# Patient Record
Sex: FEMALE | Race: BLACK OR AFRICAN AMERICAN | Employment: UNEMPLOYED | URBAN - METROPOLITAN AREA
[De-identification: names, ages, dates, MRNs, and addresses within clinical notes are randomized per-mention and may not be internally consistent; named-entity substitution may affect disease eponyms.]

---

## 2017-07-27 ENCOUNTER — HOSPITAL ENCOUNTER (OUTPATIENT)
Age: 32
Setting detail: SPECIMEN
Discharge: HOME | End: 2017-07-27
Payer: MEDICARE

## 2017-07-27 ENCOUNTER — OFFICE VISIT (OUTPATIENT)
Dept: INTERNAL MEDICINE | Age: 32
End: 2017-07-27
Payer: MEDICARE

## 2017-07-27 VITALS
BODY MASS INDEX: 32.96 KG/M2 | DIASTOLIC BLOOD PRESSURE: 69 MMHG | SYSTOLIC BLOOD PRESSURE: 108 MMHG | WEIGHT: 197.8 LBS | HEIGHT: 65 IN | HEART RATE: 81 BPM

## 2017-07-27 DIAGNOSIS — R10.84 GENERALIZED ABDOMINAL PAIN: ICD-10-CM

## 2017-07-27 DIAGNOSIS — E04.1 THYROID NODULE: ICD-10-CM

## 2017-07-27 DIAGNOSIS — G47.33 OSA (OBSTRUCTIVE SLEEP APNEA): ICD-10-CM

## 2017-07-27 DIAGNOSIS — J30.9 ALLERGIC RHINITIS, UNSPECIFIED ALLERGIC RHINITIS TRIGGER, UNSPECIFIED RHINITIS SEASONALITY: ICD-10-CM

## 2017-07-27 DIAGNOSIS — R35.89 POLYURIA: ICD-10-CM

## 2017-07-27 DIAGNOSIS — K21.9 GASTROESOPHAGEAL REFLUX DISEASE, ESOPHAGITIS PRESENCE NOT SPECIFIED: ICD-10-CM

## 2017-07-27 DIAGNOSIS — F33.1 MODERATE EPISODE OF RECURRENT MAJOR DEPRESSIVE DISORDER (HCC): Primary | ICD-10-CM

## 2017-07-27 DIAGNOSIS — E66.01 MORBID OBESITY DUE TO EXCESS CALORIES (HCC): ICD-10-CM

## 2017-07-27 DIAGNOSIS — K29.70 GASTRITIS WITHOUT BLEEDING, UNSPECIFIED CHRONICITY, UNSPECIFIED GASTRITIS TYPE: ICD-10-CM

## 2017-07-27 DIAGNOSIS — J30.9 ALLERGIC SINUSITIS: ICD-10-CM

## 2017-07-27 LAB
-: ABNORMAL
AMORPHOUS: ABNORMAL
BACTERIA: ABNORMAL
BILIRUBIN URINE: NEGATIVE
CASTS UA: ABNORMAL /LPF (ref 0–8)
COLOR: YELLOW
COMMENT UA: ABNORMAL
CRYSTALS, UA: ABNORMAL /HPF
EPITHELIAL CELLS UA: ABNORMAL /HPF (ref 0–5)
GLUCOSE URINE: NEGATIVE
HBA1C MFR BLD: 5.3 %
KETONES, URINE: NEGATIVE
LEUKOCYTE ESTERASE, URINE: ABNORMAL
LIPASE: 12 U/L (ref 13–60)
MUCUS: ABNORMAL
NITRITE, URINE: NEGATIVE
OTHER OBSERVATIONS UA: ABNORMAL
PH UA: 5.5 (ref 5–8)
PROTEIN UA: NEGATIVE
RBC UA: ABNORMAL /HPF (ref 0–4)
RENAL EPITHELIAL, UA: ABNORMAL /HPF
SPECIFIC GRAVITY UA: 1.03 (ref 1–1.03)
TRICHOMONAS: ABNORMAL
TSH SERPL DL<=0.05 MIU/L-ACNC: 1.69 MIU/L (ref 0.3–5)
TURBIDITY: ABNORMAL
URINE HGB: NEGATIVE
UROBILINOGEN, URINE: NORMAL
WBC UA: ABNORMAL /HPF (ref 0–5)
YEAST: ABNORMAL

## 2017-07-27 PROCEDURE — 82306 VITAMIN D 25 HYDROXY: CPT

## 2017-07-27 PROCEDURE — 83690 ASSAY OF LIPASE: CPT

## 2017-07-27 PROCEDURE — 83036 HEMOGLOBIN GLYCOSYLATED A1C: CPT | Performed by: INTERNAL MEDICINE

## 2017-07-27 PROCEDURE — 36415 COLL VENOUS BLD VENIPUNCTURE: CPT

## 2017-07-27 PROCEDURE — 84443 ASSAY THYROID STIM HORMONE: CPT

## 2017-07-27 PROCEDURE — 99204 OFFICE O/P NEW MOD 45 MIN: CPT | Performed by: INTERNAL MEDICINE

## 2017-07-27 RX ORDER — OXYMETAZOLINE HYDROCHLORIDE 0.05 G/100ML
2 SPRAY NASAL 2 TIMES DAILY
Qty: 1 BOTTLE | Refills: 0 | Status: SHIPPED | OUTPATIENT
Start: 2017-07-27 | End: 2018-01-25 | Stop reason: SDUPTHER

## 2017-07-27 RX ORDER — CITALOPRAM 10 MG/1
10 TABLET ORAL DAILY
Qty: 30 TABLET | Refills: 3 | Status: SHIPPED | OUTPATIENT
Start: 2017-07-27 | End: 2020-12-29 | Stop reason: ALTCHOICE

## 2017-07-27 RX ORDER — NITROFURANTOIN 25; 75 MG/1; MG/1
100 CAPSULE ORAL 2 TIMES DAILY
Qty: 14 CAPSULE | Refills: 0 | Status: SHIPPED | OUTPATIENT
Start: 2017-07-27 | End: 2017-08-03

## 2017-07-27 RX ORDER — LORATADINE 10 MG/1
10 TABLET ORAL DAILY
Qty: 30 TABLET | Refills: 0 | Status: SHIPPED | OUTPATIENT
Start: 2017-07-27 | End: 2018-01-25 | Stop reason: SDUPTHER

## 2017-07-27 RX ORDER — OMEPRAZOLE 20 MG/1
20 CAPSULE, DELAYED RELEASE ORAL DAILY
Qty: 30 CAPSULE | Refills: 3 | Status: SHIPPED | OUTPATIENT
Start: 2017-07-27 | End: 2017-08-24

## 2017-07-27 RX ORDER — ECHINACEA PURPUREA EXTRACT 125 MG
1 TABLET ORAL PRN
Qty: 1 BOTTLE | Refills: 3 | Status: SHIPPED | OUTPATIENT
Start: 2017-07-27 | End: 2020-12-29

## 2017-07-27 ASSESSMENT — ENCOUNTER SYMPTOMS
BLOOD IN STOOL: 0
ABDOMINAL PAIN: 1
SORE THROAT: 1
HEMOPTYSIS: 0
DOUBLE VISION: 0
BLURRED VISION: 0
DIARRHEA: 0
SPUTUM PRODUCTION: 0
STRIDOR: 0
NAUSEA: 0
CONSTIPATION: 1
BACK PAIN: 0
EYE PAIN: 0
ORTHOPNEA: 0
SHORTNESS OF BREATH: 0
WHEEZING: 0
COUGH: 0
HEARTBURN: 1
PHOTOPHOBIA: 0

## 2017-07-27 ASSESSMENT — PATIENT HEALTH QUESTIONNAIRE - PHQ9
4. FEELING TIRED OR HAVING LITTLE ENERGY: 3
8. MOVING OR SPEAKING SO SLOWLY THAT OTHER PEOPLE COULD HAVE NOTICED. OR THE OPPOSITE, BEING SO FIGETY OR RESTLESS THAT YOU HAVE BEEN MOVING AROUND A LOT MORE THAN USUAL: 0
6. FEELING BAD ABOUT YOURSELF - OR THAT YOU ARE A FAILURE OR HAVE LET YOURSELF OR YOUR FAMILY DOWN: 1
2. FEELING DOWN, DEPRESSED OR HOPELESS: 2
5. POOR APPETITE OR OVEREATING: 0
7. TROUBLE CONCENTRATING ON THINGS, SUCH AS READING THE NEWSPAPER OR WATCHING TELEVISION: 3
10. IF YOU CHECKED OFF ANY PROBLEMS, HOW DIFFICULT HAVE THESE PROBLEMS MADE IT FOR YOU TO DO YOUR WORK, TAKE CARE OF THINGS AT HOME, OR GET ALONG WITH OTHER PEOPLE: 3
3. TROUBLE FALLING OR STAYING ASLEEP: 3
SUM OF ALL RESPONSES TO PHQ QUESTIONS 1-9: 15
9. THOUGHTS THAT YOU WOULD BE BETTER OFF DEAD, OR OF HURTING YOURSELF: 0
1. LITTLE INTEREST OR PLEASURE IN DOING THINGS: 3
SUM OF ALL RESPONSES TO PHQ9 QUESTIONS 1 & 2: 5

## 2017-07-28 DIAGNOSIS — E55.9 VITAMIN D DEFICIENCY: Primary | ICD-10-CM

## 2017-07-28 RX ORDER — NITROFURANTOIN 25 MG/5ML
100 SUSPENSION ORAL 2 TIMES DAILY
Qty: 280 ML | Refills: 0 | Status: SHIPPED | OUTPATIENT
Start: 2017-07-28 | End: 2017-08-04

## 2017-07-31 DIAGNOSIS — E55.9 VITAMIN D DEFICIENCY: Primary | ICD-10-CM

## 2017-07-31 LAB — VITAMIN D 25-HYDROXY: 8.7 NG/ML (ref 30–100)

## 2017-07-31 RX ORDER — ERGOCALCIFEROL 1.25 MG/1
50000 CAPSULE ORAL WEEKLY
Qty: 8 CAPSULE | Refills: 0 | Status: SHIPPED | OUTPATIENT
Start: 2017-07-31 | End: 2021-03-09

## 2017-07-31 RX ORDER — MELATONIN
1 DAILY
Qty: 90 TABLET | Refills: 1 | Status: SHIPPED | OUTPATIENT
Start: 2017-07-31

## 2017-07-31 RX ORDER — SULFAMETHOXAZOLE AND TRIMETHOPRIM 200; 40 MG/5ML; MG/5ML
160 SUSPENSION ORAL 2 TIMES DAILY
Qty: 280 ML | Refills: 0 | Status: SHIPPED | OUTPATIENT
Start: 2017-07-31 | End: 2017-08-07

## 2017-07-31 RX ORDER — ANTACID TABLETS 500 MG/1
1 TABLET, CHEWABLE ORAL 2 TIMES DAILY WITH MEALS
Qty: 90 TABLET | Refills: 1 | Status: SHIPPED | OUTPATIENT
Start: 2017-07-31 | End: 2017-08-24

## 2017-08-02 ENCOUNTER — TELEPHONE (OUTPATIENT)
Dept: INTERNAL MEDICINE | Age: 32
End: 2017-08-02

## 2017-08-02 DIAGNOSIS — R10.10 PAIN OF UPPER ABDOMEN: Primary | ICD-10-CM

## 2017-08-03 ENCOUNTER — OFFICE VISIT (OUTPATIENT)
Dept: BEHAVIORAL/MENTAL HEALTH CLINIC | Age: 32
End: 2017-08-03
Payer: MEDICARE

## 2017-08-03 DIAGNOSIS — F33.1 MODERATE EPISODE OF RECURRENT MAJOR DEPRESSIVE DISORDER (HCC): Primary | ICD-10-CM

## 2017-08-03 PROCEDURE — 90791 PSYCH DIAGNOSTIC EVALUATION: CPT | Performed by: PSYCHOLOGIST

## 2017-08-11 ENCOUNTER — TELEPHONE (OUTPATIENT)
Dept: INTERNAL MEDICINE | Age: 32
End: 2017-08-11

## 2017-08-24 ENCOUNTER — OFFICE VISIT (OUTPATIENT)
Dept: BEHAVIORAL/MENTAL HEALTH CLINIC | Age: 32
End: 2017-08-24
Payer: MEDICARE

## 2017-08-24 ENCOUNTER — OFFICE VISIT (OUTPATIENT)
Dept: INTERNAL MEDICINE | Age: 32
End: 2017-08-24
Payer: MEDICARE

## 2017-08-24 ENCOUNTER — HOSPITAL ENCOUNTER (OUTPATIENT)
Age: 32
Setting detail: SPECIMEN
Discharge: HOME | End: 2017-08-24
Payer: MEDICARE

## 2017-08-24 VITALS
HEART RATE: 77 BPM | BODY MASS INDEX: 32.82 KG/M2 | HEIGHT: 65 IN | DIASTOLIC BLOOD PRESSURE: 74 MMHG | WEIGHT: 197 LBS | SYSTOLIC BLOOD PRESSURE: 101 MMHG

## 2017-08-24 DIAGNOSIS — E04.1 THYROID NODULE: ICD-10-CM

## 2017-08-24 DIAGNOSIS — F33.1 MODERATE EPISODE OF RECURRENT MAJOR DEPRESSIVE DISORDER (HCC): ICD-10-CM

## 2017-08-24 DIAGNOSIS — F33.1 MODERATE EPISODE OF RECURRENT MAJOR DEPRESSIVE DISORDER (HCC): Primary | ICD-10-CM

## 2017-08-24 DIAGNOSIS — N39.0 URINARY TRACT INFECTION WITHOUT HEMATURIA, SITE UNSPECIFIED: Primary | ICD-10-CM

## 2017-08-24 LAB
T4 TOTAL: 7.6 UG/DL (ref 4.5–12)
TSH SERPL DL<=0.05 MIU/L-ACNC: 1.47 MIU/L (ref 0.3–5)

## 2017-08-24 PROCEDURE — 99213 OFFICE O/P EST LOW 20 MIN: CPT | Performed by: INTERNAL MEDICINE

## 2017-08-24 PROCEDURE — 87086 URINE CULTURE/COLONY COUNT: CPT

## 2017-08-24 PROCEDURE — 84443 ASSAY THYROID STIM HORMONE: CPT

## 2017-08-24 PROCEDURE — 36415 COLL VENOUS BLD VENIPUNCTURE: CPT

## 2017-08-24 PROCEDURE — 84436 ASSAY OF TOTAL THYROXINE: CPT

## 2017-08-24 PROCEDURE — 90832 PSYTX W PT 30 MINUTES: CPT | Performed by: PSYCHOLOGIST

## 2017-08-24 PROCEDURE — 84482 T3 REVERSE: CPT

## 2017-08-24 RX ORDER — CIPROFLOXACIN 250 MG/1
250 TABLET, FILM COATED ORAL 2 TIMES DAILY
Qty: 10 TABLET | Refills: 0 | Status: SHIPPED | OUTPATIENT
Start: 2017-08-24 | End: 2017-08-29

## 2017-08-24 ASSESSMENT — ENCOUNTER SYMPTOMS
NAUSEA: 0
PHOTOPHOBIA: 0
ABDOMINAL PAIN: 1
COUGH: 0
SHORTNESS OF BREATH: 0
DIARRHEA: 0
STRIDOR: 0
SPUTUM PRODUCTION: 0
BACK PAIN: 0
BLURRED VISION: 0
EYE PAIN: 0
WHEEZING: 0
DOUBLE VISION: 0
HEMOPTYSIS: 0
ORTHOPNEA: 0
HEARTBURN: 1
BLOOD IN STOOL: 0

## 2017-08-25 ENCOUNTER — TELEPHONE (OUTPATIENT)
Dept: INTERNAL MEDICINE | Age: 32
End: 2017-08-25

## 2017-08-25 DIAGNOSIS — D50.9 IRON DEFICIENCY ANEMIA, UNSPECIFIED IRON DEFICIENCY ANEMIA TYPE: Primary | ICD-10-CM

## 2017-08-25 LAB
CULTURE: NORMAL
CULTURE: NORMAL
Lab: NORMAL
SPECIMEN DESCRIPTION: NORMAL
STATUS: NORMAL

## 2017-08-27 LAB — T3 REVERSE: 23.1 NG/DL (ref 9–27)

## 2017-08-30 ENCOUNTER — TELEPHONE (OUTPATIENT)
Dept: BEHAVIORAL/MENTAL HEALTH CLINIC | Age: 32
End: 2017-08-30

## 2017-09-01 ENCOUNTER — TELEPHONE (OUTPATIENT)
Dept: INTERNAL MEDICINE | Age: 32
End: 2017-09-01

## 2017-09-06 DIAGNOSIS — G47.33 OSA (OBSTRUCTIVE SLEEP APNEA): Primary | ICD-10-CM

## 2017-09-15 ENCOUNTER — HOSPITAL ENCOUNTER (OUTPATIENT)
Age: 32
Setting detail: SPECIMEN
Discharge: HOME | End: 2017-09-15
Payer: MEDICARE

## 2017-09-15 ENCOUNTER — OFFICE VISIT (OUTPATIENT)
Dept: OBGYN | Age: 32
End: 2017-09-15
Payer: MEDICARE

## 2017-09-15 VITALS
SYSTOLIC BLOOD PRESSURE: 101 MMHG | HEART RATE: 92 BPM | RESPIRATION RATE: 16 BRPM | HEIGHT: 64 IN | DIASTOLIC BLOOD PRESSURE: 72 MMHG | WEIGHT: 195.6 LBS | BODY MASS INDEX: 33.39 KG/M2 | TEMPERATURE: 97.6 F

## 2017-09-15 DIAGNOSIS — Z01.419 WELL WOMAN EXAM WITH ROUTINE GYNECOLOGICAL EXAM: Primary | ICD-10-CM

## 2017-09-15 DIAGNOSIS — D50.9 IRON DEFICIENCY ANEMIA, UNSPECIFIED IRON DEFICIENCY ANEMIA TYPE: ICD-10-CM

## 2017-09-15 DIAGNOSIS — N92.6 IRREGULAR MENSES: ICD-10-CM

## 2017-09-15 DIAGNOSIS — E55.9 VITAMIN D DEFICIENCY: ICD-10-CM

## 2017-09-15 PROBLEM — E66.9 OBESITY: Status: ACTIVE | Noted: 2017-09-15

## 2017-09-15 PROBLEM — E66.01 MORBID OBESITY (HCC): Status: ACTIVE | Noted: 2017-09-15

## 2017-09-15 PROBLEM — E66.01 MORBID OBESITY (HCC): Status: RESOLVED | Noted: 2017-09-15 | Resolved: 2017-09-15

## 2017-09-15 PROBLEM — H50.9 STRABISMUS: Status: ACTIVE | Noted: 2017-09-15

## 2017-09-15 LAB
FERRITIN: 15 UG/L (ref 13–150)
IRON SATURATION: 7 % (ref 20–55)
IRON: 26 UG/DL (ref 37–145)
TOTAL IRON BINDING CAPACITY: 353 UG/DL (ref 250–450)
UNSATURATED IRON BINDING CAPACITY: 327 UG/DL (ref 112–347)

## 2017-09-15 PROCEDURE — 83550 IRON BINDING TEST: CPT

## 2017-09-15 PROCEDURE — 82728 ASSAY OF FERRITIN: CPT

## 2017-09-15 PROCEDURE — 83540 ASSAY OF IRON: CPT

## 2017-09-15 PROCEDURE — 36415 COLL VENOUS BLD VENIPUNCTURE: CPT

## 2017-09-15 PROCEDURE — 99385 PREV VISIT NEW AGE 18-39: CPT | Performed by: STUDENT IN AN ORGANIZED HEALTH CARE EDUCATION/TRAINING PROGRAM

## 2017-09-15 RX ORDER — ALBUTEROL SULFATE 90 UG/1
2 AEROSOL, METERED RESPIRATORY (INHALATION)
COMMUNITY
Start: 2017-09-04 | End: 2017-10-04

## 2017-09-27 ENCOUNTER — HOSPITAL ENCOUNTER (OUTPATIENT)
Dept: SLEEP CENTER | Age: 32
Discharge: HOME | End: 2017-09-27
Payer: MEDICARE

## 2017-09-27 PROCEDURE — G0399 HOME SLEEP TEST/TYPE 3 PORTA: HCPCS

## 2017-09-28 ENCOUNTER — TELEPHONE (OUTPATIENT)
Dept: INTERNAL MEDICINE | Age: 32
End: 2017-09-28

## 2017-09-28 DIAGNOSIS — D50.9 IRON DEFICIENCY ANEMIA, UNSPECIFIED IRON DEFICIENCY ANEMIA TYPE: Primary | ICD-10-CM

## 2017-10-03 ENCOUNTER — OFFICE VISIT (OUTPATIENT)
Dept: BEHAVIORAL/MENTAL HEALTH CLINIC | Age: 32
End: 2017-10-03
Payer: MEDICARE

## 2017-10-03 DIAGNOSIS — F33.1 MODERATE EPISODE OF RECURRENT MAJOR DEPRESSIVE DISORDER (HCC): Primary | ICD-10-CM

## 2017-10-03 PROCEDURE — 90832 PSYTX W PT 30 MINUTES: CPT | Performed by: PSYCHOLOGIST

## 2017-10-03 RX ORDER — LANOLIN ALCOHOL/MO/W.PET/CERES
325 CREAM (GRAM) TOPICAL 2 TIMES DAILY
Qty: 90 TABLET | Refills: 3 | Status: ON HOLD | OUTPATIENT
Start: 2017-10-03 | End: 2021-06-26 | Stop reason: SDUPTHER

## 2017-10-03 NOTE — PROGRESS NOTES
Behavioral Health Consultation  Maryse HillmanyDago ANTOINE  Licensed Clinical Psychologist #[id number]  10/3/2017  11:26 AM- 12:00 PM    Time spent with Patient: 34 minutes  This is patient's third  [facility] consultation. Reason for Consult:  depression  Referring Provider: Elisa Baker MD    Feedback given to PCP. S:   Previous Recommendation(s):   1. I recommend that you reschedule the sleep study- this is likely a significant variable related to your distress. - yes, completed home study. 2. Please follow up today with your PCP regarding your thyroid functioning.- complete. 3. I recommend that your link with [facility]; I suggest this agency, as they advertise the specific testing that I believe that you need. I recommend you participate with a neuropsychological evaluation to explore symptoms of mood, anxiety, personality, and memory functioning. Additional services that would likely help you are case management (help you to be organize and schedule appointments and help with follow through), counseling/therapy, and possibly (if you are interested) in psychiatric or medication intervention. - complete awaiting results. She was asked to release copy of the report. Pt moving forward with appointments and a vareity of providers. She expressed frustration that providers do not trust in previous evaluations and feels that it is unfair that she has to [place] over. \"    O:  MSE:   Mood was Anxious and Irritable, with Apathetic affect. Suicidal ideation was denied. Homicidal ideation was denied. Hygiene was Good. Dress was Appropriate and Casual.   Behavior was mistrustful with No observation or self-report of difficulties standing/ambulating. Attitude was Help-seeking. Eye-contact was Fair. Speech: rate- WNL, rhythm-  WNL, volume- WNL  Verbalizations were  verbose. Thought processes were intact and goal-oriented without evidence of delusions, hallucinations, obsessions, or davie; with significant cognitive distortions. History:    Medications:   Current Outpatient Prescriptions   Medication Sig Dispense Refill    ferrous sulfate (FE TABS) 325 (65 Fe) MG EC tablet Take 1 tablet by mouth 2 times daily 90 tablet 3    albuterol sulfate  (90 Base) MCG/ACT inhaler Inhale 2 puffs into the lungs      vitamin D (ERGOCALCIFEROL) 91400 units CAPS capsule Take 1 capsule by mouth once a week for 8 doses 8 capsule 0    Cholecalciferol (VITAMIN D3) 1000 units TABS Take 1 tablet by mouth daily 90 tablet 1    citalopram (CELEXA) 10 MG tablet Take 1 tablet by mouth daily 30 tablet 3    sodium chloride (ALTAMIST SPRAY) 0.65 % nasal spray 1 spray by Nasal route as needed for Congestion 1 Bottle 3    loratadine (CLARITIN) 10 MG tablet Take 1 tablet by mouth daily 30 tablet 0     No current facility-administered medications for this visit. Social History:   Social History     Social History    Marital status: Single     Spouse name: N/A    Number of children: N/A    Years of education: N/A     Occupational History    Not on file. Social History Main Topics    Smoking status: Current Every Day Smoker     Types: Cigars    Smokeless tobacco: Never Used    Alcohol use No    Drug use: No    Sexual activity: Yes     Partners: Male     Other Topics Concern    Not on file     Social History Narrative       TOBACCO:   reports that she has been smoking Cigars. She has never used smokeless tobacco.  ETOH:   reports that she does not drink alcohol.     Family History:   Family History   Problem Relation Age of Onset    Depression Father     Thyroid Disease Father     Diabetes Sister

## 2017-10-10 ENCOUNTER — TELEPHONE (OUTPATIENT)
Dept: BEHAVIORAL/MENTAL HEALTH CLINIC | Age: 32
End: 2017-10-10

## 2017-10-13 ENCOUNTER — TELEPHONE (OUTPATIENT)
Dept: BEHAVIORAL/MENTAL HEALTH CLINIC | Age: 32
End: 2017-10-13

## 2017-10-16 ENCOUNTER — TELEPHONE (OUTPATIENT)
Dept: OBGYN | Age: 32
End: 2017-10-16

## 2017-10-16 NOTE — TELEPHONE ENCOUNTER
10/16/17 called pt @ 1:30pm regarding missed appt w/Dr. Christiano Harry left VM for pt to call office to r/s.

## 2017-10-19 ENCOUNTER — OFFICE VISIT (OUTPATIENT)
Dept: INTERNAL MEDICINE | Age: 32
End: 2017-10-19
Payer: MEDICARE

## 2017-10-19 VITALS
BODY MASS INDEX: 33.49 KG/M2 | HEIGHT: 64 IN | WEIGHT: 196.2 LBS | HEART RATE: 97 BPM | DIASTOLIC BLOOD PRESSURE: 86 MMHG | SYSTOLIC BLOOD PRESSURE: 139 MMHG

## 2017-10-19 DIAGNOSIS — R07.9 CHEST PAIN ON EXERTION: Primary | ICD-10-CM

## 2017-10-19 DIAGNOSIS — R13.10 DYSPHAGIA, UNSPECIFIED TYPE: ICD-10-CM

## 2017-10-19 DIAGNOSIS — F33.1 MODERATE EPISODE OF RECURRENT MAJOR DEPRESSIVE DISORDER (HCC): ICD-10-CM

## 2017-10-19 DIAGNOSIS — M25.50 ARTHRALGIA, UNSPECIFIED JOINT: ICD-10-CM

## 2017-10-19 DIAGNOSIS — F45.21 HYPOCHONDRIA: ICD-10-CM

## 2017-10-19 DIAGNOSIS — R07.9 CHEST PAIN ON EXERTION: ICD-10-CM

## 2017-10-19 DIAGNOSIS — R21 RASH: ICD-10-CM

## 2017-10-19 DIAGNOSIS — E04.1 THYROID NODULE: Primary | ICD-10-CM

## 2017-10-19 PROCEDURE — G8417 CALC BMI ABV UP PARAM F/U: HCPCS | Performed by: INTERNAL MEDICINE

## 2017-10-19 PROCEDURE — 99214 OFFICE O/P EST MOD 30 MIN: CPT | Performed by: INTERNAL MEDICINE

## 2017-10-19 PROCEDURE — G8484 FLU IMMUNIZE NO ADMIN: HCPCS | Performed by: INTERNAL MEDICINE

## 2017-10-19 PROCEDURE — G8427 DOCREV CUR MEDS BY ELIG CLIN: HCPCS | Performed by: INTERNAL MEDICINE

## 2017-10-19 PROCEDURE — 4004F PT TOBACCO SCREEN RCVD TLK: CPT | Performed by: INTERNAL MEDICINE

## 2017-10-19 NOTE — PROGRESS NOTES
Name:  Erica Valera                                                                                   YOB: 1985     Patient Care Team:  Brandon Santos MD as PCP - General (Internal Medicine)     REASON FOR VISIT: First Visit, establish care      HISTORY OF PRESENTING ILLNESS:    History was obtained from the patient.  Erica Valera is a 33 y.o. is here FOR FOLLOW UP    \  Has history of thyroid nodule , had thyroid biopsy at [address] [place] [address] , in 3/2017 , begin nodular goitre   Missed zahira at [facility] yesterday   Wants to see physician for thyroid here  , referral was given last pavel lr for ENT but did not follow up      Feeling tired , sleepy all day , history of sleep apnea , not using cpap , states that she is getting that soon , she had sleep study done and is due to get c pap      chest pain and dyspnea on exertion which gets better on rest , family history of MI , smokes 3-4 cig /day     Pt report multiple complains every visit , wants to have thyroid antibodies , thyroid uptake , reports multiple joint pains , rash on face on exposure to sun , dysphagia , tired all day , not able to work because of vic issues , always comes in and request multiple testing to be done , pt was explained that all her issue could be sec to depression but she got upset and states that she is depressed because of her he lath issues    Following up with cate , states that they are not helping either       Ct neck : 5/2/2017  Impression: Large mass in the lower pole of the left lobe of the thyroid gland plunging into the superior mediastinum and displacing the trachea somewhat toward the right.  This has progressed dramatically since 2007.  Differential diagnosis would include both benign and malignant etiologies  PAST MEDICAL AND SURGICAL HISTORY:       Past Medical History             Diagnosis Date    Anxiety      Asthma      GERD (gastroesophageal reflux disease)      Major depression 4/30/2013    Negative for blood in stool, diarrhea, melena and nausea. .   Musculoskeletal: Positive for myalgias. Negative for back pain, joint pain and neck pain. Skin: Negative for itching and rash. Neurological: Positive for headaches. Negative for dizziness, tingling, tremors, sensory change, speech change, focal weakness and weakness. Psychiatric/Behavioral: Positive for depression and memory loss. Negative for hallucinations, substance abuse and suicidal ideas. The patient is nervous/anxious and has insomnia.                PHYSICAL EXAM:       Vitals          Vitals:     08/24/17 1300 08/24/17 1308 08/24/17 1312 08/24/17 1313   BP: 93/64 98/68 98/71 101/74   Site: Left Arm Left Arm Left Arm Left Arm   Position: Sitting Supine Sitting Standing   Cuff Size: Large Adult Large Adult Large Adult Large Adult   Pulse: 67 67 75 77   Weight: 197 lb (89.4 kg)         Height: 5' 5\" (1.651 m)               Physical Exam   Constitutional: She is oriented to person, place, and time. Morbid obese    HENT:   Head: Normocephalic and atraumatic. Eyes: Conjunctivae are normal. Right eye exhibits no discharge. Left eye exhibits no discharge. No scleral icterus. Neck: Normal range of motion. Pulmonary/Chest: Effort normal and breath sounds normal. No respiratory distress. She has no wheezes. She has no rales. Abdominal: Soft. She exhibits no distension. Musculoskeletal: Normal range of motion. She exhibits no edema, tenderness or deformity. Neurological: She is alert and oriented to person, place, and time. Skin: Skin is warm.    Psychiatric:    depressed       LABORATORY FINDINGS:    CBC:         Lab Results   Component Value Date     WBC 9.2 10/06/2016     HGB 12.8 10/06/2016      10/06/2016      05/08/2012      BMP:          Lab Results   Component Value Date      10/06/2016     K 4.0 10/06/2016      10/06/2016     CO2 29 10/06/2016     BUN 6 10/06/2016     CREATININE 0.68 10/06/2016     GLUCOSE 87 10/06/2016      Hemoglobin A1C:         Lab Results   Component Value Date     LABA1C 5.3 07/27/2017      Lipid profile:         Lab Results   Component Value Date     CHOL 136 10/31/2013     TRIG 80 10/31/2013     HDL 29 10/31/2013      Thyroid functions:         Lab Results   Component Value Date     TSH 1.69 07/27/2017      Hepatic functions:         Lab Results   Component Value Date     ALT 6 10/06/2016     AST 13 10/06/2016     PROT 7.2 10/06/2016     BILITOT 0.43 10/06/2016     LABALBU 4.0 10/06/2016      ASSESSMENT AND PLAN:   Oneyda Doe was seen today for results and blood pressure check.     Diagnoses and all orders for this visit:           1. Thyroid nodule    - [facility] Alexa Zee MD    2. Moderate episode of recurrent major depressive disorder ([facility] [address]      3. Arthralgia, unspecified joint    - ELLIE Screen With Reflex; Future  - C-Reactive Protein; Future    4. Rash    - ELLIE Screen With Reflex; Future    5. Dysphagia, unspecified type      6. Chest pain on exertion    - (Gxt) Stress Test Exercise W Out Myoview; Future    7. Hypochondria           INSTRUCTIONS:   · Return in about 3 months (around 11/24/2017).    · Ticarra received counseling on the following healthy behaviors: exercise     · Reviewed prior labs and health maintenance.       · Discussed use, benefit, and side effects of prescribed medications. Barriers to medication compliance addressed. All patient questions answered.   Pt voiced understanding.      · Patient given educational materials - see patient instructions     Massimo Calix MD  PGY-3 Internal Medicine Resident   [facility].  8/24/2017  2:04 PM

## 2017-10-31 DIAGNOSIS — G47.33 OSA (OBSTRUCTIVE SLEEP APNEA): ICD-10-CM

## 2017-11-06 ENCOUNTER — TELEPHONE (OUTPATIENT)
Dept: BEHAVIORAL/MENTAL HEALTH CLINIC | Age: 32
End: 2017-11-06

## 2017-11-06 NOTE — TELEPHONE ENCOUNTER
Attempted outreach after no call, no show. [facility] requesting call back to reschedule, provided [phone].

## 2017-11-14 ENCOUNTER — HOSPITAL ENCOUNTER (OUTPATIENT)
Age: 32
Setting detail: SPECIMEN
Discharge: HOME | End: 2017-11-14
Payer: MEDICARE

## 2017-11-14 DIAGNOSIS — M25.50 ARTHRALGIA, UNSPECIFIED JOINT: ICD-10-CM

## 2017-11-14 DIAGNOSIS — R21 RASH: ICD-10-CM

## 2017-11-14 LAB — C-REACTIVE PROTEIN: 26.4 MG/L (ref 0–5)

## 2017-11-14 PROCEDURE — 86038 ANTINUCLEAR ANTIBODIES: CPT

## 2017-11-14 PROCEDURE — 86140 C-REACTIVE PROTEIN: CPT

## 2017-11-14 PROCEDURE — 36415 COLL VENOUS BLD VENIPUNCTURE: CPT

## 2017-11-15 LAB — ANTI-NUCLEAR ANTIBODY (ANA): NEGATIVE

## 2017-11-28 ENCOUNTER — OFFICE VISIT (OUTPATIENT)
Dept: BEHAVIORAL/MENTAL HEALTH CLINIC | Age: 32
End: 2017-11-28
Payer: MEDICARE

## 2017-11-28 DIAGNOSIS — F33.1 MODERATE EPISODE OF RECURRENT MAJOR DEPRESSIVE DISORDER (HCC): Primary | ICD-10-CM

## 2017-11-28 PROCEDURE — 90832 PSYTX W PT 30 MINUTES: CPT | Performed by: PSYCHOLOGIST

## 2017-11-28 NOTE — Clinical Note
Pt has not been taking celexa for multiple months. What do you all think about switching her to Cymbalta? My thinking is this may help with her pain and mood.

## 2017-11-28 NOTE — PATIENT INSTRUCTIONS
1. Please track all the food and liquids that you put in your body each day. It is up to you if you would like to use the weekly tracking method or the daily method. Either way, track daily. 2. Start to fill in the calender to help you better stay organized. 3. Dr. Matthias Winston will consult with [facility]. 4. Be sure to return ENT's call to reschedule you. If they told you last month there was no opening until December, if you wait a month you may not have an appointment until January. 5. Follow up in 2 weeks.

## 2017-11-28 NOTE — PROGRESS NOTES
Behavioral Health Consultation  Meryl ANTOINE  Licensed Clinical Psychologist #[id number]  11/28/2017  11:33 AM- 12:05 PM    Time spent with Patient: 32 minutes  This is patient's fourth  [facility] consultation. Reason for Consult:  depression  Referring Provider: Brenda Coto MD    Feedback given to PCP. S:   Previous Recommendation(s) 10/3/17:   1. Follow up with [facility] and ask them to fax a copy of your report to your PCP Dr. Eden Fragoso or [phone].- in process  2. Follow up with referrals for ENT and GI- call [phone] should you run into problems.- no.  3. In your next visit with your PCP ask about a prescribed multivitamin that has Vitamin D and Iron in it. You may need to explain to Dr. Eden Fragoso that you cannot consistently afford to purchase these over the counter. -complete    Pt expresses concerned that no one is taking her fatigue seriously. Informed about consultations with [facility]; specifically, why additional testing was needed. Addressed her concern about fatigue; pt not drinking enough water and not eating adequate caloric intake or nutrition, which is likely a significant variable to her lack of energy. Pt has not bee taking Celexa for many months. Eating-yesterday:   2 bowls of chili     Exercise:  15 minutes stair stepper- resulted in winded     O:  MSE:   Mood was Depressed and Irritable, with Apathetic affect. Suicidal ideation was denied. Homicidal ideation was denied. Hygiene was Good. Dress was Appropriate and Casual.   Behavior was demanding with No observation or self-report of difficulties standing/ambulating. Attitude was Engageable and Suspicious. Eye-contact was Fair. Speech: rate- Rapid, rhythm-  WNL, volume- WNL  Verbalizations were  repetitive. Thought processes were intact and goal-oriented without evidence of delusions, hallucinations, obsessions, or davie; with significant cognitive distortions.    Associations were characterized by perseverative cognitive processes. Pt was orientated oriented to person, place, time, and general circumstances;  recent:  good and remote:  good. Insight and judgment were estimated to be fair to poor, AEB, a poor understanding of cyclical maladaptive patterns, and the ability to use insight to inform behavior change. A:   Agreed this writer will reach out to Dr. Gallito Jacobs about stopping Celxa and starting Cymbalta. Will also reach back out to [facility] for consultation regarding evaluation progress. Pt interventions:  Supportive techniques and Collaboratively set goals with pt re: ENT follow up, [facility] evaluation, fatigue      Pt Behavioral Change Plan:   1. Please track all the food and liquids that you put in your body each day. It is up to you if you would like to use the weekly tracking method or the daily method. Either way, track daily. 2. Start to fill in the calender to help you better stay organized. 3. Dr. Bret Patel will consult with [facility]. 4. Be sure to return ENT's call to reschedule you. If they told you last month there was no opening until December, if you wait a month you may not have an appointment until January. 5. Follow up in 2 weeks. Diagnosis:  The encounter diagnosis was Moderate episode of recurrent major depressive disorder ([facility] [address]       Diagnosis Date    Anxiety     Asthma     GERD (gastroesophageal reflux disease)     Major depression 4/30/2013    Morbid obesity 9/15/2017    Tension type headache 4/30/2013       History:    Medications:   Current Outpatient Prescriptions   Medication Sig Dispense Refill    Multiple Vitamin (MVI, CELEBRATE, CHEWABLE TABLET) Take 1 tablet by mouth daily 30 tablet 5    ferrous sulfate (FE TABS) 325 (65 Fe) MG EC tablet Take 1 tablet by mouth 2 times daily 90 tablet 3    vitamin D (ERGOCALCIFEROL) 53380 units CAPS capsule Take 1 capsule by mouth once a week for 8 doses 8 capsule 0    Cholecalciferol (VITAMIN D3) 1000 units TABS Take 1 tablet by mouth

## 2017-11-30 ENCOUNTER — TELEPHONE (OUTPATIENT)
Dept: INTERNAL MEDICINE | Age: 32
End: 2017-11-30

## 2017-11-30 RX ORDER — DULOXETIN HYDROCHLORIDE 30 MG/1
30 CAPSULE, DELAYED RELEASE ORAL DAILY
Qty: 30 CAPSULE | Refills: 3 | Status: SHIPPED | OUTPATIENT
Start: 2017-11-30 | End: 2020-12-29 | Stop reason: ALTCHOICE

## 2017-11-30 NOTE — TELEPHONE ENCOUNTER
Please call the patient and inform her that after the recommendation from Dr. Enriqueta Rosales , I have sent a prescription for Cymbalta to her pharmacy.   It will help her pain as well as her mood

## 2017-12-01 ENCOUNTER — TELEPHONE (OUTPATIENT)
Dept: BEHAVIORAL/MENTAL HEALTH CLINIC | Age: 32
End: 2017-12-01

## 2017-12-01 NOTE — TELEPHONE ENCOUNTER
PC from patient, informed of message to start Cymbalta instead of celexa, let her know it was at her pharmacy, pt expressed understanding.

## 2018-01-08 ENCOUNTER — TELEPHONE (OUTPATIENT)
Dept: INTERNAL MEDICINE | Age: 33
End: 2018-01-08

## 2018-01-08 NOTE — TELEPHONE ENCOUNTER
Received PC from patient wanting to schedule an appointment due to worsening fatigue and N&T/cold hands bilaterally. Patient states the fatigue has been present for a long time but symptoms seem to have worsened since Jenny. Patient notified that she has been dismissed from [facility] IM due to our no show policy and must come in through our walk in clinic on Thursday (01/11/18) to be seen. Patient states understanding.

## 2018-01-25 DIAGNOSIS — J30.9 ALLERGIC RHINITIS: ICD-10-CM

## 2018-01-25 DIAGNOSIS — J30.9 ALLERGIC SINUSITIS: ICD-10-CM

## 2018-01-26 RX ORDER — LORATADINE 10 MG/1
TABLET ORAL
Qty: 30 TABLET | Refills: 0 | Status: SHIPPED | OUTPATIENT
Start: 2018-01-26 | End: 2020-12-29 | Stop reason: ALTCHOICE

## 2018-01-26 RX ORDER — OXYMETAZOLINE HCL 0.05 %
SPRAY, NON-AEROSOL (ML) NASAL
Qty: 30 ML | Refills: 0 | Status: SHIPPED | OUTPATIENT
Start: 2018-01-26 | End: 2020-12-29

## 2018-02-18 DIAGNOSIS — E55.9 VITAMIN D DEFICIENCY: ICD-10-CM

## 2018-02-20 RX ORDER — ERGOCALCIFEROL 1.25 MG/1
CAPSULE ORAL
Qty: 8 CAPSULE | Refills: 0 | OUTPATIENT
Start: 2018-02-20

## 2018-02-24 ENCOUNTER — HOSPITAL ENCOUNTER (OUTPATIENT)
Dept: ULTRASOUND IMAGING | Age: 33
Discharge: HOME | End: 2018-02-26
Payer: MEDICARE

## 2018-02-24 DIAGNOSIS — E04.1 THYROID NODULE: ICD-10-CM

## 2018-02-24 PROCEDURE — 76536 US EXAM OF HEAD AND NECK: CPT

## 2018-03-02 ENCOUNTER — HOSPITAL ENCOUNTER (OUTPATIENT)
Dept: NON INVASIVE DIAGNOSTICS | Age: 33
Discharge: HOME | End: 2018-03-02
Payer: MEDICARE

## 2018-03-02 VITALS — BODY MASS INDEX: 34.33 KG/M2 | WEIGHT: 200 LBS

## 2018-03-02 DIAGNOSIS — R07.9 CHEST PAIN ON EXERTION: ICD-10-CM

## 2018-03-02 PROCEDURE — 93017 CV STRESS TEST TRACING ONLY: CPT | Performed by: NURSE PRACTITIONER

## 2018-03-22 ENCOUNTER — HOSPITAL ENCOUNTER (OUTPATIENT)
Age: 33
Setting detail: SPECIMEN
Discharge: HOME | End: 2018-03-22
Payer: MEDICARE

## 2018-03-22 LAB
ABSOLUTE EOS #: 0.16 K/UL (ref 0–0.44)
ABSOLUTE IMMATURE GRANULOCYTE: <0.03 K/UL (ref 0–0.3)
ABSOLUTE LYMPH #: 3.5 K/UL (ref 1.1–3.7)
ABSOLUTE MONO #: 0.43 K/UL (ref 0.1–1.2)
BASOPHILS # BLD: 1 % (ref 0–2)
BASOPHILS ABSOLUTE: 0.05 K/UL (ref 0–0.2)
BILIRUBIN URINE: NEGATIVE
CHOLESTEROL/HDL RATIO: 4.6
CHOLESTEROL: 142 MG/DL
COLOR: YELLOW
COMMENT UA: NORMAL
DIFFERENTIAL TYPE: ABNORMAL
EOSINOPHILS RELATIVE PERCENT: 2 % (ref 1–4)
ESTIMATED AVERAGE GLUCOSE: 108 MG/DL
FERRITIN: 8 UG/L (ref 13–150)
FOLATE: 9.1 NG/ML
GLUCOSE BLD-MCNC: 85 MG/DL (ref 70–99)
GLUCOSE URINE: NEGATIVE
HBA1C MFR BLD: 5.4 % (ref 4–6)
HCT VFR BLD CALC: 40.9 % (ref 36.3–47.1)
HDLC SERPL-MCNC: 31 MG/DL
HEMOGLOBIN: 12.1 G/DL (ref 11.9–15.1)
HIGH SENSITIVE C-REACTIVE PROTEIN: 11 MG/L
HOMOCYSTEINE: 9.5 UMOL/L
IMMATURE GRANULOCYTES: 0 %
INSULIN COMMENT: NORMAL
INSULIN REFERENCE RANGE:: NORMAL
INSULIN: 30.5 MU/L
IRON SATURATION: 23 % (ref 20–55)
IRON: 93 UG/DL (ref 37–145)
KETONES, URINE: NEGATIVE
LDL CHOLESTEROL: 96 MG/DL (ref 0–130)
LEUKOCYTE ESTERASE, URINE: NEGATIVE
LYMPHOCYTES # BLD: 49 % (ref 24–43)
MCH RBC QN AUTO: 24.7 PG (ref 25.2–33.5)
MCHC RBC AUTO-ENTMCNC: 29.6 G/DL (ref 28.4–34.8)
MCV RBC AUTO: 83.6 FL (ref 82.6–102.9)
MONOCYTES # BLD: 6 % (ref 3–12)
NITRITE, URINE: NEGATIVE
NRBC AUTOMATED: 0.4 PER 100 WBC
PDW BLD-RTO: 17.7 % (ref 11.8–14.4)
PH UA: 5.5 (ref 5–8)
PLATELET # BLD: 307 K/UL (ref 138–453)
PLATELET ESTIMATE: ABNORMAL
PMV BLD AUTO: 9.7 FL (ref 8.1–13.5)
PROTEIN UA: NEGATIVE
RBC # BLD: 4.89 M/UL (ref 3.95–5.11)
RBC # BLD: ABNORMAL 10*6/UL
SEG NEUTROPHILS: 42 % (ref 36–65)
SEGMENTED NEUTROPHILS ABSOLUTE COUNT: 2.96 K/UL (ref 1.5–8.1)
SPECIFIC GRAVITY UA: 1.02 (ref 1–1.03)
T3 FREE: 3.23 PG/ML (ref 2.02–4.43)
THYROXINE, FREE: 1.35 NG/DL (ref 0.93–1.7)
TOTAL IRON BINDING CAPACITY: 399 UG/DL (ref 250–450)
TRIGL SERPL-MCNC: 74 MG/DL
TSH SERPL DL<=0.05 MIU/L-ACNC: 2.11 MIU/L (ref 0.3–5)
TURBIDITY: CLEAR
UNSATURATED IRON BINDING CAPACITY: 306 UG/DL (ref 112–347)
URINE HGB: NEGATIVE
UROBILINOGEN, URINE: NORMAL
VITAMIN B-12: 344 PG/ML (ref 232–1245)
VITAMIN D 25-HYDROXY: 9 NG/ML (ref 30–100)
VLDLC SERPL CALC-MCNC: ABNORMAL MG/DL (ref 1–30)
WBC # BLD: 7.1 K/UL (ref 3.5–11.3)
WBC # BLD: ABNORMAL 10*3/UL

## 2018-03-22 PROCEDURE — 84207 ASSAY OF VITAMIN B-6: CPT

## 2018-03-22 PROCEDURE — 86038 ANTINUCLEAR ANTIBODIES: CPT

## 2018-03-22 PROCEDURE — 86225 DNA ANTIBODY NATIVE: CPT

## 2018-03-22 PROCEDURE — 82746 ASSAY OF FOLIC ACID SERUM: CPT

## 2018-03-22 PROCEDURE — 84482 T3 REVERSE: CPT

## 2018-03-22 PROCEDURE — 82728 ASSAY OF FERRITIN: CPT

## 2018-03-22 PROCEDURE — 81383 HLA II TYPING 1 ALLELE HR: CPT

## 2018-03-22 PROCEDURE — 86376 MICROSOMAL ANTIBODY EACH: CPT

## 2018-03-22 PROCEDURE — 80061 LIPID PANEL: CPT

## 2018-03-22 PROCEDURE — 83036 HEMOGLOBIN GLYCOSYLATED A1C: CPT

## 2018-03-22 PROCEDURE — 84443 ASSAY THYROID STIM HORMONE: CPT

## 2018-03-22 PROCEDURE — 36415 COLL VENOUS BLD VENIPUNCTURE: CPT

## 2018-03-22 PROCEDURE — 83090 ASSAY OF HOMOCYSTEINE: CPT

## 2018-03-22 PROCEDURE — 83525 ASSAY OF INSULIN: CPT

## 2018-03-22 PROCEDURE — 82947 ASSAY GLUCOSE BLOOD QUANT: CPT

## 2018-03-22 PROCEDURE — 86141 C-REACTIVE PROTEIN HS: CPT

## 2018-03-22 PROCEDURE — 83540 ASSAY OF IRON: CPT

## 2018-03-22 PROCEDURE — 84425 ASSAY OF VITAMIN B-1: CPT

## 2018-03-22 PROCEDURE — 85025 COMPLETE CBC W/AUTO DIFF WBC: CPT

## 2018-03-22 PROCEDURE — 81003 URINALYSIS AUTO W/O SCOPE: CPT

## 2018-03-22 PROCEDURE — 84481 FREE ASSAY (FT-3): CPT

## 2018-03-22 PROCEDURE — 86628 CANDIDA ANTIBODY: CPT

## 2018-03-22 PROCEDURE — 82607 VITAMIN B-12: CPT

## 2018-03-22 PROCEDURE — 86800 THYROGLOBULIN ANTIBODY: CPT

## 2018-03-22 PROCEDURE — 84439 ASSAY OF FREE THYROXINE: CPT

## 2018-03-22 PROCEDURE — 83550 IRON BINDING TEST: CPT

## 2018-03-22 PROCEDURE — 82306 VITAMIN D 25 HYDROXY: CPT

## 2018-03-22 PROCEDURE — 83735 ASSAY OF MAGNESIUM: CPT

## 2018-03-22 PROCEDURE — 83018 HEAVY METAL QUAN EACH NES: CPT

## 2018-03-23 LAB
ANTI DNA DOUBLE STRANDED: 42 IU/ML
ANTI-NUCLEAR ANTIBODY (ANA): NEGATIVE
THYROGLOBULIN AB: <20 IU/ML (ref 0–40)
THYROID PEROXIDASE (TPO) AB: <10 IU/ML (ref 0–35)

## 2018-03-26 LAB
HLA-DQ: POSITIVE
MAGNESIUM RBC: 1.8 MMOL/L (ref 1.5–3.1)
NARCOLEPSY, SPECIMEN: NORMAL
VITAMIN B6: 25 NMOL/L (ref 20–125)

## 2018-03-27 LAB
CANDIDA ANTIBODIES, QUAL: NORMAL
VITAMIN B1 WHOLE BLOOD: 75 NMOL/L (ref 70–180)

## 2018-03-28 LAB — T3 REVERSE: 21.5 NG/DL (ref 9–27)

## 2018-03-31 DIAGNOSIS — K21.9 GASTROESOPHAGEAL REFLUX DISEASE, ESOPHAGITIS PRESENCE NOT SPECIFIED: ICD-10-CM

## 2018-04-02 LAB
CREATININE URINE /24 HR: NORMAL MG/D (ref 700–1600)
CREATININE URINE /VOLUME: 217 MG/DL
HOURS COLLECTED: 0
IODINE, UR UG/DAY: NORMAL (ref 93–1125)
IODINE, UR UG/L: 39.3 UG/L (ref 26–705)
IODINE, URINE: 18.1 UG/G CRT
URINE VOLUME: NORMAL

## 2018-04-02 RX ORDER — OMEPRAZOLE 20 MG/1
CAPSULE, DELAYED RELEASE ORAL
Qty: 30 CAPSULE | Refills: 3 | Status: SHIPPED | OUTPATIENT
Start: 2018-04-02 | End: 2021-03-19 | Stop reason: CLARIF

## 2018-07-19 ENCOUNTER — HOSPITAL ENCOUNTER (OUTPATIENT)
Age: 33
Setting detail: SPECIMEN
Discharge: HOME | End: 2018-07-19
Payer: MEDICARE

## 2018-07-19 LAB
ABSOLUTE EOS #: 0.13 K/UL (ref 0–0.44)
ABSOLUTE IMMATURE GRANULOCYTE: <0.03 K/UL (ref 0–0.3)
ABSOLUTE LYMPH #: 2.6 K/UL (ref 1.1–3.7)
ABSOLUTE MONO #: 0.43 K/UL (ref 0.1–1.2)
BASOPHILS # BLD: 1 % (ref 0–2)
BASOPHILS ABSOLUTE: 0.05 K/UL (ref 0–0.2)
DIFFERENTIAL TYPE: ABNORMAL
EOSINOPHILS RELATIVE PERCENT: 2 % (ref 1–4)
HCG QUALITATIVE: NEGATIVE
HCT VFR BLD CALC: 40.9 % (ref 36.3–47.1)
HEMOGLOBIN: 12.3 G/DL (ref 11.9–15.1)
HIGH SENSITIVE C-REACTIVE PROTEIN: 50.9 MG/L
IMMATURE GRANULOCYTES: 0 %
IRON SATURATION: 28 % (ref 20–55)
IRON: 98 UG/DL (ref 37–145)
LYMPHOCYTES # BLD: 38 % (ref 24–43)
MCH RBC QN AUTO: 25.8 PG (ref 25.2–33.5)
MCHC RBC AUTO-ENTMCNC: 30.1 G/DL (ref 28.4–34.8)
MCV RBC AUTO: 85.7 FL (ref 82.6–102.9)
MONOCYTES # BLD: 6 % (ref 3–12)
NRBC AUTOMATED: 0.3 PER 100 WBC
PDW BLD-RTO: 15.5 % (ref 11.8–14.4)
PLATELET # BLD: 322 K/UL (ref 138–453)
PLATELET ESTIMATE: ABNORMAL
PMV BLD AUTO: 9.7 FL (ref 8.1–13.5)
RBC # BLD: 4.77 M/UL (ref 3.95–5.11)
RBC # BLD: ABNORMAL 10*6/UL
SEG NEUTROPHILS: 53 % (ref 36–65)
SEGMENTED NEUTROPHILS ABSOLUTE COUNT: 3.57 K/UL (ref 1.5–8.1)
T3 FREE: 2.97 PG/ML (ref 2.02–4.43)
THYROXINE, FREE: 1.33 NG/DL (ref 0.93–1.7)
TOTAL IRON BINDING CAPACITY: 349 UG/DL (ref 250–450)
TSH SERPL DL<=0.05 MIU/L-ACNC: 1.07 MIU/L (ref 0.3–5)
UNSATURATED IRON BINDING CAPACITY: 251 UG/DL (ref 112–347)
VITAMIN B-12: 461 PG/ML (ref 232–1245)
VITAMIN D 25-HYDROXY: 13.8 NG/ML (ref 30–100)
WBC # BLD: 6.8 K/UL (ref 3.5–11.3)
WBC # BLD: ABNORMAL 10*3/UL

## 2018-07-19 PROCEDURE — 84443 ASSAY THYROID STIM HORMONE: CPT

## 2018-07-19 PROCEDURE — 83516 IMMUNOASSAY NONANTIBODY: CPT

## 2018-07-19 PROCEDURE — 83540 ASSAY OF IRON: CPT

## 2018-07-19 PROCEDURE — 82627 DEHYDROEPIANDROSTERONE: CPT

## 2018-07-19 PROCEDURE — 84703 CHORIONIC GONADOTROPIN ASSAY: CPT

## 2018-07-19 PROCEDURE — 83550 IRON BINDING TEST: CPT

## 2018-07-19 PROCEDURE — 84140 ASSAY OF PREGNENOLONE: CPT

## 2018-07-19 PROCEDURE — 36415 COLL VENOUS BLD VENIPUNCTURE: CPT

## 2018-07-19 PROCEDURE — 82607 VITAMIN B-12: CPT

## 2018-07-19 PROCEDURE — 84439 ASSAY OF FREE THYROXINE: CPT

## 2018-07-19 PROCEDURE — 84482 T3 REVERSE: CPT

## 2018-07-19 PROCEDURE — 84481 FREE ASSAY (FT-3): CPT

## 2018-07-19 PROCEDURE — 85025 COMPLETE CBC W/AUTO DIFF WBC: CPT

## 2018-07-19 PROCEDURE — 82306 VITAMIN D 25 HYDROXY: CPT

## 2018-07-19 PROCEDURE — 84207 ASSAY OF VITAMIN B-6: CPT

## 2018-07-19 PROCEDURE — 86800 THYROGLOBULIN ANTIBODY: CPT

## 2018-07-19 PROCEDURE — 83735 ASSAY OF MAGNESIUM: CPT

## 2018-07-19 PROCEDURE — 86141 C-REACTIVE PROTEIN HS: CPT

## 2018-07-19 PROCEDURE — 86376 MICROSOMAL ANTIBODY EACH: CPT

## 2018-07-19 PROCEDURE — 82784 ASSAY IGA/IGD/IGG/IGM EACH: CPT

## 2018-07-20 LAB
DHEAS (DHEA SULFATE): 315 UG/DL (ref 45–270)
GLIADIN DEAMINIDATED PEPTIDE AB IGA: 0.9 U/ML
GLIADIN DEAMINIDATED PEPTIDE AB IGG: <0.4 U/ML
IGA: 250 MG/DL (ref 70–400)
THYROGLOBULIN AB: <20 IU/ML (ref 0–40)
THYROID PEROXIDASE (TPO) AB: 38.5 IU/ML (ref 0–35)
TISSUE TRANSGLUTAMINASE IGA: 0.5 U/ML

## 2018-07-23 LAB
-: NORMAL
PREGNENOLONE: 17 NG/DL (ref 15–132)
REASON FOR REJECTION: NORMAL
VITAMIN B6: 34.2 NMOL/L (ref 20–125)
ZZ NTE CLEAN UP: ORDERED TEST: NORMAL
ZZ NTE WITH NAME CLEAN UP: SPECIMEN SOURCE: NORMAL

## 2018-07-24 LAB — T3 REVERSE: 22 NG/DL (ref 9–27)

## 2019-02-05 ENCOUNTER — HOSPITAL ENCOUNTER (OUTPATIENT)
Age: 34
Setting detail: SPECIMEN
Discharge: HOME | End: 2019-02-05
Payer: MEDICARE

## 2019-02-05 LAB
ABSOLUTE EOS #: 0.15 K/UL (ref 0–0.44)
ABSOLUTE IMMATURE GRANULOCYTE: <0.03 K/UL (ref 0–0.3)
ABSOLUTE LYMPH #: 3.33 K/UL (ref 1.1–3.7)
ABSOLUTE MONO #: 0.54 K/UL (ref 0.1–1.2)
BASOPHILS # BLD: 1 % (ref 0–2)
BASOPHILS ABSOLUTE: 0.05 K/UL (ref 0–0.2)
CHOLESTEROL/HDL RATIO: 5.1
CHOLESTEROL: 132 MG/DL
DIFFERENTIAL TYPE: ABNORMAL
EOSINOPHILS RELATIVE PERCENT: 2 % (ref 1–4)
ESTIMATED AVERAGE GLUCOSE: 114 MG/DL
FERRITIN: 24 UG/L (ref 13–150)
GLUCOSE BLD-MCNC: 89 MG/DL (ref 70–99)
HBA1C MFR BLD: 5.6 % (ref 4–6)
HCT VFR BLD CALC: 39.7 % (ref 36.3–47.1)
HDLC SERPL-MCNC: 26 MG/DL
HEMOGLOBIN: 12.5 G/DL (ref 11.9–15.1)
HIGH SENSITIVE C-REACTIVE PROTEIN: 63 MG/L
IMMATURE GRANULOCYTES: 0 %
INSULIN COMMENT: 1155
INSULIN REFERENCE RANGE:: NORMAL
INSULIN: 18.9 MU/L
IRON SATURATION: 13 % (ref 20–55)
IRON: 41 UG/DL (ref 37–145)
LDL CHOLESTEROL: 89 MG/DL (ref 0–130)
LYMPHOCYTES # BLD: 40 % (ref 24–43)
MCH RBC QN AUTO: 26 PG (ref 25.2–33.5)
MCHC RBC AUTO-ENTMCNC: 31.5 G/DL (ref 28.4–34.8)
MCV RBC AUTO: 82.7 FL (ref 82.6–102.9)
MONOCYTES # BLD: 6 % (ref 3–12)
NRBC AUTOMATED: 0 PER 100 WBC
PDW BLD-RTO: 14.9 % (ref 11.8–14.4)
PLATELET # BLD: 311 K/UL (ref 138–453)
PLATELET ESTIMATE: ABNORMAL
PMV BLD AUTO: 9.7 FL (ref 8.1–13.5)
RBC # BLD: 4.8 M/UL (ref 3.95–5.11)
RBC # BLD: ABNORMAL 10*6/UL
RHEUMATOID FACTOR: <10 IU/ML
SEDIMENTATION RATE, ERYTHROCYTE: 32 MM (ref 0–20)
SEG NEUTROPHILS: 51 % (ref 36–65)
SEGMENTED NEUTROPHILS ABSOLUTE COUNT: 4.34 K/UL (ref 1.5–8.1)
T3 FREE: 2.85 PG/ML (ref 2.02–4.43)
T3 TOTAL: 111 NG/DL (ref 80–200)
THYROXINE, FREE: 1.36 NG/DL (ref 0.93–1.7)
TOTAL IRON BINDING CAPACITY: 313 UG/DL (ref 250–450)
TRIGL SERPL-MCNC: 86 MG/DL
TSH SERPL DL<=0.05 MIU/L-ACNC: 0.52 MIU/L (ref 0.3–5)
UNSATURATED IRON BINDING CAPACITY: 272 UG/DL (ref 112–347)
URIC ACID: 4.1 MG/DL (ref 2.4–5.7)
VITAMIN D 25-HYDROXY: 19 NG/ML (ref 30–100)
VLDLC SERPL CALC-MCNC: ABNORMAL MG/DL (ref 1–30)
WBC # BLD: 8.4 K/UL (ref 3.5–11.3)
WBC # BLD: ABNORMAL 10*3/UL

## 2019-02-06 LAB
DHEAS (DHEA SULFATE): 257 UG/DL (ref 45–270)
THYROGLOBULIN AB: <20 IU/ML (ref 0–40)
THYROID PEROXIDASE (TPO) AB: <10 IU/ML (ref 0–35)

## 2019-02-08 LAB
EBV DNA, PCR: NOT DETECTED
EBV SOURCE: NORMAL

## 2019-02-09 LAB — PREGNENOLONE: 57 NG/DL (ref 15–132)

## 2019-02-11 LAB — T3 REVERSE: 24.6 NG/DL (ref 9–27)

## 2019-03-27 ENCOUNTER — HOSPITAL ENCOUNTER (OUTPATIENT)
Age: 34
Setting detail: SPECIMEN
Discharge: HOME | End: 2019-03-27
Payer: MEDICARE

## 2019-03-27 LAB
BILIRUBIN URINE: NEGATIVE
COLOR: YELLOW
COMMENT UA: NORMAL
ESTIMATED AVERAGE GLUCOSE: 105 MG/DL
ESTRADIOL LEVEL: 172 PG/ML (ref 27–314)
FOLLICLE STIMULATING HORMONE: 3.4 U/L (ref 1.7–21.5)
GLUCOSE URINE: NEGATIVE
HBA1C MFR BLD: 5.3 % (ref 4–6)
KETONES, URINE: NEGATIVE
LEUKOCYTE ESTERASE, URINE: NEGATIVE
LH: 8.3 U/L (ref 1–95.6)
NITRITE, URINE: NEGATIVE
PH UA: 5.5 (ref 5–8)
PROLACTIN: 15.79 UG/L (ref 4.79–23.3)
PROTEIN UA: NEGATIVE
SPECIFIC GRAVITY UA: 1.02 (ref 1–1.03)
T3 FREE: 2.98 PG/ML (ref 2.02–4.43)
TESTOSTERONE TOTAL: 54 NG/DL (ref 20–70)
THYROXINE, FREE: 1.19 NG/DL (ref 0.93–1.7)
TSH SERPL DL<=0.05 MIU/L-ACNC: 0.73 MIU/L (ref 0.3–5)
TURBIDITY: CLEAR
URINE HGB: NEGATIVE
UROBILINOGEN, URINE: NORMAL

## 2019-03-28 LAB
CULTURE: NORMAL
DHEAS (DHEA SULFATE): 287 UG/DL (ref 45–270)
Lab: NORMAL
SPECIMEN DESCRIPTION: NORMAL
THYROID PEROXIDASE (TPO) AB: <10 IU/ML (ref 0–35)

## 2019-04-05 LAB — THYROID STIMULATING IMMUNOGLOB: 85 %

## 2019-04-11 ENCOUNTER — HOSPITAL ENCOUNTER (OUTPATIENT)
Age: 34
Setting detail: SPECIMEN
Discharge: HOME | End: 2019-04-11
Payer: MEDICARE

## 2019-04-11 LAB
CHOLESTEROL/HDL RATIO: 5
CHOLESTEROL: 149 MG/DL
GLUCOSE BLD-MCNC: 90 MG/DL (ref 70–99)
HDLC SERPL-MCNC: 30 MG/DL
INSULIN COMMENT: NORMAL
INSULIN REFERENCE RANGE:: NORMAL
INSULIN: 21.1 MU/L
LDL CHOLESTEROL: 106 MG/DL (ref 0–130)
TRIGL SERPL-MCNC: 66 MG/DL
VITAMIN D 25-HYDROXY: 22.1 NG/ML (ref 30–100)
VLDLC SERPL CALC-MCNC: ABNORMAL MG/DL (ref 1–30)

## 2019-04-13 LAB — CORTISOL SALIVARY: 0.1 UG/DL

## 2019-04-14 LAB — 17 OH PROGESTERONE: 16.29 NG/DL

## 2019-05-29 ENCOUNTER — HOSPITAL ENCOUNTER (OUTPATIENT)
Age: 34
Setting detail: SPECIMEN
Discharge: HOME | End: 2019-05-29
Payer: MEDICARE

## 2019-05-29 LAB
ABSOLUTE EOS #: 0.2 K/UL (ref 0–0.44)
ABSOLUTE IMMATURE GRANULOCYTE: <0.03 K/UL (ref 0–0.3)
ABSOLUTE LYMPH #: 3.33 K/UL (ref 1.1–3.7)
ABSOLUTE MONO #: 0.45 K/UL (ref 0.1–1.2)
BASOPHILS # BLD: 1 % (ref 0–2)
BASOPHILS ABSOLUTE: 0.06 K/UL (ref 0–0.2)
DIFFERENTIAL TYPE: ABNORMAL
EOSINOPHILS RELATIVE PERCENT: 3 % (ref 1–4)
ESTIMATED AVERAGE GLUCOSE: 103 MG/DL
FERRITIN: 25 UG/L (ref 13–150)
GLUCOSE BLD-MCNC: 86 MG/DL (ref 70–99)
HBA1C MFR BLD: 5.2 % (ref 4–6)
HCT VFR BLD CALC: 41.6 % (ref 36.3–47.1)
HEMOGLOBIN: 12.9 G/DL (ref 11.9–15.1)
HIGH SENSITIVE C-REACTIVE PROTEIN: 33.1 MG/L
IMMATURE GRANULOCYTES: 0 %
INSULIN COMMENT: NORMAL
INSULIN REFERENCE RANGE:: NORMAL
INSULIN: 11.7 MU/L
IRON SATURATION: 14 % (ref 20–55)
IRON: 45 UG/DL (ref 37–145)
LYMPHOCYTES # BLD: 44 % (ref 24–43)
MCH RBC QN AUTO: 26.7 PG (ref 25.2–33.5)
MCHC RBC AUTO-ENTMCNC: 31 G/DL (ref 28.4–34.8)
MCV RBC AUTO: 86 FL (ref 82.6–102.9)
MONOCYTES # BLD: 6 % (ref 3–12)
NRBC AUTOMATED: 0 PER 100 WBC
PDW BLD-RTO: 15.1 % (ref 11.8–14.4)
PLATELET # BLD: 316 K/UL (ref 138–453)
PLATELET ESTIMATE: ABNORMAL
PMV BLD AUTO: 9.6 FL (ref 8.1–13.5)
RBC # BLD: 4.84 M/UL (ref 3.95–5.11)
RBC # BLD: ABNORMAL 10*6/UL
SEG NEUTROPHILS: 46 % (ref 36–65)
SEGMENTED NEUTROPHILS ABSOLUTE COUNT: 3.52 K/UL (ref 1.5–8.1)
T3 FREE: 2.91 PG/ML (ref 2.02–4.43)
T3 TOTAL: 113 NG/DL (ref 80–200)
THYROXINE, FREE: 1.5 NG/DL (ref 0.93–1.7)
TOTAL IRON BINDING CAPACITY: 326 UG/DL (ref 250–450)
TSH SERPL DL<=0.05 MIU/L-ACNC: 0.85 MIU/L (ref 0.3–5)
UNSATURATED IRON BINDING CAPACITY: 281 UG/DL (ref 112–347)
VITAMIN B-12: 596 PG/ML (ref 232–1245)
VITAMIN D 25-HYDROXY: 23.8 NG/ML (ref 30–100)
WBC # BLD: 7.6 K/UL (ref 3.5–11.3)
WBC # BLD: ABNORMAL 10*3/UL

## 2019-05-30 LAB
DHEAS (DHEA SULFATE): 327 UG/DL (ref 45–270)
THYROGLOBULIN AB: <20 IU/ML (ref 0–40)
THYROID PEROXIDASE (TPO) AB: 12.1 IU/ML (ref 0–35)

## 2019-05-31 LAB
-: NORMAL
HCT VFR BLD CALC: 41.6 % (ref 36.3–47.1)
RBC FOLATE: 888.5 NG/ML (ref 280–903)
REASON FOR REJECTION: NORMAL
ZZ NTE CLEAN UP: ORDERED TEST: NORMAL
ZZ NTE WITH NAME CLEAN UP: SPECIMEN SOURCE: NORMAL

## 2019-06-01 LAB
ANDROSTENEDIONE: 1.05 NG/ML (ref 0.26–2.14)
T3 REVERSE: 26.2 NG/DL (ref 9–27)

## 2019-06-02 LAB — PREGNENOLONE: 38 NG/DL (ref 15–132)

## 2019-06-04 ENCOUNTER — HOSPITAL ENCOUNTER (OUTPATIENT)
Age: 34
Setting detail: SPECIMEN
Discharge: HOME | End: 2019-06-04
Payer: MEDICARE

## 2019-06-05 LAB
MISCELLANEOUS LAB TEST RESULT: NORMAL
TEST NAME: NORMAL

## 2019-06-07 LAB — MAGNESIUM RBC: 1.8 MMOL/L (ref 1.5–3.1)

## 2020-10-08 ENCOUNTER — HOSPITAL ENCOUNTER (OUTPATIENT)
Age: 35
Setting detail: SPECIMEN
Discharge: HOME | End: 2020-10-08
Payer: MEDICARE

## 2020-10-08 LAB
-: ABNORMAL
ABSOLUTE EOS #: 0.07 K/UL (ref 0–0.44)
ABSOLUTE IMMATURE GRANULOCYTE: <0.03 K/UL (ref 0–0.3)
ABSOLUTE LYMPH #: 2.86 K/UL (ref 1.1–3.7)
ABSOLUTE MONO #: 0.54 K/UL (ref 0.1–1.2)
ALBUMIN SERPL-MCNC: 3.6 G/DL (ref 3.5–5.2)
ALBUMIN/GLOBULIN RATIO: 1 (ref 1–2.5)
ALP BLD-CCNC: 68 U/L (ref 35–104)
ALT SERPL-CCNC: 6 U/L (ref 5–33)
AMORPHOUS: ABNORMAL
ANION GAP SERPL CALCULATED.3IONS-SCNC: 12 MMOL/L (ref 9–17)
AST SERPL-CCNC: 14 U/L
BACTERIA: ABNORMAL
BASOPHILS # BLD: 1 % (ref 0–2)
BASOPHILS ABSOLUTE: 0.04 K/UL (ref 0–0.2)
BILIRUB SERPL-MCNC: 0.37 MG/DL (ref 0.3–1.2)
BILIRUBIN URINE: ABNORMAL
BUN BLDV-MCNC: 3 MG/DL (ref 6–20)
BUN/CREAT BLD: ABNORMAL (ref 9–20)
CALCIUM SERPL-MCNC: 9.4 MG/DL (ref 8.6–10.4)
CASTS UA: ABNORMAL /LPF (ref 0–8)
CHLORIDE BLD-SCNC: 105 MMOL/L (ref 98–107)
CO2: 21 MMOL/L (ref 20–31)
COLOR: ABNORMAL
COMMENT UA: ABNORMAL
CREAT SERPL-MCNC: 0.55 MG/DL (ref 0.5–0.9)
CRYSTALS, UA: ABNORMAL /HPF
DIFFERENTIAL TYPE: NORMAL
EOSINOPHILS RELATIVE PERCENT: 1 % (ref 1–4)
EPITHELIAL CELLS UA: ABNORMAL /HPF (ref 0–5)
FERRITIN: 26 UG/L (ref 13–150)
GFR AFRICAN AMERICAN: >60 ML/MIN
GFR NON-AFRICAN AMERICAN: >60 ML/MIN
GFR SERPL CREATININE-BSD FRML MDRD: ABNORMAL ML/MIN/{1.73_M2}
GFR SERPL CREATININE-BSD FRML MDRD: ABNORMAL ML/MIN/{1.73_M2}
GLUCOSE BLD-MCNC: 83 MG/DL (ref 70–99)
GLUCOSE URINE: NEGATIVE
HCT VFR BLD CALC: 37.7 % (ref 36.3–47.1)
HEMOGLOBIN: 12.6 G/DL (ref 11.9–15.1)
HIGH SENSITIVE C-REACTIVE PROTEIN: 15.1 MG/L
HOMOCYSTEINE: 7.6 UMOL/L
IMMATURE GRANULOCYTES: 0 %
IRON SATURATION: 23 % (ref 20–55)
IRON: 64 UG/DL (ref 37–145)
KETONES, URINE: ABNORMAL
LEUKOCYTE ESTERASE, URINE: ABNORMAL
LYMPHOCYTES # BLD: 39 % (ref 24–43)
MCH RBC QN AUTO: 27.9 PG (ref 25.2–33.5)
MCHC RBC AUTO-ENTMCNC: 33.4 G/DL (ref 28.4–34.8)
MCV RBC AUTO: 83.4 FL (ref 82.6–102.9)
MONOCYTES # BLD: 7 % (ref 3–12)
MUCUS: ABNORMAL
NITRITE, URINE: NEGATIVE
NRBC AUTOMATED: 0 PER 100 WBC
OTHER OBSERVATIONS UA: ABNORMAL
PDW BLD-RTO: 13.4 % (ref 11.8–14.4)
PH UA: 5.5 (ref 5–8)
PLATELET # BLD: 267 K/UL (ref 138–453)
PLATELET ESTIMATE: NORMAL
PMV BLD AUTO: 10.1 FL (ref 8.1–13.5)
POTASSIUM SERPL-SCNC: 3.4 MMOL/L (ref 3.7–5.3)
PROTEIN UA: NEGATIVE
RBC # BLD: 4.52 M/UL (ref 3.95–5.11)
RBC # BLD: NORMAL 10*6/UL
RBC UA: ABNORMAL /HPF (ref 0–4)
RENAL EPITHELIAL, UA: ABNORMAL /HPF
SARS-COV-2 ANTIBODY, TOTAL: NEGATIVE
SEDIMENTATION RATE, ERYTHROCYTE: 50 MM (ref 0–20)
SEG NEUTROPHILS: 52 % (ref 36–65)
SEGMENTED NEUTROPHILS ABSOLUTE COUNT: 3.87 K/UL (ref 1.5–8.1)
SODIUM BLD-SCNC: 138 MMOL/L (ref 135–144)
SPECIFIC GRAVITY UA: 1.02 (ref 1–1.03)
T3 FREE: 3.44 PG/ML (ref 2.02–4.43)
THYROGLOBULIN AB: <20 IU/ML (ref 0–40)
THYROID PEROXIDASE (TPO) AB: 13.3 IU/ML (ref 0–35)
THYROXINE, FREE: 1.2 NG/DL (ref 0.93–1.7)
TOTAL IRON BINDING CAPACITY: 278 UG/DL (ref 250–450)
TOTAL PROTEIN: 7.1 G/DL (ref 6.4–8.3)
TRICHOMONAS: ABNORMAL
TSH SERPL DL<=0.05 MIU/L-ACNC: 0.25 MIU/L (ref 0.3–5)
TURBIDITY: ABNORMAL
UNSATURATED IRON BINDING CAPACITY: 214 UG/DL (ref 112–347)
URINE HGB: NEGATIVE
UROBILINOGEN, URINE: NORMAL
VITAMIN B-12: 347 PG/ML (ref 232–1245)
VITAMIN D 25-HYDROXY: 12.2 NG/ML (ref 30–100)
WBC # BLD: 7.4 K/UL (ref 3.5–11.3)
WBC # BLD: NORMAL 10*3/UL
WBC UA: ABNORMAL /HPF (ref 0–5)
YEAST: ABNORMAL

## 2020-10-09 LAB
ANTI DNA DOUBLE STRANDED: 9 IU/ML
ANTI-NUCLEAR ANTIBODY (ANA): NEGATIVE
CULTURE: NORMAL
HCT VFR BLD CALC: 37.7 % (ref 36.3–47.1)
LEAD BLOOD: <1 UG/DL (ref 0–4)
Lab: NORMAL
RBC FOLATE: 562.6 NG/ML (ref 280–903)
SPECIMEN DESCRIPTION: NORMAL

## 2020-10-10 LAB
RETINYL PALMITATE: <0.02 MG/L (ref 0–0.1)
VITAMIN A LEVEL: 0.19 MG/L (ref 0.3–1.2)
VITAMIN A, INTERP: ABNORMAL

## 2020-10-11 LAB
CO-ENZYME Q10: 0.6 MG/L (ref 0.4–1.6)
COPPER: 177.7 UG/DL (ref 80–155)
ZINC: 75.8 UG/DL (ref 60–120)

## 2020-10-12 LAB
-: NORMAL
REASON FOR REJECTION: NORMAL
VITAMIN B1 WHOLE BLOOD: 82 NMOL/L (ref 70–180)
VITAMIN B6: 17.1 NMOL/L (ref 20–125)
ZZ NTE CLEAN UP: ORDERED TEST: NORMAL
ZZ NTE WITH NAME CLEAN UP: SPECIMEN SOURCE: NORMAL

## 2020-10-13 LAB
CANDIDA IGA AB: 0.86 EV
CANDIDA IGG AB: 1.01 EV
CANDIDA IGM AB: 0.43 EV
METHYLMALONIC ACID: 0.11 UMOL/L (ref 0–0.4)
T3 REVERSE: 29.6 NG/DL (ref 9–27)

## 2020-10-16 ENCOUNTER — HOSPITAL ENCOUNTER (OUTPATIENT)
Age: 35
Setting detail: SPECIMEN
Discharge: HOME | End: 2020-10-16
Payer: MEDICARE

## 2020-10-16 LAB
AMYLASE: 43 U/L (ref 28–100)
LIPASE: 11 U/L (ref 13–60)

## 2020-10-20 LAB — MAGNESIUM RBC: 2 MMOL/L (ref 1.5–3.1)

## 2020-10-21 LAB — VITAMIN C: 51 UMOL/L (ref 23–114)

## 2020-12-20 ENCOUNTER — APPOINTMENT (OUTPATIENT)
Dept: ULTRASOUND IMAGING | Age: 35
DRG: 560 | End: 2020-12-20
Payer: MEDICARE

## 2020-12-20 ENCOUNTER — HOSPITAL ENCOUNTER (INPATIENT)
Age: 35
LOS: 1 days | Discharge: HOME | DRG: 560 | End: 2020-12-21
Attending: OBSTETRICS & GYNECOLOGY | Admitting: OBSTETRICS & GYNECOLOGY
Payer: MEDICARE

## 2020-12-20 ENCOUNTER — HOSPITAL ENCOUNTER (EMERGENCY)
Age: 35
Discharge: HOME | DRG: 560 | End: 2020-12-20
Attending: EMERGENCY MEDICINE
Payer: MEDICARE

## 2020-12-20 VITALS
HEART RATE: 96 BPM | DIASTOLIC BLOOD PRESSURE: 73 MMHG | SYSTOLIC BLOOD PRESSURE: 119 MMHG | OXYGEN SATURATION: 100 % | TEMPERATURE: 98.7 F | RESPIRATION RATE: 18 BRPM

## 2020-12-20 PROBLEM — Z72.0 TOBACCO USE: Status: ACTIVE | Noted: 2020-12-20

## 2020-12-20 PROBLEM — O09.90 HRP (HIGH RISK PREGNANCY): Status: RESOLVED | Noted: 2020-12-20 | Resolved: 2020-12-20

## 2020-12-20 PROBLEM — F41.9 ANXIETY: Status: ACTIVE | Noted: 2020-12-20

## 2020-12-20 PROBLEM — O09.90 HRP (HIGH RISK PREGNANCY): Status: ACTIVE | Noted: 2020-12-20

## 2020-12-20 PROBLEM — O09.90 HRP (HIGH RISK PREGNANCY), UNSPECIFIED TRIMESTER: Status: ACTIVE | Noted: 2020-12-20

## 2020-12-20 PROBLEM — E27.40 ADRENAL INSUFFICIENCY (HCC): Status: ACTIVE | Noted: 2020-12-20

## 2020-12-20 LAB
-: ABNORMAL
ABO/RH: NORMAL
ABSOLUTE EOS #: 0.03 K/UL (ref 0–0.44)
ABSOLUTE EOS #: <0.03 K/UL (ref 0–0.44)
ABSOLUTE IMMATURE GRANULOCYTE: 0.12 K/UL (ref 0–0.3)
ABSOLUTE IMMATURE GRANULOCYTE: 0.2 K/UL (ref 0–0.3)
ABSOLUTE LYMPH #: 1.68 K/UL (ref 1.1–3.7)
ABSOLUTE LYMPH #: 2.02 K/UL (ref 1.1–3.7)
ABSOLUTE MONO #: 1.13 K/UL (ref 0.1–1.2)
ABSOLUTE MONO #: 1.31 K/UL (ref 0.1–1.2)
AMORPHOUS: ABNORMAL
AMPHETAMINE SCREEN URINE: NEGATIVE
ANION GAP SERPL CALCULATED.3IONS-SCNC: 12 MMOL/L (ref 9–17)
ANTIBODY SCREEN: NEGATIVE
BACTERIA: ABNORMAL
BARBITURATE SCREEN URINE: NEGATIVE
BASOPHILS # BLD: 0 % (ref 0–2)
BASOPHILS # BLD: 0 % (ref 0–2)
BASOPHILS ABSOLUTE: 0.03 K/UL (ref 0–0.2)
BASOPHILS ABSOLUTE: 0.04 K/UL (ref 0–0.2)
BENZODIAZEPINE SCREEN, URINE: NEGATIVE
BILIRUBIN URINE: NEGATIVE
BLOOD BANK SPECIMEN: NORMAL
BUN BLDV-MCNC: 5 MG/DL (ref 6–20)
BUN/CREAT BLD: ABNORMAL (ref 9–20)
BUPRENORPHINE URINE: NORMAL
CALCIUM SERPL-MCNC: 9.5 MG/DL (ref 8.6–10.4)
CANNABINOID SCREEN URINE: NEGATIVE
CASTS UA: ABNORMAL /LPF (ref 0–8)
CHLORIDE BLD-SCNC: 101 MMOL/L (ref 98–107)
CO2: 21 MMOL/L (ref 20–31)
COCAINE METABOLITE, URINE: NEGATIVE
COLOR: YELLOW
COMMENT UA: ABNORMAL
CREAT SERPL-MCNC: 0.46 MG/DL (ref 0.5–0.9)
CRYSTALS, UA: ABNORMAL /HPF
DIFFERENTIAL TYPE: ABNORMAL
DIFFERENTIAL TYPE: ABNORMAL
DIRECT EXAM: ABNORMAL
EOSINOPHILS RELATIVE PERCENT: 0 % (ref 1–4)
EOSINOPHILS RELATIVE PERCENT: 0 % (ref 1–4)
EPITHELIAL CELLS UA: ABNORMAL /HPF (ref 0–5)
GFR AFRICAN AMERICAN: >60 ML/MIN
GFR NON-AFRICAN AMERICAN: >60 ML/MIN
GFR SERPL CREATININE-BSD FRML MDRD: ABNORMAL ML/MIN/{1.73_M2}
GFR SERPL CREATININE-BSD FRML MDRD: ABNORMAL ML/MIN/{1.73_M2}
GLUCOSE BLD-MCNC: 94 MG/DL (ref 70–99)
GLUCOSE URINE: NEGATIVE
HCG QUALITATIVE: POSITIVE
HCG QUANTITATIVE: ABNORMAL IU/L
HCT VFR BLD CALC: 32.9 % (ref 36.3–47.1)
HCT VFR BLD CALC: 35.7 % (ref 36.3–47.1)
HEMOGLOBIN: 10.6 G/DL (ref 11.9–15.1)
HEMOGLOBIN: 11.2 G/DL (ref 11.9–15.1)
HEPATITIS B SURFACE ANTIGEN: NONREACTIVE
HEPATITIS C ANTIBODY: NONREACTIVE
HIV AG/AB: NONREACTIVE
IMMATURE GRANULOCYTES: 1 %
IMMATURE GRANULOCYTES: 1 %
KETONES, URINE: ABNORMAL
LEUKOCYTE ESTERASE, URINE: ABNORMAL
LYMPHOCYTES # BLD: 11 % (ref 24–43)
LYMPHOCYTES # BLD: 8 % (ref 24–43)
Lab: ABNORMAL
MCH RBC QN AUTO: 28.1 PG (ref 25.2–33.5)
MCH RBC QN AUTO: 28.3 PG (ref 25.2–33.5)
MCHC RBC AUTO-ENTMCNC: 31.4 G/DL (ref 28.4–34.8)
MCHC RBC AUTO-ENTMCNC: 32.2 G/DL (ref 28.4–34.8)
MCV RBC AUTO: 88 FL (ref 82.6–102.9)
MCV RBC AUTO: 89.5 FL (ref 82.6–102.9)
MDMA URINE: NORMAL
METHADONE SCREEN, URINE: NEGATIVE
METHAMPHETAMINE, URINE: NORMAL
MONOCYTES # BLD: 6 % (ref 3–12)
MONOCYTES # BLD: 7 % (ref 3–12)
MUCUS: ABNORMAL
NITRITE, URINE: NEGATIVE
NRBC AUTOMATED: 0 PER 100 WBC
NRBC AUTOMATED: 0 PER 100 WBC
OPIATES, URINE: NEGATIVE
OTHER OBSERVATIONS UA: ABNORMAL
OXYCODONE SCREEN URINE: NEGATIVE
PDW BLD-RTO: 13.9 % (ref 11.8–14.4)
PDW BLD-RTO: 14 % (ref 11.8–14.4)
PH UA: 6 (ref 5–8)
PHENCYCLIDINE, URINE: NEGATIVE
PLATELET # BLD: 278 K/UL (ref 138–453)
PLATELET # BLD: 289 K/UL (ref 138–453)
PLATELET ESTIMATE: ABNORMAL
PLATELET ESTIMATE: ABNORMAL
PMV BLD AUTO: 9.4 FL (ref 8.1–13.5)
PMV BLD AUTO: 9.7 FL (ref 8.1–13.5)
POTASSIUM SERPL-SCNC: 3.5 MMOL/L (ref 3.7–5.3)
PROPOXYPHENE, URINE: NORMAL
PROTEIN UA: ABNORMAL
RBC # BLD: 3.74 M/UL (ref 3.95–5.11)
RBC # BLD: 3.99 M/UL (ref 3.95–5.11)
RBC # BLD: ABNORMAL 10*6/UL
RBC # BLD: ABNORMAL 10*6/UL
RBC UA: ABNORMAL /HPF (ref 0–4)
RENAL EPITHELIAL, UA: ABNORMAL /HPF
RUBV IGG SER QL: 373.3 IU/ML
SEG NEUTROPHILS: 81 % (ref 36–65)
SEG NEUTROPHILS: 85 % (ref 36–65)
SEGMENTED NEUTROPHILS ABSOLUTE COUNT: 14.69 K/UL (ref 1.5–8.1)
SEGMENTED NEUTROPHILS ABSOLUTE COUNT: 17.56 K/UL (ref 1.5–8.1)
SODIUM BLD-SCNC: 134 MMOL/L (ref 135–144)
SPECIFIC GRAVITY UA: 1.03 (ref 1–1.03)
SPECIMEN DESCRIPTION: ABNORMAL
T. PALLIDUM, IGG: NONREACTIVE
TEST INFORMATION: NORMAL
TRICHOMONAS: ABNORMAL
TRICYCLIC ANTIDEPRESSANTS, UR: NORMAL
TURBIDITY: ABNORMAL
URINE HGB: NEGATIVE
UROBILINOGEN, URINE: NORMAL
WBC # BLD: 18.2 K/UL (ref 3.5–11.3)
WBC # BLD: 20.6 K/UL (ref 3.5–11.3)
WBC # BLD: ABNORMAL 10*3/UL
WBC # BLD: ABNORMAL 10*3/UL
WBC UA: ABNORMAL /HPF (ref 0–5)
YEAST: ABNORMAL

## 2020-12-20 PROCEDURE — 6360000002 HC RX W HCPCS: Performed by: STUDENT IN AN ORGANIZED HEALTH CARE EDUCATION/TRAINING PROGRAM

## 2020-12-20 PROCEDURE — 6370000000 HC RX 637 (ALT 250 FOR IP): Performed by: STUDENT IN AN ORGANIZED HEALTH CARE EDUCATION/TRAINING PROGRAM

## 2020-12-20 PROCEDURE — 86900 BLOOD TYPING SEROLOGIC ABO: CPT

## 2020-12-20 PROCEDURE — 87591 N.GONORRHOEAE DNA AMP PROB: CPT

## 2020-12-20 PROCEDURE — 2500000003 HC RX 250 WO HCPCS: Performed by: STUDENT IN AN ORGANIZED HEALTH CARE EDUCATION/TRAINING PROGRAM

## 2020-12-20 PROCEDURE — 86920 COMPATIBILITY TEST SPIN: CPT

## 2020-12-20 PROCEDURE — 6360000002 HC RX W HCPCS

## 2020-12-20 PROCEDURE — 2580000003 HC RX 258: Performed by: STUDENT IN AN ORGANIZED HEALTH CARE EDUCATION/TRAINING PROGRAM

## 2020-12-20 PROCEDURE — 96374 THER/PROPH/DIAG INJ IV PUSH: CPT

## 2020-12-20 PROCEDURE — 88305 TISSUE EXAM BY PATHOLOGIST: CPT

## 2020-12-20 PROCEDURE — 86762 RUBELLA ANTIBODY: CPT

## 2020-12-20 PROCEDURE — 87480 CANDIDA DNA DIR PROBE: CPT

## 2020-12-20 PROCEDURE — 87086 URINE CULTURE/COLONY COUNT: CPT

## 2020-12-20 PROCEDURE — 59409 OBSTETRICAL CARE: CPT | Performed by: OBSTETRICS & GYNECOLOGY

## 2020-12-20 PROCEDURE — 87340 HEPATITIS B SURFACE AG IA: CPT

## 2020-12-20 PROCEDURE — 80307 DRUG TEST PRSMV CHEM ANLYZR: CPT

## 2020-12-20 PROCEDURE — 7200000001 HC VAGINAL DELIVERY

## 2020-12-20 PROCEDURE — 87491 CHLMYD TRACH DNA AMP PROBE: CPT

## 2020-12-20 PROCEDURE — 81001 URINALYSIS AUTO W/SCOPE: CPT

## 2020-12-20 PROCEDURE — 36415 COLL VENOUS BLD VENIPUNCTURE: CPT

## 2020-12-20 PROCEDURE — 96372 THER/PROPH/DIAG INJ SC/IM: CPT

## 2020-12-20 PROCEDURE — 84703 CHORIONIC GONADOTROPIN ASSAY: CPT

## 2020-12-20 PROCEDURE — 87660 TRICHOMONAS VAGIN DIR PROBE: CPT

## 2020-12-20 PROCEDURE — 99285 EMERGENCY DEPT VISIT HI MDM: CPT

## 2020-12-20 PROCEDURE — 84702 CHORIONIC GONADOTROPIN TEST: CPT

## 2020-12-20 PROCEDURE — 86901 BLOOD TYPING SEROLOGIC RH(D): CPT

## 2020-12-20 PROCEDURE — 87389 HIV-1 AG W/HIV-1&-2 AB AG IA: CPT

## 2020-12-20 PROCEDURE — 76805 OB US >/= 14 WKS SNGL FETUS: CPT

## 2020-12-20 PROCEDURE — 86850 RBC ANTIBODY SCREEN: CPT

## 2020-12-20 PROCEDURE — 1220000000 HC SEMI PRIVATE OB R&B

## 2020-12-20 PROCEDURE — 87510 GARDNER VAG DNA DIR PROBE: CPT

## 2020-12-20 PROCEDURE — 86780 TREPONEMA PALLIDUM: CPT

## 2020-12-20 PROCEDURE — 80048 BASIC METABOLIC PNL TOTAL CA: CPT

## 2020-12-20 PROCEDURE — 86803 HEPATITIS C AB TEST: CPT

## 2020-12-20 PROCEDURE — 85025 COMPLETE CBC W/AUTO DIFF WBC: CPT

## 2020-12-20 RX ORDER — MORPHINE SULFATE 4 MG/ML
INJECTION, SOLUTION INTRAMUSCULAR; INTRAVENOUS
Status: COMPLETED
Start: 2020-12-20 | End: 2020-12-20

## 2020-12-20 RX ORDER — IBUPROFEN 600 MG/1
600 TABLET ORAL EVERY 6 HOURS PRN
Status: DISCONTINUED | OUTPATIENT
Start: 2020-12-20 | End: 2020-12-21 | Stop reason: HOSPADM

## 2020-12-20 RX ORDER — METRONIDAZOLE 500 MG/1
500 TABLET ORAL EVERY 12 HOURS SCHEDULED
Status: DISCONTINUED | OUTPATIENT
Start: 2020-12-20 | End: 2020-12-20

## 2020-12-20 RX ORDER — KETOROLAC TROMETHAMINE 30 MG/ML
30 INJECTION, SOLUTION INTRAMUSCULAR; INTRAVENOUS ONCE
Status: DISCONTINUED | OUTPATIENT
Start: 2020-12-20 | End: 2020-12-20

## 2020-12-20 RX ORDER — CLINDAMYCIN PHOSPHATE 900 MG/50ML
900 INJECTION INTRAVENOUS EVERY 8 HOURS
Status: DISCONTINUED | OUTPATIENT
Start: 2020-12-20 | End: 2020-12-20

## 2020-12-20 RX ORDER — IBUPROFEN 600 MG/1
600 TABLET ORAL EVERY 6 HOURS PRN
Qty: 30 TABLET | Refills: 0 | Status: SHIPPED | OUTPATIENT
Start: 2020-12-20 | End: 2021-02-22 | Stop reason: HOSPADM

## 2020-12-20 RX ORDER — ACETAMINOPHEN 500 MG
1000 TABLET ORAL EVERY 6 HOURS PRN
Status: DISCONTINUED | OUTPATIENT
Start: 2020-12-20 | End: 2020-12-21 | Stop reason: HOSPADM

## 2020-12-20 RX ORDER — ONDANSETRON 2 MG/ML
4 INJECTION INTRAMUSCULAR; INTRAVENOUS EVERY 6 HOURS PRN
Status: DISCONTINUED | OUTPATIENT
Start: 2020-12-20 | End: 2020-12-21 | Stop reason: HOSPADM

## 2020-12-20 RX ORDER — METOCLOPRAMIDE HYDROCHLORIDE 5 MG/ML
10 INJECTION INTRAMUSCULAR; INTRAVENOUS ONCE
Status: DISCONTINUED | OUTPATIENT
Start: 2020-12-20 | End: 2020-12-20 | Stop reason: HOSPADM

## 2020-12-20 RX ORDER — METRONIDAZOLE 500 MG/1
500 TABLET ORAL EVERY 12 HOURS SCHEDULED
Qty: 14 TABLET | Refills: 0 | Status: SHIPPED | OUTPATIENT
Start: 2020-12-21 | End: 2020-12-28

## 2020-12-20 RX ORDER — ONDANSETRON 2 MG/ML
4 INJECTION INTRAMUSCULAR; INTRAVENOUS EVERY 6 HOURS PRN
Status: DISCONTINUED | OUTPATIENT
Start: 2020-12-20 | End: 2020-12-20

## 2020-12-20 RX ORDER — DOCUSATE SODIUM 100 MG/1
100 CAPSULE, LIQUID FILLED ORAL 2 TIMES DAILY
Status: DISCONTINUED | OUTPATIENT
Start: 2020-12-20 | End: 2020-12-21 | Stop reason: HOSPADM

## 2020-12-20 RX ORDER — METRONIDAZOLE 500 MG/1
500 TABLET ORAL EVERY 12 HOURS SCHEDULED
Status: DISCONTINUED | OUTPATIENT
Start: 2020-12-20 | End: 2020-12-21 | Stop reason: HOSPADM

## 2020-12-20 RX ORDER — MISOPROSTOL 100 UG/1
1000 TABLET ORAL ONCE
Status: COMPLETED | OUTPATIENT
Start: 2020-12-20 | End: 2020-12-20

## 2020-12-20 RX ORDER — SODIUM CHLORIDE 0.9 % (FLUSH) 0.9 %
10 SYRINGE (ML) INJECTION PRN
Status: DISCONTINUED | OUTPATIENT
Start: 2020-12-20 | End: 2020-12-21 | Stop reason: HOSPADM

## 2020-12-20 RX ORDER — LANOLIN 72 %
OINTMENT (GRAM) TOPICAL PRN
Status: DISCONTINUED | OUTPATIENT
Start: 2020-12-20 | End: 2020-12-21 | Stop reason: HOSPADM

## 2020-12-20 RX ORDER — AZITHROMYCIN 250 MG/1
1000 TABLET, FILM COATED ORAL ONCE
Status: COMPLETED | OUTPATIENT
Start: 2020-12-20 | End: 2020-12-20

## 2020-12-20 RX ORDER — FENTANYL CITRATE 50 UG/ML
50 INJECTION, SOLUTION INTRAMUSCULAR; INTRAVENOUS ONCE
Status: DISCONTINUED | OUTPATIENT
Start: 2020-12-20 | End: 2020-12-20

## 2020-12-20 RX ORDER — NITROFURANTOIN 25; 75 MG/1; MG/1
100 CAPSULE ORAL 2 TIMES DAILY
Qty: 14 CAPSULE | Refills: 0 | Status: SHIPPED | OUTPATIENT
Start: 2020-12-20 | End: 2020-12-27

## 2020-12-20 RX ORDER — 0.9 % SODIUM CHLORIDE 0.9 %
1000 INTRAVENOUS SOLUTION INTRAVENOUS ONCE
Status: COMPLETED | OUTPATIENT
Start: 2020-12-20 | End: 2020-12-20

## 2020-12-20 RX ORDER — PSEUDOEPHEDRINE HCL 30 MG
100 TABLET ORAL 2 TIMES DAILY
Qty: 60 CAPSULE | Refills: 0 | Status: SHIPPED | OUTPATIENT
Start: 2020-12-21 | End: 2020-12-29

## 2020-12-20 RX ORDER — ACETAMINOPHEN 500 MG
1000 TABLET ORAL EVERY 6 HOURS PRN
Status: DISCONTINUED | OUTPATIENT
Start: 2020-12-20 | End: 2020-12-20

## 2020-12-20 RX ORDER — MORPHINE SULFATE 4 MG/ML
4 INJECTION, SOLUTION INTRAMUSCULAR; INTRAVENOUS ONCE
Status: COMPLETED | OUTPATIENT
Start: 2020-12-20 | End: 2020-12-20

## 2020-12-20 RX ORDER — DIPHENHYDRAMINE HCL 25 MG
25 TABLET ORAL EVERY 4 HOURS PRN
Status: DISCONTINUED | OUTPATIENT
Start: 2020-12-20 | End: 2020-12-21 | Stop reason: HOSPADM

## 2020-12-20 RX ORDER — ACETAMINOPHEN 500 MG
1000 TABLET ORAL ONCE
Status: DISCONTINUED | OUTPATIENT
Start: 2020-12-20 | End: 2020-12-20 | Stop reason: HOSPADM

## 2020-12-20 RX ORDER — KETOROLAC TROMETHAMINE 30 MG/ML
30 INJECTION, SOLUTION INTRAMUSCULAR; INTRAVENOUS EVERY 6 HOURS PRN
Status: DISCONTINUED | OUTPATIENT
Start: 2020-12-20 | End: 2020-12-21 | Stop reason: HOSPADM

## 2020-12-20 RX ADMIN — AZITHROMYCIN MONOHYDRATE 1000 MG: 250 TABLET ORAL at 10:14

## 2020-12-20 RX ADMIN — MISOPROSTOL 800 MCG: 100 TABLET ORAL at 19:25

## 2020-12-20 RX ADMIN — METRONIDAZOLE 500 MG: 500 TABLET, FILM COATED ORAL at 10:52

## 2020-12-20 RX ADMIN — MORPHINE SULFATE 4 MG: 4 INJECTION INTRAVENOUS at 03:37

## 2020-12-20 RX ADMIN — SODIUM CHLORIDE 1000 ML: 9 INJECTION, SOLUTION INTRAVENOUS at 02:30

## 2020-12-20 RX ADMIN — GENTAMICIN SULFATE 334 MG: 40 INJECTION, SOLUTION INTRAMUSCULAR; INTRAVENOUS at 10:13

## 2020-12-20 RX ADMIN — MORPHINE SULFATE 4 MG: 4 INJECTION, SOLUTION INTRAMUSCULAR; INTRAVENOUS at 16:45

## 2020-12-20 RX ADMIN — CLINDAMYCIN PHOSPHATE 900 MG: 900 INJECTION, SOLUTION INTRAVENOUS at 11:20

## 2020-12-20 RX ADMIN — MORPHINE SULFATE 4 MG: 4 INJECTION INTRAVENOUS at 16:45

## 2020-12-20 ASSESSMENT — PAIN SCALES - GENERAL
PAINLEVEL_OUTOF10: 10
PAINLEVEL_OUTOF10: 9
PAINLEVEL_OUTOF10: 10
PAINLEVEL_OUTOF10: 10

## 2020-12-20 ASSESSMENT — ENCOUNTER SYMPTOMS
COUGH: 0
DIARRHEA: 0
SORE THROAT: 0
SHORTNESS OF BREATH: 0
SINUS PAIN: 0
BACK PAIN: 0
VOMITING: 0
NAUSEA: 1
ABDOMINAL PAIN: 1
EYE PAIN: 0

## 2020-12-20 ASSESSMENT — PAIN DESCRIPTION - PAIN TYPE: TYPE: ACUTE PAIN

## 2020-12-20 ASSESSMENT — PAIN DESCRIPTION - LOCATION: LOCATION: ABDOMEN

## 2020-12-20 ASSESSMENT — PAIN DESCRIPTION - DESCRIPTORS: DESCRIPTORS: SHARP

## 2020-12-20 ASSESSMENT — PAIN DESCRIPTION - ORIENTATION: ORIENTATION: LOWER

## 2020-12-20 ASSESSMENT — PAIN DESCRIPTION - FREQUENCY: FREQUENCY: CONTINUOUS

## 2020-12-20 NOTE — ED NOTES
OB resident at bedside, Patient being discharged and going up to labor and delivery      Lavera Blizzard, RN  12/20/20 2416

## 2020-12-20 NOTE — FLOWSHEET NOTE
Patient arrives on stretcher from ED with c/o vaginal pressure and abdominal pain. Patient was unaware of current pregnancy. Patient states she has vaginal discharge.

## 2020-12-20 NOTE — ED NOTES
Bed: 17  Expected date: 12/20/20  Expected time: 1:41 AM  Means of arrival:   Comments:  Sandie Alvarez X [phone] Karl Arthur RN  12/20/20 7869

## 2020-12-20 NOTE — ED PROVIDER NOTES
[facility]     Emergency Department     Faculty Attestation    I performed a history and physical examination of the patient and discussed management with the resident. I have reviewed and agree with the residents findings including all diagnostic interpretations, and treatment plans as written. Any areas of disagreement are noted on the chart. I was personally present for the key portions of any procedures. I have documented in the chart those procedures where I was not present during the key portions. I have reviewed the emergency nurses triage note. I agree with the chief complaint, past medical history, past surgical history, allergies, medications, social and family history as documented unless otherwise noted below. Documentation of the HPI, Physical Exam and Medical Decision Making performed by scribsocorro is based on my personal performance of the HPI, PE and MDM. For Physician Assistant/ Nurse Practitioner cases/documentation I have personally evaluated this patient and have completed at least one if not all key elements of the E/M (history, physical exam, and MDM). Additional findings are as noted. 36 yo F c/o lower abdominal pain, nausea & vomiting, increasing since yesterday, no fever, no injury, + dysuria,   pe vss [place] RN escort for exam, bilateral lower quadrant tenderness with guarding, no rebound, no rigidity, no distension, no cva tenderness,     TVUS >> 19w 6d gestation, ob at bedside, admitting pt to ob, vss,     EKG Interpretation    Interpreted by me      CRITICAL CARE: There was a high probability of clinically significant/life threatening deterioration in this patient's condition which required my urgent intervention. Total critical care time was 0 minutes. This excludes any time for separately reportable procedures.        [facility], DO  12/20/20 1247 [address],   12/20/20 3297

## 2020-12-20 NOTE — ED NOTES
Ultra sound at bedside      Lavera Blizzard, [facility]  12/20/20 7835

## 2020-12-20 NOTE — ED TRIAGE NOTES
Patient arrived to ED alert and oriented x4  Patient has complaints of suprapubic pain that radiates up towards naval. Patient states that pain started last night around 1800. Patient states she has not had a period in 3 months and there is a change of pregnancy. Patient also has complaints of increased urinary frequency and vaginal discharge. Patient states that she is concerned that she may had an STD. Patient is not currently taking birth control or using protection with her partner. Patient states she has a hx of gallstones.    Dr. Augustina Quach at bedside

## 2020-12-20 NOTE — PROGRESS NOTES
Ob Attending     At the patient's bedside since approximately 1600 discussing plan of care with her and her grandmother. She reports feeling more pressure but declines exam at this time. Bedside ultrasound preformed with head in the vagina. Discussed expectations, pain management and treatment options for periviable pprom. After US patinet feeling more pressure and requesting exam SSE with no visible remaining cervix bag visible with leaking fluid + clots and minimal vaginal bleeding. Digital exam C/C/-2 bulging bag in the vagina. Patient would like something for pain, morphine 4mg IV given. Her partner Dalila is on his way. Once he arrives will start pushing for her comfort. Discussed risk of hemorrhage and possible need for OR. Plan for vaginal delivery at this time.      Eduardo Rutledge MD

## 2020-12-20 NOTE — FLOWSHEET NOTE
SPIRITUAL CARE DEPARTMENT - [facility]  PROGRESS NOTE    Shift date: 12/20/2020  Shift day: Sunday   Shift # 1    Room # 0705/0705-01   Name: Josefina Huang            Age: 37 y.o. Gender: female          Yazidism: [address] of Latter-day:     Referral: [facility] from doctor for Emotional Distress    Admit Date & Time: 12/20/2020  5:55 AM    PATIENT/EVENT DESCRIPTION:  Josefina Huang is a 37 y.o. female   Patient came to hospital with abdominal pain and discomfort. Staff informed patient she was 19 weeks pregnant with significant pregnancy complications. Patient reported history of infertility. Patient was expressing her emotions helpfully and exhibiting signifcant emotional distress. SPIRITUAL ASSESSMENT/INTERVENTION:   looked at chart notes some before visit. Spoke with patient nurse some before entering room. Found patient ready to interact and wanting to talk and engage questions around her raiza   and connection to [employer] in this surprising crisis situation.  conversed with patient around issues she named, listening reflectively and affirming her helpful focus.  offered prayer which was accepted.  offered a New Testament, which was accepted. Patient exhibited a focus which brings her expressed spirituality into a helpful focus for her challenge. Patient appeared significantly encouraged by prayer and reported a commitment to seeing pregnancy toward term. SPIRITUAL CARE FOLLOW-UP PLAN:  Chaplains will remain available to offer spiritual and emotional support as needed.       Electronically signed by Moni Rubio on 12/20/2020 at 10:21 AM.  [facility]  [phone]                   12/20/20 7164   Encounter Summary   Services provided to: Patient;Significant other   Referral/Consult From: Nurse  (Spiritual care consult fr [place])   Guillermo Sloan Significant other   Place of Judaism   Ernestina Denny)   Continue Visiting   (12/20/2020)   Complexity of Encounter High   Length of Encounter 45 minutes   Spiritual Assessment Completed Yes   Crisis   Type Emotional distress   Assessment Tearful;Grieving; Anxious; Fearful; Hopeful;Spiritual struggle   Intervention Active listening;Explored feelings, thoughts, concerns;Explored coping resources;Nurtured hope;Prayer;[place];Scripture;Sustaining presence/ Ministry of presence   Outcome Acceptance; Connection/belonging;Comfort;Expressed gratitude;Engaged in conversation;Expressed feelings/needs/concerns;Coping;Tearful;Less anxious, less agitated   Spiritual/[employer]   Type Spiritual support

## 2020-12-20 NOTE — ED PROVIDER NOTES
[facility]  ED  Emergency Department Encounter  EmergencyMedicineResident     This patient was seen during the COVID-19 crisis. There were limited resources and those resources we did have had to be conserved for the sickest of patients. Pt Name: Baldomero Kapoor  MRN: [id number]  [place] 1985  Date of evaluation: 12/20/20  PCP: Robbie Phan MD    [address] [place] [address]       Chief Complaint   Patient presents with    Abdominal Pain     suprapubic tenderness that radiates up towards naval    Vaginal Discharge     started this morning- clear with slight yellow color    Urinary Frequency       HISTORY OF PRESENT ILLNESS  (Location/Symptom, Timing/Onset, Context/Setting, Quality, Duration, Modifying Factors, Severity.)      Baldomero Kapoor is a 37 y.o. female who presents for evaluation of suprapubic abdominal pain x3 days. Patient reports that she has noticed yellow-green discharge during this timeframe and frequent urination. Patient states that she has now missed 3 consecutive months of menses. Her last known menstrual period was 3 months ago in September. Patient reports her pain is 10/10 and is unable to stay still due to the pain. She is not taking anything for pain to this point. Patient reports that she has never had any abdominal surgeries and has never had a pregnancy, she has had fertility issues in the past.     PAST MEDICAL / SURGICAL /SOCIAL / FAMILY HISTORY      has a past medical history of Anxiety, Asthma, GERD (gastroesophageal reflux disease), Major depression, Morbid obesity, and Tension type headache.       has no past surgical history on file.       Social History     Socioeconomic History    Marital status: Single     Spouse name: Not on file    Number of children: Not on file    Years of education: Not on file    Highest education level: Not on file   Occupational History    Not on file   Social Needs    Financial resource strain: Not on file   Haritha-Mela insecurity     Worry: Not on file     Inability: Not on file    Transportation needs     Medical: Not on file     Non-medical: Not on file   Tobacco Use    Smoking status: Current Every Day Smoker     Types: Cigars    Smokeless tobacco: Never Used   Substance and Sexual Activity    Alcohol use: No    Drug use: No    Sexual activity: Yes     Partners: Male   Lifestyle    Physical activity     Days per week: Not on file     Minutes per session: Not on file    Stress: Not on file   Relationships    Social connections     Talks on phone: Not on file     Gets together: Not on file     Attends [employer] service: Not on file     Active member of club or organization: Not on file     Attends meetings of clubs or organizations: Not on file     Relationship status: Not on file    Intimate partner violence     Fear of current or ex partner: Not on file     Emotionally abused: Not on file     Physically abused: Not on file     Forced sexual activity: Not on file   Other Topics Concern    Not on file   Social History Narrative    Not on file       Family History   Problem Relation Age of Onset    Depression Father     Thyroid Disease Father     Diabetes Sister        Allergies:  Iodine, Penicillin g, Prednisolone, and Dye  [iodides]    Home Medications:  Prior to Admission medications    Medication Sig Start Date End Date Taking?  Authorizing Provider   omeprazole (PRILOSEC) 20 MG delayed release capsule take 1 capsule by mouth once daily 4/2/18  Yes Jessika Up MD   ferrous sulfate (FE TABS) 325 (65 Fe) MG EC tablet Take 1 tablet by mouth 2 times daily 10/3/17  Yes Jessika Up MD   flintstones complete (FLINTSTONES) 60 MG chewable tablet chew and swallow 1 tablet by mouth once daily 7/10/18   Stiven Lopez MD   loratadine (CLARITIN) 10 MG tablet take 1 tablet by mouth once daily 1/26/18   Jessika Up MD   RA 12 HOUR NASAL SPRAY 0.05 % nasal spray instill 2 sprays NASALLY twice a day for 3 days as directed appearance. She is ill-appearing. HENT:      Head: Normocephalic and atraumatic. Nose: Nose normal.      Mouth/Throat:      Mouth: Mucous membranes are moist.   Eyes:      Extraocular Movements: Extraocular movements intact. Pupils: Pupils are equal, round, and reactive to light. Neck:      Musculoskeletal: Normal range of motion. Cardiovascular:      Rate and Rhythm: Normal rate and regular rhythm. Pulses: Normal pulses. Heart sounds: Normal heart sounds. Pulmonary:      Effort: Pulmonary effort is normal.      Breath sounds: Normal breath sounds. Abdominal:      Palpations: Abdomen is soft. Tenderness: There is abdominal tenderness in the suprapubic area. There is guarding. Genitourinary:     Vagina: Vaginal discharge present. Cervix: Dilated. Discharge present. Uterus: Enlarged and tender. Adnexa:         Right: Tenderness present. Left: Tenderness present. Comments: Greenish mucus present over the labia majora and throughout the vaginal vault. On bimanual examination there is exquisite tenderness to palpation over the uterus. Musculoskeletal: Normal range of motion. Skin:     General: Skin is warm and dry. Capillary Refill: Capillary refill takes less than 2 seconds. Neurological:      General: No focal deficit present. Mental Status: She is alert and oriented to person, place, and time. Psychiatric:         Mood and Affect: Mood is anxious.            DIFFERENTIAL  DIAGNOSIS     PLAN (LABS / IMAGING / EKG):  Orders Placed This Encounter   Procedures    Culture, Urine    C.trachomatis N.gonorrhoeae DNA    VAGINITIS DNA PROBE    US OB 14 PLUS WEEKS SINGLE OR FIRST GESTATION    Urinalysis, reflex to microscopic    HCG Qualitative, Serum    Microscopic Urinalysis    HCG, Quantitative, Pregnancy    Basic Metabolic Panel    CBC Auto Differential    Inpatient consult to Obstetrics / Gynecology       MEDICATIONS ORDERED:  ED Medication Orders (From admission, onward)    Start Ordered     Status Ordering Provider    12/20/20 0345 12/20/20 0334  morphine injection 4 mg  ONCE      Last MAR action: Given - by Shawna Londono on 12/20/20 at 0337 Birgit COOLEY    12/20/20 0215 12/20/20 0209  0.9 % sodium chloride bolus  ONCE      Last MAR action: Stopped - by Nichole Whaley on 12/20/20 at 0550 Birgit COOLEY          DDX: Uterine pregnancy, ectopic pregnancy, cystitis, urinary tract infection, nephrolithiasis    DIAGNOSTIC RESULTS / EMERGENCY DEPARTMENT COURSE / MDM     IMPRESSION & INITIAL PLAN:  This is a 68-year-old female in acute distress presented emergency department today for evaluation of 10 out of 10 suprapubic abdominal pain. Patient states that she has missed her last 3 menses. Patient has had symptoms for last 3 days with greenish-white discharge present. hCG qualitative came back positive for pregnancy. U UA suggesting the patient has UTI with large amount of ketones. Will order a full lab work-up patient. On speculum exam there was a gross amount of greenish-white discharge present throughout the vaginal vault. Bimanual exam there was enlargement of the uterus with tenderness to palpation. We will order transvaginal ultrasound, hCG quantitative. Bedside ultrasound showed a formed fetus in a low-lying position with fetal heart rate of 174. Endovaginal ultrasound showed a viable intrauterine pregnancy estimated gestational age of 24 weeks 6 days. OB the bedside performed pelvic exam identifying an open cervical os. They are requesting for the patient to be discharged from the ER so that they may admit to the [facility] floor. LABS:  Results for orders placed or performed during the hospital encounter of 12/20/20   VAGINITIS DNA PROBE    Specimen: Vaginal   Result Value Ref Range    Specimen Description . VAGINA     Special Requests NOT REPORTED     Direct Exam POSITIVE for Gardnerella vaginalis. (A)     Direct Exam NEGATIVE for Trichomonas vaginalis     Direct Exam NEGATIVE for Candida sp. Direct Exam       Method of testing is a DNA probe intended for detection and identification of Candida species, Gardnerella vaginalis, and Trichomonas vaginalis nucleic acid in vaginal fluid specimens from patients with symptoms of vaginitis/vaginosis. Urinalysis, reflex to microscopic   Result Value Ref Range    Color, UA YELLOW YELLOW    Turbidity UA CLOUDY (A) CLEAR    Glucose, Ur NEGATIVE NEGATIVE    Bilirubin Urine NEGATIVE NEGATIVE    Ketones, Urine LARGE (A) NEGATIVE    Specific Gravity, UA 1.026 1.005 - 1.030    Urine Hgb NEGATIVE NEGATIVE    pH, UA 6.0 5.0 - 8.0    Protein, UA TRACE (A) NEGATIVE    Urobilinogen, Urine Normal Normal    Nitrite, Urine NEGATIVE NEGATIVE    Leukocyte Esterase, Urine LARGE (A) NEGATIVE    Urinalysis Comments NOT REPORTED    HCG Qualitative, Serum   Result Value Ref Range    hCG Qual POSITIVE (A) NEGATIVE   Microscopic Urinalysis   Result Value Ref Range    -          WBC, UA TOO NUMEROUS TO COUNT 0 - 5 /HPF    RBC, UA 5 TO 10 0 - 4 /HPF    Casts UA  0 - 8 /LPF     0 TO 2 HYALINE Reference range defined for non-centrifuged specimen. Crystals, UA NOT REPORTED None /HPF    Epithelial Cells UA 2 TO 5 0 - 5 /HPF    Renal Epithelial, UA NOT REPORTED 0 /HPF    Bacteria, UA FEW (A) None    Mucus, UA NOT REPORTED None    Trichomonas, UA NOT REPORTED None    Amorphous, UA NOT REPORTED None    Other Observations UA NOT REPORTED NOT REQ.     Yeast, UA NOT REPORTED None   HCG, Quantitative, Pregnancy   Result Value Ref Range    hCG Quant 16,700 (H) <5 IU/L   Basic Metabolic Panel   Result Value Ref Range    Glucose 94 70 - 99 mg/dL    BUN 5 (L) 6 - 20 mg/dL    CREATININE 0.46 (L) 0.50 - 0.90 mg/dL    Bun/Cre Ratio NOT REPORTED 9 - 20    Calcium 9.5 8.6 - 10.4 mg/dL    Sodium 134 (L) 135 - 144 mmol/L    Potassium 3.5 (L) 3.7 - 5.3 mmol/L    Chloride 101 98 - 107 mmol/L    CO2 21 20 - 31 mmol/L    Anion Gap 12 9 - 17 mmol/L    GFR Non-African American >60 >60 mL/min    GFR African American >60 >60 mL/min    GFR Comment          GFR Staging NOT REPORTED    CBC Auto Differential   Result Value Ref Range    WBC 18.2 (H) 3.5 - 11.3 k/uL    RBC 3.99 3.95 - 5.11 m/uL    Hemoglobin 11.2 (L) 11.9 - 15.1 g/dL    Hematocrit 35.7 (L) 36.3 - 47.1 %    MCV 89.5 82.6 - 102.9 fL    MCH 28.1 25.2 - 33.5 pg    MCHC 31.4 28.4 - 34.8 g/dL    RDW 13.9 11.8 - 14.4 %    Platelets 024 786 - 779 k/uL    MPV 9.7 8.1 - 13.5 fL    NRBC Automated 0.0 0.0 per 100 WBC    Differential Type NOT REPORTED     Seg Neutrophils 81 (H) 36 - 65 %    Lymphocytes 11 (L) 24 - 43 %    Monocytes 7 3 - 12 %    Eosinophils % 0 (L) 1 - 4 %    Basophils 0 0 - 2 %    Immature Granulocytes 1 (H) 0 %    Segs Absolute 14.69 (H) 1.50 - 8.10 k/uL    Absolute Lymph # 2.02 1.10 - 3.70 k/uL    Absolute Mono # 1.31 (H) 0.10 - 1.20 k/uL    Absolute Eos # 0.03 0.00 - 0.44 k/uL    Basophils Absolute 0.03 0.00 - 0.20 k/uL    Absolute Immature Granulocyte 0.12 0.00 - 0.30 k/uL    WBC Morphology NOT REPORTED     RBC Morphology NOT REPORTED     Platelet Estimate NOT REPORTED        RADIOLOGY:  US OB 14 PLUS WEEKS SINGLE OR FIRST GESTATION   Final Result   A single live intrauterine pregnancy with estimated gestational age of 19   weeks 6 days by ultrasound. COURTNEY is May 10, 2021. The estimated fetal weight   is 325.9 g.  Consider follow-up fetal anatomical survey when clinically   appropriate. BEDSIDE ULTRASOUND:  OB bedside ultrasound showed fetus present in the uterus and a low-lying position. Fetal heart rate 176. CONSULTS:  IP CONSULT TO OB GYN    CRITICAL CARE:  See attending physician note    FINAL IMPRESSION      1. Complication of pregnancy, antepartum          DISPOSITION / PLAN     DISPOSITION Decision To Discharge 12/20/2020 05:33:17 AM      PATIENT REFERRED TO:  No follow-up provider specified.     DISCHARGE MEDICATIONS:  Discharge Medication List as of 12/20/2020  5:34 AM        Discharge Medication List as of 12/20/2020  5:34 AM           Misa Quintanilla MD  Emergency Medicine Resident    (Please note that portions of this note were completed with a voice recognition program.  Efforts were made to edit the dictations but occasionally words are mis-transcribed.)       Misa Quintanilla MD  Resident  12/20/20 8801

## 2020-12-20 NOTE — H&P
OBSTETRICAL HISTORY Willem Tellez    Date: 2020       Time: 5:56 AM   Patient Name: Sandra Walters     Patient : 1985  Room/Bed: [facility]/0705-01    Admission Date/Time: 2020  5:55 AM      CC: Abdominal pain     HPI: Sandra Walters is a 37 y.o. [place] Hedge  @19w6d who presented to the ED for abdominal pain. She states the pain started a few days ago, but became severe yesterday. She also noted some increase in thin discharge. While in the ED a Beta Quant was elevated to 16,700. A TVUS was performed and the patient was noted to be 19w6d. A CBC showed a WBC of  18.2, UA was suspicious for UTI, otherwise labs were normal. The patient was unaware that she was pregnant. She has never been pregnant before. She states she has a history of irregular periods therefore was not alarmed when she did not have a period for 3 months. She is unsure when her last LMP was. She denies F/C, N/V/D, vaginal bleeding, dysuria, hematuria, chest pain or SOB. She states yesterday at noon she experienced leaking of fluid. She has continued to leak since then. The patient reports fetal movement is absent, complains of contractions, complains of loss of fluid, denies vaginal bleeding. Pregnancy is complicated by Anxiety, Depression, Hypothyroidism, Adrenal insufficiency, Tobacco use, and BMI 29.9.     DATING:  LMP: Unknown  Estimated Date of Delivery: 5/10/21  Based on: today US, at 23 6/7 weeks GA    PREGNANCY RISK FACTORS:  Patient Active Problem List   Diagnosis    Thyroid nodule    Major depression    Tension type headache    Vitamin D deficiency    Obesity    Strabismus        Steroids Given In This Pregnancy:  no     REVIEW OF SYSTEMS:   Constitutional: negative fever, negative chills, negative weight changes   HEENT: negative visual disturbances, negative headaches, negative dizziness, negative hearing loss  Breast: Negative breast abnormalities, negative breast once daily 1/26/18   Екатерина Yee MD   RA 12 HOUR NASAL SPRAY 0.05 % nasal spray instill 2 sprays NASALLY twice a day for 3 days as directed 1/26/18   Екатерина Yee MD   DULoxetine (CYMBALTA) 30 MG extended release capsule Take 1 capsule by mouth daily 11/30/17   Toya Arredondo MD   ferrous sulfate (FE TABS) 325 (65 Fe) MG EC tablet Take 1 tablet by mouth 2 times daily 10/3/17   Екатерина Yee MD   vitamin D (ERGOCALCIFEROL) 12971 units CAPS capsule Take 1 capsule by mouth once a week for 8 doses 7/31/17 9/19/17  Misha Helm MD   Cholecalciferol (VITAMIN D3) 1000 units TABS Take 1 tablet by mouth daily 7/31/17   Misha Helm MD   citalopram (CELEXA) 10 MG tablet Take 1 tablet by mouth daily 7/27/17   Misha Helm MD   sodium chloride (ALTAMIST SPRAY) 0.65 % nasal spray 1 spray by Nasal route as needed for Congestion 7/27/17   Misha Helm MD       FAMILY HISTORY:  family history includes Depression in her father; Diabetes in her sister; Thyroid Disease in her father. SOCIAL HISTORY:   reports that she has been smoking cigars. She has never used smokeless tobacco. She reports that she does not drink alcohol or use drugs.     VITALS:  Vitals:    12/20/20 0559 12/20/20 0805   BP: 118/77 110/72   Pulse: 92 111   Resp: 16 18   Temp: 98.5 °F (36.9 °C) 98 °F (36.7 °C)   TempSrc: Oral Oral   Weight:  180 lb (81.6 kg)   Height:  5' 5\" (1.651 m)       PHYSICAL EXAM:    General appearance:  no apparent distress, alert and cooperative  HEENT: head atraumatic, normocephalic, moist mucous membranes, trachea midline  Neurologic:  alert, oriented, normal speech, no focal findings or movement disorder noted  Lungs:  No increased work of breathing, good air exchange, clear to auscultation bilaterally, no crackles or wheezing  Heart:  regular rate and rhythm and no murmur, rubs, gallops  Abdomen:  soft, gravid, significant fundal tenderness, no rebound, guarding, or rigidity, no RUQ or epigastric tenderness, no signs or symptoms of abruption, no signs or symptoms of chorioamnionitis  Extremities:  no calf tenderness, non edematous, no varicosities, full range of motion in all four extremities  Musculoskeletal: Gross strength equal and intact throughout, no gross abnormalities, range of motion normal in hips, knees, shoulders and spine  Psychiatric: Mood appropriate, normal affect   Rectal Exam: not indicated  Pelvic Exam:   Chaperone for Intimate Exam: Chaperone was present for entire exam, Chaperone Name: Tootie Leonardo RN   Sterile Speculum Exam:   Urethral meatus: normal appearing   Vulva: Normal hair distribution, normal appearing vulva, no masses, tenderness or lesions, normal clitoris   Vagina: pooling of clear/green fluid in the vaul, no lesions or lacerations    Cervix: visually dilated 2-3 cm, no lacerations or abnormal lesions visualized    DATA:  Membranes Ruptured: Yes  Fern: positive  Nitrazine: Positive  Valsalva/Pooling: present  Vaginal Bleeding: absent    PRENATAL LAB RESULTS: Collected today      ASSESSMENT & PLAN:  Baldomero Kapoor is a 37 y.o. female  at 23w7d    - GBS unknown / Rh unk / R unknown   - No indication for GBS prophylaxis   - VSS, Febrile   - FHT: 154 bpm by TVUS   - WBC 18.2   - SSE: Visually 2-3 cm dilated   - Ferning, Nitrazine, pooling positive    - Will start the patient on latency antibiotics. Due to a severe allergy to penicillins will give:  Azithro 1g PO x1, IV Gent 5mg/kg/Img, Clinda 900mg x48hrs (-) then PO Clinda 300 mg q8hrs (-)    - Gc/C pending   - IVF   - Vaginitis probe positive for BV   - Prenatal labs ordered    - UA suspicious for UTI; Ucx pending    - TVUS : A single live IUP@ 19 weeks 6 days, .1 g, Cephalic   - Will consult SW and spiritual care    - Will admit and monitor the patient for possible delivery to previable fetus    BV   - Flagyl ordered    Anxiety/Depression    - She states she is on no meds.  She was prescribed Celexa by her Psychiatrist but has not started it yet    - She follows with a psychiatrist    - Denies SI/HI      Hypothyroidism    - She follows with Dr. Kory Knott. Per patient reports she last saw him a few days ago. - She states she is on Naturthroid    - TSH 1.07 3/2/20      Adrenal insufficency    - She reports she is supposed to be on Hydrocortisone but has not taken it in months    - She follows with Dr. Kory Knott. Per patient reports she last saw him a few days ago.      BMI 34     Patient Active Problem List    Diagnosis Date Noted    Obesity 09/15/2017    Strabismus 09/15/2017    Vitamin D deficiency 07/31/2017     8.7 on 7/27/17      Major depression 04/30/2013    Tension type headache 04/30/2013    Thyroid nodule 03/29/2012     CT neck : 5/2/2017  Impression: Large mass in the lower pole of the left lobe of the thyroid gland plunging into the superior mediastinum and displacing the trachea somewhat toward the right.  This has progressed dramatically since 2007.    FNA: Benign. Nodular goiter. 12/20/2016    T4 and reverse T3 wnl 8/24/17             Plan discussed with Dr. Donavon Mcintosh, who is agreeable. Steroids given this admission: No    Risks, benefits, alternatives and possible complications have been discussed in detail with the patient. Admission, and post admission procedures and expectations were discussed in detail. All questions were answered.     Attending's Name: Dr. Chandrakant Winkler DO  Ob/Gyn Resident  12/20/2020, 5:56 AM

## 2020-12-20 NOTE — FLOWSHEET NOTE
Dr. Marcella Vincent, Dr. Teresa Cantu, Dr. Lance Valentin and Soren Contreras RN at bedside. Patient pushing at 1750. Male baby born at 200. Baby delivered en caul at 200 with placenta. 1- minute apgar of 5  5- minute apgar of 1    800 mg of Cytotec given rectally by Dr. Teresa Cantu.

## 2020-12-20 NOTE — ED NOTES
OB resident at bedside      Manny Alvarez, [facility]  12/20/20 0900

## 2020-12-20 NOTE — DISCHARGE SUMMARY
patient's :  Jermaine Sanz [[id number]]   male   Birth Weight: 9 oz (0.255 kg)       Apgars: 5 at 1 minute and 1 at 5 minutes. Postpartum course: normal.      Course of patient: complicated by Previable Prelabor Rupture of Memranes     Discharge to: Home    Readmission planned: no     Recommendations on Discharge:     Medications:      Medication List      START taking these medications    docusate 100 MG Caps  Commonly known as: COLACE, DULCOLAX  Take 100 mg by mouth 2 times daily  Start taking on: 2020     ibuprofen 600 MG tablet  Commonly known as: ADVIL;MOTRIN  Take 1 tablet by mouth every 6 hours as needed for Pain     metroNIDAZOLE 500 MG tablet  Commonly known as: FLAGYL  Take 1 tablet by mouth every 12 hours for 7 days  Start taking on: 2020     nitrofurantoin (macrocrystal-monohydrate) 100 MG capsule  Commonly known as: Macrobid  Take 1 capsule by mouth 2 times daily for 7 days        CONTINUE taking these medications    citalopram 10 MG tablet  Commonly known as: CeleXA  Take 1 tablet by mouth daily     DULoxetine 30 MG extended release capsule  Commonly known as: CYMBALTA  Take 1 capsule by mouth daily     ferrous sulfate 325 (65 Fe) MG EC tablet  Commonly known as: Fe Tabs  Take 1 tablet by mouth 2 times daily     flintstones complete 60 MG chewable tablet  chew and swallow 1 tablet by mouth once daily     loratadine 10 MG tablet  Commonly known as: CLARITIN  take 1 tablet by mouth once daily     omeprazole 20 MG delayed release capsule  Commonly known as: PRILOSEC  take 1 capsule by mouth once daily     RA 12 Hour Nasal Spray 0.05 % nasal spray  Generic drug: oxymetazoline  instill 2 sprays NASALLY twice a day for 3 days as directed     sodium chloride 0.65 % nasal spray  Commonly known as:  Altamist Spray  1 spray by Nasal route as needed for Congestion     vitamin D 1.25 MG (72086 UT) Caps capsule  Commonly known as: ERGOCALCIFEROL  Take 1 capsule by mouth once a week for 8 doses     vitamin D3 25 MCG (1000 UT) Tabs tablet  Commonly known as: CHOLECALCIFEROL  Take 1 tablet by mouth daily           Where to Get Your Medications      You can get these medications from any pharmacy    Bring a paper prescription for each of these medications  · docusate 100 MG Caps  · ibuprofen 600 MG tablet  · metroNIDAZOLE 500 MG tablet  · nitrofurantoin (macrocrystal-monohydrate) 100 MG capsule         Activity: pelvic rest x 6 weeks, nno lifting greater than 15 lbs  Diet: regular diet  Follow up: 2 weeks     Condition on discharge: stable    Discharge date: 12/21/2020    Rubens Adame DO  Ob/Gyn Resident    Comments:  Home care and follow-up care were reviewed. Pelvic rest, and birth control were reviewed. Signs and symptoms of mastitis and post partum depression were reviewed. The patient is to notify her physician if any of these occur. The patient was counseled on secondary smoke risks and the increased risk of sudden infant death syndrome and respiratory problems to her baby with exposure. She was counseled on various alternate recommendations to decrease the exposure to secondary smoke to her children.

## 2020-12-20 NOTE — L&D DELIVERY NOTE
Mother's Information    Labor Events     labor?: Yes  Rupture type: Spontaneous=SROM     Mother Delivery Information    Episiotomy: None  Lacerations: None  Repair Suture: None  # of Repair Packets: 0  Surgical or Additional Est. Blood Loss (mL): 0 (View Only): Edit in Flowsheets   Combined Est. Blood Loss (mL): 0        Kaushik Webb [[id number]]    Labor Events     labor?: Yes   steroids?: None  Cervical ripening date/time:     Rupture Identifier: Sac 1   Rupture date/time: 20 12:00:00   Rupture type: Spontaneous=SROM  Fluid color: Clear  Fluid odor: None  Induction: None  Augmentation: None  Labor complications: None          Labor Event Times    Labor onset date/time:     Dilation complete date/time:  20 1633 EST   Start pushing:    Decision time (emergent ):        Anesthesia    Method: None     Assisted Delivery Details    Forceps attempted?: No  Vacuum extractor attempted?: No     Document Additional Attempt       Document Additional Attempt             Shoulder Dystocia    Shoulder dystocia present?: No  Add Second Maneuver  Add Third Maneuver  Add Fourth Maneuver  Add Fifth Maneuver  Add Sixth Maneuver  Add Seventh Maneuver  Add Eighth Maneuver  Add Ninth Maneuver      Presentation    Presentation: Vertex      Information    Head delivery date/time: 2020 18:20:00   Changing the 's delivery date/time could affect patient care.:    Delivery date/time:  20   Delivery type: Vaginal, Spontaneous    Details:        Delivery Providers    Delivering clinician: Vladimir Beard MD   Provider Role    [place] [address], DO Serjio Poon, RN     Abbey Vegas RN       Cord    Complications: None  Delayed cord clamping?: No  Cord clamped date/time: 2020  Gases sent?: No     Placenta    Date/time: 2020 18:20:00  Removal: Spontaneous  Appearance: Intact  Disposition: Lab Apgars    Living status: Living  Apgars   1 Minute:  5 Minute:  10 Minute 15 Minute 20 Minute   Skin Color: 0  0  0      Heart Rate: 2  1  1      Reflex Irritability: 1  0  0      Muscle Tone: 1  0  0      Respiratory Effort: 1  0  0      Total: 5  1  1              Apgars Assigned By: DAVID RN     [place] Measurements    Weight: 255 g Length: 22.9 cm      Delivery Information    Episiotomy: None  Perineal lacerations: None    Vaginal laceration: No    Cervical laceration: No    Surgical or additional est. blood loss (mL): 0 (View Only): Edit in Flowsheets   Combined est. blood loss (mL): 0  Repair suture: None     Vaginal Delivery Counts    Initial count personnel: JAYMIE  Initial count verified by: DAVID   4x4:  Needles:  Instruments:  Lap Pads:  Sponges:    Initial counts:         Final counts:         Final count personnel: Dede Ozuna  Final count verified by: Cooper Charles     Other Procedures    Procedures: None            Vaginal Delivery Note  Department of Obstetrics and Gynecology  [facility]       Patient: Magan Durant   : 1985  MRN: [id number]   Date of delivery: 20     Pre-operative Diagnosis: Magan Valdovinosole  at 19w6d      PPPROM   Leukocytosis   Hypothyroidism  Adrenal insufficiency   Anxiety/Depression  BMI 34      Post-operative Diagnosis:  Living  previable infant, 80g, Male    Delivering Obstetrician & Assistant(s): Dr Javi Glass; Zoey Norwood Duard [place] PGY1    Infant Information:   Information for the patient's :  Jordana Oleary [[id number]]        Information for the patient's :  Aroldo Kelly [[id number]]          Apgar scores:  at 5 minute and 1 at 5 minutes. Anesthesia:  None. Application and Delivery:    Patient was admitted to the ED with abdominal pain x3 days on . She was noted to be pregnant at 19w6d on admission with previable premature rupture of membranes. today.    GC Modifier. This service has been performed in part by a resident under the direction of a teaching physician. Attending's Name:  Joselyn Harrington MD        I was present for the entire uncomplicated previable delivery of a live born male infant at 19w6d gestation weight 255g.

## 2020-12-20 NOTE — FLOWSHEET NOTE
Crissy Ron from spiritual care at patient bedside at [facility] for Mandaen/blessing of baby.

## 2020-12-20 NOTE — PROGRESS NOTES
Patient came to unit this morning after 6am. Patient states she noticed watery leakage yesterday around noon on Saturday-12/19. She states she also noticed a yellowish discharge with the clear fluid. Pt states she also feels suprapubic tenderness and pain. Patient states she maybe feels occasional contractions. Patient denies any recent vaginal bleeding. Patient denies fetal movement.      Dopplers were obtained and fetal heart tones 145bpm.

## 2020-12-21 VITALS
BODY MASS INDEX: 29.99 KG/M2 | WEIGHT: 180 LBS | HEIGHT: 65 IN | TEMPERATURE: 98.5 F | SYSTOLIC BLOOD PRESSURE: 102 MMHG | DIASTOLIC BLOOD PRESSURE: 66 MMHG | HEART RATE: 92 BPM | RESPIRATION RATE: 16 BRPM

## 2020-12-21 LAB
ABO/RH: NORMAL
ANTIBODY SCREEN: NEGATIVE
ARM BAND NUMBER: NORMAL
BLD PROD TYP BPU: NORMAL
BLD PROD TYP BPU: NORMAL
CROSSMATCH RESULT: NORMAL
CROSSMATCH RESULT: NORMAL
CULTURE: NORMAL
DISPENSE STATUS BLOOD BANK: NORMAL
DISPENSE STATUS BLOOD BANK: NORMAL
EXPIRATION DATE: NORMAL
Lab: NORMAL
SPECIMEN DESCRIPTION: NORMAL
TRANSFUSION STATUS: NORMAL
TRANSFUSION STATUS: NORMAL
UNIT DIVISION: 0
UNIT DIVISION: 0
UNIT NUMBER: NORMAL
UNIT NUMBER: NORMAL
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
## 2020-12-21 NOTE — FLOWSHEET NOTE
SPIRITUAL CARE DEPARTMENT - [facility]  PROGRESS NOTE    Shift date: 12/20/20  Shift day: Sunday   Shift # 2    Room # 0705/0705-01   Name: Magan Durant            Age: 37 y.o. Gender: female            Admit Date & Time: 12/20/2020  5:55 AM    PATIENT/EVENT DESCRIPTION:  Magan Durant is a 37 y.o. female who found out she was pregnant yesterday. The baby was born around 18:20 at [place] [address]. SPIRITUAL ASSESSMENT/INTERVENTION:  The patient was in shock, denial and grieving. The  was able to do an emergency Rastafarian with the family. The  was able to sit with the mother while she vented emotions. The mother and father were showing all the appropriate grief. The  was able to pray and be a ministerial presence. SPIRITUAL CARE FOLLOW-UP PLAN:  Chaplains will remain available to offer spiritual and emotional support as needed.  gave a referral to night  to follow up. Electronically signed by Tao Angel, on 12/20/2020 at 8:28 PM.  [address]  [phone]       12/20/20 2014   Encounter Summary   Services provided to: Patient and family together   Referral/Consult From: Nurse   Support System Significant other;Family members   Place [address]   (12/20/20)   Complexity of Encounter High   Length of Encounter 2 hours   Spiritual Assessment Completed Yes   Crisis   Type Emotional distress   Assessment Shock; Denial;Grieving   Intervention Sustaining presence/ Ministry of presence; Active listening;Prayer;Ritual;[place]   Outcome Tearful;Grieving

## 2020-12-21 NOTE — FLOWSHEET NOTE
information.         12/21/20 0855   Encounter Summary   Services provided to: Patient   Referral/Consult From: Nurse   Support System Significant other   Continue Visiting   (12/20/20)   Complexity of Encounter High   Length of Encounter 45 minutes   Spiritual Assessment Completed Yes   Crisis   Type Emotional distress   Assessment Shock   Intervention Active listening   Outcome Tearful   Spiritual/Sabianism   Type Spiritual support       Electronically signed by Jean Paul Arevalo on 12/21/2020 at 8:57 AM.  [facility]  [phone]

## 2020-12-21 NOTE — PROGRESS NOTES
Ob Attending     RN called out family upset and yelling that we are not trying to help her baby that we are not giving him a fighting chance. Again reviewed the cut of for evaluation and attempt at resescutation of 22 weeks and that we are several weeks away from those even early attempts at resuscitation. Patient states \"You all are murdering my baby. \" Family member on the phone also escalated and upset asking for second opinion. Discussed limitations of ability to resuscitate neonates. Family continues to be upset. Support offered, condolences and grief shared. NICU NP was willing to speak with family and again reviewed the limitations of resuscitation and that at 19 weeks 6 days this  was too early to have attempted resuscitation. No heart rate on auscultation at this time. [address] remains stable with appropriate bleeding. Will continue to monitor her bleeding. Continue physical and emotional support as she and her family process their grief.        Chevy Mcfarland MD

## 2020-12-23 ENCOUNTER — TELEPHONE (OUTPATIENT)
Dept: OBGYN | Age: 35
End: 2020-12-23

## 2020-12-23 LAB
C TRACH DNA GENITAL QL NAA+PROBE: ABNORMAL
N. GONORRHOEAE DNA: ABNORMAL
SPECIMEN DESCRIPTION: ABNORMAL
SURGICAL PATHOLOGY REPORT: NORMAL

## 2020-12-23 NOTE — TELEPHONE ENCOUNTER
Spoke with patient about her recovery. She reports minimal bleeding, occasional clotting. She reports trouble sleeping and continued grief but she has a call into her therapist and will continue counseling. She has support with her family and is appropriately grieving. Support offered.      She will see me on 12/29/2020 at 10 Baron Marcy MD

## 2020-12-23 NOTE — TELEPHONE ENCOUNTER
Left message for patient to check on her and asked her to return my call. She has an appointment with on 12/29 at 10 am at the [facility]. Reminded her of this appointment in my voicemail.      Tasha Stanley MD

## 2020-12-24 ENCOUNTER — TELEPHONE (OUTPATIENT)
Dept: PHARMACY | Age: 35
End: 2020-12-24

## 2020-12-24 NOTE — TELEPHONE ENCOUNTER
CLINICAL PHARMACY NOTE:  Follow-up for STD Test    At the time of Awilda Tam's visit to [facility] Emergency Department on 12/20/2020 STD testing was performed. DNA testing was resulted as indeterminate for Chlamydia and Gonorrhea. No documented infection. No STD treatment needed at this time.

## 2020-12-29 ENCOUNTER — HOSPITAL ENCOUNTER (OUTPATIENT)
Age: 35
Setting detail: SPECIMEN
Discharge: HOME | End: 2020-12-29
Payer: MEDICARE

## 2020-12-29 ENCOUNTER — POSTPARTUM VISIT (OUTPATIENT)
Dept: OBGYN | Age: 35
End: 2020-12-29
Payer: MEDICARE

## 2020-12-29 VITALS
WEIGHT: 175.2 LBS | HEART RATE: 80 BPM | BODY MASS INDEX: 29.15 KG/M2 | DIASTOLIC BLOOD PRESSURE: 72 MMHG | TEMPERATURE: 96.4 F | SYSTOLIC BLOOD PRESSURE: 105 MMHG

## 2020-12-29 PROCEDURE — 99024 POSTOP FOLLOW-UP VISIT: CPT | Performed by: OBSTETRICS & GYNECOLOGY

## 2020-12-29 PROCEDURE — 99211 OFF/OP EST MAY X REQ PHY/QHP: CPT | Performed by: OBSTETRICS & GYNECOLOGY

## 2020-12-29 NOTE — PROGRESS NOTES
Postpartum Visit    Fe Araujo is a 38 y.o. female , 9 days Post Partum s/p spontaneous vaginal delivery on 2020 at 19 weeks of a 255 g male  demise. The patient was seen. She reports continued grief over the loss of her son. Her pregnancy was complicated by no prenatal care, spontaneous  delivery at 19 weeks 6 days and chorioamnionitis confirmed on placenta pathology. She reports some breast leakage. Denies pain or redness. The patient completed the E.P.D.S. Post Partum Depression Evaluation form and EPDS Score of 24. She does have signs or symptoms of post partum depression. She denies any suicidal thoughts with a plan, intent to harm others and delusional ideas. She does admit to having good home support. She is seeing her therapist and has an appointment today for continued care. She struggled with depression and anxiety before delivery and has continued with therapy and feels she is doing better. She continues to have grief and anxiety as well as depression and reports sleeping difficulty. She will be started on medication today with her therapist and plans to start medication for sleep. She reports having good support and denies needing additional resources today for her depression. Today her lochia is light she denies any dizziness or shortness of breath. Her bowels are regular and she denies any urinary tract symptomology. She is using abstinence until 6 weeks and would like to conceive as soon as possible. She reports occasional sharp pain she describes as cervical but is otherwise doing well physically. Her biggest challenge is the emotional toll of the loss of her son Hailey Black.     OB History    Para Term  AB Living   1 0 0 0 1 0   SAB TAB Ectopic Molar Multiple Live Births   1 0 0 0 0 0     Patient Active Problem List   Diagnosis    Thyroid nodule    Major depression    Tension type headache    Vitamin D deficiency    Obesity    Strabismus · Abstinence, family planning counseling and STD counseling: done  · Continue with pelvic rest restrictions until 6 weeks post partum  · No heavy lifting or [place] until 6 weeks post partum    Emile Patterson MD

## 2020-12-30 LAB
C TRACH DNA GENITAL QL NAA+PROBE: NEGATIVE
DIRECT EXAM: NORMAL
Lab: NORMAL
N. GONORRHOEAE DNA: NEGATIVE
SPECIMEN DESCRIPTION: NORMAL
SPECIMEN DESCRIPTION: NORMAL

## 2020-12-31 LAB
HPV SAMPLE: ABNORMAL
HPV, GENOTYPE 16: NOT DETECTED
HPV, GENOTYPE 18: NOT DETECTED
HPV, HIGH RISK OTHER: DETECTED
HPV, INTERPRETATION: ABNORMAL
SPECIMEN DESCRIPTION: ABNORMAL

## 2021-01-01 NOTE — PROGRESS NOTES
Visit Information    Have you changed or started any medications since your last visit including any over-the-counter medicines, vitamins, or herbal medicines? no   Have you stopped taking any of your medications? Is so, why? -  no  Are you having any side effects from any of your medications? - no    Have you seen any other physician or provider since your last visit? yes    Have you had any other diagnostic tests since your last visit? yes    Have you been seen in the emergency room and/or had an admission in a hospital since we last saw you?  no   Have you had your routine dental cleaning in the past 6 months?  no     Do you have an active MyChart account? If no, what is the barrier?   Yes    Patient Care Team:  Emir Snider MD as PCP - General (Internal Medicine)    Medical History Review  Past Medical, Family, and Social History reviewed and does contribute to the patient presenting condition    Health Maintenance   Topic Date Due    Cervical cancer screen  12/29/2004    Flu vaccine (1) 09/01/2017    Pneumococcal med risk (1 of 1 - PPSV23) 10/24/2017 (Originally 12/29/2002)    DTaP/Tdap/Td vaccine (1 - Tdap) 10/27/2017 (Originally 12/29/2002)    HIV screen  07/27/2018 (Originally 12/29/1998) Resident

## 2021-01-06 ENCOUNTER — TELEPHONE (OUTPATIENT)
Dept: OBGYN | Age: 36
End: 2021-01-06

## 2021-01-06 RX ORDER — SULFAMETHOXAZOLE AND TRIMETHOPRIM 800; 160 MG/1; MG/1
1 TABLET ORAL 2 TIMES DAILY
Qty: 6 TABLET | Refills: 0 | Status: SHIPPED | OUTPATIENT
Start: 2021-01-06 | End: 2021-01-09

## 2021-01-06 NOTE — TELEPHONE ENCOUNTER
Patient was seen for post partum appt on 12/29/20 by Dr. Viridiana Martinez. She called today c/o abdominal cramping and cervical pain for 3 days. She also states that when she urinates it feels like her urine gets \"stuck\". She denies back pain, no pain with urination. She was offered an appointment today but doesn't have transportation. She also needs a death certificate signed for her son's cremation. Spoke to staff at [employer] [phone] and they will bring Death Certificate to [facility] and will forward to Dr. Viridiana Martinez for signature.

## 2021-01-06 NOTE — TELEPHONE ENCOUNTER
Rx for bactrim sent for likely UTI, if this does not help symptoms, she will need to make an appt to come in and provide a urine specimen. And yes, we will be able to get the death certificate to Dr. Avtar Fischer to sign.

## 2021-01-08 LAB — CYTOLOGY REPORT: NORMAL

## 2021-01-08 NOTE — TELEPHONE ENCOUNTER
Left message on recorder for patient to call the office for a message from md.  She is to check at her pharmacy for medication since it is Friday and the office will not be open again until Monday.

## 2021-01-11 ENCOUNTER — TELEPHONE (OUTPATIENT)
Dept: OBGYN | Age: 36
End: 2021-01-11

## 2021-01-11 DIAGNOSIS — R87.610 ASCUS WITH POSITIVE HIGH RISK HPV CERVICAL: ICD-10-CM

## 2021-01-11 DIAGNOSIS — R87.810 ASCUS WITH POSITIVE HIGH RISK HPV CERVICAL: ICD-10-CM

## 2021-01-11 NOTE — TELEPHONE ENCOUNTER
Ob Attending     Patient called and confirmed identity. Death certificate for her  loss (her son Codie Lindsey born at 24 weeks) has been signed and  home has been contacted to let them know that it has been completed. She was also notified of her abnormal PAP test ASCUS HPV other high risk type positive and need for colposcopy she has an appointment on  and would like to have this appointment be her colposcopy. We discussed at length the follow up for abnormal pap test including need for close follow up and yearly pap tests until she is told to space them out as she is at increased risk for cervical cancer. We discussed the screening for HPV and that it can cause cervical, vaginal, vulvar, anal and oropharyngeal cancer. Support also offered for her loss she has been seeing a therapist and is in a support group. She continues to have some hard days and is experiencing grief on top of already having several life stressors and trauma history she has endured. She states she is sometimes blaming her self for the loss of her son and had many questions about the placental pathology (chorioamnionitis and funisitis) we reviewed that this is an infection in the uterus and that the membranes, amniotic fluid and placenta showed signs of infection. These infections are almost always polymicrobial. She had been struggling with  C. Diff prior to pregnancy and was concerned that the c.diff may have contributed to her loss. We discussed that we will never know the exact cause of infection but that most intra-amniotic infections are ascending. She also reports some UTI history and BV these are all known possible sources of infection. I continued to encourage her to work through her grief and continue to seek services and support. Reviewed that this is not her fault that  loss is difficult and that loss due to infection is not something she could have modified the outcome. Support offered.      She will be seen on 2/2 for post-partum and colposcopy follow up. She can have repeat cultures obtained at that time if she has any symptoms. She reported occasional yellow discharge. Recent cultures on 12/29/2020 were negative.      Cindy Guaman MD

## 2021-01-15 ENCOUNTER — TELEPHONE (OUTPATIENT)
Dept: OBGYN | Age: 36
End: 2021-01-15

## 2021-01-15 DIAGNOSIS — R10.2 PELVIC CRAMPING: Primary | ICD-10-CM

## 2021-01-15 RX ORDER — IBUPROFEN 600 MG/1
600 TABLET ORAL EVERY 6 HOURS PRN
Qty: 60 TABLET | Refills: 0 | OUTPATIENT
Start: 2021-01-15 | End: 2021-02-02

## 2021-01-16 NOTE — TELEPHONE ENCOUNTER
Brianna Brownlee called regarding episodes of cramping that have been persistent today. Patient's Hx is significant for Previable PPROM @ 19w6d on 12/19. Patient states she has not had a period since. She denies any bleeding, fevers, chills, nausea, vomiting, diarrhea or any other symptoms. Informed patient that this is likely her first menstrual cycle since her recent delivery. Patient agrees that this cramping is similar to that of a period. Rx sent to the pharmacy for ibuprofen. Instructed patient to take this for the cramping pain and to please come to the ED for any worsening symptoms or acute changes in status. Also discussed patient's mental health following the loss of her baby. Patient denies SI/HI, but admits to feeling intense grief at times. Encouraged continued close follow-up with therapy and in the office. Advised that she does not hesitate to reach out or come to ED if her thoughts or emotions become overwhelming. Patient agrees to plan. She is scheduled for an appointment at [facility] 2/2/15.     Discussed with Dr. Jr Guevara, [place]  OB/GYN Resident, PGY1  [facility]  1/15/2021 11:01 PM

## 2021-01-19 ENCOUNTER — TELEPHONE (OUTPATIENT)
Dept: OBGYN | Age: 36
End: 2021-01-19

## 2021-01-19 NOTE — TELEPHONE ENCOUNTER
Geoffrey Henao called stating that she is still having abdominal pain and she has started spotting. She is currently on an antibiotic and 600 mg of motrin. Advised to continue on meds and if no change by Monday to call back and we will get her in for evaluation.

## 2021-01-20 ENCOUNTER — APPOINTMENT (OUTPATIENT)
Dept: CT IMAGING | Age: 36
End: 2021-01-20
Payer: MEDICARE

## 2021-01-20 ENCOUNTER — HOSPITAL ENCOUNTER (EMERGENCY)
Age: 36
Discharge: HOME | End: 2021-01-20
Attending: EMERGENCY MEDICINE
Payer: MEDICARE

## 2021-01-20 VITALS
HEART RATE: 85 BPM | SYSTOLIC BLOOD PRESSURE: 109 MMHG | OXYGEN SATURATION: 97 % | DIASTOLIC BLOOD PRESSURE: 73 MMHG | RESPIRATION RATE: 16 BRPM | TEMPERATURE: 97 F | HEIGHT: 65 IN | WEIGHT: 170 LBS | BODY MASS INDEX: 28.32 KG/M2

## 2021-01-20 DIAGNOSIS — R10.11 RIGHT UPPER QUADRANT ABDOMINAL PAIN: Primary | ICD-10-CM

## 2021-01-20 LAB
-: NORMAL
ABSOLUTE EOS #: 0.16 K/UL (ref 0–0.44)
ABSOLUTE IMMATURE GRANULOCYTE: 0.03 K/UL (ref 0–0.3)
ABSOLUTE LYMPH #: 3.3 K/UL (ref 1.1–3.7)
ABSOLUTE MONO #: 0.61 K/UL (ref 0.1–1.2)
ALBUMIN SERPL-MCNC: 3.3 G/DL (ref 3.5–5.2)
ALBUMIN/GLOBULIN RATIO: 1 (ref 1–2.5)
ALP BLD-CCNC: 69 U/L (ref 35–104)
ALT SERPL-CCNC: <5 U/L (ref 5–33)
AMORPHOUS: NORMAL
ANION GAP SERPL CALCULATED.3IONS-SCNC: 6 MMOL/L (ref 9–17)
AST SERPL-CCNC: 10 U/L
BACTERIA: NORMAL
BASOPHILS # BLD: 1 % (ref 0–2)
BASOPHILS ABSOLUTE: 0.04 K/UL (ref 0–0.2)
BILIRUB SERPL-MCNC: <0.1 MG/DL (ref 0.3–1.2)
BILIRUBIN URINE: NEGATIVE
BUN BLDV-MCNC: 4 MG/DL (ref 6–20)
BUN/CREAT BLD: ABNORMAL (ref 9–20)
C TRACH DNA GENITAL QL NAA+PROBE: NEGATIVE
CALCIUM SERPL-MCNC: 8.5 MG/DL (ref 8.6–10.4)
CASTS UA: NORMAL /LPF (ref 0–8)
CHLORIDE BLD-SCNC: 105 MMOL/L (ref 98–107)
CO2: 24 MMOL/L (ref 20–31)
COLOR: YELLOW
COMMENT UA: ABNORMAL
CREAT SERPL-MCNC: 0.68 MG/DL (ref 0.5–0.9)
CRYSTALS, UA: NORMAL /HPF
DIFFERENTIAL TYPE: ABNORMAL
DIRECT EXAM: ABNORMAL
EOSINOPHILS RELATIVE PERCENT: 2 % (ref 1–4)
EPITHELIAL CELLS UA: NORMAL /HPF (ref 0–5)
GFR AFRICAN AMERICAN: >60 ML/MIN
GFR NON-AFRICAN AMERICAN: >60 ML/MIN
GFR SERPL CREATININE-BSD FRML MDRD: ABNORMAL ML/MIN/{1.73_M2}
GFR SERPL CREATININE-BSD FRML MDRD: ABNORMAL ML/MIN/{1.73_M2}
GLUCOSE BLD-MCNC: 93 MG/DL (ref 70–99)
GLUCOSE URINE: NEGATIVE
HCG QUANTITATIVE: <1 IU/L
HCT VFR BLD CALC: 36.9 % (ref 36.3–47.1)
HEMOGLOBIN: 11.5 G/DL (ref 11.9–15.1)
IMMATURE GRANULOCYTES: 0 %
KETONES, URINE: NEGATIVE
LEUKOCYTE ESTERASE, URINE: ABNORMAL
LIPASE: 11 U/L (ref 13–60)
LYMPHOCYTES # BLD: 40 % (ref 24–43)
Lab: ABNORMAL
MCH RBC QN AUTO: 26.9 PG (ref 25.2–33.5)
MCHC RBC AUTO-ENTMCNC: 31.2 G/DL (ref 28.4–34.8)
MCV RBC AUTO: 86.4 FL (ref 82.6–102.9)
MONOCYTES # BLD: 7 % (ref 3–12)
MUCUS: NORMAL
N. GONORRHOEAE DNA: NEGATIVE
NITRITE, URINE: NEGATIVE
NRBC AUTOMATED: 0 PER 100 WBC
OTHER OBSERVATIONS UA: NORMAL
PDW BLD-RTO: 13.1 % (ref 11.8–14.4)
PH UA: 6.5 (ref 5–8)
PLATELET # BLD: 326 K/UL (ref 138–453)
PLATELET ESTIMATE: ABNORMAL
PMV BLD AUTO: 9.3 FL (ref 8.1–13.5)
POTASSIUM SERPL-SCNC: 3.7 MMOL/L (ref 3.7–5.3)
PROTEIN UA: NEGATIVE
RBC # BLD: 4.27 M/UL (ref 3.95–5.11)
RBC # BLD: ABNORMAL 10*6/UL
RBC UA: NORMAL /HPF (ref 0–4)
RENAL EPITHELIAL, UA: NORMAL /HPF
SEG NEUTROPHILS: 50 % (ref 36–65)
SEGMENTED NEUTROPHILS ABSOLUTE COUNT: 4.16 K/UL (ref 1.5–8.1)
SODIUM BLD-SCNC: 135 MMOL/L (ref 135–144)
SPECIFIC GRAVITY UA: 1.01 (ref 1–1.03)
SPECIMEN DESCRIPTION: ABNORMAL
SPECIMEN DESCRIPTION: NORMAL
TOTAL PROTEIN: 6.6 G/DL (ref 6.4–8.3)
TRICHOMONAS: NORMAL
TURBIDITY: CLEAR
URINE HGB: NEGATIVE
UROBILINOGEN, URINE: NORMAL
WBC # BLD: 8.3 K/UL (ref 3.5–11.3)
WBC # BLD: ABNORMAL 10*3/UL
WBC UA: NORMAL /HPF (ref 0–5)
YEAST: NORMAL

## 2021-01-20 PROCEDURE — 6370000000 HC RX 637 (ALT 250 FOR IP): Performed by: EMERGENCY MEDICINE

## 2021-01-20 PROCEDURE — 74176 CT ABD & PELVIS W/O CONTRAST: CPT

## 2021-01-20 PROCEDURE — 87480 CANDIDA DNA DIR PROBE: CPT

## 2021-01-20 PROCEDURE — 85025 COMPLETE CBC W/AUTO DIFF WBC: CPT

## 2021-01-20 PROCEDURE — 87491 CHLMYD TRACH DNA AMP PROBE: CPT

## 2021-01-20 PROCEDURE — 87660 TRICHOMONAS VAGIN DIR PROBE: CPT

## 2021-01-20 PROCEDURE — 80053 COMPREHEN METABOLIC PANEL: CPT

## 2021-01-20 PROCEDURE — 84702 CHORIONIC GONADOTROPIN TEST: CPT

## 2021-01-20 PROCEDURE — 87591 N.GONORRHOEAE DNA AMP PROB: CPT

## 2021-01-20 PROCEDURE — 83690 ASSAY OF LIPASE: CPT

## 2021-01-20 PROCEDURE — 81001 URINALYSIS AUTO W/SCOPE: CPT

## 2021-01-20 PROCEDURE — 99285 EMERGENCY DEPT VISIT HI MDM: CPT

## 2021-01-20 PROCEDURE — 87510 GARDNER VAG DNA DIR PROBE: CPT

## 2021-01-20 RX ORDER — KETOROLAC TROMETHAMINE 30 MG/ML
30 INJECTION, SOLUTION INTRAMUSCULAR; INTRAVENOUS ONCE
Status: DISCONTINUED | OUTPATIENT
Start: 2021-01-20 | End: 2021-01-20 | Stop reason: HOSPADM

## 2021-01-20 RX ORDER — METRONIDAZOLE 500 MG/1
500 TABLET ORAL 2 TIMES DAILY
Qty: 14 TABLET | Refills: 0 | Status: SHIPPED | OUTPATIENT
Start: 2021-01-20 | End: 2021-01-27

## 2021-01-20 RX ORDER — ACETAMINOPHEN 500 MG
500 TABLET ORAL EVERY 6 HOURS PRN
Qty: 40 TABLET | Refills: 0 | Status: SHIPPED | OUTPATIENT
Start: 2021-01-20 | End: 2022-07-09

## 2021-01-20 RX ORDER — ACETAMINOPHEN 325 MG/1
650 TABLET ORAL ONCE
Status: COMPLETED | OUTPATIENT
Start: 2021-01-20 | End: 2021-01-20

## 2021-01-20 RX ORDER — ONDANSETRON 2 MG/ML
4 INJECTION INTRAMUSCULAR; INTRAVENOUS ONCE
Status: DISCONTINUED | OUTPATIENT
Start: 2021-01-20 | End: 2021-01-20 | Stop reason: HOSPADM

## 2021-01-20 RX ADMIN — ACETAMINOPHEN 650 MG: 325 TABLET ORAL at 01:57

## 2021-01-20 ASSESSMENT — ENCOUNTER SYMPTOMS
CHEST TIGHTNESS: 0
VOMITING: 0
ABDOMINAL DISTENTION: 0
DIARRHEA: 0
NAUSEA: 1
APNEA: 0
BACK PAIN: 0
CONSTIPATION: 0
SHORTNESS OF BREATH: 0
COLOR CHANGE: 0
ABDOMINAL PAIN: 1

## 2021-01-20 ASSESSMENT — PAIN DESCRIPTION - PROGRESSION: CLINICAL_PROGRESSION: GRADUALLY WORSENING

## 2021-01-20 ASSESSMENT — PAIN DESCRIPTION - FREQUENCY: FREQUENCY: CONTINUOUS

## 2021-01-20 ASSESSMENT — PAIN DESCRIPTION - PAIN TYPE: TYPE: ACUTE PAIN

## 2021-01-20 ASSESSMENT — PAIN DESCRIPTION - ORIENTATION: ORIENTATION: RIGHT

## 2021-01-20 ASSESSMENT — PAIN SCALES - GENERAL: PAINLEVEL_OUTOF10: 9

## 2021-01-20 ASSESSMENT — PAIN DESCRIPTION - LOCATION: LOCATION: ABDOMEN

## 2021-01-20 NOTE — ED PROVIDER NOTES
[facility] ED  Emergency Department Encounter  EmergencyMedicine Resident     Pt Sadiq Llamas  MRN: [id number]  Ivanna 1985  Date of evaluation: 1/20/21  PCP:  Monty Leon MD    CHIEF COMPLAINT       Chief Complaint   Patient presents with    Abdominal Pain     x3-4 days, cramping       HISTORY OF PRESENT ILLNESS  (Location/Symptom, Timing/Onset, Context/Setting, Quality, Duration, Modifying Factors, Severity.)      Saima Drummond is a 38 y.o. female who presents with 3 to 4 days of abdominal cramping which is progressively getting worse. Patient states that she delivered a nonviable child at 24 weeks in December, had no vaginal bleeding for about 2 weeks up until recently where she had vaginal spotting. Has had one episode of coitus, without dyspareunia. Patient describes the abdominal pain as bandlike across her umbilicus, radiates to the back and recently radiating to the right upper quadrant where its most severe, patient had associated nausea but no vomiting. Denies any chest pain shortness of breath. Patient recently diagnosed with UTI and started on Bactrim. No dysuria, hematuria, flank pain. PAST MEDICAL / SURGICAL / SOCIAL / FAMILY HISTORY      has a past medical history of Anxiety, Asthma, GERD (gastroesophageal reflux disease), Major depression, Morbid obesity, and Tension type headache. No added pertinent     has no past surgical history on file.   No added pertinent    Social History     Socioeconomic History    Marital status: Single     Spouse name: Not on file    Number of children: Not on file    Years of education: Not on file    Highest education level: Not on file   Occupational History    Not on file   Social Needs    Financial resource strain: Not on file    Food insecurity     Worry: Not on file     Inability: Not on file    Transportation needs     Medical: Not on file     Non-medical: Not on file   Tobacco Use    Smoking status: Current Every Day Smoker     Packs/day: 1.00     Types: Cigars    Smokeless tobacco: Never Used    Tobacco comment: Black and Mild   Substance and Sexual Activity    Alcohol use: No    Drug use: No    Sexual activity: Yes     Partners: Male   Lifestyle    Physical activity     Days per week: Not on file     Minutes per session: Not on file    Stress: Not on file   Relationships    Social connections     Talks on phone: Not on file     Gets together: Not on file     Attends [employer] service: Not on file     Active member of club or organization: Not on file     Attends meetings of clubs or organizations: Not on file     Relationship status: Not on file    Intimate partner violence     Fear of current or ex partner: Not on file     Emotionally abused: Not on file     Physically abused: Not on file     Forced sexual activity: Not on file   Other Topics Concern    Not on file   Social History Narrative    Not on file       Family History   Problem Relation Age of Onset    Depression Father     Thyroid Disease Father     Diabetes Sister        Allergies:  Iodine, Penicillin g, Prednisolone, and Dye  [iodides]    Home Medications:  Prior to Admission medications    Medication Sig Start Date End Date Taking?  Authorizing Provider   metroNIDAZOLE (FLAGYL) 500 MG tablet Take 1 tablet by mouth 2 times daily for 7 days 1/20/21 1/27/21 Yes Gurmeet Maradiaga, DO   acetaminophen (TYLENOL) 500 MG tablet Take 1 tablet by mouth every 6 hours as needed for Pain 1/20/21  Yes Gurmeet Maradiaga, DO   ibuprofen (ADVIL;MOTRIN) 600 MG tablet Take 1 tablet by mouth every 6 hours as needed for Pain 1/15/21   Veronica Bolus, DO   ibuprofen (ADVIL;MOTRIN) 600 MG tablet Take 1 tablet by mouth every 6 hours as needed for Pain 12/20/20   Jany Martinez, DO   flintstones complete (FLINTSTONES) 60 MG chewable tablet chew and swallow 1 tablet by mouth once daily 7/10/18   Flor Batres MD   omeprazole (PRILOSEC) 20 MG delayed release capsule take 1 capsule by mouth once daily 4/2/18   Blanka Burch MD   ferrous sulfate (FE TABS) 325 (65 Fe) MG EC tablet Take 1 tablet by mouth 2 times daily 10/3/17   Blanka Burch MD   vitamin D (ERGOCALCIFEROL) 62481 units CAPS capsule Take 1 capsule by mouth once a week for 8 doses 7/31/17 9/19/17  Lauren Penn MD   Cholecalciferol (VITAMIN D3) 1000 units TABS Take 1 tablet by mouth daily 7/31/17   Lauren Penn MD       REVIEW OF SYSTEMS    (2-9 systems for level 4, 10 or more for level 5)      Review of Systems   Constitutional: Negative for chills, fatigue and fever. HENT: Negative for congestion. Respiratory: Negative for apnea, chest tightness and shortness of breath. Cardiovascular: Negative for chest pain and leg swelling. Gastrointestinal: Positive for abdominal pain and nausea. Negative for abdominal distention, constipation, diarrhea and vomiting. Genitourinary: Positive for pelvic pain and vaginal bleeding. Negative for difficulty urinating, dysuria and vaginal discharge. Musculoskeletal: Negative for back pain. Skin: Negative for color change. Neurological: Negative for dizziness, facial asymmetry, weakness, light-headedness, numbness and headaches. Psychiatric/Behavioral: Negative for confusion. PHYSICAL EXAM   (up to 7 for level 4, 8 or more for level 5)      INITIAL VITALS:   /73   Pulse 85   Temp 97 °F (36.1 °C) (Skin)   Resp 16   Ht 5' 5\" (1.651 m)   Wt 170 lb (77.1 kg)   LMP 09/11/2020 (Approximate)   SpO2 97%   BMI 28.29 kg/m²     Physical Exam  Vitals signs and nursing note reviewed. Exam conducted with a chaperone present. Constitutional:       Appearance: Normal appearance. HENT:      Head: Normocephalic and atraumatic. Mouth/Throat:      Mouth: Mucous membranes are moist.      Pharynx: Oropharynx is clear. Eyes:      Extraocular Movements: Extraocular movements intact.       Conjunctiva/sclera: Conjunctivae normal.      Pupils: Pupils are equal, round, and reactive to light. Neck:      Musculoskeletal: Normal range of motion and neck supple. Cardiovascular:      Rate and Rhythm: Normal rate and regular rhythm. Pulses: Normal pulses. Heart sounds: Normal heart sounds. No murmur. Pulmonary:      Effort: Pulmonary effort is normal.      Breath sounds: Normal breath sounds. No stridor. No wheezing, rhonchi or rales. Abdominal:      General: Abdomen is flat. There is no distension. Palpations: Abdomen is soft. Tenderness: There is abdominal tenderness in the right upper quadrant and right lower quadrant. There is no guarding. Positive signs include Mitchell's sign. Negative signs include Rovsing's sign, McBurney's sign and psoas sign. Genitourinary:     General: Normal vulva. Exam position: Supine. Pubic Area: No rash. Labia:         Right: No tenderness. Left: No tenderness. Vagina: Vaginal discharge present. No tenderness. Cervix: Normal.      Uterus: Tender. Comments: Patient noted to have tenderness with cervical motion. Patient also white discharge in the vaginal canal, very apparent of bacterial vaginosis. Adnexa was unable to be palpated no tenderness present on bimanual exam.  Prone was provided by patient's nurse. Musculoskeletal: Normal range of motion. General: No swelling or tenderness. Skin:     General: Skin is warm and dry. Coloration: Skin is not pale. Findings: No erythema or rash. Neurological:      General: No focal deficit present. Mental Status: She is alert and oriented to person, place, and time. Mental status is at baseline.    Psychiatric:         Mood and Affect: Mood normal.         Behavior: Behavior normal.         DIFFERENTIAL  DIAGNOSIS     PLAN (LABS / IMAGING / EKG):  Orders Placed This Encounter   Procedures    C.trachomatis N.gonorrhoeae DNA    VAGINITIS DNA PROBE    CT ABDOMEN PELVIS WO CONTRAST    CBC Auto Differential    Comprehensive Metabolic Panel w/ Reflex to MG    Lipase    Urinalysis Reflex to Culture    HCG, Quantitative, Pregnancy    Microscopic Urinalysis    Vaginal exam       MEDICATIONS ORDERED:  Orders Placed This Encounter   Medications    acetaminophen (TYLENOL) tablet 650 mg    DISCONTD: ondansetron (ZOFRAN) injection 4 mg    DISCONTD: ketorolac (TORADOL) injection 30 mg    metroNIDAZOLE (FLAGYL) 500 MG tablet     Sig: Take 1 tablet by mouth 2 times daily for 7 days     Dispense:  14 tablet     Refill:  0    acetaminophen (TYLENOL) 500 MG tablet     Sig: Take 1 tablet by mouth every 6 hours as needed for Pain     Dispense:  40 tablet     Refill:  0       DDX: Patient's cramping most likely could be likely to new onset menses after delivery. Patient does note to have slight spotting, bacterial vaginosis, cramping is most likely not due to bacterial vaginosis but we will still treat. She does have positive Mitchell sign, will obtain imaging of gallbladder for further evaluation. Infection not likely as patient does not have any fever or leukocytosis, will clinically evaluate patient. DIAGNOSTIC RESULTS / EMERGENCY DEPARTMENT COURSE / MDM   LAB RESULTS:  Results for orders placed or performed during the hospital encounter of 01/20/21   C.trachomatis N.gonorrhoeae DNA    Specimen: Cervix   Result Value Ref Range    Specimen Description . CERVIX     C. trachomatis DNA NEGATIVE NEGATIVE    N. gonorrhoeae DNA NEGATIVE NEGATIVE   VAGINITIS DNA PROBE    Specimen: Vaginal   Result Value Ref Range    Specimen Description . VAGINA     Special Requests NOT REPORTED     Direct Exam POSITIVE for Gardnerella vaginalis. (A)     Direct Exam NEGATIVE for Candida sp.      Direct Exam NEGATIVE for Trichomonas vaginalis     Direct Exam       Method of testing is a DNA probe intended for detection and identification of Candida species, Gardnerella vaginalis, and Trichomonas vaginalis nucleic family physicians and OB/GYN. PROCEDURES:  None    CONSULTS:  None    MEDICAL DECISION MAKING:  Patient seen very concerning for gallbladder pathology, CT was negative, ultrasound was not on at night. Patient's pain had resolved with administration of Tylenol and nausea had relaxed with time, patient did deny Zofran and Toradol later in her stay saying that she did not need any other management. Concerns with patient's miscarriage and story for worsening pathology, thorough evaluation and multiple reassessments with patient. Patient's vaginal exam noted closed os, no blood in the vaginal vault, white vaginal discharge indicated of of bacterial vaginosis which corresponded to laboratory testing. Heavy emphasis on further evaluation discussed with patient for follow-up with [address] [place] [address] family physicians and OB/GYN. Finding could be new onset of menses, which can tend to be worse with cramping initially after pregnancy. Pancreas was noted to be normal on CT with a normal lipase. Post partum help syndrome was thought of with patient's right upper quadrant pain following pregnancy, patient has normal platelet count and normal liver enzymes and this is not likely. Patient was normotensive and did not demonstrate any hypertension and preeclampsia not likely. CRITICAL CARE:  Please see attending note    FINAL IMPRESSION      1.  Right upper quadrant abdominal pain          DISPOSITION / PLAN     DISPOSITION Decision To Discharge 01/20/2021 05:20:36 AM      PATIENT REFERRED TO:  [facility] [address] [address] [phone]  Schedule an appointment as soon as possible for a visit today  Set up for primary care    [facility]  [address] [place] [address] [id number] [phone]  Schedule an appointment as soon as possible for a visit         DISCHARGE MEDICATIONS:  Discharge Medication List as of 1/20/2021  5:24 AM      START taking these medications    Details   metroNIDAZOLE (FLAGYL) 500 MG tablet Take 1 tablet by mouth 2 times daily for 7 days, Disp-14 tablet, R-0Print      acetaminophen (TYLENOL) 500 MG tablet Take 1 tablet by mouth every 6 hours as needed for Pain, Disp-40 tablet, R-0Print             Hiren De Leon,   Emergency Medicine Resident    (Please note that portions of thisnote were completed with a voice recognition program.  Efforts were made to edit the dictations but occasionally words are mis-transcribed.)        Estelle Rubinstein, DO  Resident  01/20/21 9164

## 2021-01-20 NOTE — ED NOTES
Pt presents to ED w/ c/o right sided abd pain x3-4 days, radiates across upper abd, pt also c/o spotting. Pt reports hx of miscarriage on 12/20/20. Pt reports pain to \"cervix\" as well. Pt is ambulatory, in NAD, VSS. IV line and labs. Will continue to monitor.      Marisela Fuentes RN  01/20/21 9881

## 2021-01-20 NOTE — ED NOTES
[facility] via [address] [place] [address]  01/20/21 2933

## 2021-01-20 NOTE — ED PROVIDER NOTES
[facility]     Emergency Department     Faculty Attestation    I performed a history and physical examination of the patient and discussed management with the resident. I have reviewed and agree with the residents findings including all diagnostic interpretations, and treatment plans as written. Any areas of disagreement are noted on the chart. I was personally present for the key portions of any procedures. I have documented in the chart those procedures where I was not present during the key portions. I have reviewed the emergency nurses triage note. I agree with the chief complaint, past medical history, past surgical history, allergies, medications, social and family history as documented unless otherwise noted below. Documentation of the HPI, Physical Exam and Medical Decision Making performed by scribsocorro is based on my personal performance of the HPI, PE and MDM. For Physician Assistant/ Nurse Practitioner cases/documentation I have personally evaluated this patient and have completed at least one if not all key elements of the E/M (history, physical exam, and MDM). Additional findings are as noted. 39 yo F ruq pain, diffuse cramping x 3 days, no fever + nausea, miscarriage 1 month prior, no injury, no back pain,   pe silas Forte RN escort for exam,   Mild tenderness ruq, no rigidity, no rebound, no guarding, no distension,     S/s improved, lipase 11, wbc stable, ct stable, we stressed pcp & gyn follow up,     EKG Interpretation    Interpreted by me      CRITICAL CARE: There was a high probability of clinically significant/life threatening deterioration in this patient's condition which required my urgent intervention. Total critical care time was 0 minutes. This excludes any time for separately reportable procedures.        Daniel 24, DO  01/20/21 [address] [facility], [address], DO  01/20/21 9285

## 2021-02-02 ENCOUNTER — HOSPITAL ENCOUNTER (OUTPATIENT)
Age: 36
Setting detail: SPECIMEN
Discharge: HOME | End: 2021-02-02
Payer: MEDICARE

## 2021-02-02 ENCOUNTER — PROCEDURE VISIT (OUTPATIENT)
Dept: OBGYN | Age: 36
End: 2021-02-02
Payer: MEDICARE

## 2021-02-02 VITALS
HEART RATE: 77 BPM | HEIGHT: 65 IN | WEIGHT: 177.13 LBS | DIASTOLIC BLOOD PRESSURE: 73 MMHG | BODY MASS INDEX: 29.51 KG/M2 | SYSTOLIC BLOOD PRESSURE: 115 MMHG

## 2021-02-02 DIAGNOSIS — N89.8 VAGINAL DISCHARGE: ICD-10-CM

## 2021-02-02 DIAGNOSIS — R87.611 ATYPICAL SQUAMOUS CELLS CANNOT EXCLUDE HIGH GRADE SQUAMOUS INTRAEPITHELIAL LESION ON CYTOLOGIC SMEAR OF CERVIX (ASC-H): Primary | ICD-10-CM

## 2021-02-02 LAB
CONTROL: PRESENT
PREGNANCY TEST URINE, POC: NEGATIVE

## 2021-02-02 PROCEDURE — 57452 EXAM OF CERVIX W/SCOPE: CPT | Performed by: STUDENT IN AN ORGANIZED HEALTH CARE EDUCATION/TRAINING PROGRAM

## 2021-02-02 PROCEDURE — 99211 OFF/OP EST MAY X REQ PHY/QHP: CPT | Performed by: STUDENT IN AN ORGANIZED HEALTH CARE EDUCATION/TRAINING PROGRAM

## 2021-02-02 PROCEDURE — 81025 URINE PREGNANCY TEST: CPT | Performed by: STUDENT IN AN ORGANIZED HEALTH CARE EDUCATION/TRAINING PROGRAM

## 2021-02-02 RX ORDER — PANTOPRAZOLE SODIUM 40 MG/1
40 TABLET, DELAYED RELEASE ORAL
Status: ON HOLD | COMMUNITY
End: 2021-06-26 | Stop reason: HOSPADM

## 2021-02-02 RX ORDER — IBUPROFEN 200 MG
200 TABLET ORAL
COMMUNITY
End: 2021-02-22 | Stop reason: HOSPADM

## 2021-02-02 RX ORDER — ESCITALOPRAM OXALATE 10 MG/1
10 TABLET ORAL
COMMUNITY
Start: 2020-11-17 | End: 2021-04-22

## 2021-02-02 RX ORDER — POTASSIUM CHLORIDE 750 MG/1
10 CAPSULE, EXTENDED RELEASE ORAL
Status: ON HOLD | COMMUNITY
Start: 2021-01-14 | End: 2021-06-26 | Stop reason: HOSPADM

## 2021-02-02 RX ORDER — LACTOBACILLUS COMBINATION NO.9 4B CELL
1 CAPSULE ORAL DAILY
Qty: 30 TABLET | Refills: 1 | Status: SHIPPED | OUTPATIENT
Start: 2021-02-02 | End: 2021-04-22 | Stop reason: ALTCHOICE

## 2021-02-02 RX ORDER — FLUCONAZOLE 150 MG/1
150 TABLET ORAL ONCE
Qty: 1 TABLET | Refills: 0 | Status: SHIPPED | OUTPATIENT
Start: 2021-02-02 | End: 2021-02-02

## 2021-02-02 RX ORDER — LEVOTHYROXINE, LIOTHYRONINE 19; 4.5 UG/1; UG/1
30 TABLET ORAL
Status: ON HOLD | COMMUNITY
Start: 2020-12-23 | End: 2021-06-26 | Stop reason: HOSPADM

## 2021-02-02 RX ORDER — TOPIRAMATE 25 MG/1
25 TABLET ORAL
COMMUNITY
Start: 2020-02-23 | End: 2021-02-22 | Stop reason: HOSPADM

## 2021-02-02 NOTE — PROGRESS NOTES
[facility] Obstetrics & Gynecology    COLPOSCOPY PROCEDURE FORM    2021  Dimple Garrett  Patient's last menstrual period was 2021 (approximate). 38 y.o.      Chief Complaint:   Chief Complaint   Patient presents with    Procedure     colp        Patient is here for a colposcopy. Pap smear on 20 showed ASCH with high risk HPV. Today she is also complaining of vaginal discharge. States the discharge is thick and white with no odor. Denies any abdominal pain, vaginal bleeding or discomfort. She reports that she was recently tested for this discharge on 21 and she was positive for BV. She took the flagyl that was prescribed but now she thinks she has a yeast infection and desires treatment for that. Patient was very emotional and anxious about this procedure. Several relaxation techniques and lots of encouragement were given by attending and myself to perform this procedure. Procedure was offered to be performed under general anesthesia at a later date but patient desired to continue with the colpo as scheduled today. Past Medical History:   Diagnosis Date    Anxiety     Asthma     GERD (gastroesophageal reflux disease)     Major depression 2013    Morbid obesity 9/15/2017    Tension type headache 2013     History reviewed. No pertinent surgical history.     Family History   Problem Relation Age of Onset    Depression Father     Thyroid Disease Father     Diabetes Sister      Social History     Socioeconomic History    Marital status: Single     Spouse name: Not on file    Number of children: Not on file    Years of education: Not on file    Highest education level: Not on file   Occupational History    Not on file   Social Needs    Financial resource strain: Not on file    Food insecurity     Worry: Not on file     Inability: Not on file    Transportation needs     Medical: Not on file     Non-medical: Not on file   Tobacco Use    Smoking status: Current Every Day Smoker     Packs/day: 1.00     Types: Cigars    Smokeless tobacco: Never Used    Tobacco comment: Black and Mild   Substance and Sexual Activity    Alcohol use: No    Drug use: No    Sexual activity: Yes     Partners: Male   Lifestyle    Physical activity     Days per week: Not on file     Minutes per session: Not on file    Stress: Not on file   Relationships    Social connections     Talks on phone: Not on file     Gets together: Not on file     Attends Synagogue service: Not on file     Active member of club or organization: Not on file     Attends meetings of clubs or organizations: Not on file     Relationship status: Not on file    Intimate partner violence     Fear of current or ex partner: Not on file     Emotionally abused: Not on file     Physically abused: Not on file     Forced sexual activity: Not on file   Other Topics Concern    Not on file   Social History Narrative    Not on file       Current Outpatient Medications on File Prior to Visit   Medication Sig Dispense Refill    ibuprofen (ADVIL) 200 MG tablet 200 mg      escitalopram (LEXAPRO) 10 MG tablet 10 mg      topiramate (TOPAMAX) 25 MG tablet 25 mg      NP THYROID 30 MG tablet 30 mg      potassium chloride (MICRO-K) 10 MEQ extended release capsule 10 mEq      pantoprazole (PROTONIX) 40 MG tablet 40 mg      acetaminophen (TYLENOL) 500 MG tablet Take 1 tablet by mouth every 6 hours as needed for Pain 40 tablet 0    ibuprofen (ADVIL;MOTRIN) 600 MG tablet Take 1 tablet by mouth every 6 hours as needed for Pain 30 tablet 0    ferrous sulfate (FE TABS) 325 (65 Fe) MG EC tablet Take 1 tablet by mouth 2 times daily 90 tablet 3    flintstones complete (FLINTSTONES) 60 MG chewable tablet chew and swallow 1 tablet by mouth once daily (Patient not taking: Reported on 2/2/2021) 30 tablet 5    omeprazole (PRILOSEC) 20 MG delayed release capsule take 1 capsule by mouth once daily (Patient not taking: Reported on 2/2/2021) 30 capsule 3    vitamin D (ERGOCALCIFEROL) 22521 units CAPS capsule Take 1 capsule by mouth once a week for 8 doses 8 capsule 0    Cholecalciferol (VITAMIN D3) 1000 units TABS Take 1 tablet by mouth daily (Patient not taking: Reported on 2/2/2021) 90 tablet 1     No current facility-administered medications on file prior to visit. Allergies as of 02/02/2021 - Review Complete 02/02/2021   Allergen Reaction Noted    Iodine Shortness Of Breath 03/16/2017    Penicillin g Hives and Shortness Of Breath 03/16/2017    Prednisolone Hives and Shortness Of Breath 03/16/2017    Dye  [iodides]  01/21/2017        INDICATIONS:   1. Atypical squamous cells cannot exclude high grade squamous intraepithelial lesion on cytologic smear of cervix (ASC-H)    2. Vaginal discharge                UHCG: negative         HPV:   positive      Birth Control Method: she is not using any birth control   Abnormal Cytology and Colposcopy History: denies previous history     COLPOSCOPIC EXAMINATION:                Blood pressure 115/73, pulse 77, height 5' 5\" (1.651 m), weight 177 lb 2 oz (80.3 kg), last menstrual period 01/17/2021, not currently breastfeeding. Chaperone for Intimate Exam   Chaperone was offered and accepted as part of the rooming process.  Chaperone: Angelica Alvarado MA and Dr Emmett Rodriguez observations: white discharge that was easily able to be cleaned off cervix, cervix appeared to be very thick, TZ present   Satisfactory:  Yes             LANDMARKS and ATYPICAL FINDINGS:  TZ = transformation zone   SC = new squamocolumnar junction  NC = Nabothian cyst    ME = immature squamous metaplasia  PO = polyp   AV = atypical vessels  C = condyloma  L = leukoplakia  AW = acetowhite epithelium   P = punctation  MO = mosaicism   LS = decreased Lugols uptake  X = biopsy sites  CA = invasive carcinoma      FINDINGS:   When vinegar was applied, aceto white changes were noted at 12 oclock, there was also some punctation noted at 12 oclock. Lugols iodine was applied and decreased uptake was noted at [address] performed:  No    Lugols Iodine Applied:   Yes     Biopsy sites: 9 oclock and 12 oclock   Silver nitrate sticks were used for hemostasis and Monsol's solution was applied. Patient tolerated the procedure well despite having severe anxiety about the procedure       Assessment:   Diagnosis Orders   1. Atypical squamous cells cannot exclude high grade squamous intraepithelial lesion on cytologic smear of cervix (ASC-H)  Colposcopy   2. Vaginal discharge  fluconazole (DIFLUCAN) 150 MG tablet    Prenatal Vit-Min-FA-Fish Oil ([facility] PRENATAL GUMMY) 0.4-113.5 MG CHEW       Patient Active Problem List    Diagnosis Date Noted    ASCUS with positive high risk HPV cervical 2021    19 weeks gestation of pregnancy      premature rupture of membranes (PPROM) delivered, current hospitalization      labor in second trimester with  delivery     Previable PPROM @19wk6d on  @1200 2020     Overview Note:     The patient did not know she was pregnant prior to rupture.  Anxiety 2020    Adrenal insufficiency ([address] 2020    Tobacco use 2020    Obesity 09/15/2017    Strabismus 09/15/2017    Vitamin D deficiency 2017     Overview Note:     8.7 on 17      Major depression 2013    Tension type headache 2013    Thyroid nodule 2012     Overview Note:     CT neck : 2017  Impression: Large mass in the lower pole of the left lobe of the thyroid gland plunging into the superior mediastinum and displacing the trachea somewhat toward the right.  This has progressed dramatically since .    FNA: Benign. Nodular goiter. 2016    T4 and reverse T3 wnl 17             PLAN:   1. The patient was given formal restriction guidelines. Given a larry pad and instructed to report any increase in vaginal bleeding, discharge, or any temperature more than 100.4 F. Okay to take ibuprofen or tylenol as needed for pain control   2. Rx diflucan given due to recent course of antibiotics, declined cultures at this visit   3. Rx prenatal gummy given to patient   4. Follow up in 2 weeks for phone appointment to discuss results  5. HPV Barrier Recommendations and STD counseling completed      Yesi Leary DO   OB/GYN Resident  Pager [phone]  1 Krystyna Alexander  2/2/2021, 5:48 PM           Attending Physician Statement  I have discussed the care of Nikita Ace, including pertinent history and exam findings,  with the resident. I have seen and examined the patient and the key elements of all parts of the encounter have been performed by me. I agree with the assessment, plan and orders as documented by the resident.   [facility])    Ernesto Solano DO

## 2021-02-04 LAB — SURGICAL PATHOLOGY REPORT: NORMAL

## 2021-02-17 ENCOUNTER — TELEPHONE (OUTPATIENT)
Dept: OBGYN | Age: 36
End: 2021-02-17

## 2021-02-17 NOTE — TELEPHONE ENCOUNTER
Maria Esther Canseco called stating that she can not take the Flagyl and would like Metro Gel sent to her pharmacy.

## 2021-02-18 RX ORDER — METRONIDAZOLE 7.5 MG/G
GEL VAGINAL
Qty: 1 TUBE | Refills: 0 | Status: SHIPPED | OUTPATIENT
Start: 2021-02-18 | End: 2021-02-25

## 2021-02-19 ENCOUNTER — HOSPITAL ENCOUNTER (OUTPATIENT)
Age: 36
Setting detail: SPECIMEN
Discharge: HOME | End: 2021-02-19
Payer: MEDICARE

## 2021-02-19 LAB
ESTIMATED AVERAGE GLUCOSE: 97 MG/DL
ESTRADIOL LEVEL: 241 PG/ML (ref 27–314)
FOLLICLE STIMULATING HORMONE: 2.5 U/L (ref 1.7–21.5)
HBA1C MFR BLD: 5 % (ref 4–6)
LH: 4 U/L (ref 1–95.6)
OSMOLALITY URINE: 789 MOSM/KG (ref 80–1300)
POTASSIUM SERPL-SCNC: 3.7 MMOL/L (ref 3.7–5.3)
PROGESTERONE LEVEL: 9.89 NG/ML
PROLACTIN: 27.97 UG/L (ref 4.79–23.3)
SODIUM BLD-SCNC: 141 MMOL/L (ref 135–144)
T3 FREE: 2.72 PG/ML (ref 2.02–4.43)
THYROXINE, FREE: 1.17 NG/DL (ref 0.93–1.7)
TSH SERPL DL<=0.05 MIU/L-ACNC: 1.13 MIU/L (ref 0.3–5)
VITAMIN D 25-HYDROXY: 21.4 NG/ML (ref 30–100)

## 2021-02-21 ENCOUNTER — HOSPITAL ENCOUNTER (EMERGENCY)
Age: 36
Discharge: HOME | End: 2021-02-22
Attending: EMERGENCY MEDICINE
Payer: MEDICARE

## 2021-02-21 ENCOUNTER — APPOINTMENT (OUTPATIENT)
Dept: ULTRASOUND IMAGING | Age: 36
End: 2021-02-21
Payer: MEDICARE

## 2021-02-21 DIAGNOSIS — O20.0 THREATENED MISCARRIAGE: Primary | ICD-10-CM

## 2021-02-21 LAB
-: ABNORMAL
ABSOLUTE EOS #: 0.12 K/UL (ref 0–0.44)
ABSOLUTE IMMATURE GRANULOCYTE: <0.03 K/UL (ref 0–0.3)
ABSOLUTE LYMPH #: 3.5 K/UL (ref 1.1–3.7)
ABSOLUTE MONO #: 0.69 K/UL (ref 0.1–1.2)
ALBUMIN SERPL-MCNC: 3.6 G/DL (ref 3.5–5.2)
ALBUMIN/GLOBULIN RATIO: 0.9 (ref 1–2.5)
ALP BLD-CCNC: 76 U/L (ref 35–104)
ALT SERPL-CCNC: 11 U/L (ref 5–33)
AMORPHOUS: ABNORMAL
ANION GAP SERPL CALCULATED.3IONS-SCNC: 9 MMOL/L (ref 9–17)
AST SERPL-CCNC: 15 U/L
BACTERIA: ABNORMAL
BASOPHILS # BLD: 1 % (ref 0–2)
BASOPHILS ABSOLUTE: 0.04 K/UL (ref 0–0.2)
BILIRUB SERPL-MCNC: 0.19 MG/DL (ref 0.3–1.2)
BILIRUBIN URINE: NEGATIVE
BUN BLDV-MCNC: 8 MG/DL (ref 6–20)
BUN/CREAT BLD: ABNORMAL (ref 9–20)
CALCIUM SERPL-MCNC: 8.8 MG/DL (ref 8.6–10.4)
CASTS UA: ABNORMAL /LPF (ref 0–8)
CHLORIDE BLD-SCNC: 103 MMOL/L (ref 98–107)
CO2: 22 MMOL/L (ref 20–31)
COLOR: YELLOW
CREAT SERPL-MCNC: 0.56 MG/DL (ref 0.5–0.9)
CRYSTALS, UA: ABNORMAL /HPF
DIFFERENTIAL TYPE: ABNORMAL
EOSINOPHILS RELATIVE PERCENT: 2 % (ref 1–4)
EPITHELIAL CELLS UA: ABNORMAL /HPF (ref 0–5)
GFR AFRICAN AMERICAN: >60 ML/MIN
GFR NON-AFRICAN AMERICAN: >60 ML/MIN
GFR SERPL CREATININE-BSD FRML MDRD: ABNORMAL ML/MIN/{1.73_M2}
GFR SERPL CREATININE-BSD FRML MDRD: ABNORMAL ML/MIN/{1.73_M2}
GLUCOSE BLD-MCNC: 101 MG/DL (ref 70–99)
GLUCOSE URINE: NEGATIVE
HCG QUANTITATIVE: 68 IU/L
HCT VFR BLD CALC: 37.6 % (ref 36.3–47.1)
HEMOGLOBIN: 11.6 G/DL (ref 11.9–15.1)
IMMATURE GRANULOCYTES: 0 %
KETONES, URINE: NEGATIVE
LEUKOCYTE ESTERASE, URINE: ABNORMAL
LIPASE: 15 U/L (ref 13–60)
LYMPHOCYTES # BLD: 43 % (ref 24–43)
MCH RBC QN AUTO: 26.4 PG (ref 25.2–33.5)
MCHC RBC AUTO-ENTMCNC: 30.9 G/DL (ref 28.4–34.8)
MCV RBC AUTO: 85.6 FL (ref 82.6–102.9)
MONOCYTES # BLD: 8 % (ref 3–12)
MUCUS: ABNORMAL
NITRITE, URINE: NEGATIVE
NRBC AUTOMATED: 0 PER 100 WBC
OTHER OBSERVATIONS UA: ABNORMAL
PDW BLD-RTO: 13.6 % (ref 11.8–14.4)
PH UA: 5.5 (ref 5–8)
PLATELET # BLD: 311 K/UL (ref 138–453)
PLATELET ESTIMATE: ABNORMAL
PMV BLD AUTO: 9.4 FL (ref 8.1–13.5)
POTASSIUM SERPL-SCNC: 3.8 MMOL/L (ref 3.7–5.3)
PROTEIN UA: NEGATIVE
RBC # BLD: 4.39 M/UL (ref 3.95–5.11)
RBC # BLD: ABNORMAL 10*6/UL
RBC UA: ABNORMAL /HPF (ref 0–4)
RENAL EPITHELIAL, UA: ABNORMAL /HPF
SEG NEUTROPHILS: 46 % (ref 36–65)
SEGMENTED NEUTROPHILS ABSOLUTE COUNT: 3.84 K/UL (ref 1.5–8.1)
SODIUM BLD-SCNC: 134 MMOL/L (ref 135–144)
SPECIFIC GRAVITY UA: 1.03 (ref 1–1.03)
TOTAL PROTEIN: 7.5 G/DL (ref 6.4–8.3)
TRICHOMONAS: ABNORMAL
TURBIDITY: CLEAR
URINE HGB: NEGATIVE
UROBILINOGEN, URINE: NORMAL
WBC # BLD: 8.2 K/UL (ref 3.5–11.3)
WBC # BLD: ABNORMAL 10*3/UL
WBC UA: ABNORMAL /HPF (ref 0–5)
YEAST: ABNORMAL

## 2021-02-21 PROCEDURE — 85025 COMPLETE CBC W/AUTO DIFF WBC: CPT

## 2021-02-21 PROCEDURE — 81001 URINALYSIS AUTO W/SCOPE: CPT

## 2021-02-21 PROCEDURE — 87491 CHLMYD TRACH DNA AMP PROBE: CPT

## 2021-02-21 PROCEDURE — 76817 TRANSVAGINAL US OBSTETRIC: CPT

## 2021-02-21 PROCEDURE — 87510 GARDNER VAG DNA DIR PROBE: CPT

## 2021-02-21 PROCEDURE — 87086 URINE CULTURE/COLONY COUNT: CPT

## 2021-02-21 PROCEDURE — 87591 N.GONORRHOEAE DNA AMP PROB: CPT

## 2021-02-21 PROCEDURE — 84702 CHORIONIC GONADOTROPIN TEST: CPT

## 2021-02-21 PROCEDURE — 87480 CANDIDA DNA DIR PROBE: CPT

## 2021-02-21 PROCEDURE — 80053 COMPREHEN METABOLIC PANEL: CPT

## 2021-02-21 PROCEDURE — 99284 EMERGENCY DEPT VISIT MOD MDM: CPT

## 2021-02-21 PROCEDURE — 83690 ASSAY OF LIPASE: CPT

## 2021-02-21 PROCEDURE — 87660 TRICHOMONAS VAGIN DIR PROBE: CPT

## 2021-02-21 ASSESSMENT — ENCOUNTER SYMPTOMS
SHORTNESS OF BREATH: 0
ABDOMINAL PAIN: 1
RHINORRHEA: 0
PHOTOPHOBIA: 0
BACK PAIN: 1
VOMITING: 0
WHEEZING: 0
NAUSEA: 0

## 2021-02-22 ENCOUNTER — TELEPHONE (OUTPATIENT)
Dept: OBGYN | Age: 36
End: 2021-02-22

## 2021-02-22 VITALS
DIASTOLIC BLOOD PRESSURE: 87 MMHG | TEMPERATURE: 98 F | RESPIRATION RATE: 16 BRPM | SYSTOLIC BLOOD PRESSURE: 106 MMHG | HEART RATE: 92 BPM | OXYGEN SATURATION: 97 %

## 2021-02-22 DIAGNOSIS — Z3A.01 LESS THAN 8 WEEKS GESTATION OF PREGNANCY: Primary | ICD-10-CM

## 2021-02-22 LAB
CULTURE: NORMAL
DIRECT EXAM: ABNORMAL
Lab: ABNORMAL
Lab: NORMAL
SPECIMEN DESCRIPTION: ABNORMAL
SPECIMEN DESCRIPTION: NORMAL

## 2021-02-22 PROCEDURE — 6370000000 HC RX 637 (ALT 250 FOR IP): Performed by: STUDENT IN AN ORGANIZED HEALTH CARE EDUCATION/TRAINING PROGRAM

## 2021-02-22 RX ORDER — METRONIDAZOLE 500 MG/1
500 TABLET ORAL 2 TIMES DAILY
Qty: 13 TABLET | Refills: 0 | Status: SHIPPED | OUTPATIENT
Start: 2021-02-22 | End: 2021-03-01 | Stop reason: ALTCHOICE

## 2021-02-22 RX ORDER — METRONIDAZOLE 500 MG/1
500 TABLET ORAL ONCE
Status: COMPLETED | OUTPATIENT
Start: 2021-02-22 | End: 2021-02-22

## 2021-02-22 RX ADMIN — METRONIDAZOLE 500 MG: 500 TABLET ORAL at 01:06

## 2021-02-22 NOTE — ED TRIAGE NOTES
Pt to ED with c/o abdominal pain and back pain. Pt stated she took a pregnancy test at home today and it was positive. Pt also states she is having vaginal discharge. Denies any bleeding. Pt stated she had a  labor in 2020. Pt resting, NAD noted. RR even and non labored. VSS. Call light in reach. Resident at bedside assessing pt.

## 2021-02-22 NOTE — TELEPHONE ENCOUNTER
Spoke with Suzanne Coburn and informed her that Dr Rodrigue Joseph was looking into finding a prenatal that would be easier for her to take. I tole her that I would call her back when I have an answer from Dr Rodrigue Joseph.

## 2021-02-22 NOTE — TELEPHONE ENCOUNTER
Ofelia Bright is needing a prenatal vitamin that is small due to her not being able to swelling her current prenatal, she has a thyroid goiter. She is asking if it can come in a liquid form or tiny pill.

## 2021-02-22 NOTE — CONSULTS
Ob/Gyn Resident Consult Note:    Contacted by Resident in the ED regarding Viridiana Barfield. Patient came in c/o RUQ pain. TVUS not concerning for ectopic pregnancy. bHCG quant 68. Patient denies any vaginal bleeding. Vital signs stable. Abdomen non-peritoneal per ED Resident. Stable per Dr. Jason Daley clinical judgment. Patient is OK for DC from Ob/Gyn standpoint with f/u bHCG in 48hrs and 1 week. Patient given instructions to f/u in the office in 1 week. Will plan to trend quants to assess for appropriate growth vs. SAB. Standard precautions advised. Discussed with senior resident.    Will update Dr. Power Ortega DO  OB/GYN Resident, PGY1  [facility]  2/22/2021 1:24 AM

## 2021-02-22 NOTE — ED PROVIDER NOTES
[facility]     Emergency Department     Faculty Attestation    I performed a history and physical examination of the patient and discussed management with the resident. I have reviewed and agree with the residents findings including all diagnostic interpretations, and treatment plans as written at the time of my review. Any areas of disagreement are noted on the chart. I was personally present for the key portions of any procedures. I have documented in the chart those procedures where I was not present during the key portions. For Physician Assistant/ Nurse Practitioner cases/documentation I have personally evaluated this patient and have completed at least one if not all key elements of the E/M (history, physical exam, and MDM). Additional findings are as noted. This patient was evaluated in the Emergency Department for symptoms described in the history of present illness. The patient was evaluated in the context of the global COVID-19 pandemic, which necessitated consideration that the patient might be at risk for infection with the SARS-CoV-2 virus that causes COVID-19. Institutional protocols and algorithms that pertain to the evaluation of patients at risk for COVID-19 are in a state of rapid change based on information released by regulatory bodies including the CDC and federal and state organizations. These policies and algorithms were followed during the patient's care in the ED. Primary Care Physician: Radha Ace MD    History: This is a 38 y.o. female who presents to the Emergency Department with complaint of abdominal pain. The patient presents emerged complaint of upper abdominal pain with some nausea. Patient has no constipation diarrhea. She denies any dysuria, frequency urgency. Physical:   temperature is 98 °F (36.7 °C). Her blood pressure is 125/77 and her pulse is 92. Her respiration is 16 and oxygen saturation is 100%. Abdomen is soft she has some tenderness in epigastric and right upper quadrant is no guarding or rebound no tenderness in the lower abdominal area. Impression: Abdominal pain    Plan: CBC, CMP, lipase, a pregnancy test, urinalysis      (Please note that portions of this note were completed with a voice recognition program.  Efforts were made to edit the dictations but occasionally words are mis-transcribed.)    Christina Grimm.  Machelle Oliver MD, [facility]  Attending Emergency Medicine Physician        Kristel Castro MD  02/21/21 4345

## 2021-02-22 NOTE — ED PROVIDER NOTES
[facility]  ED  Emergency Department Encounter  Emergency Medicine Resident     Pt Name: Harris Jones  MRN: [id number]  [place] 1985  Date of evaluation: 21  PCP:  Willy Senior MD    CHIEF COMPLAINT     Back pain, abdominal pain    HISTORY OFPRESENT ILLNESS  (Location/Symptom, Timing/Onset, Context/Setting, Quality, Duration, Modifying Factors,Severity.)      Harris Jones is a 38 y. o.yo female who presents with recent positive pregnancy test at home today. Since yesterday, she is also been complaining of lower back pain as well as abdominal pain. She states that this pain is what prompted her to take a pregnancy test.  In 2020 she had  labor at this hospital and since then, she has been unable to identify exactly when her last period was. She states that she has had some discharge and she is recently being treated for bacterial vaginosis. She states that her abdominal pain is in the right upper quadrant and she says she has been told she has problems with her gallbladder, and that she has gallstones. She states it is worse when she eats. She denies any changes to the urine including dysuria or increased urinary frequency. He is states she has back pain and given her recent pregnancy and labor in the setting of back pain, she is very concerned and would like medical evaluation. She denies any fevers, chills, chest pain, shortness of breath, nausea, vomiting. She states that she did take Motrin x1 which did not alleviate the pain. No known previous history of ectopic pregnancy. No known history of sexually transmitted infections. PAST MEDICAL / SURGICAL / SOCIAL / FAMILY HISTORY      has a past medical history of Anxiety, Asthma, GERD (gastroesophageal reflux disease), Major depression, Morbid obesity, and Tension type headache.     has no past surgical history on file.      Social History     Socioeconomic History    Marital status: Single Spouse name: Not on file    Number of children: Not on file    Years of education: Not on file    Highest education level: Not on file   Occupational History    Not on file   Social Needs    Financial resource strain: Not on file    Food insecurity     Worry: Not on file     Inability: Not on file    Transportation needs     Medical: Not on file     Non-medical: Not on file   Tobacco Use    Smoking status: Current Every Day Smoker     Packs/day: 1.00     Types: Cigars    Smokeless tobacco: Never Used    Tobacco comment: Black and Mild   Substance and Sexual Activity    Alcohol use: No    Drug use: No    Sexual activity: Yes     Partners: Male   Lifestyle    Physical activity     Days per week: Not on file     Minutes per session: Not on file    Stress: Not on file   Relationships    Social connections     Talks on phone: Not on file     Gets together: Not on file     Attends Zoroastrian service: Not on file     Active member of club or organization: Not on file     Attends meetings of clubs or organizations: Not on file     Relationship status: Not on file    Intimate partner violence     Fear of current or ex partner: Not on file     Emotionally abused: Not on file     Physically abused: Not on file     Forced sexual activity: Not on file   Other Topics Concern    Not on file   Social History Narrative    Not on file       Family History   Problem Relation Age of Onset    Depression Father     Thyroid Disease Father     Diabetes Sister         Allergies:  Iodine, Penicillin g, Prednisolone, and Dye  [iodides]    Home Medications:  Prior to Admission medications    Medication Sig Start Date End Date Taking? Authorizing Provider   metroNIDAZOLE (FLAGYL) 500 MG tablet Take 1 tablet by mouth 2 times daily Take with Food. Do NOT drink alcohol.  Begin taking in morning of 2/22/21 2/22/21  Yes Megan Stephens, DO   Multiple Vitamin (MVI, CELEBRATE, CHEWABLE TABLET) Take 1 tablet by mouth daily 2/22/21 Khanh Ramírez, DO   metroNIDAZOLE (METROGEL VAGINAL) 0.75 % vaginal gel One applicator intravaginally every night for 5 days 2/18/21 2/25/21  Carey Arshad DO   escitalopram (LEXAPRO) 10 MG tablet 10 mg 11/17/20   Historical Provider, MD   NP THYROID 30 MG tablet 30 mg 12/23/20   Historical Provider, MD   potassium chloride (MICRO-K) 10 MEQ extended release capsule 10 mEq 1/14/21   Historical Provider, MD   pantoprazole (PROTONIX) 40 MG tablet 40 mg    Historical Provider, MD   Prenatal Vit-Min-FA-Fish Oil ([employer] PRENATAL GUMMY) 0.4-113.5 MG CHEW Take 1 tablet by mouth daily 2/2/21   Erlinda Bryan, DO   acetaminophen (TYLENOL) 500 MG tablet Take 1 tablet by mouth every 6 hours as needed for Pain 1/20/21   Roberto Maradiaga, DO   flintstones complete (FLINTSTONES) 60 MG chewable tablet chew and swallow 1 tablet by mouth once daily  Patient not taking: Reported on 2/2/2021 7/10/18   Nadeem Bro MD   omeprazole (PRILOSEC) 20 MG delayed release capsule take 1 capsule by mouth once daily  Patient not taking: Reported on 2/2/2021 4/2/18   Davion Delvalle MD   ferrous sulfate (FE TABS) 325 (65 Fe) MG EC tablet Take 1 tablet by mouth 2 times daily 10/3/17   Davion Delvalle MD   vitamin D (ERGOCALCIFEROL) 55002 units CAPS capsule Take 1 capsule by mouth once a week for 8 doses 7/31/17 9/19/17  Gisselle Lucia MD   Cholecalciferol (VITAMIN D3) 1000 units TABS Take 1 tablet by mouth daily  Patient not taking: Reported on 2/2/2021 7/31/17   Gisselle Lucia MD       REVIEW OFSYSTEMS    (2-9 systems for level 4, 10 or more for level 5)      Review of Systems   Constitutional: Negative for chills and fever. HENT: Negative for congestion and rhinorrhea. Eyes: Negative for photophobia and visual disturbance. Respiratory: Negative for shortness of breath and wheezing. Cardiovascular: Negative for chest pain and palpitations. Gastrointestinal: Positive for abdominal pain.  Negative for nausea and vomiting. Genitourinary: Positive for vaginal discharge. Negative for dysuria and vaginal bleeding. Musculoskeletal: Positive for back pain. Skin: Negative for pallor. Neurological: Negative for dizziness and headaches. PHYSICAL EXAM   (up to 7 for level 4, 8 or more forlevel 5)      INITIAL VITALS:   Vitals:    02/21/21 2136 02/21/21 2202 02/21/21 2216 02/21/21 2231   BP: 125/77 119/68 123/73 106/87   Pulse: 92      Resp: 16      Temp: 98 °F (36.7 °C)      SpO2: 100% 100% 100% 97%       Physical Exam  Constitutional:       General: She is not in acute distress. Appearance: She is well-developed. She is not toxic-appearing. HENT:      Head: Normocephalic and atraumatic. Eyes:      Extraocular Movements: Extraocular movements intact. Conjunctiva/sclera: Conjunctivae normal.   Neck:      Musculoskeletal: Normal range of motion and neck supple. Cardiovascular:      Rate and Rhythm: Normal rate. Heart sounds: Normal heart sounds. Pulmonary:      Effort: Pulmonary effort is normal.      Breath sounds: Normal breath sounds. Abdominal:      Comments: Abdomen is soft and nondistended. There is no tenderness to palpation in the lower quadrants. There is tenderness in the right upper quadrant with a positive Mitchell sign. No rebound or guarding. No tenderness with palpation over McBurney's point. Negative CVA tenderness bilaterally. Musculoskeletal: Normal range of motion. General: No deformity. Neurological:      Mental Status: She is alert and oriented to person, place, and time. Comments: Sensation and muscle strength intact for bilateral lower extremities. No midline tenderness to thoracic or lumbar spine.          DIFFERENTIAL  DIAGNOSIS     PLAN (LABS / IMAGING / EKG):  Orders Placed This Encounter   Procedures    C.trachomatis N.gonorrhoeae DNA    VAGINITIS DNA PROBE    Culture, Urine    US OB TRANSVAGINAL    CBC Auto Differential    HCG, normal limits   URINALYSIS WITH MICROSCOPIC - Abnormal; Notable for the following components:    Leukocyte Esterase, Urine SMALL (*)     All other components within normal limits   COMPREHENSIVE METABOLIC PANEL - Abnormal; Notable for the following components:    Glucose 101 (*)     Sodium 134 (*)     Total Bilirubin 0.19 (*)     Albumin/Globulin Ratio 0.9 (*)     All other components within normal limits   CULTURE, URINE   C.TRACHOMATIS N.GONORRHOEAE DNA   LIPASE         RADIOLOGY:  Impression   1. No evidence of intrauterine pregnancy. 2. Unremarkable appearing endometrial stripe without sonographic suggestion   of retained products of conception. 3. No evidence of adnexal mass lesion to suggest ectopic pregnancy. 4. Note: Right adnexa not visualized likely secondary to obscuration by   overlying bowel gas. EKG  none    All EKG's are interpreted by the Emergency Department Physicianwho either signs or Co-signs this chart in the absence of a cardiologist.    EMERGENCY DEPARTMENT COURSE:  ED Course as of Feb 23 1000   Sun Feb 21, 2021   2302 hCG Quant(!): 68 [KD]   2302 Lipase: 15 [KD]   2302 No leukocytosis. No UTI. Quant 68 and and a formal transvaginal ultrasound was ordered to evaluate for ectopic pregnancy although I do have a low suspicion for these clinically. Bedside ultrasound was performed. Gallbladder difficult to identify however there was a negative sonographic Mitchell sign. Negative fast.  No IUP identified which is expected with a quant of 68. Follow-up transvaginal ultrasound. WBC: 8.2 [KD]   2326 Thick exam performed. Swabs collected. There was some white discharge in the vaginal vault likely BV given that patient is having symptoms that she believes to BV and she has had BV infections in the past.  There is no cervical motion tenderness or pain or palpable mass in the adnexa. Follow-up transvaginal ultrasound anticipate discharge.     [KD]      ED Course User Index  [KD] Rudy Kuhn DO    TVUS showing now IUP but likely due to early pregnancy. Spoke with OB, patient needs repeat quant in 48 hours. PROCEDURES:  None    CONSULTS:  IP CONSULT TO OB GYN    CRITICAL CARE:  none    FINAL IMPRESSION      1. Threatened miscarriage          DISPOSITION / PLAN     DISPOSITION Discharge       PATIENT REFERRED TO:  Dareen Phalen, MD  [facility]  [address] [facility] [address]  [phone]    Schedule an appointment as soon as possible for a visit       [address] [address]  [phone]  Schedule an appointment as soon as possible for a visit       [facility] ED  [address] [facility]  [phone]    As needed    [address]  [phone]  In 1 week  [facility] follow up for threatened miscarriage       DISCHARGE MEDICATIONS:  Discharge Medication List as of 2/22/2021 12:53 AM      START taking these medications    Details   metroNIDAZOLE (FLAGYL) 500 MG tablet Take 1 tablet by mouth 2 times daily Take with Food. Do NOT drink alcohol.  Begin taking in morning of 2/22/21, Disp-13 tablet, R-0Print             Rudy Kuhn DO  Emergency Medicine Resident    (Please note that portions of this note were completed with a voice recognition program.Efforts were made to edit the dictations but occasionally words are mis-transcribed.)       Rudy Kuhn DO  Resident  02/23/21 1001

## 2021-02-22 NOTE — ED PROVIDER NOTES
Faculty Sign-Out Attestation  Handoff taken on the following patient from prior Attending Physician: Christina Cash    I was available and discussed any additional care issues that arose and coordinated the management plans with the resident(s) caring for the patient during my duty period. Any areas of disagreement with residents documentation of care or procedures are noted on the chart. I was personally present for the key portions of any/all procedures during my duty period. I have documented in the chart those procedures where I was not present during the key portions.     Abdominal pain, quant 65,   US pending,   Needing OB consult  /  \\  >> dc    Sung Buchanan DO  Attending Physician     Sung Buchanan,   02/21/21 0004  No iup, ob consulted cleared for dc,   Treating trich, pt will follow up,      Sung Buchanan,   02/22/21 9996

## 2021-02-23 ENCOUNTER — VIRTUAL VISIT (OUTPATIENT)
Dept: OBGYN | Age: 36
End: 2021-02-23
Payer: MEDICARE

## 2021-02-23 DIAGNOSIS — N76.0 BV (BACTERIAL VAGINOSIS): ICD-10-CM

## 2021-02-23 DIAGNOSIS — Z32.01 POSITIVE BLOOD PREGNANCY TEST: ICD-10-CM

## 2021-02-23 DIAGNOSIS — N87.0 CIN I (CERVICAL INTRAEPITHELIAL NEOPLASIA I): Primary | ICD-10-CM

## 2021-02-23 DIAGNOSIS — N89.8 VAGINAL IRRITATION: ICD-10-CM

## 2021-02-23 DIAGNOSIS — A59.01 TRICHOMONAS VAGINALIS (TV) INFECTION: ICD-10-CM

## 2021-02-23 DIAGNOSIS — Z34.91 CURRENTLY PREGNANT IN FIRST TRIMESTER WITH UNKNOWN GESTATIONAL AGE: ICD-10-CM

## 2021-02-23 DIAGNOSIS — B96.89 BV (BACTERIAL VAGINOSIS): ICD-10-CM

## 2021-02-23 LAB
C TRACH DNA GENITAL QL NAA+PROBE: NEGATIVE
N. GONORRHOEAE DNA: NEGATIVE
SPECIMEN DESCRIPTION: NORMAL

## 2021-02-23 PROCEDURE — G8427 DOCREV CUR MEDS BY ELIG CLIN: HCPCS | Performed by: OBSTETRICS & GYNECOLOGY

## 2021-02-23 PROCEDURE — G8419 CALC BMI OUT NRM PARAM NOF/U: HCPCS | Performed by: OBSTETRICS & GYNECOLOGY

## 2021-02-23 PROCEDURE — 99213 OFFICE O/P EST LOW 20 MIN: CPT | Performed by: OBSTETRICS & GYNECOLOGY

## 2021-02-23 PROCEDURE — G8484 FLU IMMUNIZE NO ADMIN: HCPCS | Performed by: OBSTETRICS & GYNECOLOGY

## 2021-02-23 PROCEDURE — 4004F PT TOBACCO SCREEN RCVD TLK: CPT | Performed by: OBSTETRICS & GYNECOLOGY

## 2021-02-23 NOTE — PROGRESS NOTES
Dimple Garrett is a 38 y.o. female evaluated via telephone on 2/23/2021. Consent:  She and/or health care decision maker is aware that that she may receive a bill for this telephone service, depending on her insurance coverage, and has provided verbal consent to proceed: Yes      Documentation:  I communicated with the patient and/or health care decision maker about follow up for results of colposcopy and cervical biopsy. She also had several questions about recent pos BHCG = 68 from ED evaluation on 2/21/21 as well as + BV and + trichomonas vaginalis infection. She reports vaginal soreness since exam on 2/21. This is likely due to TV. She has started flagyl BID x 7 . Details of this discussion including any medical advice provided:   1. POLO I (cervical intraepithelial neoplasia I)  - repeat co-testing in 1 year    2. Positive blood pregnancy test  - follow up BHCG 2/24 as planned    3. Currently pregnant in first trimester with unknown gestational age  -  Timing of repeat BHCG based on results on 2/24 results    4. BV (bacterial vaginosis)  - complete flagyl    5. Trichomonas vaginalis (TV) infection  - completed flagyl . Recommend no intercourse until she and partner complete treatment     6. Vaginal irritation, likely due to TV  - treat TV as noted above          I affirm this is a Patient Initiated Episode with a Patient who has not had a related appointment within my department in the past 7 days or scheduled within the next 24 hours.     Patient identification was verified at the start of the visit: Yes    Total Time: minutes: 11-20 minutes    Note: not billable if this call serves to triage the patient into an appointment for the relevant concern      Michelle Proctor

## 2021-02-24 ENCOUNTER — HOSPITAL ENCOUNTER (OUTPATIENT)
Age: 36
Setting detail: SPECIMEN
Discharge: HOME | End: 2021-02-24
Payer: MEDICARE

## 2021-02-24 DIAGNOSIS — O20.0 THREATENED MISCARRIAGE: ICD-10-CM

## 2021-02-24 LAB — HCG QUANTITATIVE: 371 IU/L

## 2021-02-25 ENCOUNTER — HOSPITAL ENCOUNTER (EMERGENCY)
Age: 36
Discharge: HOME | End: 2021-02-26
Attending: EMERGENCY MEDICINE
Payer: MEDICARE

## 2021-02-25 ENCOUNTER — APPOINTMENT (OUTPATIENT)
Dept: ULTRASOUND IMAGING | Age: 36
End: 2021-02-25
Payer: MEDICARE

## 2021-02-25 VITALS
OXYGEN SATURATION: 100 % | DIASTOLIC BLOOD PRESSURE: 69 MMHG | RESPIRATION RATE: 22 BRPM | SYSTOLIC BLOOD PRESSURE: 103 MMHG | TEMPERATURE: 97.8 F | HEART RATE: 84 BPM

## 2021-02-25 DIAGNOSIS — R10.2 PELVIC PAIN AFFECTING PREGNANCY IN FIRST TRIMESTER, ANTEPARTUM: ICD-10-CM

## 2021-02-25 DIAGNOSIS — O26.891 PELVIC PAIN AFFECTING PREGNANCY IN FIRST TRIMESTER, ANTEPARTUM: ICD-10-CM

## 2021-02-25 DIAGNOSIS — R11.2 NON-INTRACTABLE VOMITING WITH NAUSEA, UNSPECIFIED VOMITING TYPE: ICD-10-CM

## 2021-02-25 DIAGNOSIS — R10.13 ABDOMINAL PAIN, EPIGASTRIC: Primary | ICD-10-CM

## 2021-02-25 LAB
-: ABNORMAL
ABSOLUTE EOS #: 0.09 K/UL (ref 0–0.44)
ABSOLUTE IMMATURE GRANULOCYTE: <0.03 K/UL (ref 0–0.3)
ABSOLUTE LYMPH #: 3.36 K/UL (ref 1.1–3.7)
ABSOLUTE MONO #: 0.75 K/UL (ref 0.1–1.2)
ALBUMIN SERPL-MCNC: 3.6 G/DL (ref 3.5–5.2)
ALBUMIN/GLOBULIN RATIO: 1.1 (ref 1–2.5)
ALP BLD-CCNC: 64 U/L (ref 35–104)
ALT SERPL-CCNC: 5 U/L (ref 5–33)
AMORPHOUS: ABNORMAL
ANION GAP SERPL CALCULATED.3IONS-SCNC: 8 MMOL/L (ref 9–17)
AST SERPL-CCNC: 11 U/L
BACTERIA: ABNORMAL
BASOPHILS # BLD: 1 % (ref 0–2)
BASOPHILS ABSOLUTE: 0.04 K/UL (ref 0–0.2)
BILIRUB SERPL-MCNC: 0.18 MG/DL (ref 0.3–1.2)
BILIRUBIN URINE: NEGATIVE
BUN BLDV-MCNC: 9 MG/DL (ref 6–20)
BUN/CREAT BLD: ABNORMAL (ref 9–20)
CALCIUM SERPL-MCNC: 8.8 MG/DL (ref 8.6–10.4)
CASTS UA: ABNORMAL /LPF (ref 0–8)
CHLORIDE BLD-SCNC: 105 MMOL/L (ref 98–107)
CO2: 24 MMOL/L (ref 20–31)
COLOR: YELLOW
COMMENT UA: ABNORMAL
CREAT SERPL-MCNC: 0.67 MG/DL (ref 0.5–0.9)
CRYSTALS, UA: ABNORMAL /HPF
DIFFERENTIAL TYPE: ABNORMAL
DIRECT EXAM: ABNORMAL
EOSINOPHILS RELATIVE PERCENT: 1 % (ref 1–4)
EPITHELIAL CELLS UA: ABNORMAL /HPF (ref 0–5)
GFR AFRICAN AMERICAN: >60 ML/MIN
GFR NON-AFRICAN AMERICAN: >60 ML/MIN
GFR SERPL CREATININE-BSD FRML MDRD: ABNORMAL ML/MIN/{1.73_M2}
GFR SERPL CREATININE-BSD FRML MDRD: ABNORMAL ML/MIN/{1.73_M2}
GLUCOSE BLD-MCNC: 88 MG/DL (ref 70–99)
GLUCOSE URINE: NEGATIVE
HCG QUANTITATIVE: 798 IU/L
HCT VFR BLD CALC: 35.2 % (ref 36.3–47.1)
HEMOGLOBIN: 10.8 G/DL (ref 11.9–15.1)
IMMATURE GRANULOCYTES: 0 %
KETONES, URINE: NEGATIVE
LEUKOCYTE ESTERASE, URINE: ABNORMAL
LIPASE: 17 U/L (ref 13–60)
LYMPHOCYTES # BLD: 46 % (ref 24–43)
Lab: ABNORMAL
MCH RBC QN AUTO: 26.7 PG (ref 25.2–33.5)
MCHC RBC AUTO-ENTMCNC: 30.7 G/DL (ref 28.4–34.8)
MCV RBC AUTO: 87.1 FL (ref 82.6–102.9)
MONOCYTES # BLD: 10 % (ref 3–12)
MUCUS: ABNORMAL
NITRITE, URINE: NEGATIVE
NRBC AUTOMATED: 0 PER 100 WBC
OTHER OBSERVATIONS UA: ABNORMAL
PDW BLD-RTO: 14.1 % (ref 11.8–14.4)
PH UA: 6 (ref 5–8)
PLATELET # BLD: 292 K/UL (ref 138–453)
PLATELET ESTIMATE: ABNORMAL
PMV BLD AUTO: 9.7 FL (ref 8.1–13.5)
POTASSIUM SERPL-SCNC: 3.9 MMOL/L (ref 3.7–5.3)
PROTEIN UA: NEGATIVE
RBC # BLD: 4.04 M/UL (ref 3.95–5.11)
RBC # BLD: ABNORMAL 10*6/UL
RBC UA: ABNORMAL /HPF (ref 0–4)
RENAL EPITHELIAL, UA: ABNORMAL /HPF
SEG NEUTROPHILS: 42 % (ref 36–65)
SEGMENTED NEUTROPHILS ABSOLUTE COUNT: 3.08 K/UL (ref 1.5–8.1)
SODIUM BLD-SCNC: 137 MMOL/L (ref 135–144)
SPECIFIC GRAVITY UA: 1.03 (ref 1–1.03)
SPECIMEN DESCRIPTION: ABNORMAL
TOTAL PROTEIN: 7 G/DL (ref 6.4–8.3)
TRICHOMONAS: ABNORMAL
TURBIDITY: CLEAR
URINE HGB: NEGATIVE
UROBILINOGEN, URINE: NORMAL
WBC # BLD: 7.3 K/UL (ref 3.5–11.3)
WBC # BLD: ABNORMAL 10*3/UL
WBC UA: ABNORMAL /HPF (ref 0–5)
YEAST: ABNORMAL

## 2021-02-25 PROCEDURE — 87660 TRICHOMONAS VAGIN DIR PROBE: CPT

## 2021-02-25 PROCEDURE — 99284 EMERGENCY DEPT VISIT MOD MDM: CPT

## 2021-02-25 PROCEDURE — 85025 COMPLETE CBC W/AUTO DIFF WBC: CPT

## 2021-02-25 PROCEDURE — 80053 COMPREHEN METABOLIC PANEL: CPT

## 2021-02-25 PROCEDURE — 87591 N.GONORRHOEAE DNA AMP PROB: CPT

## 2021-02-25 PROCEDURE — 87491 CHLMYD TRACH DNA AMP PROBE: CPT

## 2021-02-25 PROCEDURE — 84702 CHORIONIC GONADOTROPIN TEST: CPT

## 2021-02-25 PROCEDURE — 81001 URINALYSIS AUTO W/SCOPE: CPT

## 2021-02-25 PROCEDURE — 83690 ASSAY OF LIPASE: CPT

## 2021-02-25 PROCEDURE — 87480 CANDIDA DNA DIR PROBE: CPT

## 2021-02-25 PROCEDURE — 87510 GARDNER VAG DNA DIR PROBE: CPT

## 2021-02-25 PROCEDURE — 76817 TRANSVAGINAL US OBSTETRIC: CPT

## 2021-02-25 PROCEDURE — 93976 VASCULAR STUDY: CPT

## 2021-02-25 RX ORDER — ACETAMINOPHEN 500 MG
1000 TABLET ORAL ONCE
Status: DISCONTINUED | OUTPATIENT
Start: 2021-02-25 | End: 2021-02-26 | Stop reason: HOSPADM

## 2021-02-25 RX ORDER — ONDANSETRON 2 MG/ML
4 INJECTION INTRAMUSCULAR; INTRAVENOUS ONCE
Status: DISCONTINUED | OUTPATIENT
Start: 2021-02-25 | End: 2021-02-26 | Stop reason: HOSPADM

## 2021-02-25 ASSESSMENT — ENCOUNTER SYMPTOMS
PHOTOPHOBIA: 0
ABDOMINAL PAIN: 1
SHORTNESS OF BREATH: 0

## 2021-02-25 ASSESSMENT — PAIN DESCRIPTION - LOCATION: LOCATION: ABDOMEN

## 2021-02-26 ENCOUNTER — TELEPHONE (OUTPATIENT)
Dept: OBGYN | Age: 36
End: 2021-02-26

## 2021-02-26 RX ORDER — NITROFURANTOIN 25; 75 MG/1; MG/1
100 CAPSULE ORAL 2 TIMES DAILY
Qty: 20 CAPSULE | Refills: 0 | Status: SHIPPED | OUTPATIENT
Start: 2021-02-26 | End: 2021-03-08

## 2021-02-26 NOTE — ED NOTES
No change in pt status, pt remains resting on cot, NAD noted, call light within reach. Awaiting ob consult.       Yesenia Casey RN  02/25/21 1911

## 2021-02-26 NOTE — ED NOTES
Pt to ED with  C/o nausea with pregnancy, states she had positive pregnancy test two days ago and was seen at [facility]. V's. Pt states she doesn't have any nausea at this time but has abdominal pain.  PT in NAD rr even and unlabored, alert and oriented x 4.     Jorge Alberto Car RN  02/25/21 6481

## 2021-02-26 NOTE — ED PROVIDER NOTES
[facility]     Emergency Department     Faculty Attestation    I performed a history and physical examination of the patient and discussed management with the resident. I reviewed the resident´s note and agree with the documented findings and plan of care. Any areas of disagreement are noted on the chart. I was personally present for the key portions of any procedures. I have documented in the chart those procedures where I was not present during the key portions. I have reviewed the emergency nurses triage note. I agree with the chief complaint, past medical history, past surgical history, allergies, medications, social and family history as documented unless otherwise noted below. For Physician Assistant/ Nurse Practitioner cases/documentation I have personally evaluated this patient and have completed at least one if not all key elements of the E/M (history, physical exam, and MDM). Additional findings are as noted. Patient pregnancy, low hCGs, pelvic ultrasound done 3 days ago did not show an IUP, patient is not tender in the adnexa but complains of pain in the epigastrium and does have a positive Mitchell sign.      Latasha Mejia MD  02/25/21 2051

## 2021-02-26 NOTE — ED NOTES
Dr Glenna Henderson RN and Dr Natalya Carroll at bedside.      Rosemarie Fraga, ANTOINETTE  02/25/21 8214

## 2021-02-26 NOTE — CONSULTS
[address] [facility]    Patient Name: Kayden Ludwig     Patient : 1985  Room/Bed:   Admission Date/Time: 2021  8:08 PM  Primary Care Physician: Destinee Zhao MD    Consulting Provider: Dr. Carol Marte  Reason for Consult: Pregnancy of unknown location    CC:   Chief Complaint   Patient presents with    Abdominal Pain     \"Tightness\"    Emesis During Pregnancy     Positive test on 21                HPI: Kayden Ludwig is a 38 y.o. female  5w5d GA based on LMP who presents with bilateral lower pelvic pain that has been persistent for days. She has not had this before and is concerned. She denies any abdominal cramping or bleeding. This is a desired and wanted pregnancy. Patient has hx of previable PPROM that resulted in spontaneous delivery at 19w6d and resulted in  demise. Patient last had a bowel movement earlier today without melena/hematochezia. She reports no hx of constipation. She denies any abdominal pain, dysuria, hematuria. bHCG on 21 was 68. Pelvic ultrasound on 21 showing no IUP with no visualization of R ovary but left ovary normal at 3 x 2 x 2cm. bHCG on 21 was 371 > 21 was 798. Pelvic ultrasound on 21 showing no IUP with complex cystic lesions on both ovaries without internal vascularity and indeterminate that could represent corpus luterum or ectopic with normal doppler flows within both ovaries. R ovary with 2 x 1 x1cm complex cystic lesion and L ovary with 2 x 2 x 1cm complex cystic lesion.      REVIEW OF SYSTEMS:   Constitutional: negative fever, negative chills, negative weight changes   HEENT: negative visual disturbances, negative headaches, negative dizziness  Breast: negative breast abnormalities, negative breast lumps, negative nipple discharge  Respiratory: negative dyspnea, negative cough, negative SOB  Cardiovascular: negative chest pain,  negative palpitations, negative arrhythmia, negative syncope   Gastrointestinal: negative abdominal pain, negative RUQ pain, negative N/V, negative diarrhea, negative constipation, negative bowel changes, positive abdominal pressure  Genitourinary: negative dysuria, negative hematuria, negative urinary incontinence, negative vaginal discharge, negative vaginal bleeding  Dermatological: negative rash, negative pruritis, negative mole or other skin changes  Hematologic: negative bruising, negative personal/family history of DVT/PE  Immunologic/Lymphatic: negative recent illness, negative recent sick contact  Musculoskeletal: negative back pain, negative myalgias, negative arthralgias  Neurological:  negative dizziness, negative migraines, negative seizures, negative weakness  Behavior/Psych: negative depression, negative anxiety, negative SI, negative HI    _______________________________________________________________________      OBSTETRICAL HISTORY:   OB History    Para Term  AB Living   2 0 0 0 1 0   SAB TAB Ectopic Molar Multiple Live Births   1 0 0 0 0 0      # Outcome Date GA Lbr Adi/2nd Weight Sex Delivery Anes PTL Lv   2 Current            1 SAB /:47 9 oz (0.255 kg) M Vag-Spont None Y ND      Complications: Chorioamnionitis      Name: Yvan Kelly: 5  Apgar5: 1       PAST MEDICAL HISTORY:   has a past medical history of Anxiety, Asthma, GERD (gastroesophageal reflux disease), Major depression, Morbid obesity, and Tension type headache. PAST SURGICAL HISTORY:   has no past surgical history on file.     ALLERGIES:  Allergies as of 2021 - Review Complete 2021   Allergen Reaction Noted    Iodine Shortness Of Breath 2017    Penicillin g Hives and Shortness Of Breath 2017    Prednisolone Hives and Shortness Of Breath 2017    Dye  [iodides]  2017       MEDICATIONS:  Current Facility-Administered Medications   Medication Dose Route Frequency Provider Last Rate Last Admin    ondansetron [facility]) injection 4 mg  4 mg Intravenous Once Fred Bonilla DO   Stopped at 02/25/21 2055    acetaminophen (TYLENOL) tablet 1,000 mg  1,000 mg Oral Once Fred Bonilla DO         Current Outpatient Medications   Medication Sig Dispense Refill    metroNIDAZOLE (FLAGYL) 500 MG tablet Take 1 tablet by mouth 2 times daily Take with Food. Do NOT drink alcohol. Begin taking in morning of 2/22/21 13 tablet 0    Multiple Vitamin (MVI, CELEBRATE, CHEWABLE TABLET) Take 1 tablet by mouth daily 30 tablet 5    escitalopram (LEXAPRO) 10 MG tablet 10 mg      NP THYROID 30 MG tablet 30 mg      potassium chloride (MICRO-K) 10 MEQ extended release capsule 10 mEq      pantoprazole (PROTONIX) 40 MG tablet 40 mg      Prenatal Vit-Min-FA-Fish Oil ([employer] PRENATAL GUMMY) 0.4-113.5 MG CHEW Take 1 tablet by mouth daily 30 tablet 1    acetaminophen (TYLENOL) 500 MG tablet Take 1 tablet by mouth every 6 hours as needed for Pain 40 tablet 0    flintstones complete (FLINTSTONES) 60 MG chewable tablet chew and swallow 1 tablet by mouth once daily (Patient not taking: Reported on 2/2/2021) 30 tablet 5    omeprazole (PRILOSEC) 20 MG delayed release capsule take 1 capsule by mouth once daily (Patient not taking: Reported on 2/2/2021) 30 capsule 3    ferrous sulfate (FE TABS) 325 (65 Fe) MG EC tablet Take 1 tablet by mouth 2 times daily 90 tablet 3    vitamin D (ERGOCALCIFEROL) 54663 units CAPS capsule Take 1 capsule by mouth once a week for 8 doses 8 capsule 0    Cholecalciferol (VITAMIN D3) 1000 units TABS Take 1 tablet by mouth daily 90 tablet 1       FAMILY HISTORY:  Family History of Breast, Ovarian, Colon or Uterine Cancer: No   family history includes Depression in her father; Diabetes in her sister; Thyroid Disease in her father. SOCIAL HISTORY:   reports that she has quit smoking. Her smoking use included cigars. She smoked 1.00 pack per day.  She has never used smokeless tobacco. She reports that she does not drink alcohol or use drugs. ________________________________________________________________________                                    Soren Coburneste:  Vitals:    02/25/21 1959   BP: 103/69   Pulse: 84   Resp: 22   Temp: 97.8 °F (36.6 °C)   TempSrc: Temporal   SpO2: 100%                                                                                                                                                                             PHYSICAL EXAM:   General Appearance: Appears healthy. Alert; in no acute distress. Pleasant. Skin: Skin color, texture, turgor normal. No rashes or lesions. HEENT: Normocephalic and atraumatic, moist mucous membranes, trachea midline  Respiratory: Normal expansion. Clear to auscultation. No rales, rhonchi, or wheezing. Cardiovascular: Normal rate, regular rhythm, normal S1 and S2, no murmurs, rubs or gallops. Abdomen: Soft, non-tender, no rebound, guarding, rigidity, non-distended  Pelvic Exam: Completed by ED resident who reported closed cervix without bleeding  Rectal Exam: Exam declined by patient. Musculoskeletal: No joint swelling, deformity, or tenderness. , no gross abnormalities. Extremities: Non-tender BLE and non-edematous. Psych: Oriented to time, place and person, mood and affect are within normal limits. OMM EXAM:  The patient did not complain of a Chief complaint requiring OMM. Chief Complaint:none  Structural Exam: No Interest    LAB RESULTS:  A POSITIVE     Results for orders placed or performed during the hospital encounter of 02/25/21   VAGINITIS DNA PROBE    Specimen: Vaginal   Result Value Ref Range    Specimen Description . VAGINA     Special Requests NOT REPORTED     Direct Exam POSITIVE for Gardnerella vaginalis. (A)     Direct Exam NEGATIVE for Candida sp.      Direct Exam NEGATIVE for Trichomonas vaginalis     Direct Exam       Method of testing is a DNA probe intended for detection and identification of Candida species, Gardnerella vaginalis, and Trichomonas vaginalis nucleic acid in vaginal fluid specimens from patients with symptoms of vaginitis/vaginosis.    HCG, QUANTITATIVE, PREGNANCY   Result Value Ref Range    hCG Quant 798 (H) <5 IU/L   CBC WITH AUTO DIFFERENTIAL   Result Value Ref Range    WBC 7.3 3.5 - 11.3 k/uL    RBC 4.04 3.95 - 5.11 m/uL    Hemoglobin 10.8 (L) 11.9 - 15.1 g/dL    Hematocrit 35.2 (L) 36.3 - 47.1 %    MCV 87.1 82.6 - 102.9 fL    MCH 26.7 25.2 - 33.5 pg    MCHC 30.7 28.4 - 34.8 g/dL    RDW 14.1 11.8 - 14.4 %    Platelets 033 862 - 982 k/uL    MPV 9.7 8.1 - 13.5 fL    NRBC Automated 0.0 0.0 per 100 WBC    Differential Type NOT REPORTED     Seg Neutrophils 42 36 - 65 %    Lymphocytes 46 (H) 24 - 43 %    Monocytes 10 3 - 12 %    Eosinophils % 1 1 - 4 %    Basophils 1 0 - 2 %    Immature Granulocytes 0 0 %    Segs Absolute 3.08 1.50 - 8.10 k/uL    Absolute Lymph # 3.36 1.10 - 3.70 k/uL    Absolute Mono # 0.75 0.10 - 1.20 k/uL    Absolute Eos # 0.09 0.00 - 0.44 k/uL    Basophils Absolute 0.04 0.00 - 0.20 k/uL    Absolute Immature Granulocyte <0.03 0.00 - 0.30 k/uL    WBC Morphology NOT REPORTED     RBC Morphology NOT REPORTED     Platelet Estimate NOT REPORTED    COMPREHENSIVE METABOLIC PANEL   Result Value Ref Range    Glucose 88 70 - 99 mg/dL    BUN 9 6 - 20 mg/dL    CREATININE 0.67 0.50 - 0.90 mg/dL    Bun/Cre Ratio NOT REPORTED 9 - 20    Calcium 8.8 8.6 - 10.4 mg/dL    Sodium 137 135 - 144 mmol/L    Potassium 3.9 3.7 - 5.3 mmol/L    Chloride 105 98 - 107 mmol/L    CO2 24 20 - 31 mmol/L    Anion Gap 8 (L) 9 - 17 mmol/L    Alkaline Phosphatase 64 35 - 104 U/L    ALT 5 5 - 33 U/L    AST 11 <32 U/L    Total Bilirubin 0.18 (L) 0.3 - 1.2 mg/dL    Total Protein 7.0 6.4 - 8.3 g/dL    Albumin 3.6 3.5 - 5.2 g/dL    Albumin/Globulin Ratio 1.1 1.0 - 2.5    GFR Non-African American >60 >60 mL/min    GFR African American >60 >60 mL/min    GFR Comment          GFR Staging NOT REPORTED    Urinalysis Reflex to Culture    Specimen: Urine, clean catch   Result Value Ref Range    Color, UA YELLOW YELLOW    Turbidity UA CLEAR CLEAR    Glucose, Ur NEGATIVE NEGATIVE    Bilirubin Urine NEGATIVE NEGATIVE    Ketones, Urine NEGATIVE NEGATIVE    Specific Gravity, UA 1.027 1.005 - 1.030    Urine Hgb NEGATIVE NEGATIVE    pH, UA 6.0 5.0 - 8.0    Protein, UA NEGATIVE NEGATIVE    Urobilinogen, Urine Normal Normal    Nitrite, Urine NEGATIVE NEGATIVE    Leukocyte Esterase, Urine SMALL (A) NEGATIVE    Urinalysis Comments NOT REPORTED    Microscopic Urinalysis   Result Value Ref Range    -          WBC, UA 5 TO 10 0 - 5 /HPF    RBC, UA 0 TO 2 0 - 4 /HPF    Casts UA  0 - 8 /LPF     2 TO 5 HYALINE Reference range defined for non-centrifuged specimen. Crystals, UA NOT REPORTED None /HPF    Epithelial Cells UA 2 TO 5 0 - 5 /HPF    Renal Epithelial, UA NOT REPORTED 0 /HPF    Bacteria, UA FEW (A) None    Mucus, UA NOT REPORTED None    Trichomonas, UA NOT REPORTED None    Amorphous, UA NOT REPORTED None    Other Observations UA NOT REPORTED NOT REQ. Yeast, UA NOT REPORTED None   Lipase   Result Value Ref Range    Lipase 17 13 - 60 U/L     DIAGNOSTICS:  Us Ob Transvaginal  Result Date: 2/25/2021  EXAMINATION: FIRST TRIMESTER OBSTETRIC ULTRASOUND 2/25/2021 TECHNIQUE: Transvaginal first trimester obstetric pelvic ultrasound was performed with color Doppler flow evaluation. COMPARISON: 02/21/2021 HISTORY: ORDERING SYSTEM PROVIDED HISTORY: eval for ectopic TECHNOLOGIST PROVIDED HISTORY: eval for ectopic FINDINGS: Uterus: 9.9 x 7.2 x 4.7 cm. The endometrium measures 1.7 cm Gestational Sac(s):  None visualized Yolk Sac:  Not visualized Fetal Pole:  Not visualized Crown Rump Length:  NA Fetal Heart Rate:  Not visualized Right ovary: 3.6 x 2.6 x 2.0 cm. There is a 2.2 x 1.7 x 1.4 cm complex cystic lesion in the liver right ovary. There is normal Doppler flow. Left ovary: 3.8 x 2.5 x 1.7 cm. There is a 2.3 x 2.0 x 1.2 cm complex cystic lesion in the left ovary.   Normal Pelvic ultrasound on 21 showing no IUP with complex cystic lesions on both ovaries without internal vascularity and indeterminate that could represent corpus luterum or ectopic with normal doppler flows within both ovaries. R ovary with 2 x 1 x1cm complex cystic lesion and L ovary with 2 x 2 x 1cm complex cystic lesion.    - Pregnancy of unknown location at this time, suspect early IUP vs ectopic pregnancy   - Discussed return precautions of worsening abdominal pain/bleeding   - Pt declined tylenol or zofran and requesting to go home today   - Patient already had a virtual visit on 21 with Dr. Emanuel Cushing and has f/u appt 3/1/21 at [facility] already- encouraged compliance and at that time, will consider trending bHCG and another pelvic ultrasound to confirm IUP    Hx of PPROM @ 19w6d resulting in spontaneous delivery and then  loss     Hx headaches   - Stable on no medications    GERD   - Stable on no medications    Depression/Anxiety   - Stable on lexapro 10mg qd    Asthma   - Stable on no medications    Patient Active Problem List    Diagnosis Date Noted    ASCUS with positive high risk HPV cervical 2021    19 weeks gestation of pregnancy      premature rupture of membranes (PPROM) delivered, current hospitalization      labor in second trimester with  delivery     Previable PPROM @19wk6d on  @1200 2020     The patient did not know she was pregnant prior to rupture.        Anxiety 2020    Adrenal insufficiency ([address] 2020    Tobacco use 2020    Obesity 09/15/2017    Strabismus 09/15/2017    Vitamin D deficiency 2017     8.7 on 17      Major depression 2013    Tension type headache 2013    Thyroid nodule 2012     CT neck : 2017  Impression: Large mass in the lower pole of the left lobe of the thyroid gland plunging into the superior mediastinum and displacing the trachea somewhat toward the right.  This has progressed dramatically since .    FNA: Benign. Nodular goiter. 2016    T4 and reverse T3 wnl 17             Plan discussed with Dr. Telma Gale, who is agreeable. Attending's Name: Dr. Clinton Griffin DO  Ob/Gyn Resident  2021, 12:58 AM    Date: 3/4/2021  Time: 3:17 PM      Patient Name: Ludwin Alas  Patient : 1985  Room/Bed:   Admission Date/Time: 2021  8:08 PM        Attending Physician Statement  I have discussed the care of Ludwin Alas, including pertinent history and exam findings with the resident. I have reviewed and edited their note in the electronic medical record. The key elements of all parts of the encounter have been performed/reviewed by me . I agree with the assessment, plan and orders as documented by the resident. The level of care submitted represents to the best of my ability the care documented in the medical record today. GC Modifier. This service has been performed in part by a resident under the direction of a teaching physician.         Attending's Name:  Fe Omer DO

## 2021-02-26 NOTE — ED PROVIDER NOTES
[facility] ED  Emergency Department Encounter  EmergencyMedicine Resident     Pt Renato Mccall  MRN: [id number]  [place] 1985  Date of evaluation: 2/25/21  PCP:  Shelia Olvera MD    CHIEF COMPLAINT       Chief Complaint   Patient presents with    Abdominal Pain     \"Tightness\"    Emesis During Pregnancy     Positive test on Tuesday 2/23/21       HISTORY OF PRESENT ILLNESS  (Location/Symptom, Timing/Onset, Context/Setting, Quality, Duration, Modifying Factors, Severity.)      Patricia Barrera is a 38 y.o. female who presents with plaints of pelvic tightness as well as nausea vomiting and epigastric abdominal pain she states started yesterday. Patient was last menstrual period is 1/17/2021 she was found to be pregnant within the last week. Patient was seen and evaluated several days ago in emergency department and diagnosed with bacterial vaginosis as well as Trichomonas and was started on Flagyl. Patient states that she has not been sexual active since then and has been compliant with her Flagyl. Denies any trauma any vaginal discharge or bleeding any urinary symptoms. PAST MEDICAL / SURGICAL / SOCIAL / FAMILY HISTORY      has a past medical history of Anxiety, Asthma, GERD (gastroesophageal reflux disease), Major depression, Morbid obesity, and Tension type headache.       has no past surgical history on file.       Social History     Socioeconomic History    Marital status: Single     Spouse name: Not on file    Number of children: Not on file    Years of education: Not on file    Highest education level: Not on file   Occupational History    Not on file   Social Needs    Financial resource strain: Not on file    Food insecurity     Worry: Not on file     Inability: Not on file    Transportation needs     Medical: Not on file     Non-medical: Not on file   Tobacco Use    Smoking status: Former Smoker     Packs/day: 1.00     Types: Cigars    Smokeless tobacco: Never Used    Tobacco comment: Black and Mild   Substance and Sexual Activity    Alcohol use: No    Drug use: No    Sexual activity: Yes     Partners: Male   Lifestyle    Physical activity     Days per week: Not on file     Minutes per session: Not on file    Stress: Not on file   Relationships    Social connections     Talks on phone: Not on file     Gets together: Not on file     Attends Jew service: Not on file     Active member of club or organization: Not on file     Attends meetings of clubs or organizations: Not on file     Relationship status: Not on file    Intimate partner violence     Fear of current or ex partner: Not on file     Emotionally abused: Not on file     Physically abused: Not on file     Forced sexual activity: Not on file   Other Topics Concern    Not on file   Social History Narrative    Not on file       Family History   Problem Relation Age of Onset    Depression Father     Thyroid Disease Father     Diabetes Sister        Allergies:  Iodine, Penicillin g, Prednisolone, and Dye  [iodides]    Home Medications:  Prior to Admission medications    Medication Sig Start Date End Date Taking? Authorizing Provider   metroNIDAZOLE (FLAGYL) 500 MG tablet Take 1 tablet by mouth 2 times daily Take with Food. Do NOT drink alcohol.  Begin taking in morning of 2/22/21 2/22/21   Valdene Atlantic, DO   Multiple Vitamin (MVI, CELEBRATE, CHEWABLE TABLET) Take 1 tablet by mouth daily 2/22/21   Barbette Popper, DO   metroNIDAZOLE (METROGEL VAGINAL) 0.75 % vaginal gel One applicator intravaginally every night for 5 days  Patient not taking: Reported on 2/23/2021 2/18/21 2/25/21  Daryl Villegas,    escitalopram (LEXAPRO) 10 MG tablet 10 mg 11/17/20   Historical Provider, MD   NP THYROID 30 MG tablet 30 mg 12/23/20   Historical Provider, MD   potassium chloride (MICRO-K) 10 MEQ extended release capsule 10 mEq 1/14/21   Historical Provider, MD   pantoprazole (PROTONIX) 40 MG tablet Physical Exam  Vitals signs and nursing note reviewed. Constitutional:       Appearance: She is obese. She is not diaphoretic. Comments: Uncomfortable nontoxic   HENT:      Head: Normocephalic and atraumatic. Right Ear: External ear normal.      Left Ear: External ear normal.      Mouth/Throat:      Mouth: Mucous membranes are moist.   Eyes:      General: No scleral icterus. Right eye: No discharge. Left eye: No discharge. Conjunctiva/sclera: Conjunctivae normal.   Neck:      Musculoskeletal: Normal range of motion. Cardiovascular:      Rate and Rhythm: Normal rate and regular rhythm. Pulses: Normal pulses. Pulmonary:      Effort: Pulmonary effort is normal. No respiratory distress. Breath sounds: Normal breath sounds. No wheezing. Abdominal:      Palpations: Abdomen is soft. Tenderness: There is no right CVA tenderness, left CVA tenderness or guarding. Genitourinary:     General: Normal vulva. Vagina: No vaginal discharge. Musculoskeletal:      Right lower leg: No edema. Left lower leg: No edema. Skin:     General: Skin is warm. Neurological:      Mental Status: She is alert.    Psychiatric:         Mood and Affect: Mood normal.         DIFFERENTIAL  DIAGNOSIS     PLAN (LABS / IMAGING / EKG):  Orders Placed This Encounter   Procedures    VAGINITIS DNA PROBE    C.trachomatis N.gonorrhoeae DNA    HCG, QUANTITATIVE, PREGNANCY    CBC WITH AUTO DIFFERENTIAL    COMPREHENSIVE METABOLIC PANEL    Urinalysis Reflex to Culture    LIPASE    Vaginal exam       MEDICATIONS ORDERED:  Orders Placed This Encounter   Medications    ondansetron (ZOFRAN) injection 4 mg       DDX: ectopic, sti, uti, pancreatitis    DIAGNOSTIC RESULTS / EMERGENCY DEPARTMENT COURSE / MDM   LAB RESULTS:  Results for orders placed or performed during the hospital encounter of 02/25/21   CBC WITH AUTO DIFFERENTIAL   Result Value Ref Range    WBC 7.3 3.5 - 11.3 k/uL    RBC 4. 04 3.95 - 5.11 m/uL    Hemoglobin 10.8 (L) 11.9 - 15.1 g/dL    Hematocrit 35.2 (L) 36.3 - 47.1 %    MCV 87.1 82.6 - 102.9 fL    MCH 26.7 25.2 - 33.5 pg    MCHC 30.7 28.4 - 34.8 g/dL    RDW 14.1 11.8 - 14.4 %    Platelets 788 248 - 323 k/uL    MPV 9.7 8.1 - 13.5 fL    NRBC Automated 0.0 0.0 per 100 WBC    Differential Type NOT REPORTED     Seg Neutrophils 42 36 - 65 %    Lymphocytes 46 (H) 24 - 43 %    Monocytes 10 3 - 12 %    Eosinophils % 1 1 - 4 %    Basophils 1 0 - 2 %    Immature Granulocytes 0 0 %    Segs Absolute 3.08 1.50 - 8.10 k/uL    Absolute Lymph # 3.36 1.10 - 3.70 k/uL    Absolute Mono # 0.75 0.10 - 1.20 k/uL    Absolute Eos # 0.09 0.00 - 0.44 k/uL    Basophils Absolute 0.04 0.00 - 0.20 k/uL    Absolute Immature Granulocyte <0.03 0.00 - 0.30 k/uL    WBC Morphology NOT REPORTED     RBC Morphology NOT REPORTED     Platelet Estimate NOT REPORTED        IMPRESSION: Uncomfortable nontoxic 18-year-old female with pelvic cramping/squeezing sensation 24 hours duration recent pregnancy unknown location concerning for ectopic plan will be broad work-up labs imaging transvaginal ultrasound    RADIOLOGY:  Us Ob Transvaginal    Result Date: 2/25/2021  EXAMINATION: FIRST TRIMESTER OBSTETRIC ULTRASOUND 2/25/2021 TECHNIQUE: Transvaginal first trimester obstetric pelvic ultrasound was performed with color Doppler flow evaluation. COMPARISON: 02/21/2021 HISTORY: ORDERING SYSTEM PROVIDED HISTORY: eval for ectopic TECHNOLOGIST PROVIDED HISTORY: eval for ectopic FINDINGS: Uterus: 9.9 x 7.2 x 4.7 cm. The endometrium measures 1.7 cm Gestational Sac(s):  None visualized Yolk Sac:  Not visualized Fetal Pole:  Not visualized Crown Rump Length:  NA Fetal Heart Rate:  Not visualized Right ovary: 3.6 x 2.6 x 2.0 cm. There is a 2.2 x 1.7 x 1.4 cm complex cystic lesion in the liver right ovary. There is normal Doppler flow. Left ovary: 3.8 x 2.5 x 1.7 cm. There is a 2.3 x 2.0 x 1.2 cm complex cystic lesion in the left ovary. Normal Doppler flow. 1. No intrauterine gestation identified. 2. Complex cystic lesions in both ovaries without internal vascularity, indeterminate. This could represent corpus luteum or ectopic. Close clinical and sonographic follow-up is recommended. . 3. Normal Doppler flow within both ovaries. 4. No free fluid. EKG  none    All EKG's are interpreted by the Emergency Department Physician who either signs or Co-signs this chart in the absence of a cardiologist.    EMERGENCY DEPARTMENT COURSE:  ED Course as of Feb 26 0135   Thu Feb 25, 2021 2055 Female rn chaperoned pelvic showed closed os no cmt or adnexal tenderness swabs obtained. There is mucoid discharge and erythema of the cervix    [BG]   2156 Dispo pending tvus    [BG]   2333 IMPRESSION:  1. No intrauterine gestation identified. 2. Complex cystic lesions in both ovaries without internal vascularity,  indeterminate. This could represent corpus luteum or ectopic. Close  clinical and sonographic follow-up is recommended. .  3. Normal Doppler flow within both ovaries. 4. No free fluid. [BG]   0 Spoke with ob they will come see pt    [BG]   Fri Feb 26, 2021   0025 Evaluated and updated on results I found patient was dressed and back to leave I explained to her the need for her to states that OB can speak with her and she is in agreement with staying at this time she is very emotional given the events of the last week. [BG]   0105 Ob paged again they are coming down    [BG]   0114 Pt wanting to go. Encouraged again to wait for ob    [BG]   0133 Given bacteruria and anaphylactic reaction to keflex/penicillin documented will prescribe macrobid as quinolones are contraindicated and other options have high risk of resistance.     [BG]      ED Course User Index  [BG] Kay Roldan DO         PROCEDURES:  Pelvic exam as documented above    CONSULTS:  IP CONSULT TO OB GYN    CRITICAL CARE:  Please see attending note    FINAL IMPRESSION      1. Abdominal pain, epigastric    2. Non-intractable vomiting with nausea, unspecified vomiting type    3. Pelvic pain affecting pregnancy in first trimester, antepartum          DISPOSITION / PLAN     DISPOSITION  dc with strict return precautions and ob followup. Pt with ob/gyn followup appointment scheduled for Monday.       PATIENT REFERRED TO:  Db Quevedo MD  [address] [address]  [phone]    Call today  for pcp followup    [facility]  [address] [place] [address]  [phone]  Go to   in 2 days for recheck of hcg (saturday) am, As needed, If symptoms worsen    [address]  Schedule an appointment as soon as possible for a visit         DISCHARGE MEDICATIONS:  Discharge Medication List as of 2/26/2021  1:23 AM          Hernandez Fox DO  Emergency Medicine Resident    (Please note that portions of thisnote were completed with a voice recognition program.  Efforts were made to edit the dictations but occasionally words are mis-transcribed.)        Hernandez Fox DO  Resident  02/26/21 8917

## 2021-02-26 NOTE — ED NOTES
Pt states she doesn't want vaginal exam or Othel Loveless, Dr Jeannette Miller informed.       Nestor Bautista, RN  02/25/21 2051

## 2021-02-26 NOTE — TELEPHONE ENCOUNTER
Pt seen and DC from ED last night. BHCG increasing appropriately, 798. No gest sac on US as expected, bilateral ovarian cysts, no free fluid. Ectopic can not be ruled out. I had a long discussion with pt regarding her symptoms and findings. S/S of ectopic reviewed. Pt to go to ED if she experiences any of those. Clinic follow up 3/1/21 as scheduled.

## 2021-02-26 NOTE — TELEPHONE ENCOUNTER
Chika Moss called stating that she went back to er last night and is concerned for ectopic pregnancy. She states that she is having swelling in her abdomen and it has a heart beat feeling. She is very uncomfortable.

## 2021-02-26 NOTE — ED NOTES
The following labs labeled with pt sticker and tubed:     [x] Lavender   [] on ice   [x] Blue   [x] Green/yellow  [] Green/black [] on ice  [] Pink  [] Red  [x] Yellow     [] COVID-19 swab    [] Rapid     [x] Urine Sample  [] Pelvic Cultures    [] Blood Cultures          Vicky Carrasco RN  02/25/21 2038

## 2021-02-27 ENCOUNTER — HOSPITAL ENCOUNTER (OUTPATIENT)
Age: 36
Discharge: HOME | End: 2021-02-27
Payer: MEDICARE

## 2021-02-27 DIAGNOSIS — O20.0 THREATENED MISCARRIAGE: ICD-10-CM

## 2021-02-27 LAB — HCG QUANTITATIVE: 1664 IU/L

## 2021-02-27 PROCEDURE — 36415 COLL VENOUS BLD VENIPUNCTURE: CPT

## 2021-02-27 PROCEDURE — 84702 CHORIONIC GONADOTROPIN TEST: CPT

## 2021-03-01 ENCOUNTER — HOSPITAL ENCOUNTER (OUTPATIENT)
Age: 36
Setting detail: SPECIMEN
Discharge: HOME | End: 2021-03-01
Payer: MEDICARE

## 2021-03-01 ENCOUNTER — OFFICE VISIT (OUTPATIENT)
Dept: OBGYN | Age: 36
End: 2021-03-01
Payer: MEDICARE

## 2021-03-01 VITALS
TEMPERATURE: 97.1 F | WEIGHT: 175 LBS | HEART RATE: 65 BPM | SYSTOLIC BLOOD PRESSURE: 90 MMHG | BODY MASS INDEX: 29.12 KG/M2 | DIASTOLIC BLOOD PRESSURE: 65 MMHG

## 2021-03-01 DIAGNOSIS — O36.80X0 PREGNANCY OF UNKNOWN ANATOMIC LOCATION: ICD-10-CM

## 2021-03-01 DIAGNOSIS — K80.20 CALCULUS OF GALLBLADDER WITHOUT CHOLECYSTITIS WITHOUT OBSTRUCTION: ICD-10-CM

## 2021-03-01 DIAGNOSIS — O36.80X0 PREGNANCY OF UNKNOWN ANATOMIC LOCATION: Primary | ICD-10-CM

## 2021-03-01 PROBLEM — N87.0 DYSPLASIA OF CERVIX, LOW GRADE (CIN 1): Status: ACTIVE | Noted: 2021-03-01

## 2021-03-01 PROBLEM — A59.9 TRICHOMONAS VAGINALIS INFECTION: Status: ACTIVE | Noted: 2021-03-01

## 2021-03-01 LAB — HCG QUANTITATIVE: 3495 IU/L

## 2021-03-01 PROCEDURE — G8427 DOCREV CUR MEDS BY ELIG CLIN: HCPCS | Performed by: STUDENT IN AN ORGANIZED HEALTH CARE EDUCATION/TRAINING PROGRAM

## 2021-03-01 PROCEDURE — 1036F TOBACCO NON-USER: CPT | Performed by: STUDENT IN AN ORGANIZED HEALTH CARE EDUCATION/TRAINING PROGRAM

## 2021-03-01 PROCEDURE — G8484 FLU IMMUNIZE NO ADMIN: HCPCS | Performed by: STUDENT IN AN ORGANIZED HEALTH CARE EDUCATION/TRAINING PROGRAM

## 2021-03-01 PROCEDURE — 99213 OFFICE O/P EST LOW 20 MIN: CPT | Performed by: STUDENT IN AN ORGANIZED HEALTH CARE EDUCATION/TRAINING PROGRAM

## 2021-03-01 PROCEDURE — G8419 CALC BMI OUT NRM PARAM NOF/U: HCPCS | Performed by: STUDENT IN AN ORGANIZED HEALTH CARE EDUCATION/TRAINING PROGRAM

## 2021-03-01 RX ORDER — PRENATAL WITH FERROUS FUM AND FOLIC ACID 3080; 920; 120; 400; 22; 1.84; 3; 20; 10; 1; 12; 200; 27; 25; 2 [IU]/1; [IU]/1; MG/1; [IU]/1; MG/1; MG/1; MG/1; MG/1; MG/1; MG/1; UG/1; MG/1; MG/1; MG/1; MG/1
TABLET ORAL
Status: ON HOLD | COMMUNITY
Start: 2021-02-24 | End: 2021-06-26 | Stop reason: HOSPADM

## 2021-03-01 NOTE — PROGRESS NOTES
Attending Physician Statement  I have discussed the care of Kayden Ludwig, including pertinent history and exam findings,  with the resident. I have seen and examined the patient and the key elements of all parts of the encounter have been performed by me. I agree with the assessment, plan and orders as documented by the resident.   (GC Modifier)

## 2021-03-01 NOTE — PROGRESS NOTES
palpitations  Gastrointestinal: positive abdominal pain, positive nausea, negative RUQ pain, negative vomiting, negative diarrhea, negative constipation  Genitourinary: negative dysuria, negative vaginal discharge, positive vaginal pain   Dermatological: negative rash  Hematologic: negative bruising  Immunologic/Lymphatic: negative recent illness, negative recent sick contact  Musculoskeletal: negative back pain, negative myalgias, negative arthralgias  Neurological:  negative dizziness, negative weakness  Behavior/Psych: negative depression, negative SI/HI, positive anxiety  ________________________________________________________________________      OBSTETRICAL HISTORY:  OB History    Para Term  AB Living   2 0 0 0 1 0   SAB TAB Ectopic Molar Multiple Live Births   1 0 0 0 0 0      # Outcome Date GA Lbr Adi/2nd Weight Sex Delivery Anes PTL Lv   2 Current            1 SAB 20:47 9 oz (0.255 kg) M Vag-Spont None Y ND      Complications: Chorioamnionitis       PAST MEDICAL HISTORY:      Diagnosis Date    Anxiety     Asthma     GERD (gastroesophageal reflux disease)     Major depression 2013    Morbid obesity 9/15/2017    Tension type headache 2013       PAST SURGICAL HISTORY:                                                                History reviewed. No pertinent surgical history.     MEDICATIONS:  Current Outpatient Medications   Medication Sig Dispense Refill    Prenatal Vit-Fe Fumarate-FA (PRENATAL VITAMIN) 27-1 MG TABS tablet       acetaminophen (TYLENOL) 500 MG tablet Take 1 tablet by mouth every 6 hours as needed for Pain 40 tablet 0    Cholecalciferol (VITAMIN D3) 1000 units TABS Take 1 tablet by mouth daily 90 tablet 1    nitrofurantoin, macrocrystal-monohydrate, (MACROBID) 100 MG capsule Take 1 capsule by mouth 2 times daily for 10 days (Patient not taking: Reported on 3/1/2021) 20 capsule 0    Multiple Vitamin (MVI, CELEBRATE, CHEWABLE TABLET) Take 1 tablet by mouth daily (Patient not taking: Reported on 3/1/2021) 30 tablet 5    escitalopram (LEXAPRO) 10 MG tablet 10 mg      NP THYROID 30 MG tablet 30 mg      potassium chloride (MICRO-K) 10 MEQ extended release capsule 10 mEq      pantoprazole (PROTONIX) 40 MG tablet 40 mg      Prenatal Vit-Min-FA-Fish Oil ([employer] PRENATAL GUMMY) 0.4-113.5 MG CHEW Take 1 tablet by mouth daily (Patient not taking: Reported on 3/1/2021) 30 tablet 1    flintstones complete (FLINTSTONES) 60 MG chewable tablet chew and swallow 1 tablet by mouth once daily (Patient not taking: Reported on 2/2/2021) 30 tablet 5    omeprazole (PRILOSEC) 20 MG delayed release capsule take 1 capsule by mouth once daily (Patient not taking: Reported on 2/2/2021) 30 capsule 3    ferrous sulfate (FE TABS) 325 (65 Fe) MG EC tablet Take 1 tablet by mouth 2 times daily (Patient not taking: Reported on 3/1/2021) 90 tablet 3    vitamin D (ERGOCALCIFEROL) 16547 units CAPS capsule Take 1 capsule by mouth once a week for 8 doses 8 capsule 0     No current facility-administered medications for this visit. ALLERGIES:  Allergies as of 03/01/2021 - Review Complete 03/01/2021   Allergen Reaction Noted    Iodine Shortness Of Breath 03/16/2017    Penicillin g Hives and Shortness Of Breath 03/16/2017    Prednisolone Hives and Shortness Of Breath 03/16/2017    Dye  [iodides]  01/21/2017                                   VITALS:  Vitals:    03/01/21 1321   BP: 90/65   Pulse: 65   Temp: 97.1 °F (36.2 °C)                                                                                                                                                                         PHYSICAL EXAM:    General Appearance: Appears healthy. Alert. Pleasant. Skin: Normal  Lymphatic: No cervical, superclavicular, axillary, or inguinal adenopathy.   HEENT: normocephalic and atraumatic, Thyroid normal to inspection  Respiratory: no respiratory distress, good air

## 2021-03-02 ENCOUNTER — HOSPITAL ENCOUNTER (OUTPATIENT)
Dept: ULTRASOUND IMAGING | Age: 36
Discharge: HOME | End: 2021-03-04
Payer: MEDICARE

## 2021-03-02 ENCOUNTER — TELEPHONE (OUTPATIENT)
Dept: OBGYN | Age: 36
End: 2021-03-02

## 2021-03-02 DIAGNOSIS — O36.80X0 PREGNANCY OF UNKNOWN ANATOMIC LOCATION: ICD-10-CM

## 2021-03-02 DIAGNOSIS — K80.20 CALCULUS OF GALLBLADDER WITHOUT CHOLECYSTITIS WITHOUT OBSTRUCTION: ICD-10-CM

## 2021-03-02 PROCEDURE — 76705 ECHO EXAM OF ABDOMEN: CPT

## 2021-03-02 PROCEDURE — 76817 TRANSVAGINAL US OBSTETRIC: CPT

## 2021-03-02 NOTE — TELEPHONE ENCOUNTER
I called to inform the patient that BHCG level on 3/1/21 increased normally and pelvic ultrasound showed and early pregnancy sac measure 5 weeks size. Still too early to see a fetus. Ultrasound of the Gallbladder showed many stones. Recommend repeat pelvic ultrasound in 2 weeks, by that time we would expect to see a fetus and heart beat. Order entered for repeat ultrasound that can be completed in our office. We will consider referral to general surgery after fetal viability is confirmed for surveillance of gallstones during pregnancy.

## 2021-03-09 ENCOUNTER — OFFICE VISIT (OUTPATIENT)
Dept: OBGYN | Age: 36
End: 2021-03-09
Payer: MEDICARE

## 2021-03-09 ENCOUNTER — HOSPITAL ENCOUNTER (OUTPATIENT)
Age: 36
Setting detail: SPECIMEN
Discharge: HOME | End: 2021-03-09
Payer: MEDICARE

## 2021-03-09 VITALS
HEART RATE: 71 BPM | SYSTOLIC BLOOD PRESSURE: 102 MMHG | DIASTOLIC BLOOD PRESSURE: 63 MMHG | BODY MASS INDEX: 29.75 KG/M2 | WEIGHT: 178.8 LBS

## 2021-03-09 DIAGNOSIS — O26.891 DYSURIA DURING PREGNANCY IN FIRST TRIMESTER: ICD-10-CM

## 2021-03-09 DIAGNOSIS — R30.0 DYSURIA DURING PREGNANCY IN FIRST TRIMESTER: ICD-10-CM

## 2021-03-09 DIAGNOSIS — R20.9 COLD EXTREMITIES: ICD-10-CM

## 2021-03-09 DIAGNOSIS — O36.80X0 PREGNANCY OF UNKNOWN ANATOMIC LOCATION: Primary | ICD-10-CM

## 2021-03-09 PROCEDURE — G8427 DOCREV CUR MEDS BY ELIG CLIN: HCPCS | Performed by: STUDENT IN AN ORGANIZED HEALTH CARE EDUCATION/TRAINING PROGRAM

## 2021-03-09 PROCEDURE — 1036F TOBACCO NON-USER: CPT | Performed by: STUDENT IN AN ORGANIZED HEALTH CARE EDUCATION/TRAINING PROGRAM

## 2021-03-09 PROCEDURE — 99213 OFFICE O/P EST LOW 20 MIN: CPT | Performed by: STUDENT IN AN ORGANIZED HEALTH CARE EDUCATION/TRAINING PROGRAM

## 2021-03-09 PROCEDURE — G8484 FLU IMMUNIZE NO ADMIN: HCPCS | Performed by: STUDENT IN AN ORGANIZED HEALTH CARE EDUCATION/TRAINING PROGRAM

## 2021-03-09 PROCEDURE — G8419 CALC BMI OUT NRM PARAM NOF/U: HCPCS | Performed by: STUDENT IN AN ORGANIZED HEALTH CARE EDUCATION/TRAINING PROGRAM

## 2021-03-09 NOTE — PROGRESS NOTES
Yoseph Kirby  3/9/2021    YOB: 1985    HPI:  Yoseph Kirby is a 38 y.o. female    The patient was seen and examined today. She had several complaints this appointment. Her biggest complaint was intermittent pelvic pain that radiates to her lower back. She noticed in her chart that she was given a Rx for Macrobid but never filled that Rx. She wants to be re-tested for a urinary tract infection. Denies increase in urinary frequency but did noticed some light pink discharge when wiping. She states she wipes deep inside her vagina to \"clean it out\" and that is where she noticed the light pink spotting. Denies any bright red bleeding. Discussed that the vagina is self cleaning and does not need to be clean internally. Warned her about increased risk of infections and possibly rupture of membranes when she is further along in pregnancy. Patient agreeable to stop doing this habit. She also reports that she is extremely cold all the time. States this started when she found out she was pregnant. States she wears 4 blankets and still cannot warm up. She checks her temp daily and never spikes a fever, also says her temp is not too low. Denies any chills, N/V or exposure to sick contacts. Showed patient her thyroid studies that were completed on 21 which were WNL. Explained to her that repeating thyroid studies early in pregnancy can be misleading but patient was persistent to have her thyroid rechecked. Her bHCG has been appropriately increasing (351>[id number]>2944). Most recent [address] on 3/2/21 showed a presumed gestational sac measuring 5w0d but no fetal pole or yolk sac were identified. She has a repeat ultrasound scheduled for viability on 3/23/21.     OB History    Para Term  AB Living   2 0 0 0 1 0   SAB TAB Ectopic Molar Multiple Live Births   1 0 0 0 0 0      # Outcome Date GA Lbr Adi/2nd Weight Sex Delivery Anes PTL Lv   2 Current            1 SAB 20  / MEDICATIONS:  Current Outpatient Medications   Medication Sig Dispense Refill    Prenatal Vit-Fe Fumarate-FA (PRENATAL VITAMIN) 27-1 MG TABS tablet       escitalopram (LEXAPRO) 10 MG tablet 10 mg      acetaminophen (TYLENOL) 500 MG tablet Take 1 tablet by mouth every 6 hours as needed for Pain 40 tablet 0    Cholecalciferol (VITAMIN D3) 1000 units TABS Take 1 tablet by mouth daily 90 tablet 1    Multiple Vitamin (MVI, CELEBRATE, CHEWABLE TABLET) Take 1 tablet by mouth daily (Patient not taking: Reported on 3/1/2021) 30 tablet 5    NP THYROID 30 MG tablet 30 mg      potassium chloride (MICRO-K) 10 MEQ extended release capsule 10 mEq      pantoprazole (PROTONIX) 40 MG tablet 40 mg      Prenatal Vit-Min-FA-Fish Oil ([facility] PRENATAL GUMMY) 0.4-113.5 MG CHEW Take 1 tablet by mouth daily (Patient not taking: Reported on 3/1/2021) 30 tablet 1    flintstones complete (FLINTSTONES) 60 MG chewable tablet chew and swallow 1 tablet by mouth once daily (Patient not taking: Reported on 2/2/2021) 30 tablet 5    omeprazole (PRILOSEC) 20 MG delayed release capsule take 1 capsule by mouth once daily (Patient not taking: Reported on 2/2/2021) 30 capsule 3    ferrous sulfate (FE TABS) 325 (65 Fe) MG EC tablet Take 1 tablet by mouth 2 times daily (Patient not taking: Reported on 3/1/2021) 90 tablet 3     No current facility-administered medications for this visit.         ALLERGIES:  Allergies as of 03/09/2021 - Review Complete 03/09/2021   Allergen Reaction Noted    Iodine Shortness Of Breath 03/16/2017    Penicillin g Hives and Shortness Of Breath 03/16/2017    Prednisolone Hives and Shortness Of Breath 03/16/2017    Dye  [iodides]  01/21/2017       REVIEW OF SYSTEMS:   Constitutional: negative fever, negative chills, negative weight changes, positive cold sensitivity   HEENT: negative headaches, negative dizziness   Respiratory:   negative cough, negative SOB  Cardiovascular: negative chest pain,  negative the prior study, there appears to be interval development of gestational sac with surrounding decidual reaction measuring 5 weeks 0 days by MSD. Gestational sac measures 5 mm. No fetal pole or yolk sac is noted. Small amount of fluid is seen within the endometrial canal.  Small amount simple free fluid is seen within the pelvis. Both ovaries are identified appear unremarkable. No adnexal mass. Since the prior study, there appears to be interval development of a presumed gestational sac  measuring 5 weeks 0 days by MSD. No fetal pole or yolk sac is identified. Early IUP is suspected. Correlation with beta HCG trend and repeat ultrasound is recommended to ensure fetal viability. Assessment/Plan:  Pregnancy of Unknown Location    - bHCG appropriately increasing [id number]   - Pelvic US 3/2/21 showed a presumed gestational sac measuring 5w0d but no fetal pole or yolk sac were identified   - Repeat ultrasound scheduled for dating & viability on 3/23/21   - Repeat bHCG ordered for patient to complete this week     Lower Back Pain    - Desires urinalysis for possible UTI, lab ordered    - Was previously prescribed Macrobid but did not fill this Rx. She desires treatment with Keflex over Macrobid if she needs an antibiotic    - UA on 2/25/21 did not reflex to culture but did have small leukocyte esterase, 5-10 WBC and few bacteria    - Declined pelvic exam    - Patient recently had trich (positive on 2/21/21) and was treated with Flagyl. She had a negative vaginitis on 2/25/21 but will still need a test of re-infection in another 2-3 weeks     Cold Extremities    - Repeat TSH with reflex T4 ordered    - Phone appointment scheduled in 2 weeks to check on symptoms and go over labs      Diagnosis Orders   1. Pregnancy of unknown anatomic location  hCG, Quantitative, Pregnancy   2. Dysuria during pregnancy in first trimester  Urinalysis Reflex to Culture   3.  Cold extremities  TSH With Reflex Ft4     Patient Active Problem List    Diagnosis Date Noted    LGSIL (POLO-1) on [place] 2/2/21 03/01/2021     9 o'clock and 12 o'clock biopsies  Needs repeat Pap smear 2/2/22      Trichomonas vaginalis infection 2/21/21 03/01/2021    ASCUS with positive high risk HPV cervical 01/11/2021    sPTD @19w6d after PPROM     Hx Previable PPROM @19wk6d on 12/19/20 12/20/2020     The patient did not know she was pregnant prior to rupture.  Anxiety 12/20/2020    Adrenal insufficiency ([facility] [address] 12/20/2020    Tobacco use 12/20/2020    Obesity 09/15/2017    Strabismus 09/15/2017    Vitamin D deficiency 07/31/2017     8.7 on 7/27/17      Major depression 04/30/2013    Tension type headache 04/30/2013    Thyroid nodule 03/29/2012     CT neck : 5/2/2017  Impression: Large mass in the lower pole of the left lobe of the thyroid gland plunging into the superior mediastinum and displacing the trachea somewhat toward the right.  This has progressed dramatically since 2007.    FNA: Benign. Nodular goiter. 12/20/2016    T4 and reverse T3 wnl 8/24/17           Orders Placed This Encounter   Procedures    Urinalysis Reflex to Culture     Standing Status:   Future     Standing Expiration Date:   6/9/2021     Order Specific Question:   SPECIFY(EX-CATH,MIDSTREAM,CYSTO,ETC)? Answer:   midstream    hCG, Quantitative, Pregnancy     Standing Status:   Future     Standing Expiration Date:   7/9/2021    TSH With Reflex Ft4     Standing Status:   Future     Standing Expiration Date:   6/9/2021       Patient was seen with total face to face time of 20 minutes. More than 50% of this visit was counseling and education regarding The primary encounter diagnosis was Pregnancy of unknown anatomic location. Diagnoses of Dysuria during pregnancy in first trimester and Cold extremities were also pertinent to this visit. and Follow-up (pressure in pelvis area )   as well as counseling on preventative health maintenance follow-up.       Marian Aquino DO OB/GYN Resident  Pager [phone] [facility]  3/9/2021, 5:19 PM        Attending Physician Statement  I have discussed the care of Vy Nassar, including pertinent history and exam findings,  with the resident. I have reviewed the key elements of all parts of the encounter with the resident. I agree with the assessment, plan and orders as documented by the resident.   (GE Modifier)      Kelvin Davison DO

## 2021-03-10 DIAGNOSIS — O26.891 DYSURIA DURING PREGNANCY IN FIRST TRIMESTER: ICD-10-CM

## 2021-03-10 DIAGNOSIS — R30.0 DYSURIA DURING PREGNANCY IN FIRST TRIMESTER: ICD-10-CM

## 2021-03-10 LAB
-: ABNORMAL
AMORPHOUS: ABNORMAL
BACTERIA: ABNORMAL
BILIRUBIN URINE: NEGATIVE
CASTS UA: ABNORMAL /LPF (ref 0–8)
COLOR: YELLOW
COMMENT UA: ABNORMAL
CRYSTALS, UA: ABNORMAL /HPF
EPITHELIAL CELLS UA: ABNORMAL /HPF (ref 0–5)
GLUCOSE URINE: NEGATIVE
KETONES, URINE: NEGATIVE
LEUKOCYTE ESTERASE, URINE: ABNORMAL
MUCUS: ABNORMAL
NITRITE, URINE: NEGATIVE
OTHER OBSERVATIONS UA: ABNORMAL
PH UA: 6 (ref 5–8)
PROTEIN UA: NEGATIVE
RBC UA: ABNORMAL /HPF (ref 0–4)
RENAL EPITHELIAL, UA: ABNORMAL /HPF
SPECIFIC GRAVITY UA: 1.01 (ref 1–1.03)
TRICHOMONAS: ABNORMAL
TURBIDITY: CLEAR
URINE HGB: NEGATIVE
UROBILINOGEN, URINE: NORMAL
WBC UA: ABNORMAL /HPF (ref 0–5)
YEAST: ABNORMAL

## 2021-03-11 LAB
CULTURE: ABNORMAL
Lab: ABNORMAL
SPECIMEN DESCRIPTION: ABNORMAL

## 2021-03-12 ENCOUNTER — TELEPHONE (OUTPATIENT)
Dept: OBGYN | Age: 36
End: 2021-03-12

## 2021-03-12 DIAGNOSIS — O23.11 ACUTE CYSTITIS DURING PREGNANCY IN FIRST TRIMESTER: Primary | ICD-10-CM

## 2021-03-12 DIAGNOSIS — B95.2 ENTEROCOCCUS UTI: ICD-10-CM

## 2021-03-12 DIAGNOSIS — N39.0 ENTEROCOCCUS UTI: ICD-10-CM

## 2021-03-12 PROBLEM — B96.20 E. COLI UTI: Status: ACTIVE | Noted: 2021-03-12

## 2021-03-12 RX ORDER — GRANULES FOR ORAL 3 G/1
3 POWDER ORAL ONCE
Qty: 1 EACH | Refills: 0 | Status: SHIPPED | OUTPATIENT
Start: 2021-03-12 | End: 2021-03-12

## 2021-03-12 NOTE — TELEPHONE ENCOUNTER
Gigimica Almas called stating that she has recently given a urine sample and thinks she has a UTI and would like medication called in for.

## 2021-03-13 ENCOUNTER — APPOINTMENT (OUTPATIENT)
Dept: ULTRASOUND IMAGING | Age: 36
End: 2021-03-13
Payer: MEDICARE

## 2021-03-13 ENCOUNTER — HOSPITAL ENCOUNTER (EMERGENCY)
Age: 36
Discharge: HOME | End: 2021-03-14
Attending: EMERGENCY MEDICINE
Payer: MEDICARE

## 2021-03-13 ENCOUNTER — TELEPHONE (OUTPATIENT)
Dept: OBGYN | Age: 36
End: 2021-03-13

## 2021-03-13 VITALS
HEART RATE: 81 BPM | HEIGHT: 65 IN | OXYGEN SATURATION: 100 % | DIASTOLIC BLOOD PRESSURE: 66 MMHG | WEIGHT: 175 LBS | RESPIRATION RATE: 16 BRPM | SYSTOLIC BLOOD PRESSURE: 100 MMHG | BODY MASS INDEX: 29.16 KG/M2 | TEMPERATURE: 97.5 F

## 2021-03-13 DIAGNOSIS — N30.00 ACUTE CYSTITIS WITHOUT HEMATURIA: Primary | ICD-10-CM

## 2021-03-13 DIAGNOSIS — R11.2 NAUSEA AND VOMITING, INTRACTABILITY OF VOMITING NOT SPECIFIED, UNSPECIFIED VOMITING TYPE: Primary | ICD-10-CM

## 2021-03-13 LAB
-: NORMAL
ABSOLUTE EOS #: 0.04 K/UL (ref 0–0.44)
ABSOLUTE IMMATURE GRANULOCYTE: <0.03 K/UL (ref 0–0.3)
ABSOLUTE LYMPH #: 3.46 K/UL (ref 1.1–3.7)
ABSOLUTE MONO #: 0.56 K/UL (ref 0.1–1.2)
AMORPHOUS: NORMAL
BACTERIA: NORMAL
BASOPHILS # BLD: 1 % (ref 0–2)
BASOPHILS ABSOLUTE: 0.05 K/UL (ref 0–0.2)
BILIRUBIN URINE: NEGATIVE
CASTS UA: NORMAL /LPF (ref 0–8)
COLOR: YELLOW
COMMENT UA: ABNORMAL
CRYSTALS, UA: NORMAL /HPF
DIFFERENTIAL TYPE: ABNORMAL
EOSINOPHILS RELATIVE PERCENT: 1 % (ref 1–4)
EPITHELIAL CELLS UA: NORMAL /HPF (ref 0–5)
GLUCOSE URINE: NEGATIVE
HCG QUANTITATIVE: ABNORMAL IU/L
HCT VFR BLD CALC: 34.7 % (ref 36.3–47.1)
HEMOGLOBIN: 11.3 G/DL (ref 11.9–15.1)
IMMATURE GRANULOCYTES: 0 %
KETONES, URINE: NEGATIVE
LEUKOCYTE ESTERASE, URINE: ABNORMAL
LYMPHOCYTES # BLD: 45 % (ref 24–43)
MCH RBC QN AUTO: 26.6 PG (ref 25.2–33.5)
MCHC RBC AUTO-ENTMCNC: 32.6 G/DL (ref 28.4–34.8)
MCV RBC AUTO: 81.6 FL (ref 82.6–102.9)
MONOCYTES # BLD: 7 % (ref 3–12)
MUCUS: NORMAL
NITRITE, URINE: NEGATIVE
NRBC AUTOMATED: 0 PER 100 WBC
OTHER OBSERVATIONS UA: NORMAL
PDW BLD-RTO: 14.9 % (ref 11.8–14.4)
PH UA: 6 (ref 5–8)
PLATELET # BLD: 263 K/UL (ref 138–453)
PLATELET ESTIMATE: ABNORMAL
PMV BLD AUTO: 10 FL (ref 8.1–13.5)
PROTEIN UA: NEGATIVE
RBC # BLD: 4.25 M/UL (ref 3.95–5.11)
RBC # BLD: ABNORMAL 10*6/UL
RBC UA: NORMAL /HPF (ref 0–4)
RENAL EPITHELIAL, UA: NORMAL /HPF
SEG NEUTROPHILS: 46 % (ref 36–65)
SEGMENTED NEUTROPHILS ABSOLUTE COUNT: 3.52 K/UL (ref 1.5–8.1)
SPECIFIC GRAVITY UA: 1.02 (ref 1–1.03)
TRICHOMONAS: NORMAL
TURBIDITY: CLEAR
URINE HGB: NEGATIVE
UROBILINOGEN, URINE: NORMAL
WBC # BLD: 7.6 K/UL (ref 3.5–11.3)
WBC # BLD: ABNORMAL 10*3/UL
WBC UA: NORMAL /HPF (ref 0–5)
YEAST: NORMAL

## 2021-03-13 PROCEDURE — 84702 CHORIONIC GONADOTROPIN TEST: CPT

## 2021-03-13 PROCEDURE — 86850 RBC ANTIBODY SCREEN: CPT

## 2021-03-13 PROCEDURE — 81001 URINALYSIS AUTO W/SCOPE: CPT

## 2021-03-13 PROCEDURE — 87086 URINE CULTURE/COLONY COUNT: CPT

## 2021-03-13 PROCEDURE — 86900 BLOOD TYPING SEROLOGIC ABO: CPT

## 2021-03-13 PROCEDURE — 76817 TRANSVAGINAL US OBSTETRIC: CPT

## 2021-03-13 PROCEDURE — 99283 EMERGENCY DEPT VISIT LOW MDM: CPT

## 2021-03-13 PROCEDURE — 85025 COMPLETE CBC W/AUTO DIFF WBC: CPT

## 2021-03-13 PROCEDURE — 6370000000 HC RX 637 (ALT 250 FOR IP): Performed by: STUDENT IN AN ORGANIZED HEALTH CARE EDUCATION/TRAINING PROGRAM

## 2021-03-13 PROCEDURE — 86901 BLOOD TYPING SEROLOGIC RH(D): CPT

## 2021-03-13 RX ORDER — CEPHALEXIN 250 MG/1
250 CAPSULE ORAL 2 TIMES DAILY
Qty: 7 CAPSULE | Refills: 0 | Status: SHIPPED | OUTPATIENT
Start: 2021-03-13 | End: 2021-04-22 | Stop reason: ALTCHOICE

## 2021-03-13 RX ORDER — DOXYLAMINE SUCCINATE AND PYRIDOXINE HYDROCHLORIDE, DELAYED RELEASE TABLETS 10 MG/10 MG 10; 10 MG/1; MG/1
10 TABLET, DELAYED RELEASE ORAL 2 TIMES DAILY
Qty: 60 TABLET | Refills: 0 | Status: SHIPPED | OUTPATIENT
Start: 2021-03-13 | End: 2021-05-03

## 2021-03-13 RX ORDER — LANOLIN ALCOHOL/MO/W.PET/CERES
50 CREAM (GRAM) TOPICAL ONCE
Status: COMPLETED | OUTPATIENT
Start: 2021-03-13 | End: 2021-03-13

## 2021-03-13 RX ADMIN — DOXYLAMINE SUCCINATE 25 MG: 25 TABLET ORAL at 19:33

## 2021-03-13 RX ADMIN — Medication 50 MG: at 19:33

## 2021-03-13 ASSESSMENT — PAIN DESCRIPTION - LOCATION: LOCATION: ABDOMEN

## 2021-03-13 NOTE — TELEPHONE ENCOUNTER
Patient returned the call from [address] [place] [address] ED. Patient is to be seen and evaluated there. Will coordinate care. Patient has not been able to  her Rx d/t issues with prior authorization.     +UCx on 3/9/21 for Enterococcus faecalis     Gisel Valdivia DO  OB/GYN Resident, PGY1  [facility]  3/13/2021 6:56 PM

## 2021-03-13 NOTE — TELEPHONE ENCOUNTER
Attempted to call patient regarding concerns about UTI and medication. Patient did not answer. Left message with callback.      Oneal Mcgee DO  OB/GYN Resident, PGY1  [facility]  3/13/2021 6:28 PM

## 2021-03-13 NOTE — ED PROVIDER NOTES
[facility] ED  Emergency Department Encounter  Emergency Medicine Resident     Pt Name: Ciaran Tim  MRN: [id number]  [place] 1985  Date of evaluation: 3/13/21  PCP:  Anabel Calderon MD    [address] [place] [address]       Chief Complaint   Patient presents with    Abdominal Pain    Nausea       HISTORY OFPRESENT ILLNESS  (Location/Symptom, Timing/Onset, Context/Setting, Quality, Duration, Modifying Factors,Severity.)      Ciaran Tim is a 38 y.o. female who presents with concerns responding, back pain patient will be seen in 7 weeks. Patient had a positive pregnancy test on 2/23/2021 with a last menstrual period on 1/17/2021, patient at that time was diagnosed with acute vaginosis as well as Trichomonas was started on Flagyl at that time. She was notably a positive, with no need for RhoGam at this time. Patient has recent urine culture which shows E. coli, patient is currently not on any antibiotics, is notably sensitive to Macrobid. Patient notably has severe, right lower quadrant tenderness as well as nausea and vomiting. He states his symptoms have been worsening over the past couple of days, denies any change in bowel function, denies any vaginal discharge, denies any passage of clots, but does state that she has had some scant spotting. PAST MEDICAL / SURGICAL / SOCIAL / FAMILY HISTORY      has a past medical history of Anxiety, Asthma, GERD (gastroesophageal reflux disease), Major depression, Morbid obesity, and Tension type headache.     has no past surgical history on file.      Social History     Socioeconomic History    Marital status: Single     Spouse name: Not on file    Number of children: Not on file    Years of education: Not on file    Highest education level: Not on file   Occupational History    Not on file   Social Needs    Financial resource strain: Not on file    Food insecurity     Worry: Not on file     Inability: Not on file   [facility] needs Provider, MD   potassium chloride (MICRO-K) 10 MEQ extended release capsule 10 mEq 1/14/21   Historical Provider, MD   pantoprazole (PROTONIX) 40 MG tablet 40 mg    Historical Provider, MD   Prenatal Vit-Min-FA-Fish Oil ([facility] PRENATAL GUMMY) 0.4-113.5 MG CHEW Take 1 tablet by mouth daily  Patient not taking: Reported on 3/1/2021 2/2/21   Jeanann Nageotte, DO   acetaminophen (TYLENOL) 500 MG tablet Take 1 tablet by mouth every 6 hours as needed for Pain 1/20/21   Tramaine Maradiaga,    flintstones complete (FLINTSTONES) 60 MG chewable tablet chew and swallow 1 tablet by mouth once daily  Patient not taking: Reported on 2/2/2021 7/10/18   Flor Batres MD   omeprazole (PRILOSEC) 20 MG delayed release capsule take 1 capsule by mouth once daily  Patient not taking: Reported on 2/2/2021 4/2/18   Alize Stacy MD   ferrous sulfate (FE TABS) 325 (65 Fe) MG EC tablet Take 1 tablet by mouth 2 times daily  Patient not taking: Reported on 3/1/2021 10/3/17   Alize Stacy MD   Cholecalciferol (VITAMIN D3) 1000 units TABS Take 1 tablet by mouth daily 7/31/17   Jacqueline Rico MD       REVIEW OFSYSTEMS    (2-9 systems for level 4, 10 or more for level 5)      Review of Systems   Constitutional: Negative for chills, fatigue and fever. HENT: Negative for hearing loss, rhinorrhea, sneezing, sore throat, tinnitus and trouble swallowing. Eyes: Negative for photophobia and visual disturbance. Respiratory: Negative for chest tightness, shortness of breath and wheezing. Cardiovascular: Negative for chest pain and palpitations. Gastrointestinal: Positive for abdominal pain, nausea and vomiting. Negative for abdominal distention, constipation and diarrhea. Endocrine: Negative for polyuria. Genitourinary: Positive for vaginal bleeding. Negative for dysuria, flank pain, frequency and vaginal discharge. Musculoskeletal: Negative for arthralgias, back pain, gait problem and neck pain.    Neurological: Positive for headaches. Negative for dizziness, tremors, seizures, syncope, facial asymmetry and numbness. Psychiatric/Behavioral: Negative for self-injury and suicidal ideas. PHYSICAL EXAM   (up to 7 for level 4, 8 or more forlevel 5)      INITIAL VITALS:   ED Triage Vitals [03/13/21 1840]   BP Temp Temp Source Pulse Resp SpO2 Height Weight   -- 96.8 °F (36 °C) Tympanic -- -- -- -- --       Physical Exam  Constitutional:       General: She is not in acute distress. Appearance: She is well-developed. She is not diaphoretic. HENT:      Head: Normocephalic and atraumatic. Right Ear: External ear normal.      Left Ear: External ear normal.   Eyes:      Conjunctiva/sclera: Conjunctivae normal.   Neck:      Musculoskeletal: Normal range of motion and neck supple. Trachea: No tracheal deviation. Cardiovascular:      Rate and Rhythm: Normal rate and regular rhythm. Heart sounds: Normal heart sounds. Pulmonary:      Effort: Pulmonary effort is normal.      Breath sounds: Normal breath sounds. Abdominal:      General: Bowel sounds are normal. There is no distension. Palpations: Abdomen is soft. Tenderness: There is no abdominal tenderness. There is no guarding. Musculoskeletal: Normal range of motion. General: No deformity. Skin:     General: Skin is warm. Neurological:      Mental Status: She is alert and oriented to person, place, and time. Cranial Nerves: No cranial nerve deficit.          DIFFERENTIAL  DIAGNOSIS     PLAN (LABS / IMAGING / EKG):  Orders Placed This Encounter   Procedures    Culture, Urine    US OB TRANSVAGINAL    URINALYSIS    HCG, QUANTITATIVE, PREGNANCY    Microscopic Urinalysis    CBC WITH AUTO DIFFERENTIAL    Inpatient consult to Obstetrics / Gynecology    Inpatient consult to Obstetrics / Gynecology    TYPE AND SCREEN       MEDICATIONS ORDERED:  Orders Placed This Encounter   Medications    vitamin B-6 (PYRIDOXINE) tablet 50 mg    doxyLAMINE succinate (GNP SLEEP AID) tablet 25 mg    doxylamine-pyridoxine 10-10 MG TBEC     Sig: Take 10 mg by mouth 2 times daily     Dispense:  60 tablet     Refill:  0    cephALEXin (KEFLEX) 250 MG capsule     Sig: Take 1 capsule by mouth 2 times daily     Dispense:  7 capsule     Refill:  0       DDX: Worsening concerns of worsening, intrauterine pregnancy, spotting, subchorionic hemorrhage, ectopic pregnancy, urinary tract infection    Initial MDM/Plan/ED COURSE:    38 y.o. female who presents with known urinary tract infection as well as concerns for vaginal spotting, nausea and vomiting, plan to get a quantitative beta-hCG as transabdominal ultrasound does not show any signs of fetal heartbeat inside the uterus. Stated to patient known urinary tract infection with antibiotics, plan to get a transvaginal ultrasound. ED Course as of Mar 14 0144   Sat Mar 13, 2021   1926 Endovaginal ultrasound does not show intrauterine pregnancy, shows an empty uterus. Transvaginal ultrasound ordered, quantitative beta-hCG ordered. [GP]   2048 hCG Quant(!): 07,179 [GP]      ED Course User Index  [GP] Reji Garcia MD   Transvaginal ultrasound shows the results as below, spoke with outpatient gynecology recommend follow-up in 7 days for repeat transvaginal ultrasound, also recommend patient be discharged. Keflex for antibiotic.   Patient agreeable discharge at this time, has no questions at time of discharge.:     DIAGNOSTIC RESULTS / Dilan  / MDM     LABS:  Labs Reviewed   URINALYSIS - Abnormal; Notable for the following components:       Result Value    Leukocyte Esterase, Urine SMALL (*)     All other components within normal limits   HCG, QUANTITATIVE, PREGNANCY - Abnormal; Notable for the following components:    hCG CJNTZ 97,442 (*)     All other components within normal limits   CBC WITH AUTO DIFFERENTIAL - Abnormal; Notable for the following components:    Hemoglobin 11.3 (*) Hematocrit 34.7 (*)     MCV 81.6 (*)     RDW 14.9 (*)     Lymphocytes 45 (*)     All other components within normal limits   CULTURE, URINE   MICROSCOPIC URINALYSIS   TYPE AND SCREEN           Us Ob Transvaginal    Result Date: 3/13/2021  EXAMINATION: FIRST TRIMESTER OBSTETRIC ULTRASOUND 3/13/2021 TECHNIQUE: Transvaginal first trimester obstetric pelvic ultrasound was performed with color Doppler flow evaluation. COMPARISON: Ob ultrasound 03/02/2021. HISTORY: ORDERING SYSTEM PROVIDED HISTORY: concern for ectopic TECHNOLOGIST PROVIDED HISTORY: concern for ectopic FINDINGS: Uterus: 9.9 x 5.4 by 6.1 cm. Nabothian cysts. Gestational Sac(s): Intrauterine gestation. Irregularly shaped gestational sac. Questionable subchorionic hemorrhage measuring up to 1 cm. Yolk Sac: Visualized. Fetal Pole: Not clearly visualized. Right ovary: 3.2 x 2.5 by 1.8 cm. Complex cyst that could represent corpus luteal cyst noted. Gray scale appearance of the right ovary is unremarkable. Flow is identified to the region of the right ovary on color Doppler. Left ovary: 3.2 x 2.5 by 2.3 cm Gray scale appearance of the left ovary is unremarkable. Flow is identified to the region of the left ovary on color Doppler. Free fluid: None. Measurements: Estimated gestational age by current ultrasound: 6 weeks 3 days Estimated gestational by LMP/prior ultrasound: 6 weeks 3 days Estimated Due Date: 11/03/2021     Irregularly-shaped intrauterine gestational sac. Embryonic pole not clearly identified at this time. Questionable subchorionic hemorrhage measuring up to 1 cm. Follow-up ultrasound recommended. PROCEDURES:  None    CONSULTS:  IP CONSULT TO OB GYN  IP CONSULT TO OB GYN    CRITICAL CARE:  Please see attending note    FINAL IMPRESSION      1.  Nausea and vomiting, intractability of vomiting not specified, unspecified vomiting type          DISPOSITION / PLAN     DISPOSITION Decision To Discharge 03/13/2021 11:48:40 PM      PATIENT REFERRED TO:  [facility]  [address] [facility] [address]  [phone]    As needed    Mitchell Julien MD  [address] [facility] [address]  [phone]      As needed    [address] [facility] [address]  [phone]    For [address] appointment      DISCHARGE MEDICATIONS:  Discharge Medication List as of 3/14/2021 12:00 AM      START taking these medications    Details   doxylamine-pyridoxine 10-10 MG TBEC Take 10 mg by mouth 2 times daily, Disp-60 tablet, R-0Print      cephALEXin (KEFLEX) 250 MG capsule Take 1 capsule by mouth 2 times daily, Disp-7 capsule, R-0Print             Jame Sofia MD  Emergency Medicine Resident    (Please note that portions of this note were completed with a voice recognition program.Efforts were made to edit the dictations but occasionally words are mis-transcribed.)        Jame Sofia MD  Resident  03/14/21 2623

## 2021-03-14 ENCOUNTER — TELEPHONE (OUTPATIENT)
Dept: OBGYN | Age: 36
End: 2021-03-14

## 2021-03-14 DIAGNOSIS — N39.0 E. COLI UTI: Primary | ICD-10-CM

## 2021-03-14 DIAGNOSIS — B96.20 E. COLI UTI: Primary | ICD-10-CM

## 2021-03-14 DIAGNOSIS — O23.41 URINARY TRACT INFECTION IN MOTHER DURING FIRST TRIMESTER OF PREGNANCY: ICD-10-CM

## 2021-03-14 LAB
CULTURE: NORMAL
Lab: NORMAL
SPECIMEN DESCRIPTION: NORMAL

## 2021-03-14 PROCEDURE — 99242 OFF/OP CONSLTJ NEW/EST SF 20: CPT | Performed by: STUDENT IN AN ORGANIZED HEALTH CARE EDUCATION/TRAINING PROGRAM

## 2021-03-14 RX ORDER — CEPHALEXIN 250 MG/5ML
500 POWDER, FOR SUSPENSION ORAL 4 TIMES DAILY
Qty: 280 ML | Refills: 0 | Status: SHIPPED | OUTPATIENT
Start: 2021-03-14 | End: 2021-03-21

## 2021-03-14 ASSESSMENT — ENCOUNTER SYMPTOMS
CHEST TIGHTNESS: 0
TROUBLE SWALLOWING: 0
VOMITING: 1
PHOTOPHOBIA: 0
RHINORRHEA: 0
ABDOMINAL DISTENTION: 0
SORE THROAT: 0
DIARRHEA: 0
CONSTIPATION: 0
SHORTNESS OF BREATH: 0
BACK PAIN: 0
WHEEZING: 0
NAUSEA: 1
ABDOMINAL PAIN: 1

## 2021-03-14 NOTE — TELEPHONE ENCOUNTER
Obstetric/Gynecology Resident Note    Health link received regarding being 6wks pregnant with cramping and back pain. No answer, left message encouraging her to take 1000mg Tylenol, try a warm bath or heating pad for discomfort and encouraged her to call back. Per chart review patient was seen in ER yesterday for similar complaints. At that time she had an indeterminate ultrasound with an irregularly shaped intrauterine gestational sac and UA suspicions for UTI. Encouraged her to continue her Keflex and to please call back to discuss further plan of care.     Demetri Gallegos DO  OB/GYN Resident, PGY2  [facility]  3/14/2021, 5:16 PM

## 2021-03-14 NOTE — ED PROVIDER NOTES
[facility]  Emergency Department  Faculty Attestation     I performed a history and physical examination of the patient and discussed management with the resident. I reviewed the residents note and agree with the documented findings and plan of care. Any areas of disagreement are noted on the chart. I was personally present for the key portions of any procedures. I have documented in the chart those procedures where I was not present during the key portions. I have reviewed the emergency nurses triage note. I agree with the chief complaint, past medical history, past surgical history, allergies, medications, social and family history as documented unless otherwise noted below. For Physician Assistant/ Nurse Practitioner cases/documentation I have personally evaluated this patient and have completed at least one if not all key elements of the E/M (history, physical exam, and MDM). Additional findings are as noted. Primary Care Physician:  Radha Ace MD    Screenings:  [unfilled]    CHIEF COMPLAINT       Chief Complaint   Patient presents with    Abdominal Pain    Nausea       RECENT VITALS:   Temp: 97.5 °F (36.4 °C),  Pulse: 81, Resp: 16, BP: 100/66    LABS:  Labs Reviewed   CULTURE, URINE   VAGINITIS DNA PROBE   C.TRACHOMATIS N.GONORRHOEAE DNA   URINALYSIS   HCG, QUANTITATIVE, PREGNANCY       Radiology  US OB TRANSVAGINAL    (Results Pending)         Attending Physician Additional  Notes  Patient is 6 flexed weeks pregnant, no confirmatory IUP on ultrasound as yet, did have yolk sac but no fetal pole, was diagnosed as UTI and antibiotics were to be prescribed which she has not yet filled. She has diffuse abdominal pain somewhat into the left flank and some chills but no fevers nausea or vomiting. No true pelvic pain. On exam she is uncomfortable but nontoxic afebrile vital signs normal.  Abdomen is soft and nontender. No CVA tenderness.   Plan is bedside ultrasound, repeat urinalysis, hCG. Anticipate formal pelvic ultrasound. Tj Meyers.  Maria Elena Wright MD, [address] [facility]  Attending Emergency  Physician               Jasper Rodriguez MD  03/13/21 2014

## 2021-03-14 NOTE — TELEPHONE ENCOUNTER
Obstetric/Gynecology Resident Note    Patient called back to L&D to discuss her continued cramping and abdominal pain. She was seen in the ER yesterday for similar symptoms. At that time ultrasound was completed which demonstrated gestational sac and yolk sac without fetal pole. She has repeat ultrasound ordered. She was also diagnosed with a UTI and had previously known about this but has not started her antibiotics. She was given an Rx for Keflex yesterday. She has not tried anything for pain yet today and is concerned about what her ultrasound says as well as her continued pain. Discussed results of ultrasound in detail and encouraged her to follow up closely. Encouraged her to start Keflex and to take 1000mg Tylenol and try a heating pad or warm bath. Patient agreeable to starting both of these this evening. Encouraged patient to see if this helps her symptoms and if not to call the clinic to schedule same day appointment to be seen. Patient agreeable to above plan. She was given strict precautions to come to ER if pain worsens or she develops heavy vaginal bleeding.     Ray Morris DO  OB/GYN Resident, PGY2  [facility]  3/14/2021, 5:28 PM

## 2021-03-14 NOTE — ED TRIAGE NOTES
Patient states she is 6-7 weeks having abdominal cramping and some intermittent spotting. Patient was seen her last week for the same thing. States the cramping has continued.

## 2021-03-14 NOTE — TELEPHONE ENCOUNTER
Obstetric/Gynecology Resident Note    [facility] received regarding inability to swallow Keflex pills. Rx for liquid keflex sent to pharmacy. All questions answered. Patient to start medication tonight.     Casandra DO Kuldip  OB/GYN Resident, PGY2  [facility]  3/14/2021, 7:54 PM

## 2021-03-14 NOTE — CONSULTS
[facility]    Patient Name: Patricia Barrera     Patient : 1985  Room/Bed:   Admission Date/Time: 3/13/2021  6:51 PM  Primary Care Physician: Shelia Olvera MD    Consulting Provider: Dr. Jessica Suarez  Reason for Consult: Pregnancy with abdominal cramping/UTI    CC:   Chief Complaint   Patient presents with    Abdominal Pain    Nausea                HPI: Patricia Barrera is a 38 y.o. female  presents to the ED c/o diffuse abdominal discomfort and concerns about at UTI. Patient was diagnosed with at UTI on 3/9 at the [facility] clinic. Culture came back positive for Enterobacter. Patient states she was never able to get the antibiotics 2/2 issues with a prior authorization. Patient admits to feeling very stressed and afraid about this pregnancy especially considering her recent loss in 2020. She states she feels like she is aware of every \"twinge\"  Or pain in her body. She reports some very light spotting over the past few weeks but denies current spotting or bleeding. Patient denies any headache, visual changes, difficulty breathing, RUQ pain, N/V, F/C, and pain/swelling in lower extremities   Patient's last menstrual period was 2021 (approximate). Patient [facility] 69,[phone] increased from [facility] on 3/1/21 of 3,495. REVIEW OF SYSTEMS:   A minimum of an eleven point review of systems was completed.     Constitutional: negative fever, negative chills   HEENT: negative visual disturbances, negative headaches  Respiratory: negative dyspnea, negative cough  Cardiovascular: negative chest pain,  negative palpitations  Gastrointestinal: positive abdominal discomfort, negative RUQ pain, negative N/V, negative diarrhea, negative constipation  Genitourinary: negative dysuria, negative vaginal discharge  Dermatological: negative rash  Hematologic: negative bruising  Immunologic/Lymphatic: negative recent illness, negative recent sick contact  Musculoskeletal: negative back pain, negative myalgias, negative arthralgias  Neurological:  negative dizziness, negative weakness  Behavior/Psych: negative depression, negative anxiety    _______________________________________________________________________    GYNECOLOGICAL HISTORY:    Sexually Active: has sex with males   STD History: recent Hx Trichomonas for which the patient was treated     Pap History: ASC-H +other HRHPV   Colposcopy History: 19 + LSIL @ 12 and 9 o clock     Permanent Sterilization: denies  Reversible Birth Control: denies    OBSTETRICAL HISTORY:   OB History    Para Term  AB Living   2 0 0 0 1 0   SAB TAB Ectopic Molar Multiple Live Births   1 0 0 0 0 0      # Outcome Date GA Lbr Adi/2nd Weight Sex Delivery Anes PTL Lv   2 Current            1 SAB 20:47 9 oz (0.255 kg) M Vag-Spont None Y ND      Complications: Chorioamnionitis      Name: Laura [place]: 5  Apgar5: 1       PAST MEDICAL HISTORY:   has a past medical history of Anxiety, Asthma, GERD (gastroesophageal reflux disease), Major depression, Morbid obesity, and Tension type headache. PAST SURGICAL HISTORY:   has no past surgical history on file. ALLERGIES:  Allergies as of 2021 - Review Complete 2021   Allergen Reaction Noted    Iodine Shortness Of Breath 2017    Penicillin g Hives and Shortness Of Breath 2017    Prednisolone Hives and Shortness Of Breath 2017    Dye  [iodides]  2017       MEDICATIONS:  No current facility-administered medications for this encounter.       Current Outpatient Medications   Medication Sig Dispense Refill    doxylamine-pyridoxine 10-10 MG TBEC Take 10 mg by mouth 2 times daily 60 tablet 0    cephALEXin (KEFLEX) 250 MG capsule Take 1 capsule by mouth 2 times daily 7 capsule 0    Prenatal Vit-Fe Fumarate-FA (PRENATAL VITAMIN) 27-1 MG TABS tablet       Multiple Vitamin (MVI, CELEBRATE, CHEWABLE TABLET) Take 1 tablet by mouth daily (Patient not taking: Reported on 3/1/2021) 30 tablet 5    escitalopram (LEXAPRO) 10 MG tablet 10 mg      NP THYROID 30 MG tablet 30 mg      potassium chloride (MICRO-K) 10 MEQ extended release capsule 10 mEq      pantoprazole (PROTONIX) 40 MG tablet 40 mg      Prenatal Vit-Min-FA-Fish Oil ([employer] PRENATAL GUMMY) 0.4-113.5 MG CHEW Take 1 tablet by mouth daily (Patient not taking: Reported on 3/1/2021) 30 tablet 1    acetaminophen (TYLENOL) 500 MG tablet Take 1 tablet by mouth every 6 hours as needed for Pain 40 tablet 0    flintstones complete (FLINTSTONES) 60 MG chewable tablet chew and swallow 1 tablet by mouth once daily (Patient not taking: Reported on 2/2/2021) 30 tablet 5    omeprazole (PRILOSEC) 20 MG delayed release capsule take 1 capsule by mouth once daily (Patient not taking: Reported on 2/2/2021) 30 capsule 3    ferrous sulfate (FE TABS) 325 (65 Fe) MG EC tablet Take 1 tablet by mouth 2 times daily (Patient not taking: Reported on 3/1/2021) 90 tablet 3    Cholecalciferol (VITAMIN D3) 1000 units TABS Take 1 tablet by mouth daily 90 tablet 1       FAMILY HISTORY:  Family History of Breast, Ovarian, Colon or Uterine Cancer: No   family history includes Depression in her father; Diabetes in her sister; Thyroid Disease in her father. SOCIAL HISTORY:   reports that she has quit smoking. Her smoking use included cigars. She smoked 1.00 pack per day. She has never used smokeless tobacco. She reports that she does not drink alcohol or use drugs. ________________________________________________________________________                                    Michael Sullivanul:  Vitals:    03/13/21 1840 03/13/21 1913   BP:  100/66   Pulse:  81   Resp:  16   Temp: 96.8 °F (36 °C) 97.5 °F (36.4 °C)   TempSrc: Tympanic Oral   SpO2:  100%   Weight:  175 lb (79.4 kg)   Height:  5' 5\" (1.651 m)                                                    INPUT/OUTPUT:  No intake/output data recorded.   No intake/output data recorded. PHYSICAL EXAM:     General Appearance: Appears healthy. Alert; in no acute distress. Pleasant. Skin: Skin color, texture, turgor normal. No rashes or lesions. Lymphatic: No abnormally enlarged lymph nodes. Neck and EENT: normal atraumatic, no neck masses, normal thyroid, no jvd  Respiratory: Normal expansion. Clear to auscultation. No rales, rhonchi, or wheezing. Cardiovascular: normal, regular rate and rhythm  Abdomen: soft, non-tender, non-distended, no right upper quadrant tenderness and no CVA tenderness, no rebound, guarding, or rigidity, no abdominal scars  Pelvic Exam: performed by ED resident. No active bleeding. Cervix closed. Musculoskeletal: no gross abnormalities  Extremities: non-tender BLE and non-edematous  Psych:  oriented to time, place and person     OMM EXAM:  The patient did not complain of a Chief complaint requiring OMM. Chief Complaint:none    Structural Exam: No Interest    LAB RESULTS:  Results for orders placed or performed during the hospital encounter of 03/13/21   URINALYSIS   Result Value Ref Range    Color, UA YELLOW YELLOW    Turbidity UA CLEAR CLEAR    Glucose, Ur NEGATIVE NEGATIVE    Bilirubin Urine NEGATIVE NEGATIVE    Ketones, Urine NEGATIVE NEGATIVE    Specific Gravity, UA 1.021 1.005 - 1.030    Urine Hgb NEGATIVE NEGATIVE    pH, UA 6.0 5.0 - 8.0    Protein, UA NEGATIVE NEGATIVE    Urobilinogen, Urine Normal Normal    Nitrite, Urine NEGATIVE NEGATIVE    Leukocyte Esterase, Urine SMALL (A) NEGATIVE    Urinalysis Comments NOT REPORTED    HCG, QUANTITATIVE, PREGNANCY   Result Value Ref Range    hCG Quant 69,458 (H) <5 IU/L   Microscopic Urinalysis   Result Value Ref Range    -          WBC, UA 5 TO 10 0 - 5 /HPF    RBC, UA None 0 - 4 /HPF    Casts UA  0 - 8 /LPF     0 TO 2 HYALINE Reference range defined for non-centrifuged specimen. Crystals, UA NOT REPORTED None /HPF    Epithelial Cells UA 0 TO 2 0 - 5 /HPF    Renal Epithelial, UA NOT REPORTED 0 /HPF    Bacteria, UA NOT REPORTED None    Mucus, UA NOT REPORTED None    Trichomonas, UA NOT REPORTED None    Amorphous, UA NOT REPORTED None    Other Observations UA NOT REPORTED NOT REQ. Yeast, UA NOT REPORTED None     DIAGNOSTICS:  Us Ob Transvaginal    Result Date: 3/13/2021  EXAMINATION: FIRST TRIMESTER OBSTETRIC ULTRASOUND 3/13/2021 TECHNIQUE: Transvaginal first trimester obstetric pelvic ultrasound was performed with color Doppler flow evaluation. COMPARISON: Ob ultrasound 03/02/2021. HISTORY: ORDERING SYSTEM PROVIDED HISTORY: concern for ectopic TECHNOLOGIST PROVIDED HISTORY: concern for ectopic FINDINGS: Uterus: 9.9 x 5.4 by 6.1 cm. Nabothian cysts. Gestational Sac(s): Intrauterine gestation. Irregularly shaped gestational sac. Questionable subchorionic hemorrhage measuring up to 1 cm. Yolk Sac: Visualized. Fetal Pole: Not clearly visualized. Right ovary: 3.2 x 2.5 by 1.8 cm. Complex cyst that could represent corpus luteal cyst noted. Gray scale appearance of the right ovary is unremarkable. Flow is identified to the region of the right ovary on color Doppler. Left ovary: 3.2 x 2.5 by 2.3 cm Gray scale appearance of the left ovary is unremarkable. Flow is identified to the region of the left ovary on color Doppler. Free fluid: None. Measurements: Estimated gestational age by current ultrasound: 6 weeks 3 days Estimated gestational by LMP/prior ultrasound: 6 weeks 3 days Estimated Due Date: 11/03/2021     Irregularly-shaped intrauterine gestational sac. Embryonic pole not clearly identified at this time. Questionable subchorionic hemorrhage measuring up to 1 cm. Follow-up ultrasound recommended.      Us Ob Transvaginal    Result Date: 3/2/2021  EXAMINATION: FIRST TRIMESTER OBSTETRIC ULTRASOUND 3/2/2021 TECHNIQUE: Transvaginal first trimester obstetric pelvic ultrasound was 1. No intrauterine gestation identified. 2. Complex cystic lesions in both ovaries without internal vascularity, indeterminate. This could represent corpus luteum or ectopic. Close clinical and sonographic follow-up is recommended. . 3. Normal Doppler flow within both ovaries. 4. No free fluid. Us Ob Transvaginal    Result Date: 2/22/2021  EXAMINATION: FIRST TRIMESTER OBSTETRIC ULTRASOUND 2/22/2021 TECHNIQUE: Transvaginal first trimester obstetric pelvic ultrasound was performed with color Doppler flow evaluation. COMPARISON: Ultrasound OB 14+ weeks December 20, 2020. HISTORY: ORDERING SYSTEM PROVIDED HISTORY: r/o ectopic TECHNOLOGIST PROVIDED HISTORY: r/o ectopic FINDINGS: Uterus: 9.6 cm in length and is anteverted in position. Gestational Sac(s):  Not visualized. Yolk Sac:  Not visualized. Fetal Pole:  Not visualized. [place] Rump Length:  Not measured Fetal Heart Rate:  Not sonographically detected. Right ovary: Not visualized secondary to obscuration by overlying bowel gas. Left ovary: 3.0 x 2.4 x 2.7 cm Free fluid: No significant free fluid is noted within the cul-de-sac. 1. No evidence of intrauterine pregnancy. 2. Unremarkable appearing endometrial stripe without sonographic suggestion of retained products of conception. 3. No evidence of adnexal mass lesion to suggest ectopic pregnancy. 4. Note: Right adnexa not visualized likely secondary to obscuration by overlying bowel gas. Us Gallbladder Ruq    Result Date: 3/2/2021  EXAMINATION: RIGHT UPPER QUADRANT ULTRASOUND 3/2/2021 8:50 am COMPARISON: CT abdomen and pelvis January 20, 2021. HISTORY: ORDERING SYSTEM PROVIDED HISTORY: Calculus of gallbladder without cholecystitis without obstruction TECHNOLOGIST PROVIDED HISTORY: This procedure can be scheduled via [employer].   Access your [employer] account by visiting [place]. abdominal pain, nausea, vomiting gallstones seen on CT abdomen/pelvis Acuity: Acute Type of Exam: Initial FINDINGS: LIVER: The liver demonstrates normal echogenicity without evidence of intrahepatic biliary ductal dilatation. Hepatopetal flow seen within the portal vein. BILIARY SYSTEM:  Wall echo shadow sign consistent with gallbladder filled with stones. No definitive gallbladder wall thickening. No pericholecystic fluid. Negative Mitchell's sign. Common bile duct is within normal limits measuring 6 mm. RIGHT KIDNEY: The right kidney is grossly unremarkable without evidence of hydronephrosis. PANCREAS:  Visualized portions of the pancreas are unremarkable. OTHER: No evidence of right upper quadrant ascites. 1. Gallbladder filled with stones. No ultrasound evidence of acute cholecystitis. Us Dup Abd Pel Retro Scrot Limited    Result Date: 2021  EXAMINATION: FIRST TRIMESTER OBSTETRIC ULTRASOUND 2021 TECHNIQUE: Transvaginal first trimester obstetric pelvic ultrasound was performed with color Doppler flow evaluation. COMPARISON: 2021 HISTORY: ORDERING SYSTEM PROVIDED HISTORY: eval for ectopic TECHNOLOGIST PROVIDED HISTORY: eval for ectopic FINDINGS: Uterus: 9.9 x 7.2 x 4.7 cm. The endometrium measures 1.7 cm Gestational Sac(s):  None visualized Yolk Sac:  Not visualized Fetal Pole:  Not visualized [place] Rump Length:  NA Fetal Heart Rate:  Not visualized Right ovary: 3.6 x 2.6 x 2.0 cm. There is a 2.2 x 1.7 x 1.4 cm complex cystic lesion in the liver right ovary. There is normal Doppler flow. Left ovary: 3.8 x 2.5 x 1.7 cm. There is a 2.3 x 2.0 x 1.2 cm complex cystic lesion in the left ovary. Normal Doppler flow. 1. No intrauterine gestation identified. 2. Complex cystic lesions in both ovaries without internal vascularity, indeterminate. This could represent corpus luteum or ectopic. Close clinical and sonographic follow-up is recommended. . 3. Normal Doppler flow within both ovaries. 4. No free fluid.        ASSESSMENT & PLAN:    Aaron Lopez is a 38 y.o. female  w/ abdominal discomfort   - Patient reports abdominal discomfort that has been diffuse over the last several weeks since she found out she was pregnant    - SSE: Performed by ED Resident. No bleeding, cervix closed. Declining additional exam.   - TVUS: Irregularly shaped IU gestational sac. Embryonic pole not clearly identified at this time. Questionable subchorionic hemorrhage measuring up to 1cm. F/U us recommended    - Discussed indeterminate nature of the US with the patient in accordance with [facility] of 69,458. Explained that because the patient is currently stable, and not having vaginal bleeding or any acutely concerning clinical signs, the best management is expectant. Patient has an appointment for a repeat [address] on 3/23   - Vaginitis: Negative    - Gonorrhea/Chlamydia: pending    - UA: indeterminate, UCx + enterococcus on 3/9/21-- Will plan to treat    - CBC stable with WBC wnl and Hgb 11.3    - Patient given strict return precautions for bleeding, worsening pain, or acute changes. She is to follow up in the office. Patient is in agreement with plan. Urine culture + Enteroccous   - Patient never picked up Rx d/t issues with prior authorization    - Patient is still symptomatic with dysuria    - Rx Keflex given today. Patient acknowledges her listed allergy to penicillin. Patient counseled on possible cross reactivity between penicillins and cephalosporins. Patient declining GetFresh because she has read of its risks in pregnancy. Patient states she has recently taken amoxicillin for a dental procedure and believes her allergy was to Penicillin specifically.  She is comfortable taking Keflex and states she has an Epi pen at home should there be an emergency    Nausea    - Patient discharged with Unisom and B6    sPTD @ 19w6d     Depression   - Patient taking Lexapro    - Patient admits to still feeling very sad about her  delivery in  but states she is coping with her medication and feels she has good support - Denies SI/HI      Patient Active Problem List    Diagnosis Date Noted    E. coli UTI during pregnancy  03/12/2021    LGSIL (POLO-1) on [place]po 2/2/21 03/01/2021     9 o'clock and 12 o'clock biopsies  Needs repeat Pap smear 2/2/22      Trichomonas vaginalis infection 2/21/21 03/01/2021    ASCUS with positive high risk HPV cervical 01/11/2021    sPTD @19w6d after PPROM     Hx Previable PPROM @19wk6d on 12/19/20 12/20/2020     The patient did not know she was pregnant prior to rupture.  Anxiety 12/20/2020    Adrenal insufficiency ([facility] [address] 12/20/2020    Tobacco use 12/20/2020    Obesity 09/15/2017    Strabismus 09/15/2017    Vitamin D deficiency 07/31/2017     8.7 on 7/27/17      Major depression 04/30/2013    Tension type headache 04/30/2013    Thyroid nodule 03/29/2012     CT neck : 5/2/2017  Impression: Large mass in the lower pole of the left lobe of the thyroid gland plunging into the superior mediastinum and displacing the trachea somewhat toward the right.  This has progressed dramatically since 2007.    FNA: Benign. Nodular goiter. 12/20/2016    T4 and reverse T3 wnl 8/24/17             Plan discussed with Dr. Diana Lujan, who is agreeable. Attending's Name: Dr. Bigg Griffin DO  Ob/Gyn Resident  Pager: [phone]  3/13/2021, 11:52 PM    Attending Physician Statement  I have discussed the care of Kristel Pedersen, including pertinent history and exam findings with the resident. I have reviewed and edited their note in the electronic medical record. The key elements of all parts of the encounter have been performed/reviewed by me . I agree with the assessment, plan and orders as documented by the resident. The level of care submitted represents to the best of my ability the care documented in the medical record today. GC Modifier.   This service has been performed in part by a resident under the direction of a teaching physician. Patient presented with vaginal spotting/UTI symptoms. Hx Enterobacter UTI not treated. Keflex on D/C. US showed \" Irregularly shaped IU gestational sac. Embryonic pole not clearly identified at this time. Questionable subchorionic hemorrhage measuring up to 1cm. F/U us recommended. \" Recommend F/U US in 1-2 weeks to assess viability. Patient stable for discharge.       Attending's Name:  Bertin Ontiveros, DO

## 2021-03-16 ENCOUNTER — TELEPHONE (OUTPATIENT)
Dept: OBGYN | Age: 36
End: 2021-03-16

## 2021-03-16 DIAGNOSIS — Z34.91 CURRENTLY PREGNANT IN FIRST TRIMESTER WITH UNKNOWN GESTATIONAL AGE: Primary | ICD-10-CM

## 2021-03-16 NOTE — TELEPHONE ENCOUNTER
Recommend follow up [facility] tomorrow or Thursday. Order entered. Add pt to ultrasound schedule for Friday 3/19/201 anytime before 11:00 AM. Visit with  resident after ultrasound. Add pt to the schedule of any resident 3/19/21.

## 2021-03-19 ENCOUNTER — ANCILLARY PROCEDURE (OUTPATIENT)
Dept: OBGYN | Age: 36
End: 2021-03-19
Payer: MEDICARE

## 2021-03-19 ENCOUNTER — ROUTINE PRENATAL (OUTPATIENT)
Dept: OBGYN | Age: 36
End: 2021-03-19
Payer: MEDICARE

## 2021-03-19 VITALS
DIASTOLIC BLOOD PRESSURE: 71 MMHG | HEART RATE: 100 BPM | BODY MASS INDEX: 29.62 KG/M2 | SYSTOLIC BLOOD PRESSURE: 101 MMHG | WEIGHT: 178 LBS

## 2021-03-19 DIAGNOSIS — Z36.89 ESTABLISH GESTATIONAL AGE, ULTRASOUND: ICD-10-CM

## 2021-03-19 DIAGNOSIS — K80.20 GALLSTONES: ICD-10-CM

## 2021-03-19 DIAGNOSIS — O09.91 HIGH-RISK PREGNANCY IN FIRST TRIMESTER: Primary | ICD-10-CM

## 2021-03-19 PROCEDURE — G8484 FLU IMMUNIZE NO ADMIN: HCPCS | Performed by: STUDENT IN AN ORGANIZED HEALTH CARE EDUCATION/TRAINING PROGRAM

## 2021-03-19 PROCEDURE — 1036F TOBACCO NON-USER: CPT | Performed by: STUDENT IN AN ORGANIZED HEALTH CARE EDUCATION/TRAINING PROGRAM

## 2021-03-19 PROCEDURE — 99212 OFFICE O/P EST SF 10 MIN: CPT | Performed by: STUDENT IN AN ORGANIZED HEALTH CARE EDUCATION/TRAINING PROGRAM

## 2021-03-19 PROCEDURE — G8419 CALC BMI OUT NRM PARAM NOF/U: HCPCS | Performed by: STUDENT IN AN ORGANIZED HEALTH CARE EDUCATION/TRAINING PROGRAM

## 2021-03-19 PROCEDURE — G8427 DOCREV CUR MEDS BY ELIG CLIN: HCPCS | Performed by: STUDENT IN AN ORGANIZED HEALTH CARE EDUCATION/TRAINING PROGRAM

## 2021-03-19 PROCEDURE — 76817 TRANSVAGINAL US OBSTETRIC: CPT | Performed by: RADIOLOGY

## 2021-03-19 NOTE — PROGRESS NOTES
OB/GYN Problem Visit    Quin Hassan  3/19/2021                       Primary Care Physician: Mitchell Julien MD    CC:   Chief Complaint   Patient presents with    Results     patient is here today to review ultrasound results         HPI: Quin Hassan is a 38 y.o. female     The patient was seen and examined. She is here for repeat ultrasound for viability. TVUS 3/13/21 showing no embryonic pole clearly identifed. bHCG trending upwards, 1,664>3,495>69,458. Ultrasound today showing IUP at 7w3d, . She is complaining of continued abdominal pain and back pain associated with meals. She reports the abdominal pain and back pain has been present for several weeks now. She states it was located in the RUQ but has now referred to her midthoracic region and is a generalized abdominal pain. Patient was noted to have gallstones on RUQ US 3/2/21. CMP wnl 21. Referral to general surgery placed today. Patient denies any fever, chills, N/V, headaches, vision changes, chest pain, shortness of breath, and increased swelling/tenderness in bilateral lower extremities. Patient denies any vaginal discharge and any urinary complaints. Patient reports compliance with her Keflex for UTI treatment. The patient denies loss of fluid, denies vaginal bleeding.     REVIEW OF SYSTEMS:   Constitutional: negative fever, negative chills  HEENT: negative visual disturbances, negative headaches  Respiratory: negative dyspnea, negative cough  Cardiovascular: negative chest pain,  negative palpitations  Gastrointestinal: positive abdominal pain, negative RUQ pain, negative N/V, negative diarrhea, negative constipation  Genitourinary: negative dysuria, negative vaginal discharge, negative vaginal bleeding  Dermatological: negative rash, negative wounds  Hematologic: negative bleeding/clotting disorder  Immunologic: negative recent illness, negative recent sick contact, negative allergic reactions  Lymphatic: negative Take 1 tablet by mouth daily (Patient not taking: Reported on 3/19/2021) 30 tablet 1    acetaminophen (TYLENOL) 500 MG tablet Take 1 tablet by mouth every 6 hours as needed for Pain (Patient not taking: Reported on 3/19/2021) 40 tablet 0    flintstones complete (FLINTSTONES) 60 MG chewable tablet chew and swallow 1 tablet by mouth once daily (Patient not taking: Reported on 2/2/2021) 30 tablet 5    ferrous sulfate (FE TABS) 325 (65 Fe) MG EC tablet Take 1 tablet by mouth 2 times daily (Patient not taking: Reported on 3/1/2021) 90 tablet 3    Cholecalciferol (VITAMIN D3) 1000 units TABS Take 1 tablet by mouth daily (Patient not taking: Reported on 3/19/2021) 90 tablet 1     No current facility-administered medications for this visit. ALLERGIES:  Allergies as of 03/19/2021 - Review Complete 03/19/2021   Allergen Reaction Noted    Iodine Shortness Of Breath 03/16/2017    Penicillin g Hives and Shortness Of Breath 03/16/2017    Prednisolone Hives and Shortness Of Breath 03/16/2017    Dye  [iodides]  01/21/2017                                   VITALS:  Vitals:    03/19/21 1103   BP: 101/71   Site: Right Upper Arm   Position: Sitting   Cuff Size: Medium Adult   Pulse: 100   Weight: 178 lb (80.7 kg)                                                                                                                                                                         PHYSICAL EXAM:     General Appearance: Appears healthy. Alert; in no acute distress. Pleasant. Skin: Normal  Lymphatic: No cervical, superclavicular, axillary, or inguinal adenopathy.   HEENT: normocephalic and atraumatic, Thyroid normal to inspection and palpation  Respiratory: clear to auscultation, no wheezes, rales or rhonchi, symmetric air entry  Cardiovascular: regular rate and rhythm, no murmurs rubs or gallops  Breast:  (Chest): normal appearance, no masses or tenderness  Abdomen: soft, non-tender, non-distended, no right upper quadrant tenderness and no CVA tenderness, no RUQ tenderness  Pelvic Exam: not indicated  Rectal Exam: deferred  Extremities: non-tender BLE and non-edematous  Musculoskeletal: no gross abnormalities, mild paraspinal muscle tenderness palpated in thoracic region  Psych: Alert and oriented, appropriate affect. OMM EXAM:  The patient did not complain of a Chief complaint requiring OMM. Chief Complaint: viability US    Structural Exam: No Interest    DATA:  No results found for this visit on 21. ASSESSMENT & PLAN:    Floridalma Murdock is a 38 y.o. female     Follow up for viability ultrasound   - TVUS today showing viable IUP at 7w3d, [address]   - Prior to today, TVUS 3/13/21 showing no embryonic pole clearly identifed. bHCG appropriately trending upwards, 1,664>3,495>69,458. - Patient will be scheduled for OB intake and initial prenatal visit. - Compliant with prenatal vitamins. Abdominal/back pain   - Patient reports generalized abdominal pain with referral to back, associated with meals.   - RUQ US 3/2/21 showing gallbladder filled with stones. Suspect pain 2/2 gallstones. - CMP wnl 21   - General surgery referral placed. Patient Active Problem List    Diagnosis Date Noted    Urinary tract infection in mother during first trimester of pregnancy     E. coli UTI during pregnancy  2021    LGSIL (POLO-1) on Colpo 2021     9 o'clock and 12 o'clock biopsies  Needs repeat Pap smear 22      Trichomonas vaginalis infection 2021    ASCUS with positive high risk HPV cervical 2021    sPTD @19w6d after PPROM     Hx Previable PPROM @19wk6d on 20     The patient did not know she was pregnant prior to rupture.        Anxiety 2020    Adrenal insufficiency ([address] 2020    Tobacco use 2020    Obesity 09/15/2017    Strabismus 09/15/2017    Vitamin D deficiency 2017     8.7 on 17      Major depression 04/30/2013    Tension type headache 04/30/2013    Thyroid nodule 03/29/2012     CT neck : 5/2/2017  Impression: Large mass in the lower pole of the left lobe of the thyroid gland plunging into the superior mediastinum and displacing the trachea somewhat toward the right.  This has progressed dramatically since 2007.    FNA: Benign. Nodular goiter. 12/20/2016    T4 and reverse T3 wnl 8/24/17             Return in about 1 week (around 3/26/2021) for OB intake and initial prenatal.      Patient was seen with total face to face time of 15 minutes. More than 50% of this visit was on counseling and education regarding her diagnose(s) as listed below and options. She was also counseled on her preventative health maintenance recommendations and follow-up. Diagnosis Orders   1. High-risk pregnancy in first trimester     2.  [address] [facility] [address] Fiona Horton DO, General Surgery, [address] [place] [address] He,   Ob/Gyn Resident  [facility] [place] [facility], [facility]  3/19/2021, 12:05 PM

## 2021-03-24 ENCOUNTER — TELEPHONE (OUTPATIENT)
Dept: OBGYN | Age: 36
End: 2021-03-24

## 2021-03-24 RX ORDER — FLUCONAZOLE 150 MG/1
150 TABLET ORAL ONCE
Qty: 1 TABLET | Refills: 2 | Status: SHIPPED | OUTPATIENT
Start: 2021-03-24 | End: 2021-03-24

## 2021-03-24 RX ORDER — METRONIDAZOLE 500 MG/1
500 TABLET ORAL 2 TIMES DAILY
Qty: 14 TABLET | Refills: 0 | Status: SHIPPED | OUTPATIENT
Start: 2021-03-24 | End: 2021-03-31

## 2021-04-05 ENCOUNTER — INITIAL PRENATAL (OUTPATIENT)
Dept: OBGYN | Age: 36
End: 2021-04-05
Payer: MEDICARE

## 2021-04-05 ENCOUNTER — HOSPITAL ENCOUNTER (OUTPATIENT)
Age: 36
Setting detail: SPECIMEN
Discharge: HOME | End: 2021-04-05
Payer: MEDICARE

## 2021-04-05 VITALS
WEIGHT: 186 LBS | SYSTOLIC BLOOD PRESSURE: 130 MMHG | DIASTOLIC BLOOD PRESSURE: 83 MMHG | BODY MASS INDEX: 30.95 KG/M2 | HEART RATE: 87 BPM

## 2021-04-05 DIAGNOSIS — O09.91 HIGH-RISK PREGNANCY IN FIRST TRIMESTER: ICD-10-CM

## 2021-04-05 DIAGNOSIS — O09.521 MULTIGRAVIDA OF ADVANCED MATERNAL AGE IN FIRST TRIMESTER: ICD-10-CM

## 2021-04-05 DIAGNOSIS — Z34.91 FIRST TRIMESTER PREGNANCY: Primary | ICD-10-CM

## 2021-04-05 DIAGNOSIS — Z34.91 FIRST TRIMESTER PREGNANCY: ICD-10-CM

## 2021-04-05 LAB
DIRECT EXAM: NORMAL
Lab: NORMAL
SPECIMEN DESCRIPTION: NORMAL

## 2021-04-05 PROCEDURE — 99211 OFF/OP EST MAY X REQ PHY/QHP: CPT | Performed by: STUDENT IN AN ORGANIZED HEALTH CARE EDUCATION/TRAINING PROGRAM

## 2021-04-05 PROCEDURE — G8427 DOCREV CUR MEDS BY ELIG CLIN: HCPCS | Performed by: STUDENT IN AN ORGANIZED HEALTH CARE EDUCATION/TRAINING PROGRAM

## 2021-04-05 PROCEDURE — 99213 OFFICE O/P EST LOW 20 MIN: CPT | Performed by: STUDENT IN AN ORGANIZED HEALTH CARE EDUCATION/TRAINING PROGRAM

## 2021-04-05 PROCEDURE — G8419 CALC BMI OUT NRM PARAM NOF/U: HCPCS | Performed by: STUDENT IN AN ORGANIZED HEALTH CARE EDUCATION/TRAINING PROGRAM

## 2021-04-05 PROCEDURE — 1036F TOBACCO NON-USER: CPT | Performed by: STUDENT IN AN ORGANIZED HEALTH CARE EDUCATION/TRAINING PROGRAM

## 2021-04-05 NOTE — Clinical Note
Initial prenatal exam completed 4/5/21. Pt needs referral to [facility] for first trimester screen, she will be seeing Dr. Nayana Augustin, for medical endocrine follow up this week due to hx of hypothyroid and adrenal insufficiency. We will need notes from Dr. Nayana Augustin.

## 2021-04-05 NOTE — PROGRESS NOTES
[address] [address] OB/GYN  Initial Prenatal Visit    CC: Initial Prenatal Visit    HPI:   Alexi Dooley is a 38 y.o. female  at 11w9d  She is being seen today for her first obstetrical visit. Pregnancy history fully reviewed. This is not a planned pregnancy. Her LMP is Patient's last menstrual period was 2021 (approximate). Her obstetrical history is significant for advanced maternal age, Hx Previable PPROM, smoker hypothyroidism (currently on no medication), adrenal insufficiency, asthma, MDD. The patient was seen and evaluated. The patient complains of continued cramping that she thinks might just be normal since it has been happening for so long. There was negative fetal movements. She denies contractions, vaginal bleeding and leakage of fluid. She currently denies any signs or symptoms of pre-eclampsia which include headache, vision changes, RUQ pain. The patient is Rh positive and Rhogam is not indicated in this pregnancy, patient informed. The patient declined the influenza vaccine this year.      Relationship with FOB: Not together but supportive  Mother's ethnicity:   Family History:    - Neural tube defects: No   - Congenital birth defects (congenital heart defects, polydactyly, cleft lip/palate): Yes: FOB sister w/ polydactyly    - Intellectual disability: No   - Genetic disorders/chromosomal abnormalities: No   - Diabetes mellitus in first degree relatives: No    OB History:  OB History    Para Term  AB Living   2 0 0 0 1 0   SAB TAB Ectopic Molar Multiple Live Births   1 0 0 0 0 0      # Outcome Date GA Lbr Adi/2nd Weight Sex Delivery Anes PTL Lv   2 Current            1 SAB /:47 9 oz (0.255 kg) M Vag-Spont None Y ND      Complications: Chorioamnionitis      Name: Latrice Torre: 5  Apgar5: 1       Past Medical History:  Past Medical History:   Diagnosis Date    Anxiety     Asthma     GERD (gastroesophageal reflux disease)  Major depression 4/30/2013    Morbid obesity 9/15/2017    Tension type headache 4/30/2013       Past Surgical History:  No past surgical history on file. Medications:  Current Outpatient Medications on File Prior to Visit   Medication Sig Dispense Refill    Prenatal Vit-Fe Fumarate-FA (PRENATAL VITAMIN) 27-1 MG TABS tablet       Prenatal Vit-Min-FA-Fish Oil ([facility] PRENATAL GUMMY) 0.4-113.5 MG CHEW Take 1 tablet by mouth daily 30 tablet 1    acetaminophen (TYLENOL) 500 MG tablet Take 1 tablet by mouth every 6 hours as needed for Pain 40 tablet 0    doxylamine-pyridoxine 10-10 MG TBEC Take 10 mg by mouth 2 times daily (Patient not taking: Reported on 3/19/2021) 60 tablet 0    cephALEXin (KEFLEX) 250 MG capsule Take 1 capsule by mouth 2 times daily (Patient not taking: Reported on 3/19/2021) 7 capsule 0    Multiple Vitamin (MVI, CELEBRATE, CHEWABLE TABLET) Take 1 tablet by mouth daily (Patient not taking: Reported on 3/1/2021) 30 tablet 5    escitalopram (LEXAPRO) 10 MG tablet 10 mg      NP THYROID 30 MG tablet 30 mg      potassium chloride (MICRO-K) 10 MEQ extended release capsule 10 mEq      pantoprazole (PROTONIX) 40 MG tablet 40 mg      flintstones complete (FLINTSTONES) 60 MG chewable tablet chew and swallow 1 tablet by mouth once daily (Patient not taking: Reported on 2/2/2021) 30 tablet 5    ferrous sulfate (FE TABS) 325 (65 Fe) MG EC tablet Take 1 tablet by mouth 2 times daily (Patient not taking: Reported on 3/1/2021) 90 tablet 3    Cholecalciferol (VITAMIN D3) 1000 units TABS Take 1 tablet by mouth daily (Patient not taking: Reported on 3/19/2021) 90 tablet 1     No current facility-administered medications on file prior to visit. Allergies:   Allergies as of 04/05/2021 - Review Complete 04/05/2021   Allergen Reaction Noted    Iodine Shortness Of Breath 03/16/2017    Penicillin g Hives and Shortness Of Breath 03/16/2017    Prednisolone Hives and Shortness Of Breath 03/16/2017    Joselito  [iodides]  01/21/2017       Social History:  Social History     Socioeconomic History    Marital status: Single     Spouse name: Not on file    Number of children: Not on file    Years of education: Not on file    Highest education level: Not on file   Occupational History    Not on file   Social Needs    Financial resource strain: Not on file    Food insecurity     Worry: Not on file     Inability: Not on file   [employer] needs     Medical: Not on file     Non-medical: Not on file   Tobacco Use    Smoking status: Former Smoker     Packs/day: 1.00     Types: Cigars    Smokeless tobacco: Never Used    Tobacco comment: Black and Mild   Substance and Sexual Activity    Alcohol use: No    Drug use: No    Sexual activity: Yes     Partners: Male   Lifestyle    Physical activity     Days per week: Not on file     Minutes per session: Not on file    Stress: Not on file   Relationships    Social connections     Talks on phone: Not on file     Gets together: Not on file     Attends [employer] service: Not on file     Active member of club or organization: Not on file     Attends meetings of clubs or organizations: Not on file     Relationship status: Not on file    Intimate partner violence     Fear of current or ex partner: Not on file     Emotionally abused: Not on file     Physically abused: Not on file     Forced sexual activity: Not on file   Other Topics Concern    Not on file   Social History Narrative    Not on file       Family History:  Family History   Problem Relation Age of Onset    Depression Father     Thyroid Disease Father     Diabetes Sister        Vitals:  BP: 130/83  Weight: 186 lb (84.4 kg)  Pulse: 87  Patient Position: Sitting  Albumin: Trace  Glucose: Negative  Fetal Heart Rate: 157     Physical Exam: Completed, See Epic Navigator   Chaperone for Intimate Exam: Chaperone was present for entire exam, Chaperone Name: Reagan Shaver     [facility] Exam:  The patient did not complain of a chief complaint requiring OMM. Chief Complaint:none    Structural Exam: No Interest    Assessment & Plan:  Edgardo Vera is a 38 y.o. female  at 11w9d Initial Obstetrical Visit   - The patient was seen full history and physical was completed/reviewed. - Prenatal labs ordered   - Prenatal vitamins prescription Given   - Aspirin indication: indicated due to High risk factors: none, Moderate risk factors: nulliparity, BMI >30, sociodemographic factors ( race, low socioeconomic status) and age 26years or order- Rx given at next appointment d/t early gestational age today   - Problem list reviewed and updated   - Role of ultrasound in pregnancy discussed; requests fetal survey, message sent to Marlo Dowling for MFM referral    - Gc/Chlam Cultures & Vaginitis:collected today   - Last pap smear LSIL on colpo 21, repeat pap    - Influenza vaccination: declined   - Rhogam: not indicated   -  on ultrasound today     AMA   - 35 at time of delivery   - MFM consult     LGSIL   - repeat pap 2022    Hx Trich   - collected again today     Hypothyroidism and Adrenal Insufficiency    - Follow w/ endocrinology   - Patient reports she has a follow up appointment this week   - Not currently on medication     sPTD   - 21 Previable PPROM, delivery of 19 week  w/ subsequent demise     Upon completion of the visit all questions were answered and the patients follow-up and testing schedule were reviewed.      Patient Active Problem List    Diagnosis Date Noted    Fam Hx Polydactyly 2021     FOB sister w/ polydactyly      Multigravida of advanced maternal age in first trimester 2021    Urinary tract infection in mother during first trimester of pregnancy     E. coli UTI during pregnancy  2021    LGSIL (POLO-1) on Colpo 2021     9 o'clock and 12 o'clock biopsies  Needs repeat Pap smear 22      Trichomonas vaginalis infection 2021  ASCUS with positive high risk HPV cervical 01/11/2021    sPTD @19w6d after PPROM     Hx Previable PPROM @19wk6d on 12/19/20 12/20/2020     The patient did not know she was pregnant prior to rupture.  Anxiety 12/20/2020    Adrenal insufficiency ([facility] [address] 12/20/2020    Tobacco use 12/20/2020    Obesity 09/15/2017    Strabismus 09/15/2017    Vitamin D deficiency 07/31/2017     8.7 on 7/27/17      Major depression 04/30/2013    Tension type headache 04/30/2013    Thyroid nodule 03/29/2012     CT neck : 5/2/2017  Impression: Large mass in the lower pole of the left lobe of the thyroid gland plunging into the superior mediastinum and displacing the trachea somewhat toward the right.  This has progressed dramatically since 2007.    FNA: Benign. Nodular goiter. 12/20/2016    T4 and reverse T3 wnl 8/24/17           No follow-ups on file.     Jasper Santos DO  Ob/Gyn Resident  [facility] [place] [facility], [address]  4/5/2021, 5:08 PM

## 2021-04-08 ENCOUNTER — TELEPHONE (OUTPATIENT)
Dept: OBGYN | Age: 36
End: 2021-04-08

## 2021-04-08 ENCOUNTER — HOSPITAL ENCOUNTER (OUTPATIENT)
Age: 36
Setting detail: SPECIMEN
Discharge: HOME | End: 2021-04-08
Payer: MEDICARE

## 2021-04-08 DIAGNOSIS — Z34.91 FIRST TRIMESTER PREGNANCY: ICD-10-CM

## 2021-04-08 LAB
-: ABNORMAL
ABO/RH: NORMAL
ABSOLUTE EOS #: 0.09 K/UL (ref 0–0.44)
ABSOLUTE IMMATURE GRANULOCYTE: <0.03 K/UL (ref 0–0.3)
ABSOLUTE LYMPH #: 2.41 K/UL (ref 1.1–3.7)
ABSOLUTE MONO #: 0.55 K/UL (ref 0.1–1.2)
AMORPHOUS: ABNORMAL
AMPHETAMINE SCREEN URINE: NEGATIVE
ANTIBODY SCREEN: NEGATIVE
BACTERIA: ABNORMAL
BARBITURATE SCREEN URINE: NEGATIVE
BASOPHILS # BLD: 0 % (ref 0–2)
BASOPHILS ABSOLUTE: 0.03 K/UL (ref 0–0.2)
BENZODIAZEPINE SCREEN, URINE: NEGATIVE
BILIRUBIN URINE: NEGATIVE
BUPRENORPHINE URINE: NORMAL
CANNABINOID SCREEN URINE: NEGATIVE
CASTS UA: ABNORMAL /LPF (ref 0–2)
COCAINE METABOLITE, URINE: NEGATIVE
COLOR: YELLOW
COMMENT UA: ABNORMAL
CRYSTALS, UA: ABNORMAL /HPF
CRYSTALS, UA: ABNORMAL /HPF
DIFFERENTIAL TYPE: ABNORMAL
EOSINOPHILS RELATIVE PERCENT: 1 % (ref 1–4)
EPITHELIAL CELLS UA: ABNORMAL /HPF (ref 0–5)
GLUCOSE URINE: NEGATIVE
HCT VFR BLD CALC: 37.3 % (ref 36.3–47.1)
HEMOGLOBIN: 11.7 G/DL (ref 11.9–15.1)
HEPATITIS B SURFACE ANTIGEN: NONREACTIVE
HEPATITIS C ANTIBODY: NONREACTIVE
HIV AG/AB: NONREACTIVE
IMMATURE GRANULOCYTES: 0 %
KETONES, URINE: ABNORMAL
LEUKOCYTE ESTERASE, URINE: ABNORMAL
LYMPHOCYTES # BLD: 36 % (ref 24–43)
MCH RBC QN AUTO: 26.4 PG (ref 25.2–33.5)
MCHC RBC AUTO-ENTMCNC: 31.4 G/DL (ref 28.4–34.8)
MCV RBC AUTO: 84 FL (ref 82.6–102.9)
MDMA URINE: NORMAL
METHADONE SCREEN, URINE: NEGATIVE
METHAMPHETAMINE, URINE: NORMAL
MONOCYTES # BLD: 8 % (ref 3–12)
MUCUS: ABNORMAL
NITRITE, URINE: NEGATIVE
NRBC AUTOMATED: 0 PER 100 WBC
OPIATES, URINE: NEGATIVE
OTHER OBSERVATIONS UA: ABNORMAL
OXYCODONE SCREEN URINE: NEGATIVE
PDW BLD-RTO: 15.5 % (ref 11.8–14.4)
PH UA: 6 (ref 5–8)
PHENCYCLIDINE, URINE: NEGATIVE
PLATELET # BLD: 256 K/UL (ref 138–453)
PLATELET ESTIMATE: ABNORMAL
PMV BLD AUTO: 9.9 FL (ref 8.1–13.5)
PROPOXYPHENE, URINE: NORMAL
PROTEIN UA: ABNORMAL
RBC # BLD: 4.44 M/UL (ref 3.95–5.11)
RBC # BLD: ABNORMAL 10*6/UL
RBC UA: ABNORMAL /HPF (ref 0–2)
RENAL EPITHELIAL, UA: ABNORMAL /HPF
RUBV IGG SER QL: 316.3 IU/ML
SEG NEUTROPHILS: 55 % (ref 36–65)
SEGMENTED NEUTROPHILS ABSOLUTE COUNT: 3.6 K/UL (ref 1.5–8.1)
SPECIFIC GRAVITY UA: 1.03 (ref 1–1.03)
T. PALLIDUM, IGG: NONREACTIVE
TEST INFORMATION: NORMAL
TRICHOMONAS: ABNORMAL
TRICYCLIC ANTIDEPRESSANTS, UR: NORMAL
TURBIDITY: ABNORMAL
URINE HGB: NEGATIVE
UROBILINOGEN, URINE: NORMAL
WBC # BLD: 6.7 K/UL (ref 3.5–11.3)
WBC # BLD: ABNORMAL 10*3/UL
WBC UA: ABNORMAL /HPF (ref 0–5)
YEAST: ABNORMAL

## 2021-04-09 DIAGNOSIS — O09.91 HIGH-RISK PREGNANCY, FIRST TRIMESTER: Primary | ICD-10-CM

## 2021-04-09 PROBLEM — E55.9 VITAMIN D DEFICIENCY: Status: RESOLVED | Noted: 2017-07-31 | Resolved: 2021-04-09

## 2021-04-09 LAB
CULTURE: NORMAL
Lab: NORMAL
SPECIMEN DESCRIPTION: NORMAL

## 2021-04-10 LAB
HGB ELECTROPHORESIS INTERP: NORMAL
PATHOLOGIST: NORMAL

## 2021-04-14 ENCOUNTER — HOSPITAL ENCOUNTER (OUTPATIENT)
Age: 36
Setting detail: SPECIMEN
Discharge: HOME | End: 2021-04-14
Payer: MEDICARE

## 2021-04-14 LAB
-: ABNORMAL
-: NORMAL
ABSOLUTE EOS #: 0.09 K/UL (ref 0–0.44)
ABSOLUTE IMMATURE GRANULOCYTE: <0.03 K/UL (ref 0–0.3)
ABSOLUTE LYMPH #: 2.42 K/UL (ref 1.1–3.7)
ABSOLUTE MONO #: 0.54 K/UL (ref 0.1–1.2)
ALBUMIN SERPL-MCNC: 3.3 G/DL (ref 3.5–5.2)
ALBUMIN/GLOBULIN RATIO: 1.1 (ref 1–2.5)
ALP BLD-CCNC: 47 U/L (ref 35–104)
ALT SERPL-CCNC: 13 U/L (ref 5–33)
AMORPHOUS: ABNORMAL
ANION GAP SERPL CALCULATED.3IONS-SCNC: 11 MMOL/L (ref 9–17)
AST SERPL-CCNC: 14 U/L
BACTERIA: ABNORMAL
BASOPHILS # BLD: 0 % (ref 0–2)
BASOPHILS ABSOLUTE: <0.03 K/UL (ref 0–0.2)
BILIRUB SERPL-MCNC: 0.18 MG/DL (ref 0.3–1.2)
BILIRUBIN URINE: NEGATIVE
BUN BLDV-MCNC: 4 MG/DL (ref 6–20)
BUN/CREAT BLD: ABNORMAL (ref 9–20)
CALCIUM SERPL-MCNC: 9 MG/DL (ref 8.6–10.4)
CASTS UA: ABNORMAL /LPF (ref 0–8)
CHLORIDE BLD-SCNC: 105 MMOL/L (ref 98–107)
CO2: 20 MMOL/L (ref 20–31)
COLOR: ABNORMAL
COMMENT UA: ABNORMAL
CREAT SERPL-MCNC: 0.39 MG/DL (ref 0.5–0.9)
CRYSTALS, UA: ABNORMAL /HPF
DIFFERENTIAL TYPE: ABNORMAL
EOSINOPHILS RELATIVE PERCENT: 1 % (ref 1–4)
EPITHELIAL CELLS UA: ABNORMAL /HPF (ref 0–5)
ESTIMATED AVERAGE GLUCOSE: 97 MG/DL
GFR AFRICAN AMERICAN: >60 ML/MIN
GFR NON-AFRICAN AMERICAN: >60 ML/MIN
GFR SERPL CREATININE-BSD FRML MDRD: ABNORMAL ML/MIN/{1.73_M2}
GFR SERPL CREATININE-BSD FRML MDRD: ABNORMAL ML/MIN/{1.73_M2}
GLUCOSE BLD-MCNC: 114 MG/DL (ref 70–99)
GLUCOSE URINE: NEGATIVE
HBA1C MFR BLD: 5 % (ref 4–6)
HCT VFR BLD CALC: 35.3 % (ref 36.3–47.1)
HEMOGLOBIN: 11.2 G/DL (ref 11.9–15.1)
IMMATURE GRANULOCYTES: 0 %
INSULIN COMMENT: NORMAL
INSULIN REFERENCE RANGE:: NORMAL
INSULIN: 89 MU/L
IRON SATURATION: 27 % (ref 20–55)
IRON: 83 UG/DL (ref 37–145)
KETONES, URINE: ABNORMAL
LEUKOCYTE ESTERASE, URINE: ABNORMAL
LYMPHOCYTES # BLD: 36 % (ref 24–43)
MCH RBC QN AUTO: 26.7 PG (ref 25.2–33.5)
MCHC RBC AUTO-ENTMCNC: 31.7 G/DL (ref 28.4–34.8)
MCV RBC AUTO: 84 FL (ref 82.6–102.9)
MONOCYTES # BLD: 8 % (ref 3–12)
MUCUS: ABNORMAL
NITRITE, URINE: NEGATIVE
NRBC AUTOMATED: 0 PER 100 WBC
OTHER OBSERVATIONS UA: ABNORMAL
PDW BLD-RTO: 15 % (ref 11.8–14.4)
PH UA: 6 (ref 5–8)
PLATELET # BLD: 270 K/UL (ref 138–453)
PLATELET ESTIMATE: ABNORMAL
PMV BLD AUTO: 9.5 FL (ref 8.1–13.5)
POTASSIUM SERPL-SCNC: 3.1 MMOL/L (ref 3.7–5.3)
PROTEIN UA: ABNORMAL
RBC # BLD: 4.2 M/UL (ref 3.95–5.11)
RBC # BLD: ABNORMAL 10*6/UL
RBC UA: ABNORMAL /HPF (ref 0–4)
REASON FOR REJECTION: NORMAL
RENAL EPITHELIAL, UA: ABNORMAL /HPF
SEG NEUTROPHILS: 55 % (ref 36–65)
SEGMENTED NEUTROPHILS ABSOLUTE COUNT: 3.55 K/UL (ref 1.5–8.1)
SODIUM BLD-SCNC: 136 MMOL/L (ref 135–144)
SPECIFIC GRAVITY UA: 1.02 (ref 1–1.03)
T3 TOTAL: 164 NG/DL (ref 60–181)
THYROXINE, FREE: 1.42 NG/DL (ref 0.93–1.7)
TOTAL IRON BINDING CAPACITY: 312 UG/DL (ref 250–450)
TOTAL PROTEIN: 6.4 G/DL (ref 6.4–8.3)
TRICHOMONAS: ABNORMAL
TSH SERPL DL<=0.05 MIU/L-ACNC: 0.02 MIU/L (ref 0.3–5)
TURBIDITY: ABNORMAL
UNSATURATED IRON BINDING CAPACITY: 229 UG/DL (ref 112–347)
URINE HGB: NEGATIVE
UROBILINOGEN, URINE: NORMAL
VITAMIN B-12: 358 PG/ML (ref 232–1245)
VITAMIN D 25-HYDROXY: 21.2 NG/ML (ref 30–100)
WBC # BLD: 6.6 K/UL (ref 3.5–11.3)
WBC # BLD: ABNORMAL 10*3/UL
WBC UA: ABNORMAL /HPF (ref 0–5)
YEAST: ABNORMAL
ZZ NTE CLEAN UP: ORDERED TEST: NORMAL
ZZ NTE WITH NAME CLEAN UP: SPECIMEN SOURCE: NORMAL

## 2021-04-15 LAB
THYROGLOBULIN AB: <12 IU/ML (ref 0–40)
THYROID PEROXIDASE (TPO) AB: <4 IU/ML (ref 0–25)

## 2021-04-16 ENCOUNTER — TELEPHONE (OUTPATIENT)
Dept: OBGYN | Age: 36
End: 2021-04-16

## 2021-04-16 ENCOUNTER — TELEPHONE (OUTPATIENT)
Dept: FAMILY MEDICINE CLINIC | Age: 36
End: 2021-04-16

## 2021-04-16 DIAGNOSIS — R82.90 ABNORMAL FINDING ON URINALYSIS: Primary | ICD-10-CM

## 2021-04-16 NOTE — TELEPHONE ENCOUNTER
Pt called wants results from urine done on 4/14/2021,pt said she is still having symptoms they are cramping/BL side low back pain,pt would like something sent to the [facility] on [phone] [phone] call pt with results

## 2021-04-17 ENCOUNTER — HOSPITAL ENCOUNTER (OUTPATIENT)
Age: 36
Discharge: HOME | End: 2021-04-17
Payer: MEDICARE

## 2021-04-17 DIAGNOSIS — R82.90 ABNORMAL FINDING ON URINALYSIS: ICD-10-CM

## 2021-04-17 LAB
-: NORMAL
AMORPHOUS: NORMAL
BACTERIA: NORMAL
BILIRUBIN URINE: NEGATIVE
CASTS UA: NORMAL /LPF (ref 0–8)
COLOR: YELLOW
COMMENT UA: ABNORMAL
CRYSTALS, UA: NORMAL /HPF
EPITHELIAL CELLS UA: NORMAL /HPF (ref 0–5)
GLUCOSE URINE: NEGATIVE
KETONES, URINE: NEGATIVE
LEUKOCYTE ESTERASE, URINE: ABNORMAL
MUCUS: NORMAL
NITRITE, URINE: NEGATIVE
OTHER OBSERVATIONS UA: NORMAL
PH UA: 6.5 (ref 5–8)
PROTEIN UA: NEGATIVE
RBC UA: NORMAL /HPF (ref 0–4)
RENAL EPITHELIAL, UA: NORMAL /HPF
SPECIFIC GRAVITY UA: 1.01 (ref 1–1.03)
TRICHOMONAS: NORMAL
TURBIDITY: CLEAR
URINE HGB: NEGATIVE
UROBILINOGEN, URINE: NORMAL
WBC UA: NORMAL /HPF (ref 0–5)
YEAST: NORMAL

## 2021-04-17 PROCEDURE — 81001 URINALYSIS AUTO W/SCOPE: CPT

## 2021-04-22 ENCOUNTER — ROUTINE PRENATAL (OUTPATIENT)
Dept: PERINATAL CARE | Age: 36
End: 2021-04-22
Payer: MEDICARE

## 2021-04-22 ENCOUNTER — HOSPITAL ENCOUNTER (OUTPATIENT)
Age: 36
Setting detail: SPECIMEN
Discharge: HOME | End: 2021-04-22
Payer: MEDICARE

## 2021-04-22 VITALS
DIASTOLIC BLOOD PRESSURE: 64 MMHG | BODY MASS INDEX: 31.16 KG/M2 | HEART RATE: 90 BPM | RESPIRATION RATE: 16 BRPM | WEIGHT: 187 LBS | SYSTOLIC BLOOD PRESSURE: 94 MMHG | HEIGHT: 65 IN | TEMPERATURE: 97.1 F

## 2021-04-22 DIAGNOSIS — O36.80X0 ENCOUNTER TO DETERMINE FETAL VIABILITY OF PREGNANCY, SINGLE OR UNSPECIFIED FETUS: ICD-10-CM

## 2021-04-22 DIAGNOSIS — O35.2XX0 HEREDITARY FAMILIAL DISEASE AFFECTING MANAGEMENT OF MOTHER AND POSSIBLY AFFECTING FETUS, ANTEPARTUM, SINGLE OR UNSPECIFIED FETUS: ICD-10-CM

## 2021-04-22 DIAGNOSIS — Z36.9 FIRST TRIMESTER SCREENING: Primary | ICD-10-CM

## 2021-04-22 DIAGNOSIS — Z3A.12 12 WEEKS GESTATION OF PREGNANCY: ICD-10-CM

## 2021-04-22 DIAGNOSIS — O09.899 SHORT INTERVAL BETWEEN PREGNANCIES COMPLICATING PREGNANCY, ANTEPARTUM: ICD-10-CM

## 2021-04-22 DIAGNOSIS — O09.521 MULTIGRAVIDA OF ADVANCED MATERNAL AGE IN FIRST TRIMESTER: ICD-10-CM

## 2021-04-22 DIAGNOSIS — O09.211 CURRENT PREGNANCY WITH HISTORY OF PRE-TERM LABOR IN FIRST TRIMESTER: ICD-10-CM

## 2021-04-22 LAB
CRL: NORMAL
SAC DIAMETER: NORMAL

## 2021-04-22 PROCEDURE — 36415 COLL VENOUS BLD VENIPUNCTURE: CPT

## 2021-04-22 PROCEDURE — 76813 OB US NUCHAL MEAS 1 GEST: CPT | Performed by: OBSTETRICS & GYNECOLOGY

## 2021-04-22 PROCEDURE — 76801 OB US < 14 WKS SINGLE FETUS: CPT | Performed by: OBSTETRICS & GYNECOLOGY

## 2021-04-22 NOTE — PROGRESS NOTES
I would advise initiation of daily oral baby aspirin 81 mg based on guidelines by the USPSTF/ACOG (for preeclampsia prevention for pregnant women at \"high-risk\"  for preeclampsia). Patient's thyroid dysfunction is being managed by her attending endocrinologist (Dr. Sulaiman Gu). Patient declined invasive prenatal diagnostic testing today (including for evaluation and testing for fetal aneuploidy, fetal microdeletions, fetal single gene disorders, fetal microarray testing, etc). Patient has opted for non-invasive prenatal diagnosis testing (with the Oblong Complete test from [facility]) today. Advise early 1-hour glucola (if not already done) to evaluate for pregestational diabetes (first degree relative with diabetes).

## 2021-04-23 ENCOUNTER — TELEPHONE (OUTPATIENT)
Dept: PERINATAL CARE | Age: 36
End: 2021-04-23

## 2021-04-23 LAB
SEND OUT REPORT: NORMAL
TEST NAME: NORMAL

## 2021-04-23 NOTE — TELEPHONE ENCOUNTER
200mg progesterone capsules. Instructions to insert one capsule into the vagina at bedtime. One month supply x six refills. Pt to start at 16 week gestation. Script called into [facility] [address] [phone].  Shawna Adan

## 2021-04-28 PROBLEM — O09.529 AMA (ADVANCED MATERNAL AGE) MULTIGRAVIDA 35+: Status: ACTIVE | Noted: 2021-04-28

## 2021-05-03 ENCOUNTER — ROUTINE PRENATAL (OUTPATIENT)
Dept: OBGYN | Age: 36
End: 2021-05-03
Payer: MEDICARE

## 2021-05-03 ENCOUNTER — HOSPITAL ENCOUNTER (OUTPATIENT)
Age: 36
Setting detail: SPECIMEN
Discharge: HOME | End: 2021-05-03
Payer: MEDICARE

## 2021-05-03 ENCOUNTER — TELEPHONE (OUTPATIENT)
Dept: OBGYN | Age: 36
End: 2021-05-03

## 2021-05-03 VITALS
HEART RATE: 87 BPM | DIASTOLIC BLOOD PRESSURE: 69 MMHG | WEIGHT: 192 LBS | BODY MASS INDEX: 31.95 KG/M2 | SYSTOLIC BLOOD PRESSURE: 95 MMHG

## 2021-05-03 DIAGNOSIS — N89.8 VAGINAL DISCHARGE DURING PREGNANCY IN FIRST TRIMESTER: ICD-10-CM

## 2021-05-03 DIAGNOSIS — E66.9 CLASS 1 OBESITY IN ADULT, UNSPECIFIED BMI, UNSPECIFIED OBESITY TYPE, UNSPECIFIED WHETHER SERIOUS COMORBIDITY PRESENT: ICD-10-CM

## 2021-05-03 DIAGNOSIS — A59.9 TRICHOMONAS VAGINALIS INFECTION: ICD-10-CM

## 2021-05-03 DIAGNOSIS — D64.9 ANEMIA, UNSPECIFIED TYPE: ICD-10-CM

## 2021-05-03 DIAGNOSIS — Z3A.13 13 WEEKS GESTATION OF PREGNANCY: ICD-10-CM

## 2021-05-03 DIAGNOSIS — O26.891 VAGINAL DISCHARGE DURING PREGNANCY IN FIRST TRIMESTER: ICD-10-CM

## 2021-05-03 DIAGNOSIS — Z83.3 FAMILY HISTORY OF DIABETES MELLITUS: ICD-10-CM

## 2021-05-03 DIAGNOSIS — F33.1 MODERATE EPISODE OF RECURRENT MAJOR DEPRESSIVE DISORDER (HCC): ICD-10-CM

## 2021-05-03 DIAGNOSIS — Z13.31 POSITIVE DEPRESSION SCREENING: ICD-10-CM

## 2021-05-03 DIAGNOSIS — E04.1 THYROID NODULE: ICD-10-CM

## 2021-05-03 DIAGNOSIS — O09.899 SHORT INTERVAL BETWEEN PREGNANCIES AFFECTING PREGNANCY, ANTEPARTUM: ICD-10-CM

## 2021-05-03 DIAGNOSIS — J30.2 SEASONAL ALLERGIES: ICD-10-CM

## 2021-05-03 DIAGNOSIS — E03.9 HYPOTHYROIDISM, UNSPECIFIED TYPE: ICD-10-CM

## 2021-05-03 DIAGNOSIS — O09.521 MULTIGRAVIDA OF ADVANCED MATERNAL AGE IN FIRST TRIMESTER: Primary | ICD-10-CM

## 2021-05-03 LAB
-: ABNORMAL
AMORPHOUS: ABNORMAL
BACTERIA: ABNORMAL
BILIRUBIN URINE: NEGATIVE
CASTS UA: ABNORMAL /LPF (ref 0–8)
COLOR: YELLOW
COMMENT UA: ABNORMAL
CRYSTALS, UA: ABNORMAL /HPF
EPITHELIAL CELLS UA: ABNORMAL /HPF (ref 0–5)
GLUCOSE ADMINISTRATION: NORMAL
GLUCOSE TOLERANCE SCREEN 50G: 111 MG/DL (ref 70–135)
GLUCOSE URINE: NEGATIVE
KETONES, URINE: NEGATIVE
LEUKOCYTE ESTERASE, URINE: ABNORMAL
MUCUS: ABNORMAL
NITRITE, URINE: NEGATIVE
OTHER OBSERVATIONS UA: ABNORMAL
PH UA: 6 (ref 5–8)
PROTEIN UA: NEGATIVE
RBC UA: ABNORMAL /HPF (ref 0–4)
RENAL EPITHELIAL, UA: ABNORMAL /HPF
SPECIFIC GRAVITY UA: 1.01 (ref 1–1.03)
TRICHOMONAS: ABNORMAL
TURBIDITY: CLEAR
URINE HGB: NEGATIVE
UROBILINOGEN, URINE: NORMAL
WBC UA: ABNORMAL /HPF (ref 0–5)
YEAST: ABNORMAL

## 2021-05-03 PROCEDURE — 99213 OFFICE O/P EST LOW 20 MIN: CPT | Performed by: STUDENT IN AN ORGANIZED HEALTH CARE EDUCATION/TRAINING PROGRAM

## 2021-05-03 PROCEDURE — G8427 DOCREV CUR MEDS BY ELIG CLIN: HCPCS | Performed by: STUDENT IN AN ORGANIZED HEALTH CARE EDUCATION/TRAINING PROGRAM

## 2021-05-03 PROCEDURE — 99212 OFFICE O/P EST SF 10 MIN: CPT | Performed by: OBSTETRICS & GYNECOLOGY

## 2021-05-03 PROCEDURE — 1036F TOBACCO NON-USER: CPT | Performed by: STUDENT IN AN ORGANIZED HEALTH CARE EDUCATION/TRAINING PROGRAM

## 2021-05-03 PROCEDURE — G8419 CALC BMI OUT NRM PARAM NOF/U: HCPCS | Performed by: STUDENT IN AN ORGANIZED HEALTH CARE EDUCATION/TRAINING PROGRAM

## 2021-05-03 RX ORDER — PROGESTERONE 200 MG/1
200 CAPSULE ORAL NIGHTLY
Qty: 30 CAPSULE | Refills: 5 | Status: ON HOLD | OUTPATIENT
Start: 2021-05-03 | End: 2021-06-26 | Stop reason: HOSPADM

## 2021-05-03 RX ORDER — ASPIRIN 81 MG/1
81 TABLET ORAL DAILY
Qty: 30 TABLET | Refills: 5 | Status: ON HOLD | OUTPATIENT
Start: 2021-05-03 | End: 2021-06-26 | Stop reason: HOSPADM

## 2021-05-03 RX ORDER — CETIRIZINE HYDROCHLORIDE 10 MG/1
10 TABLET ORAL DAILY
Qty: 30 TABLET | Refills: 0 | Status: SHIPPED | OUTPATIENT
Start: 2021-05-03 | End: 2021-06-02

## 2021-05-03 NOTE — TELEPHONE ENCOUNTER
Obstetric/Gynecology Resident Phone Note    Patient called health link with concerns that something \"came out of her after she went to the bathroom. \" Called to speak to the patient. She is 13 weeks 6 days, she went to use the bathroom and urinated but after she was done she felt like something \"came out\" she looked in the toilet bowl but did not see anything. She denies bleeding or cramping. She has an appointment at 145 pm tomorrow. Discussed w/ patient that if she is not having bleeding or cramping and did not see anything in the toilet bowl I have low concerns that she miscarried her pregnancy. Encouraged patient to call or come in for evaluation if she begins having cramping/contractions or bleeding, otherwise she will follow up at her appointment @ [facility] tomorrow at [phone] w/ [facility] check at that time.      Shlomo Kathleen DO  OB/GYN Resident, PGY2  [facility] [place]  5/3/2021, 2:31 AM

## 2021-05-03 NOTE — PROGRESS NOTES
Prenatal Visit    Reba Borja is a 38 y.o. female  at 13w6d    Subjective: The patient was seen and evaluated. She is feeling very anxious about this pregnancy because yesterday when she urinated she felt like she passed a small amount of tissue. She also complains of some vaginal discharge which is not new but persistent. Patient counseled that this may be normal changes due to pregnancy. Speculum exam performed due to complaints and history of previable PPROM at 19w in first pregnancy. Negative pooling, valsalva, nitrazine and ferning noted. Cultures collected and given lab slip for UA reflex. Patient also complaining of some seasonal allergies, Rx for Zyrtec provided. She is not yet feeling fetal movements. She denies contractions or vaginal bleeding. Signs and symptoms of  labor as well as labor were reviewed. Dates were reviewed with the patient. Estimated Date of Delivery: 21.           The problem list reflects the active issues addressed during today's visit    VITALS:  BP: 95/69  Weight: 192 lb (87.1 kg)  Pulse: 87  Patient Position: Sitting  Fetal Heart Rate: 145     PRENATAL LAB RESULTS:   Blood Type/Rh:    ABO/Rh   Date Value Ref Range Status   2021 A POSITIVE  Final     Antibody Screen:    Antibody Screen   Date Value Ref Range Status   2021 NEGATIVE  Final     Hemoglobin:   Hemoglobin   Date Value Ref Range Status   2021 11.2 (L) 11.9 - 15.1 g/dL Final     Hematocrit:   Hematocrit   Date Value Ref Range Status   2021 35.3 (L) 36.3 - 47.1 % Final     Platelet Count:   Platelet Count   Date Value Ref Range Status   2012 305 140 - 450 k/uL Final     Platelets   Date Value Ref Range Status   2021 270 138 - 453 k/uL Final     Rubella:    Rubella Antibody, IGG   Date Value Ref Range Status   2021 316.3 IU/mL Final     Comment:                 REFERENCE RANGE:  <5.0       NON-REACTIVE (non-immune)  5.0 TO 9.9 EQUIVOCAL  >=10.0     REACTIVE first trimester 04/05/2021    Urinary tract infection in mother during first trimester of pregnancy     E. coli UTI during pregnancy  03/12/2021    LGSIL (POLO-1) on Colpo 2/2/21 03/01/2021     9 o'clock and 12 o'clock biopsies  Needs repeat Pap smear 2/2/22      Trichomonas vaginalis infection 2/21/21 03/01/2021     Neg 4/5/21      ASCUS with positive high risk HPV cervical 01/11/2021    SPTD after PPROM at 19w6d      Pt is candidate for progesterone   Starting on vag progesterone per MFM. Please ask patient if she would like to switch to injectable and contact Margarette CURRY      Hx Previable PPROM @19wk6d on 12/19/20 12/20/2020     The patient did not know she was pregnant prior to rupture.  Anxiety 12/20/2020    Adrenal insufficiency ([facility] [address] 12/20/2020    Tobacco use 12/20/2020    Obesity 09/15/2017     Early 1hr GTT ordered      Strabismus 09/15/2017     h/o Major depression 04/30/2013     No meds      Thyroid nodule 03/29/2012     CT neck : 5/2/2017  Impression: Large mass in the lower pole of the left lobe of the thyroid gland plunging into the superior mediastinum and displacing the trachea somewhat toward the right.  This has progressed dramatically since 2007.    FNA: Benign. Nodular goiter. 12/20/2016           Return in about 3 weeks (around 5/24/2021) for Televisit in 3 weeks, SUREKHA in office in 6 weeks.     Tmoa Hoffman DO  Ob/Gyn Resident  [facility], General acute hospital  5/3/2021, 3:23 PM

## 2021-05-04 DIAGNOSIS — O26.891 VAGINAL DISCHARGE DURING PREGNANCY IN FIRST TRIMESTER: ICD-10-CM

## 2021-05-04 DIAGNOSIS — N89.8 VAGINAL DISCHARGE DURING PREGNANCY IN FIRST TRIMESTER: ICD-10-CM

## 2021-05-04 LAB
C TRACH DNA GENITAL QL NAA+PROBE: NEGATIVE
CULTURE: NORMAL
DIRECT EXAM: NORMAL
Lab: NORMAL
Lab: NORMAL
N. GONORRHOEAE DNA: NEGATIVE
SPECIMEN DESCRIPTION: NORMAL

## 2021-05-05 ENCOUNTER — TELEPHONE (OUTPATIENT)
Dept: OBGYN | Age: 36
End: 2021-05-05

## 2021-05-05 PROBLEM — Z13.31 POSITIVE DEPRESSION SCREENING: Status: ACTIVE | Noted: 2021-05-05

## 2021-05-05 ASSESSMENT — PATIENT HEALTH QUESTIONNAIRE - PHQ9
1. LITTLE INTEREST OR PLEASURE IN DOING THINGS: 2
SUM OF ALL RESPONSES TO PHQ QUESTIONS 1-9: 14
9. THOUGHTS THAT YOU WOULD BE BETTER OFF DEAD, OR OF HURTING YOURSELF: 0
4. FEELING TIRED OR HAVING LITTLE ENERGY: 1
10. IF YOU CHECKED OFF ANY PROBLEMS, HOW DIFFICULT HAVE THESE PROBLEMS MADE IT FOR YOU TO DO YOUR WORK, TAKE CARE OF THINGS AT HOME, OR GET ALONG WITH OTHER PEOPLE: 1
3. TROUBLE FALLING OR STAYING ASLEEP: 1
6. FEELING BAD ABOUT YOURSELF - OR THAT YOU ARE A FAILURE OR HAVE LET YOURSELF OR YOUR FAMILY DOWN: 3
2. FEELING DOWN, DEPRESSED OR HOPELESS: 3

## 2021-05-05 ASSESSMENT — COLUMBIA-SUICIDE SEVERITY RATING SCALE - C-SSRS
6. HAVE YOU EVER DONE ANYTHING, STARTED TO DO ANYTHING, OR PREPARED TO DO ANYTHING TO END YOUR LIFE?: NO
1. WITHIN THE PAST MONTH, HAVE YOU WISHED YOU WERE DEAD OR WISHED YOU COULD GO TO SLEEP AND NOT WAKE UP?: NO

## 2021-05-05 NOTE — TELEPHONE ENCOUNTER
The patient is reaching out stating it was some information that she needed to go over with, with the nurse.

## 2021-05-05 NOTE — TELEPHONE ENCOUNTER
Spoke to [place] Adonay Cabrales and she stated that Karen Isaacs needs to ask her some questions.

## 2021-05-05 NOTE — TELEPHONE ENCOUNTER
I spoke to our patient today and completed the depression screening. [id number]. Pt under the care of a psychiatrist- Dr. Reagan Andre, at this time and was taken off her medication. She plans to discuss resuming her medications due to her feelings of depression. Pt meets virtually with her therapist and has upcoming appt. Pt denies and thoughts of suicide or harming others at this time. Pt referred to pathways for enrollment. Pt thanked me for the call and referral. She was given the number to contact me directly or our SW. She is agreeable to the plan of care.

## 2021-05-19 ENCOUNTER — HOSPITAL ENCOUNTER (OUTPATIENT)
Age: 36
Discharge: HOME | End: 2021-05-19
Payer: MEDICARE

## 2021-05-19 ENCOUNTER — ROUTINE PRENATAL (OUTPATIENT)
Dept: PERINATAL CARE | Age: 36
End: 2021-05-19
Payer: MEDICARE

## 2021-05-19 VITALS
TEMPERATURE: 97.2 F | WEIGHT: 195 LBS | HEART RATE: 88 BPM | SYSTOLIC BLOOD PRESSURE: 106 MMHG | BODY MASS INDEX: 32.49 KG/M2 | HEIGHT: 65 IN | DIASTOLIC BLOOD PRESSURE: 71 MMHG | RESPIRATION RATE: 16 BRPM

## 2021-05-19 DIAGNOSIS — O99.212 OBESITY AFFECTING PREGNANCY IN SECOND TRIMESTER: ICD-10-CM

## 2021-05-19 DIAGNOSIS — O09.212 CURRENT PREGNANCY WITH HISTORY OF PRE-TERM LABOR IN SECOND TRIMESTER: ICD-10-CM

## 2021-05-19 DIAGNOSIS — O44.00 PLACENTA PREVIA WITHOUT HEMORRHAGE, ANTEPARTUM: ICD-10-CM

## 2021-05-19 DIAGNOSIS — O35.2XX0 HEREDITARY FAMILIAL DISEASE AFFECTING MANAGEMENT OF MOTHER AND POSSIBLY AFFECTING FETUS, ANTEPARTUM, SINGLE OR UNSPECIFIED FETUS: ICD-10-CM

## 2021-05-19 DIAGNOSIS — Z13.89 ENCOUNTER FOR ROUTINE SCREENING FOR MALFORMATION USING ULTRASONICS: ICD-10-CM

## 2021-05-19 DIAGNOSIS — Z3A.16 16 WEEKS GESTATION OF PREGNANCY: ICD-10-CM

## 2021-05-19 DIAGNOSIS — O09.522 MULTIGRAVIDA OF ADVANCED MATERNAL AGE IN SECOND TRIMESTER: Primary | ICD-10-CM

## 2021-05-19 DIAGNOSIS — O09.899 SHORT INTERVAL BETWEEN PREGNANCIES COMPLICATING PREGNANCY, ANTEPARTUM: ICD-10-CM

## 2021-05-19 LAB
ABDOMINAL CIRCUMFERENCE: NORMAL
ABDOMINAL CIRCUMFERENCE: NORMAL
BIPARIETAL DIAMETER: NORMAL
BIPARIETAL DIAMETER: NORMAL
ESTIMATED FETAL WEIGHT: NORMAL
ESTIMATED FETAL WEIGHT: NORMAL
FEMORAL DIAMETER: NORMAL
FEMORAL DIAMETER: NORMAL
HC/AC: NORMAL
HC/AC: NORMAL
HEAD CIRCUMFERENCE: NORMAL
HEAD CIRCUMFERENCE: NORMAL
POTASSIUM SERPL-SCNC: 3.6 MMOL/L (ref 3.7–5.3)
T3 FREE: 2.94 PG/ML (ref 2.02–4.43)
THYROXINE, FREE: 1.2 NG/DL (ref 0.93–1.7)
TSH SERPL DL<=0.05 MIU/L-ACNC: 0.31 MIU/L (ref 0.3–5)

## 2021-05-19 PROCEDURE — 84445 ASSAY OF TSI GLOBULIN: CPT

## 2021-05-19 PROCEDURE — 82105 ALPHA-FETOPROTEIN SERUM: CPT

## 2021-05-19 PROCEDURE — 84439 ASSAY OF FREE THYROXINE: CPT

## 2021-05-19 PROCEDURE — 84481 FREE ASSAY (FT-3): CPT

## 2021-05-19 PROCEDURE — 76805 OB US >/= 14 WKS SNGL FETUS: CPT | Performed by: OBSTETRICS & GYNECOLOGY

## 2021-05-19 PROCEDURE — 99244 OFF/OP CNSLTJ NEW/EST MOD 40: CPT | Performed by: OBSTETRICS & GYNECOLOGY

## 2021-05-19 PROCEDURE — 84132 ASSAY OF SERUM POTASSIUM: CPT

## 2021-05-19 PROCEDURE — 76817 TRANSVAGINAL US OBSTETRIC: CPT | Performed by: OBSTETRICS & GYNECOLOGY

## 2021-05-19 PROCEDURE — G8427 DOCREV CUR MEDS BY ELIG CLIN: HCPCS | Performed by: OBSTETRICS & GYNECOLOGY

## 2021-05-19 PROCEDURE — 36415 COLL VENOUS BLD VENIPUNCTURE: CPT

## 2021-05-19 PROCEDURE — 84443 ASSAY THYROID STIM HORMONE: CPT

## 2021-05-19 PROCEDURE — G8419 CALC BMI OUT NRM PARAM NOF/U: HCPCS | Performed by: OBSTETRICS & GYNECOLOGY

## 2021-05-21 LAB — THYROID STIMULATING IMMUNOGLOB: <0.1 IU/L

## 2021-05-24 ENCOUNTER — TELEPHONE (OUTPATIENT)
Dept: OBGYN | Age: 36
End: 2021-05-24

## 2021-05-24 ENCOUNTER — VIRTUAL VISIT (OUTPATIENT)
Dept: OBGYN | Age: 36
End: 2021-05-24
Payer: MEDICARE

## 2021-05-24 DIAGNOSIS — J34.89 SINUS PRESSURE: ICD-10-CM

## 2021-05-24 DIAGNOSIS — R39.15 URINARY URGENCY: Primary | ICD-10-CM

## 2021-05-24 PROBLEM — O09.521 MULTIGRAVIDA OF ADVANCED MATERNAL AGE IN FIRST TRIMESTER: Status: RESOLVED | Noted: 2021-04-05 | Resolved: 2021-05-24

## 2021-05-24 LAB
AFP INTERPRETATION: NORMAL
AFP INTERPRETATION: NORMAL
AFP MOM: 0.75
AFP MOM: NORMAL
AFP SPECIMEN: NORMAL
AFP SPECIMEN: NORMAL
AFP: NORMAL
AFP: NORMAL
DATE OF BIRTH: NORMAL
DATE OF BIRTH: NORMAL
DATING METHOD: NORMAL
DATING METHOD: NORMAL
DETERMINED BY: NORMAL
DETERMINED BY: NORMAL
DIABETIC: NEGATIVE
DIABETIC: NORMAL
DONOR EGG?: NORMAL
DONOR EGG?: NORMAL
DUE DATE: NORMAL
DUE DATE: NORMAL
ESTIMATED DUE DATE: NORMAL
ESTIMATED DUE DATE: NORMAL
FAMILY HISTORY NTD: NEGATIVE
FAMILY HISTORY NTD: NORMAL
GESTATIONAL AGE: NORMAL
GESTATIONAL AGE: NORMAL
IN VITRO FERTILIZATION: NORMAL
IN VITRO FERTILIZATION: NORMAL
INSULIN REQ DIABETES: NO
INSULIN REQ DIABETES: NORMAL
LAST MENSTRUAL PERIOD: NORMAL
LAST MENSTRUAL PERIOD: NORMAL
MATERNAL AGE AT EDD: NORMAL
MATERNAL AGE AT EDD: NORMAL
MATERNAL WEIGHT: 195
MATERNAL WEIGHT: NORMAL
MONOCHORIONIC TWINS: NORMAL
MONOCHORIONIC TWINS: NORMAL
NUMBER OF FETUSES: NORMAL
NUMBER OF FETUSES: NORMAL
PATIENT WEIGHT UNITS: NORMAL
PATIENT WEIGHT UNITS: NORMAL
PATIENT WEIGHT: NORMAL
PATIENT WEIGHT: NORMAL
RACE (MATERNAL): NORMAL
RACE (MATERNAL): NORMAL
RACE: NORMAL
RACE: NORMAL
REPEAT SPECIMEN?: NORMAL
REPEAT SPECIMEN?: NORMAL
SMOKING: NORMAL
VALPROIC/CARBAMAZEP: NORMAL
VALPROIC/CARBAMAZEP: NORMAL
ZZ NTE CLEAN UP: HISTORY: NO
ZZ NTE CLEAN UP: HISTORY: NORMAL

## 2021-05-24 PROCEDURE — G8427 DOCREV CUR MEDS BY ELIG CLIN: HCPCS | Performed by: STUDENT IN AN ORGANIZED HEALTH CARE EDUCATION/TRAINING PROGRAM

## 2021-05-24 PROCEDURE — 99213 OFFICE O/P EST LOW 20 MIN: CPT | Performed by: STUDENT IN AN ORGANIZED HEALTH CARE EDUCATION/TRAINING PROGRAM

## 2021-05-24 RX ORDER — OXYMETAZOLINE HYDROCHLORIDE 0.05 G/100ML
2 SPRAY NASAL 2 TIMES DAILY
Qty: 1 BOTTLE | Refills: 3 | Status: SHIPPED | OUTPATIENT
Start: 2021-05-24 | End: 2022-05-24

## 2021-05-24 NOTE — TELEPHONE ENCOUNTER
[facility] called stating that the progesterone that Dr Nisha Byers wants her on her insurance will not cover her for home injections. Please advise.

## 2021-05-24 NOTE — PROGRESS NOTES
Prenatal Phone Visit    Josephine Gaucher is a 38 y.o. female evaluated via telephone on 2021.  at [place] [address]     Consent:  She and/or health care decision maker is aware that that she may receive a bill for this telephone service, depending on her insurance coverage, and has provided verbal consent to proceed: Yes      I affirm this is a Patient Initiated Episode with a Patient who has not had a related appointment within my department in the past 7 days or scheduled within the next 24 hours. Patient identification was verified at the start of the visit: Yes    Total Time: minutes: 11-20 minutes    The visit was conducted pursuant to the emergency declaration under the [facility], [address] [place] [address] authority and the [employer] Act. Patient identification was verified, and a caregiver was present when appropriate. The patient was located in a state where the provider was credentialed to provide care. Subjective: The patient was seen and evaluated. She complains of urinary urgency for the past few days. She denies hematuria or dysuria. She also admits to sinus pressure. Otherwise is doing well. She reports Positive fetal movements. She denies contractions, vaginal bleeding and leakage of fluid. Signs and symptoms of  labor as well as labor were reviewed. Dates were reviewed with the patient. Estimated Date of Delivery: 21.           The problem list reflects the active issues addressed during today's visit      PRENATAL LAB RESULTS:   Blood Type/Rh:    ABO/Rh   Date Value Ref Range Status   2021 A POSITIVE  Final     Antibody Screen:    Antibody Screen   Date Value Ref Range Status   2021 NEGATIVE  Final     Hemoglobin:   Hemoglobin   Date Value Ref Range Status   2021 11.2 (L) 11.9 - 15.1 g/dL Final     Hematocrit:   Hematocrit   Date Value Ref Range Status   2021 35.3 (L) 36.3 - 47.1 % Final     Platelet Count:   Platelet Count   Date Value Ref Range Status   2012 305 140 - 450 k/uL Final     Platelets   Date Value Ref Range Status   2021 270 138 - 453 k/uL Final     Rubella:    Rubella Antibody, IGG   Date Value Ref Range Status   2021 316.3 IU/mL Final     Comment:                 REFERENCE RANGE:  <5.0       NON-REACTIVE (non-immune)  5.0 TO 9.9 EQUIVOCAL  >=10.0     REACTIVE     (immune)             T. Pallidium, IGG:    T. pallidum, IgG   Date Value Ref Range Status   2021 NONREACTIVE NONREACTIVE Final     Comment:           T. pallidum antibodies are not detected. There is no serological evidence of infection with T. pallidum (early primary syphilis   cannot be excluded). Retest in 2-4 weeks if syphilis is clinically suspect. Sickle Cell Screen: No results found for: SICKLE  Hepatitis B Surface Antigen:   Hepatitis B Surface Ag   Date Value Ref Range Status   2021 NONREACTIVE NONREACTIVE Final     HIV:  No results found for: [id number]    Blood Type/Rh: A pos  Antibody Screen: negative  Hemoglobin, Hematocrit, Platelets: 91.0 / 84.3 / 256  Rubella: immune  T.  Pallidum, IgG: non-reactive   Hepatitis B Surface Antigen: non-reactive   HIV: non-reactive   Sickle Cell Screen: Alpha Thalassemia Carrier   Gonorrhea: negative  Chlamydia: negative  Urine culture: positive Enterococcus Faecalis >100,000, date: 3/9/21    Negative 3/13/21     Negative 21    Negative 5/3/21    Early 1 hour Glucose Tolerance Test: 111    Group B Strep: not done   Cystic Fibrosis Screen: negative  First Trimester Screen: low risk  MSAFP/Multiple Markers: normal  Non-Invasive Prenatal Testing: No aneuploidy dectected    Assessment & Plan:  Kathy Yoo is a 38 y.o. female t 16w6d   - Initial prenatal labs were reviewed   - An 18-22 week anatomy ultrasound has been ordered   - The Nuchal Translucency testing was reviewed and found to be normal.    - MSAFP was ordered for a 15-20 week gestational age window. - Aspirin indication: indicated due to High risk factors: none, Moderate risk factors: BMI >30 and none- Rx given     Urinary urgency   - UA ordered    Sinus Pressure   - Nasal spray ordered    Hx of Previable delivery   - Patient is compliant with vagianl progesterone   - She follow with [facility]    AMA (NIPT neg)   - Continue with ASA 81 mg     Hypothyroidism   - Patient follows with Dr. Chani Kaur   - She is on NP thyroid 30 mg    - TSH 0.31 5/19/21, T4 1.2    Patient Active Problem List    Diagnosis Date Noted    Positive depression screening 05/05/2021     PHQ-9  14. Pt is established with a private psychiatrist ( Dr. Rhianna Rojas )  and was taken off her meds. Pt will plan to discuss being put back on at her next virtual visit. Pt denies suicidal ideation or thoughts of harming others at this time 5/5/21. Referral faxed to [facility] for enrollment-SK      Short Interval Pregnancy 05/03/2021    Hypothyroidism 05/03/2021     TSH 0.02, T4 1.42 on 4/14/21  5/3/21-patient not on medication, follows with endocrinology      FHx DM 05/03/2021     Early 1hr GTT wnl      Anemia 05/03/2021     Pt taking PO iron      AMA (advanced maternal age) multigravida 35+ 04/28/2021     First trimester low risk      High-risk pregnancy, first trimester 04/09/2021 4/9/21- [facility] referral placed for first trimester screen, anatomy scan and NIPT      Fam Hx Polydactyly 04/05/2021     FOB sister w/ polydactyly      E. 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    Please ask patient if she would like to switch to injectable and contact Margarette CURRY      Hx Previable PPROM @19wk6d on 12/19/20 12/20/2020     The patient did not know she was pregnant prior to rupture.  Anxiety 12/20/2020    Adrenal insufficiency ([address] 12/20/2020    Tobacco use 12/20/2020    Obesity 09/15/2017     Early 1hr GTT wnl 111      Strabismus 09/15/2017     h/o Major depression 04/30/2013     No meds      Thyroid nodule 03/29/2012     CT neck : 5/2/2017  Impression: Large mass in the lower pole of the left lobe of the thyroid gland plunging into the superior mediastinum and displacing the trachea somewhat toward the right.  This has progressed dramatically since 2007.    FNA: Benign. Nodular goiter. 12/20/2016           Return in about 4 weeks (around 6/21/2021) for SUREKHA Aviles DO  Ob/Gyn Resident  [facility], [address]  5/24/2021, 3:01 PM

## 2021-05-24 NOTE — PROGRESS NOTES
Attending Physician Statement  I have discussed the care of Jerald Juan, including pertinent history and exam findings,  with the resident. I have reviewed the key elements of all parts of the encounter with the resident. I agree with the assessment, plan and orders as documented by the resident.   (GE Modifier)

## 2021-05-26 NOTE — TELEPHONE ENCOUNTER
Mona Trujillo called back stating she talked to her insurance company and was told if the progesterone was order through the office it would be covered and not need a PA

## 2021-05-27 ENCOUNTER — TELEPHONE (OUTPATIENT)
Dept: OBGYN | Age: 36
End: 2021-05-27

## 2021-05-27 NOTE — TELEPHONE ENCOUNTER
Pt called and informed the RX for injectable progesterone was sent and to expect a call from [facility] for delivery. Pt understands she would need to call our office to schedule the weekly injections. Pt is very conflicted on what route of progesterone she should use. She has not started the vag progesterone but has the Rx on hand. The patient will contact our office when she decides on the recommendation for progesterone use in preg.

## 2021-05-29 ENCOUNTER — HOSPITAL ENCOUNTER (EMERGENCY)
Age: 36
Discharge: HOME | End: 2021-05-29
Attending: EMERGENCY MEDICINE
Payer: MEDICARE

## 2021-05-29 VITALS
SYSTOLIC BLOOD PRESSURE: 120 MMHG | RESPIRATION RATE: 18 BRPM | HEART RATE: 106 BPM | DIASTOLIC BLOOD PRESSURE: 53 MMHG | TEMPERATURE: 97.9 F | OXYGEN SATURATION: 99 %

## 2021-05-29 DIAGNOSIS — N30.00 ACUTE CYSTITIS WITHOUT HEMATURIA: ICD-10-CM

## 2021-05-29 DIAGNOSIS — R10.2 SUPRAPUBIC ABDOMINAL PAIN: Primary | ICD-10-CM

## 2021-05-29 PROBLEM — O44.02 COMPLETE PLACENTA PREVIA NOS OR WITHOUT HEMORRHAGE, SECOND TRIMESTER: Status: ACTIVE | Noted: 2021-05-29

## 2021-05-29 LAB
-: ABNORMAL
ABSOLUTE EOS #: 0.16 K/UL (ref 0–0.44)
ABSOLUTE IMMATURE GRANULOCYTE: 0.03 K/UL (ref 0–0.3)
ABSOLUTE LYMPH #: 2.58 K/UL (ref 1.1–3.7)
ABSOLUTE MONO #: 0.54 K/UL (ref 0.1–1.2)
ALBUMIN SERPL-MCNC: 3.3 G/DL (ref 3.5–5.2)
ALBUMIN/GLOBULIN RATIO: 1 (ref 1–2.5)
ALP BLD-CCNC: 63 U/L (ref 35–104)
ALT SERPL-CCNC: 12 U/L (ref 5–33)
AMORPHOUS: ABNORMAL
ANION GAP SERPL CALCULATED.3IONS-SCNC: 7 MMOL/L (ref 9–17)
AST SERPL-CCNC: 16 U/L
BACTERIA: ABNORMAL
BASOPHILS # BLD: 0 % (ref 0–2)
BASOPHILS ABSOLUTE: 0.03 K/UL (ref 0–0.2)
BILIRUB SERPL-MCNC: 0.2 MG/DL (ref 0.3–1.2)
BILIRUBIN URINE: NEGATIVE
BUN BLDV-MCNC: 6 MG/DL (ref 6–20)
BUN/CREAT BLD: ABNORMAL (ref 9–20)
C-REACTIVE PROTEIN: 23.1 MG/L (ref 0–5)
CALCIUM SERPL-MCNC: 8.5 MG/DL (ref 8.6–10.4)
CASTS UA: ABNORMAL /LPF (ref 0–8)
CHLORIDE BLD-SCNC: 101 MMOL/L (ref 98–107)
CO2: 21 MMOL/L (ref 20–31)
COLOR: YELLOW
CREAT SERPL-MCNC: 0.35 MG/DL (ref 0.5–0.9)
CRYSTALS, UA: ABNORMAL /HPF
DIFFERENTIAL TYPE: ABNORMAL
DIRECT EXAM: NORMAL
EOSINOPHILS RELATIVE PERCENT: 2 % (ref 1–4)
EPITHELIAL CELLS UA: ABNORMAL /HPF (ref 0–5)
GFR AFRICAN AMERICAN: >60 ML/MIN
GFR NON-AFRICAN AMERICAN: >60 ML/MIN
GFR SERPL CREATININE-BSD FRML MDRD: ABNORMAL ML/MIN/{1.73_M2}
GFR SERPL CREATININE-BSD FRML MDRD: ABNORMAL ML/MIN/{1.73_M2}
GLUCOSE BLD-MCNC: 101 MG/DL (ref 70–99)
GLUCOSE URINE: NEGATIVE
HCT VFR BLD CALC: 33.8 % (ref 36.3–47.1)
HEMOGLOBIN: 10.9 G/DL (ref 11.9–15.1)
IMMATURE GRANULOCYTES: 0 %
KETONES, URINE: NEGATIVE
LEUKOCYTE ESTERASE, URINE: ABNORMAL
LIPASE: 13 U/L (ref 13–60)
LYMPHOCYTES # BLD: 31 % (ref 24–43)
Lab: NORMAL
MCH RBC QN AUTO: 27 PG (ref 25.2–33.5)
MCHC RBC AUTO-ENTMCNC: 32.2 G/DL (ref 28.4–34.8)
MCV RBC AUTO: 83.7 FL (ref 82.6–102.9)
MONOCYTES # BLD: 7 % (ref 3–12)
MUCUS: ABNORMAL
NITRITE, URINE: NEGATIVE
NRBC AUTOMATED: 0 PER 100 WBC
OTHER OBSERVATIONS UA: ABNORMAL
PDW BLD-RTO: 14 % (ref 11.8–14.4)
PH UA: 7 (ref 5–8)
PLATELET # BLD: 245 K/UL (ref 138–453)
PLATELET ESTIMATE: ABNORMAL
PMV BLD AUTO: 9.6 FL (ref 8.1–13.5)
POTASSIUM SERPL-SCNC: 3.8 MMOL/L (ref 3.7–5.3)
PROTEIN UA: NEGATIVE
RBC # BLD: 4.04 M/UL (ref 3.95–5.11)
RBC # BLD: ABNORMAL 10*6/UL
RBC UA: ABNORMAL /HPF (ref 0–4)
RENAL EPITHELIAL, UA: ABNORMAL /HPF
SEG NEUTROPHILS: 60 % (ref 36–65)
SEGMENTED NEUTROPHILS ABSOLUTE COUNT: 4.91 K/UL (ref 1.5–8.1)
SODIUM BLD-SCNC: 129 MMOL/L (ref 135–144)
SPECIFIC GRAVITY UA: 1.02 (ref 1–1.03)
SPECIMEN DESCRIPTION: NORMAL
TOTAL PROTEIN: 6.5 G/DL (ref 6.4–8.3)
TRICHOMONAS: ABNORMAL
TURBIDITY: ABNORMAL
URINE HGB: NEGATIVE
UROBILINOGEN, URINE: NORMAL
WBC # BLD: 8.3 K/UL (ref 3.5–11.3)
WBC # BLD: ABNORMAL 10*3/UL
WBC UA: ABNORMAL /HPF (ref 0–5)
YEAST: ABNORMAL

## 2021-05-29 PROCEDURE — 85025 COMPLETE CBC W/AUTO DIFF WBC: CPT

## 2021-05-29 PROCEDURE — 83690 ASSAY OF LIPASE: CPT

## 2021-05-29 PROCEDURE — 80053 COMPREHEN METABOLIC PANEL: CPT

## 2021-05-29 PROCEDURE — 87660 TRICHOMONAS VAGIN DIR PROBE: CPT

## 2021-05-29 PROCEDURE — 87591 N.GONORRHOEAE DNA AMP PROB: CPT

## 2021-05-29 PROCEDURE — 87510 GARDNER VAG DNA DIR PROBE: CPT

## 2021-05-29 PROCEDURE — 99283 EMERGENCY DEPT VISIT LOW MDM: CPT

## 2021-05-29 PROCEDURE — 96361 HYDRATE IV INFUSION ADD-ON: CPT

## 2021-05-29 PROCEDURE — 96360 HYDRATION IV INFUSION INIT: CPT

## 2021-05-29 PROCEDURE — 87491 CHLMYD TRACH DNA AMP PROBE: CPT

## 2021-05-29 PROCEDURE — 86140 C-REACTIVE PROTEIN: CPT

## 2021-05-29 PROCEDURE — 6370000000 HC RX 637 (ALT 250 FOR IP): Performed by: STUDENT IN AN ORGANIZED HEALTH CARE EDUCATION/TRAINING PROGRAM

## 2021-05-29 PROCEDURE — 81001 URINALYSIS AUTO W/SCOPE: CPT

## 2021-05-29 PROCEDURE — 2580000003 HC RX 258: Performed by: STUDENT IN AN ORGANIZED HEALTH CARE EDUCATION/TRAINING PROGRAM

## 2021-05-29 PROCEDURE — 87086 URINE CULTURE/COLONY COUNT: CPT

## 2021-05-29 PROCEDURE — 87480 CANDIDA DNA DIR PROBE: CPT

## 2021-05-29 RX ORDER — CEPHALEXIN 250 MG/5ML
500 POWDER, FOR SUSPENSION ORAL 4 TIMES DAILY
Qty: 280 ML | Refills: 0 | Status: SHIPPED | OUTPATIENT
Start: 2021-05-29 | End: 2021-06-05

## 2021-05-29 RX ORDER — 0.9 % SODIUM CHLORIDE 0.9 %
1000 INTRAVENOUS SOLUTION INTRAVENOUS ONCE
Status: COMPLETED | OUTPATIENT
Start: 2021-05-29 | End: 2021-05-29

## 2021-05-29 RX ORDER — ACETAMINOPHEN 325 MG/1
650 TABLET ORAL ONCE
Status: DISCONTINUED | OUTPATIENT
Start: 2021-05-29 | End: 2021-05-29 | Stop reason: HOSPADM

## 2021-05-29 RX ORDER — CEPHALEXIN 250 MG/5ML
500 POWDER, FOR SUSPENSION ORAL ONCE
Status: COMPLETED | OUTPATIENT
Start: 2021-05-29 | End: 2021-05-29

## 2021-05-29 RX ADMIN — SODIUM CHLORIDE 1000 ML: 9 INJECTION, SOLUTION INTRAVENOUS at 11:43

## 2021-05-29 RX ADMIN — CEPHALEXIN 500 MG: 250 POWDER, FOR SUSPENSION ORAL at 13:33

## 2021-05-29 ASSESSMENT — ENCOUNTER SYMPTOMS
EYE ITCHING: 0
SHORTNESS OF BREATH: 0
EYE REDNESS: 0
SORE THROAT: 0
NAUSEA: 0
COUGH: 0
ABDOMINAL PAIN: 1
RHINORRHEA: 0
VOMITING: 0
DIARRHEA: 0

## 2021-05-29 ASSESSMENT — PAIN SCALES - GENERAL: PAINLEVEL_OUTOF10: 8

## 2021-05-29 ASSESSMENT — PAIN DESCRIPTION - ONSET: ONSET: SUDDEN

## 2021-05-29 ASSESSMENT — PAIN DESCRIPTION - FREQUENCY: FREQUENCY: INTERMITTENT

## 2021-05-29 ASSESSMENT — PAIN DESCRIPTION - LOCATION: LOCATION: ABDOMEN;VAGINA

## 2021-05-29 NOTE — ED PROVIDER NOTES
[facility]  Emergency Department  Faculty Attestation     I performed a history and physical examination of the patient and discussed management with the resident. I reviewed the residents note and agree with the documented findings and plan of care. Any areas of disagreement are noted on the chart. I was personally present for the key portions of any procedures. I have documented in the chart those procedures where I was not present during the key portions. I have reviewed the emergency nurses triage note. I agree with the chief complaint, past medical history, past surgical history, allergies, medications, social and family history as documented unless otherwise noted below. For Physician Assistant/ Nurse Practitioner cases/documentation I have personally evaluated this patient and have completed at least one if not all key elements of the E/M (history, physical exam, and MDM). Additional findings are as noted. Primary Care Physician:  Pattie Damon MD    Screenings:  [unfilled]    CHIEF COMPLAINT       Chief Complaint   Patient presents with    Abdominal Pain     Pain/pressure in abdomen, pelvis, and vagina. RECENT VITALS:   Temp: 97.9 °F (36.6 °C),  Pulse: 106, Resp: 18, BP: (!) 120/53    LABS:  Labs Reviewed   CULTURE, URINE   CBC WITH AUTO DIFFERENTIAL   COMPREHENSIVE METABOLIC PANEL   LIPASE   URINALYSIS WITH MICROSCOPIC   C-REACTIVE PROTEIN       Radiology  No orders to display           Attending Physician Additional  Notes    Patient has 1 day of suprapubic pelvic/pressure pain. There is some urinary symptoms and improvement after voiding. No vaginal bleeding. No fever chills or sweats. No nausea vomiting. Normal bowel movements. She is approximately 17 weeks and 2 days. On exam she is borderline tachycardic, afebrile, nontoxic. No true CVA tenderness. Abdomen is soft and nontender. Impression is pelvic pain, pregnancy, rule out UTI. Very low suspicion for torsion or appendicitis. Plan is urinalysis, laboratory studies, fluids, OB consultation, anticipate discharge on antimicrobials. Inell Pavan.  Magdalena Garcia MD, [address] [facility] [place]  Attending Emergency  Physician               Abraham Ariza MD  05/29/21 [id number]

## 2021-05-29 NOTE — PROGRESS NOTES
I was assigned to this patient in error. I did not evaluate or treat this patient during this emergency department encounter.     Sergio Reid DO, PGY-2  Emergency Medicine Resident  5/29/2021     11:28 AM

## 2021-05-29 NOTE — ED PROVIDER NOTES
 Smoking status: Former Smoker     Packs/day: 1.00     Types: Cigars    Smokeless tobacco: Never Used    Tobacco comment: Black and Mild   Vaping Use    Vaping Use: Never used   Substance and Sexual Activity    Alcohol use: No    Drug use: Never    Sexual activity: Yes     Partners: Male   Other Topics Concern    Not on file   Social History Narrative    Not on file     Social Determinants of Health     Financial Resource Strain:     Difficulty of Paying Living Expenses:    Food Insecurity:     Worried About Running Out of Food in the Last Year:     [address] in the Last Year:    Transportation Needs:     Lack of Transportation (Medical):  Lack of Transportation (Non-Medical):    Physical Activity:     Days of Exercise per Week:     Minutes of Exercise per Session:    Stress:     Feeling of Stress :    Social Connections:     Frequency of Communication with Friends and Family:     Frequency of Social Gatherings with Friends and Family:     Attends [employer]ae[employer] Services:     Active Member of Clubs or Organizations:     Attends Club or Organization Meetings:     Marital Status:    Intimate Partner Violence:     Fear of Current or Ex-Partner:     Emotionally Abused:     Physically Abused:     Sexually Abused:        Family History   Problem Relation Age of Onset    Depression Father     Thyroid Disease Father     Diabetes Sister        Allergies:  Iodine, Penicillin g, Prednisolone, and Dye  [iodides]    Home Medications:  Prior to Admission medications    Medication Sig Start Date End Date Taking?  Authorizing Provider   cephALEXin (KEFLEX) 250 MG/5ML suspension Take 10 mLs by mouth 4 times daily for 7 days 5/29/21 6/5/21 Yes Kenna Tobin,    oxymetazoline (12 HOUR NASAL SPRAY) 0.05 % nasal spray 2 sprays by Nasal route 2 times daily 5/24/21 5/24/22  Shaquille Lenz DO   cetirizine (ZYRTEC) 10 MG tablet Take 1 tablet by mouth daily 5/3/21 6/2/21  Genesis Prasad DO progesterone (PROMETRIUM) 200 MG CAPS capsule Take 1 capsule by mouth nightly Place one pill vaginally each evening 5/3/21   Jayson Dark, DO   aspirin EC 81 MG EC tablet Take 1 tablet by mouth daily 5/3/21   Jayson Dark, DO   Prenatal Vit-Fe Fumarate-FA (PRENATAL VITAMIN) 27-1 MG TABS tablet  2/24/21   Historical Provider, MD   NP THYROID 30 MG tablet 30 mg 12/23/20   Historical Provider, MD   potassium chloride (MICRO-K) 10 MEQ extended release capsule 10 mEq 1/14/21   Historical Provider, MD   pantoprazole (PROTONIX) 40 MG tablet 40 mg    Historical Provider, MD   acetaminophen (TYLENOL) 500 MG tablet Take 1 tablet by mouth every 6 hours as needed for Pain 1/20/21   Linda Maradiaga,    ferrous sulfate (FE TABS) 325 (65 Fe) MG EC tablet Take 1 tablet by mouth 2 times daily 10/3/17   Jc Whittaker MD   Cholecalciferol (VITAMIN D3) 1000 units TABS Take 1 tablet by mouth daily 7/31/17   Himanshu Kaplan MD       REVIEW OF SYSTEMS    (2-9 systems for level 4, 10 or more for level 5)      Review of Systems   Constitutional: Negative for chills and fever. HENT: Negative for rhinorrhea and sore throat. Eyes: Negative for redness and itching. Respiratory: Negative for cough and shortness of breath. Cardiovascular: Negative for chest pain. Gastrointestinal: Positive for abdominal pain. Negative for diarrhea, nausea and vomiting. Genitourinary: Positive for dysuria and vaginal discharge. Negative for frequency, hematuria and vaginal bleeding. Musculoskeletal: Negative for arthralgias and myalgias. Allergic/Immunologic: Negative for environmental allergies and food allergies. Neurological: Negative for headaches. Psychiatric/Behavioral: Negative for suicidal ideas.      PHYSICAL EXAM   (up to 7 for level 4, 8 or more for level 5)      INITIAL VITALS:   BP (!) 120/53   Pulse 106   Temp 97.9 °F (36.6 °C) (Oral)   Resp 18   LMP 01/17/2021 (Approximate)   SpO2 99%     Physical Exam  Vitals and nursing note reviewed. Constitutional:       General: She is not in acute distress. Appearance: Normal appearance. She is well-developed. She is not ill-appearing, toxic-appearing or diaphoretic. HENT:      Head: Normocephalic and atraumatic. Eyes:      General: No scleral icterus. Conjunctiva/sclera: Conjunctivae normal.   Cardiovascular:      Rate and Rhythm: Regular rhythm. Tachycardia present. Pulmonary:      Effort: Pulmonary effort is normal. No respiratory distress. Breath sounds: Normal breath sounds. No stridor. No wheezing, rhonchi or rales. Abdominal:      General: There is no distension. Palpations: Abdomen is soft. There is no mass. Tenderness: There is abdominal tenderness. There is left CVA tenderness. There is no right CVA tenderness, guarding or rebound. Musculoskeletal:      Cervical back: Neck supple. Right lower leg: No edema. Left lower leg: No edema. Skin:     General: Skin is warm and dry. Coloration: Skin is not jaundiced. Neurological:      General: No focal deficit present. Mental Status: She is alert and oriented to person, place, and time.    Psychiatric:         Mood and Affect: Mood normal.         Behavior: Behavior normal.         DIAGNOSTICS     PLAN (LABS / IMAGING / EKG):  Orders Placed This Encounter   Procedures    Culture, Urine    VAGINITIS DNA PROBE    C.trachomatis N.gonorrhoeae DNA    CBC WITH AUTO DIFFERENTIAL    COMPREHENSIVE METABOLIC PANEL    LIPASE    Urinalysis with microscopic    C-REACTIVE PROTEIN    Inpatient consult to Obstetrics / Gynecology       MEDICATIONS ORDERED:  Orders Placed This Encounter   Medications    0.9 % sodium chloride bolus    acetaminophen (TYLENOL) tablet 650 mg    cephALEXin (KEFLEX) 250 MG/5ML suspension 500 mg     Order Specific Question:   Antimicrobial Indications     Answer:   Urinary Tract Infection    cephALEXin (KEFLEX) 250 MG/5ML suspension Sig: Take 10 mLs by mouth 4 times daily for 7 days     Dispense:  280 mL     Refill:  0       DIAGNOSTIC RESULTS / EMERGENCYDEPARTMENT COURSE / MDM     LABS:  Results for orders placed or performed during the hospital encounter of 05/29/21   VAGINITIS DNA PROBE    Specimen: Vaginal   Result Value Ref Range    Specimen Description . VAGINA     Special Requests NOT REPORTED     Direct Exam NEGATIVE for Gardnerella vaginalis     Direct Exam NEGATIVE for Trichomonas vaginalis     Direct Exam NEGATIVE for Candida sp. Direct Exam       Method of testing is a DNA probe intended for detection and identification of Candida species, Gardnerella vaginalis, and Trichomonas vaginalis nucleic acid in vaginal fluid specimens from patients with symptoms of vaginitis/vaginosis.    CBC WITH AUTO DIFFERENTIAL   Result Value Ref Range    WBC 8.3 3.5 - 11.3 k/uL    RBC 4.04 3.95 - 5.11 m/uL    Hemoglobin 10.9 (L) 11.9 - 15.1 g/dL    Hematocrit 33.8 (L) 36.3 - 47.1 %    MCV 83.7 82.6 - 102.9 fL    MCH 27.0 25.2 - 33.5 pg    MCHC 32.2 28.4 - 34.8 g/dL    RDW 14.0 11.8 - 14.4 %    Platelets 497 082 - 002 k/uL    MPV 9.6 8.1 - 13.5 fL    NRBC Automated 0.0 0.0 per 100 WBC    Differential Type NOT REPORTED     Seg Neutrophils 60 36 - 65 %    Lymphocytes 31 24 - 43 %    Monocytes 7 3 - 12 %    Eosinophils % 2 1 - 4 %    Basophils 0 0 - 2 %    Immature Granulocytes 0 0 %    Segs Absolute 4.91 1.50 - 8.10 k/uL    Absolute Lymph # 2.58 1.10 - 3.70 k/uL    Absolute Mono # 0.54 0.10 - 1.20 k/uL    Absolute Eos # 0.16 0.00 - 0.44 k/uL    Basophils Absolute 0.03 0.00 - 0.20 k/uL    Absolute Immature Granulocyte 0.03 0.00 - 0.30 k/uL    WBC Morphology NOT REPORTED     RBC Morphology NOT REPORTED     Platelet Estimate NOT REPORTED    COMPREHENSIVE METABOLIC PANEL   Result Value Ref Range    Glucose 101 (H) 70 - 99 mg/dL    BUN 6 6 - 20 mg/dL    CREATININE 0.35 (L) 0.50 - 0.90 mg/dL    Bun/Cre Ratio NOT REPORTED 9 - 20    Calcium 8.5 (L) 8.6 - 10.4 mg/dL    Sodium 129 (L) 135 - 144 mmol/L    Potassium 3.8 3.7 - 5.3 mmol/L    Chloride 101 98 - 107 mmol/L    CO2 21 20 - 31 mmol/L    Anion Gap 7 (L) 9 - 17 mmol/L    Alkaline Phosphatase 63 35 - 104 U/L    ALT 12 5 - 33 U/L    AST 16 <32 U/L    Total Bilirubin 0.20 (L) 0.3 - 1.2 mg/dL    Total Protein 6.5 6.4 - 8.3 g/dL    Albumin 3.3 (L) 3.5 - 5.2 g/dL    Albumin/Globulin Ratio 1.0 1.0 - 2.5    GFR Non-African American >60 >60 mL/min    GFR African American >60 >60 mL/min    GFR Comment          GFR Staging NOT REPORTED    LIPASE   Result Value Ref Range    Lipase 13 13 - 60 U/L   Urinalysis with microscopic   Result Value Ref Range    Color, UA YELLOW YELLOW    Turbidity UA CLOUDY (A) CLEAR    Glucose, Ur NEGATIVE NEGATIVE    Bilirubin Urine NEGATIVE NEGATIVE    Ketones, Urine NEGATIVE NEGATIVE    Specific Gravity, UA 1.017 1.005 - 1.030    Urine Hgb NEGATIVE NEGATIVE    pH, UA 7.0 5.0 - 8.0    Protein, UA NEGATIVE NEGATIVE    Urobilinogen, Urine Normal Normal    Nitrite, Urine NEGATIVE NEGATIVE    Leukocyte Esterase, Urine LARGE (A) NEGATIVE    -          WBC, UA 20 TO 50 0 - 5 /HPF    RBC, UA 2 TO 5 0 - 4 /HPF    Casts UA  0 - 8 /LPF     2 TO 5 HYALINE Reference range defined for non-centrifuged specimen. Crystals, UA NOT REPORTED None /HPF    Epithelial Cells UA 5 TO 10 0 - 5 /HPF    Renal Epithelial, UA NOT REPORTED 0 /HPF    Bacteria, UA MODERATE (A) None    Mucus, UA NOT REPORTED None    Trichomonas, UA NOT REPORTED None    Amorphous, UA NOT REPORTED None    Other Observations UA NOT REPORTED NOT REQ. Yeast, UA NOT REPORTED None       RADIOLOGY:  No orders to display      EMERGENCY DEPARTMENT COURSE:    ED Course as of May 29 1629   Sat May 29, 2021   1307 Discussed with OB who recommends patient can perform a self vaginal swab but does not require pelvic at this time given placenta previa. Recommend to do a self swab, and discharge and of course treat her UTI. Discussed results with patient.   She reports that she has had Keflex in the past without issue. Will order this, and watch her briefly. [TC]      ED Course User Index  [TC] Mick Manriquez DO       MDM: 64-year-old G-tube  at about 17 weeks gestation presenting with suprapubic abdominal pain and vaginal pain. On exam she is nontoxic-appearing no acute distress. She is mildly tachycardic but vitals are otherwise unremarkable. Heart regular rate and rhythm on my examination, lungs are clear to auscultation bilateral.  Abdomen soft with suprapubic tenderness to palpation as well as right upper quadrant tenderness. No rebound or guarding noted. Also some mild left-sided CVA tenderness. Differential diagnosis includes UTI, pyelonephritis,  labor, [place] contractions, PID, among others. I did perform bedside ultrasound which shows fetal heart rate of about 150 bpm with good fetal movement. Also perform a gallbladder ultrasound which shows evidence of gallstones in her gallbladder but no pericholecystic fluid, or gallbladder wall thickening to suggest acute cholecystitis. Given her symptoms will obtain basic labs, treat symptomatically. She does have a history of placenta previa and will defer pelvic exam to [facility] service. Discussed with OB service. Recommended doing self swabs and having her follow-up. Did not feel that patient necessarily is a pelvic at this time after discussion. I did perform the vaginal swab but did not perform pelvic examination. Urinalysis is concerning for cystitis but otherwise lab work is unremarkable. CRP is not back as of the lab is down however not feeling this is going to  significantly given her probable UTI. Will discharge on Keflex. Instructed her to follow-up with her OB as soon as possible. Did also discuss her hyponatremia. She discussed that she should follow-up with her primary care physician soon as possible. Discussed supportive care measures.  Strict return precautions given. Patient instructed to follow with her primary care provider as soon as possible for follow up. Patient and/or family expressed understanding and agreement with this plan. PROCEDURES:  None    CONSULTS:  IP CONSULT TO OB GYN    FINAL IMPRESSION      1. Suprapubic abdominal pain    2.  Acute cystitis without hematuria          DISPOSITION / PLAN     DISPOSITION Decision To Discharge 05/29/2021 01:48:19 PM      PATIENT REFERRED TO:  [facility]  [facility]  [phone]  Schedule an appointment as soon as possible for a visit   Follow up of this visit    [facility]  [facility]  [phone]  Go to   If symptoms worsen    Sonal Michaels MD  [address] [place] [id number] [phone]    Schedule an appointment as soon as possible for a visit   Follow up of this visit      DISCHARGE MEDICATIONS:  Discharge Medication List as of 5/29/2021  2:18 PM      START taking these medications    Details   cephALEXin (KEFLEX) 250 MG/5ML suspension Take 10 mLs by mouth 4 times daily for 7 days, Disp-280 mL, R-0Print             Sherlyn Boo DO  Emergency Medicine Resident  [facility]    (Please note that portions of this note were completed with a voice recognition program.  Efforts were made to edit thedictations but occasionally words are mis-transcribed.)       Sherlyn Boo DO  Resident  05/29/21 3635

## 2021-05-29 NOTE — ED TRIAGE NOTES
Pt reports abdominal, pelvic, and vaginal pain/pressure that started yesterday with no known trauma. Pt states pain worsens when standing up. Pt reports no vaginal bleeding or discharge.

## 2021-05-29 NOTE — CONSULTS
OB/GYN Resident Note    OBGYN consulted for suprapubic abdominal pain in patient who is 17w4d. VSS, patient afebrile. BSUS showing FHT 150s per ED resident. Patient denies vaginal bleeding or leakage of fluid. UA concerning for UTI, ER to treat appropriately. Urine culture in process. CBC and CMP unremarkable other than Na 129, patient receiving NS IVF bolus. Lipase wnl. Vaginitis pending. Please page OBGYN with any questions or concerns or if patient begins to complain of vaginal bleeding or leakage of fluid. Vitals:    05/29/21 1058 05/29/21 1107   BP:  (!) 120/53   Pulse:  106   Resp:  18   Temp: 96.8 °F (36 °C) 97.9 °F (36.6 °C)   TempSrc: Infrared Oral   SpO2:  99%     Recent Results (from the past 6 hour(s))   Urinalysis with microscopic    Collection Time: 05/29/21 11:44 AM   Result Value Ref Range    Color, UA YELLOW YELLOW    Turbidity UA CLOUDY (A) CLEAR    Glucose, Ur NEGATIVE NEGATIVE    Bilirubin Urine NEGATIVE NEGATIVE    Ketones, Urine NEGATIVE NEGATIVE    Specific Gravity, UA 1.017 1.005 - 1.030    Urine Hgb NEGATIVE NEGATIVE    pH, UA 7.0 5.0 - 8.0    Protein, UA NEGATIVE NEGATIVE    Urobilinogen, Urine Normal Normal    Nitrite, Urine NEGATIVE NEGATIVE    Leukocyte Esterase, Urine LARGE (A) NEGATIVE    -          WBC, UA 20 TO 50 0 - 5 /HPF    RBC, UA 2 TO 5 0 - 4 /HPF    Casts UA  0 - 8 /LPF     2 TO 5 HYALINE Reference range defined for non-centrifuged specimen. Crystals, UA NOT REPORTED None /HPF    Epithelial Cells UA 5 TO 10 0 - 5 /HPF    Renal Epithelial, UA NOT REPORTED 0 /HPF    Bacteria, UA MODERATE (A) None    Mucus, UA NOT REPORTED None    Trichomonas, UA NOT REPORTED None    Amorphous, UA NOT REPORTED None    Other Observations UA NOT REPORTED NOT REQ.     Yeast, UA NOT REPORTED None   CBC WITH AUTO DIFFERENTIAL    Collection Time: 05/29/21 11:48 AM   Result Value Ref Range    WBC 8.3 3.5 - 11.3 k/uL    RBC 4.04 3.95 - 5.11 m/uL    Hemoglobin 10.9 (L) 11.9 - 15.1 g/dL    Hematocrit 33.8 (L) 36.3 - 47.1 %    MCV 83.7 82.6 - 102.9 fL    MCH 27.0 25.2 - 33.5 pg    MCHC 32.2 28.4 - 34.8 g/dL    RDW 14.0 11.8 - 14.4 %    Platelets 340 908 - 707 k/uL    MPV 9.6 8.1 - 13.5 fL    NRBC Automated 0.0 0.0 per 100 WBC    Differential Type NOT REPORTED     Seg Neutrophils 60 36 - 65 %    Lymphocytes 31 24 - 43 %    Monocytes 7 3 - 12 %    Eosinophils % 2 1 - 4 %    Basophils 0 0 - 2 %    Immature Granulocytes 0 0 %    Segs Absolute 4.91 1.50 - 8.10 k/uL    Absolute Lymph # 2.58 1.10 - 3.70 k/uL    Absolute Mono # 0.54 0.10 - 1.20 k/uL    Absolute Eos # 0.16 0.00 - 0.44 k/uL    Basophils Absolute 0.03 0.00 - 0.20 k/uL    Absolute Immature Granulocyte 0.03 0.00 - 0.30 k/uL    WBC Morphology NOT REPORTED     RBC Morphology NOT REPORTED     Platelet Estimate NOT REPORTED    COMPREHENSIVE METABOLIC PANEL    Collection Time: 05/29/21 11:48 AM   Result Value Ref Range    Glucose 101 (H) 70 - 99 mg/dL    BUN 6 6 - 20 mg/dL    CREATININE 0.35 (L) 0.50 - 0.90 mg/dL    Bun/Cre Ratio NOT REPORTED 9 - 20    Calcium 8.5 (L) 8.6 - 10.4 mg/dL    Sodium 129 (L) 135 - 144 mmol/L    Potassium 3.8 3.7 - 5.3 mmol/L    Chloride 101 98 - 107 mmol/L    CO2 21 20 - 31 mmol/L    Anion Gap 7 (L) 9 - 17 mmol/L    Alkaline Phosphatase 63 35 - 104 U/L    ALT 12 5 - 33 U/L    AST 16 <32 U/L    Total Bilirubin 0.20 (L) 0.3 - 1.2 mg/dL    Total Protein 6.5 6.4 - 8.3 g/dL    Albumin 3.3 (L) 3.5 - 5.2 g/dL    Albumin/Globulin Ratio 1.0 1.0 - 2.5    GFR Non-African American >60 >60 mL/min    GFR African American >60 >60 mL/min    GFR Comment          GFR Staging NOT REPORTED    LIPASE    Collection Time: 05/29/21 11:48 AM   Result Value Ref Range    Lipase 13 13 - 60 U/L           Viktoriya Kaur, DO  OB/GYN Resident

## 2021-05-30 LAB
CULTURE: NORMAL
Lab: NORMAL
SPECIMEN DESCRIPTION: NORMAL

## 2021-06-04 ENCOUNTER — HOSPITAL ENCOUNTER (OUTPATIENT)
Age: 36
Discharge: HOME | End: 2021-06-04
Payer: MEDICARE

## 2021-06-04 ENCOUNTER — ROUTINE PRENATAL (OUTPATIENT)
Dept: PERINATAL CARE | Age: 36
End: 2021-06-04
Payer: MEDICARE

## 2021-06-04 VITALS
TEMPERATURE: 96.9 F | DIASTOLIC BLOOD PRESSURE: 71 MMHG | SYSTOLIC BLOOD PRESSURE: 92 MMHG | RESPIRATION RATE: 16 BRPM | BODY MASS INDEX: 33.15 KG/M2 | WEIGHT: 199 LBS | HEIGHT: 65 IN | HEART RATE: 76 BPM

## 2021-06-04 DIAGNOSIS — Z3A.18 18 WEEKS GESTATION OF PREGNANCY: ICD-10-CM

## 2021-06-04 DIAGNOSIS — O09.212 CURRENT PREGNANCY WITH HISTORY OF PRE-TERM LABOR IN SECOND TRIMESTER: Primary | ICD-10-CM

## 2021-06-04 LAB
ABDOMINAL CIRCUMFERENCE: NORMAL
BIPARIETAL DIAMETER: NORMAL
ESTIMATED FETAL WEIGHT: NORMAL
FEMORAL DIAMETER: NORMAL
HC/AC: NORMAL
HEAD CIRCUMFERENCE: NORMAL
IRON SATURATION: 38 % (ref 20–55)
IRON: 152 UG/DL (ref 37–145)
POTASSIUM SERPL-SCNC: 4.1 MMOL/L (ref 3.7–5.3)
T3 FREE: 2.89 PG/ML (ref 2.02–4.43)
THYROXINE, FREE: 1.16 NG/DL (ref 0.93–1.7)
TOTAL IRON BINDING CAPACITY: 402 UG/DL (ref 250–450)
TSH SERPL DL<=0.05 MIU/L-ACNC: 0.6 MIU/L (ref 0.3–5)
UNSATURATED IRON BINDING CAPACITY: 250 UG/DL (ref 112–347)
VITAMIN D 25-HYDROXY: 31.7 NG/ML (ref 30–100)

## 2021-06-04 PROCEDURE — 83550 IRON BINDING TEST: CPT

## 2021-06-04 PROCEDURE — 76817 TRANSVAGINAL US OBSTETRIC: CPT | Performed by: OBSTETRICS & GYNECOLOGY

## 2021-06-04 PROCEDURE — 83540 ASSAY OF IRON: CPT

## 2021-06-04 PROCEDURE — 36415 COLL VENOUS BLD VENIPUNCTURE: CPT

## 2021-06-04 PROCEDURE — 84439 ASSAY OF FREE THYROXINE: CPT

## 2021-06-04 PROCEDURE — 84132 ASSAY OF SERUM POTASSIUM: CPT

## 2021-06-04 PROCEDURE — 84443 ASSAY THYROID STIM HORMONE: CPT

## 2021-06-04 PROCEDURE — 84481 FREE ASSAY (FT-3): CPT

## 2021-06-04 PROCEDURE — 82306 VITAMIN D 25 HYDROXY: CPT

## 2021-06-14 ENCOUNTER — FOLLOWUP TELEPHONE ENCOUNTER (OUTPATIENT)
Dept: OBGYN | Age: 36
End: 2021-06-14

## 2021-06-14 ENCOUNTER — ROUTINE PRENATAL (OUTPATIENT)
Dept: OBGYN | Age: 36
End: 2021-06-14
Payer: MEDICARE

## 2021-06-14 VITALS
DIASTOLIC BLOOD PRESSURE: 67 MMHG | BODY MASS INDEX: 33.48 KG/M2 | SYSTOLIC BLOOD PRESSURE: 101 MMHG | HEART RATE: 100 BPM | WEIGHT: 201.2 LBS

## 2021-06-14 DIAGNOSIS — E03.9 HYPOTHYROIDISM, UNSPECIFIED TYPE: ICD-10-CM

## 2021-06-14 DIAGNOSIS — O26.899 PELVIC PRESSURE IN PREGNANCY: Primary | ICD-10-CM

## 2021-06-14 DIAGNOSIS — R10.2 PELVIC PRESSURE IN PREGNANCY: Primary | ICD-10-CM

## 2021-06-14 PROCEDURE — 1036F TOBACCO NON-USER: CPT | Performed by: STUDENT IN AN ORGANIZED HEALTH CARE EDUCATION/TRAINING PROGRAM

## 2021-06-14 PROCEDURE — 81003 URINALYSIS AUTO W/O SCOPE: CPT | Performed by: STUDENT IN AN ORGANIZED HEALTH CARE EDUCATION/TRAINING PROGRAM

## 2021-06-14 PROCEDURE — 99213 OFFICE O/P EST LOW 20 MIN: CPT | Performed by: STUDENT IN AN ORGANIZED HEALTH CARE EDUCATION/TRAINING PROGRAM

## 2021-06-14 PROCEDURE — G8427 DOCREV CUR MEDS BY ELIG CLIN: HCPCS | Performed by: STUDENT IN AN ORGANIZED HEALTH CARE EDUCATION/TRAINING PROGRAM

## 2021-06-14 PROCEDURE — G8419 CALC BMI OUT NRM PARAM NOF/U: HCPCS | Performed by: STUDENT IN AN ORGANIZED HEALTH CARE EDUCATION/TRAINING PROGRAM

## 2021-06-14 PROCEDURE — 99211 OFF/OP EST MAY X REQ PHY/QHP: CPT | Performed by: STUDENT IN AN ORGANIZED HEALTH CARE EDUCATION/TRAINING PROGRAM

## 2021-06-14 RX ORDER — ASPIRIN 81 MG/1
81 TABLET ORAL DAILY
Qty: 30 TABLET | Refills: 5 | Status: ON HOLD | OUTPATIENT
Start: 2021-06-14 | End: 2021-06-26 | Stop reason: HOSPADM

## 2021-06-14 RX ORDER — PNV NO.95/FERROUS FUM/FOLIC AC 28MG-0.8MG
1 TABLET ORAL DAILY
Qty: 30 TABLET | Refills: 12 | Status: SHIPPED | OUTPATIENT
Start: 2021-06-14

## 2021-06-14 NOTE — PROGRESS NOTES
Prenatal Visit    Bobby Ochoa is a 38 y.o. female  at 23w7d    The patient was seen and evaluated. She reports pelvic heaviness with standing and movement which is concerning to her given her history of previable delivery at 19 weeks and she is currently 19 weeks. She reports this has been ongoing since she was evaluated in the ED for the same complaints on . She also reports \"buzzing\" in her rectum and perineum as well as \"random little pains\" of the external vagina. She also endorses a white, stringy vaginal discharge. She does endorse compliance with vaginal progesterone. Discussed pelvic exam for cultures and to look at the cervix for evaluation of dilation/infection. Patient declined at this time. There was positive fetal movements. She denies contractions, vaginal bleeding and leakage of fluid. Signs and symptoms of  labor as well as labor were reviewed. The S/S of Pre-Eclampsia were reviewed with the patient in detail. She is to report any of these if they occur. She currently denies any of these. The problem list reflects the active issues addressed during today's visit    Vitals:  BP: 101/67  Weight: 201 lb 3.2 oz (91.3 kg)  Pulse: 100  Patient Position: Sitting  Albumin: Trace  Glucose: Negative  Fetal Heart Rate: 145  Movement: Present     Labs: The patient is Rh positive, Rhogam not indicated  ABO/Rh   Date Value Ref Range Status   2021 A POSITIVE  Final       Early 1 hour GTT: 111  UCx negative 21    Assessment & Plan:  Bobby Ochoa is a 38 y.o. female  at 19w6d   - 28 week labs ordered, including (CBC, 1 hr GTT, U/A C&S), the patient is to complete this in the next 7 weeks. - The Nuchal Translucency testing was reviewed and found to be normal   - A single marker MSAFP was reviewed and found to be normal.    - The patients anatomy ultrasound has been ordered and is scheduled for    - Aspirin indication: Has not yet started.  Stressed importance and Rx sent to pharmacy. - Short Interval Pregnancy - will follow with MFM for growth   - Hx Previable  Delivery - Compliant with progesterone suppositories    - Hypothyroidism - follows with Dr. Frank Maza. Most recent TSH/T4 () within normal for pregnancy/fetal brain development 0.60/1. 16. Patient is not currently on any medications. - AMA - NIPT without aneuploidy detected   - Depression: States she follows closely with Dr. Randall Cameron and is considering restarting meds. Discussed risks, benefits, alternatives to SSRI, SNRIs. Endorsed needs for food, bills, transportation. Will have SW speak with the patient. - Warning signs reviewed and recommendations when to call or present to the hospital if she experiences signs or symptoms of  labor and pre-eclampsia were reviewed. Pelvic Pressure   - See HPI   - Patient refused pelvic exam   - Discussed reporting to the hospital for evaluation of continued complaints given significant prior obstetric history. Patient verbalized understanding   - Not sexually active and declined GC/C testing. Also not amenable to collecting vaginitis probe   - UA ordered      Patient Active Problem List    Diagnosis Date Noted    Complete placenta previa 2021    Positive depression screening 2021     PHQ-9  14. Pt is established with a private psychiatrist ( Dr. Kash Rojas )  and was taken off her meds. Pt will plan to discuss being put back on at her next virtual visit. Pt denies suicidal ideation or thoughts of harming others at this time 21.    Referral faxed to [facility] for enrollment-SK      Short Interval Pregnancy 2021    Hypothyroidism 2021     TSH 0.02, T4 1.42 on 4/14/21  5/3/21-patient not on medication, follows with endocrinology  21 - TSH/T4 0.60/1.16      FHx DM 2021     Early 1hr GTT wnl      Anemia 2021     Pt taking PO iron      AMA (advanced maternal age) multigravida 35+ 2021 First trimester low risk      High-risk pregnancy, first trimester 04/09/2021 4/9/21- [facility] referral placed for first trimester screen, anatomy scan and NIPT      Fam Hx Polydactyly 04/05/2021     FOB sister w/ polydactyly      E. 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 Please ask patient if she would like to switch to injectable and contact Margarette CURRY      Hx Previable PPROM @19wk6d on 12/19/20 12/20/2020     The patient did not know she was pregnant prior to rupture.  Anxiety 12/20/2020    Adrenal insufficiency ([address] 12/20/2020    Tobacco use 12/20/2020    Obesity 09/15/2017     Early 1hr GTT wnl 111      Strabismus 09/15/2017     h/o Major depression 04/30/2013     No meds      Thyroid nodule 03/29/2012     CT neck : 5/2/2017  Impression: Large mass in the lower pole of the left lobe of the thyroid gland plunging into the superior mediastinum and displacing the trachea somewhat toward the right.  This has progressed dramatically since 2007.    FNA: Benign. Nodular goiter. 12/20/2016           Return in about 4 weeks (around 7/12/2021), or MAHNAZ Herrera DO  Ob/Gyn Resident  [facility] [place]  6/14/2021, 5:14 PM

## 2021-06-14 NOTE — TELEPHONE ENCOUNTER
SW met with Pt following appt. Pt discussed current issues and needs. SW was able to discuss Pt current MH symptoms and dealing with current grief. Pt will continue to work with current psychiatrist but also work with grief counseling. SW was able to assist client with referral list for personal items and will follow up with [facility] to establish new  within the program. Scott Aguilar will follow up with Pt with more information as received.

## 2021-06-19 ENCOUNTER — HOSPITAL ENCOUNTER (INPATIENT)
Age: 36
LOS: 6 days | Discharge: HOME | DRG: 560 | End: 2021-06-26
Attending: EMERGENCY MEDICINE | Admitting: OBSTETRICS & GYNECOLOGY
Payer: MEDICARE

## 2021-06-19 ENCOUNTER — TELEPHONE (OUTPATIENT)
Dept: INTERNAL MEDICINE CLINIC | Age: 36
End: 2021-06-19

## 2021-06-19 DIAGNOSIS — O34.30: Primary | ICD-10-CM

## 2021-06-19 DIAGNOSIS — D50.9 IRON DEFICIENCY ANEMIA, UNSPECIFIED IRON DEFICIENCY ANEMIA TYPE: ICD-10-CM

## 2021-06-19 PROBLEM — Z3A.20 20 WEEKS GESTATION OF PREGNANCY: Status: ACTIVE | Noted: 2021-06-19

## 2021-06-19 PROBLEM — D56.3 THALASSEMIA MINOR: Status: ACTIVE | Noted: 2021-06-19

## 2021-06-19 PROBLEM — J45.909 ASTHMA: Status: ACTIVE | Noted: 2021-06-19

## 2021-06-19 PROBLEM — Z88.0 PENICILLIN ALLERGY: Status: ACTIVE | Noted: 2021-06-19

## 2021-06-19 PROBLEM — O44.02 COMPLETE PLACENTA PREVIA NOS OR WITHOUT HEMORRHAGE, SECOND TRIMESTER: Status: RESOLVED | Noted: 2021-05-29 | Resolved: 2021-06-19

## 2021-06-19 LAB
-: ABNORMAL
ABSOLUTE EOS #: 0.13 K/UL (ref 0–0.44)
ABSOLUTE IMMATURE GRANULOCYTE: 0.04 K/UL (ref 0–0.3)
ABSOLUTE LYMPH #: 3.13 K/UL (ref 1.1–3.7)
ABSOLUTE MONO #: 0.73 K/UL (ref 0.1–1.2)
AMORPHOUS: ABNORMAL
BACTERIA: ABNORMAL
BASOPHILS # BLD: 0 % (ref 0–2)
BASOPHILS ABSOLUTE: 0.04 K/UL (ref 0–0.2)
BILIRUBIN URINE: NEGATIVE
CASTS UA: ABNORMAL /LPF (ref 0–2)
COLOR: ABNORMAL
CRYSTALS, UA: ABNORMAL /HPF
CRYSTALS, UA: ABNORMAL /HPF
DIFFERENTIAL TYPE: ABNORMAL
DIRECT EXAM: NORMAL
EOSINOPHILS RELATIVE PERCENT: 1 % (ref 1–4)
EPITHELIAL CELLS UA: ABNORMAL /HPF (ref 0–5)
GLUCOSE URINE: NEGATIVE
HCT VFR BLD CALC: 34.6 % (ref 36.3–47.1)
HEMOGLOBIN: 10.8 G/DL (ref 11.9–15.1)
IMMATURE GRANULOCYTES: 0 %
KETONES, URINE: ABNORMAL
LEUKOCYTE ESTERASE, URINE: ABNORMAL
LYMPHOCYTES # BLD: 32 % (ref 24–43)
Lab: NORMAL
MCH RBC QN AUTO: 27.3 PG (ref 25.2–33.5)
MCHC RBC AUTO-ENTMCNC: 31.2 G/DL (ref 28.4–34.8)
MCV RBC AUTO: 87.6 FL (ref 82.6–102.9)
MONOCYTES # BLD: 8 % (ref 3–12)
MUCUS: ABNORMAL
NITRITE, URINE: NEGATIVE
NRBC AUTOMATED: 0 PER 100 WBC
OTHER OBSERVATIONS UA: ABNORMAL
PDW BLD-RTO: 13.9 % (ref 11.8–14.4)
PH UA: 5.5 (ref 5–8)
PLATELET # BLD: 258 K/UL (ref 138–453)
PLATELET ESTIMATE: ABNORMAL
PMV BLD AUTO: 9.9 FL (ref 8.1–13.5)
PROTEIN UA: ABNORMAL
RBC # BLD: 3.95 M/UL (ref 3.95–5.11)
RBC # BLD: ABNORMAL 10*6/UL
RBC UA: ABNORMAL /HPF (ref 0–2)
RENAL EPITHELIAL, UA: ABNORMAL /HPF
SARS-COV-2, RAPID: NOT DETECTED
SEG NEUTROPHILS: 59 % (ref 36–65)
SEGMENTED NEUTROPHILS ABSOLUTE COUNT: 5.64 K/UL (ref 1.5–8.1)
SPECIFIC GRAVITY UA: 1.04 (ref 1–1.03)
SPECIMEN DESCRIPTION: NORMAL
SPECIMEN DESCRIPTION: NORMAL
TRICHOMONAS: ABNORMAL
TURBIDITY: ABNORMAL
URINE HGB: NEGATIVE
UROBILINOGEN, URINE: NORMAL
WBC # BLD: 9.7 K/UL (ref 3.5–11.3)
WBC # BLD: ABNORMAL 10*3/UL
WBC UA: ABNORMAL /HPF (ref 0–5)
YEAST: ABNORMAL

## 2021-06-19 PROCEDURE — 87635 SARS-COV-2 COVID-19 AMP PRB: CPT

## 2021-06-19 PROCEDURE — 81001 URINALYSIS AUTO W/SCOPE: CPT

## 2021-06-19 PROCEDURE — 87086 URINE CULTURE/COLONY COUNT: CPT

## 2021-06-19 PROCEDURE — 87591 N.GONORRHOEAE DNA AMP PROB: CPT

## 2021-06-19 PROCEDURE — 99282 EMERGENCY DEPT VISIT SF MDM: CPT

## 2021-06-19 PROCEDURE — G0378 HOSPITAL OBSERVATION PER HR: HCPCS

## 2021-06-19 PROCEDURE — 87510 GARDNER VAG DNA DIR PROBE: CPT

## 2021-06-19 PROCEDURE — 87491 CHLMYD TRACH DNA AMP PROBE: CPT

## 2021-06-19 PROCEDURE — 85025 COMPLETE CBC W/AUTO DIFF WBC: CPT

## 2021-06-19 PROCEDURE — 87480 CANDIDA DNA DIR PROBE: CPT

## 2021-06-19 PROCEDURE — 86850 RBC ANTIBODY SCREEN: CPT

## 2021-06-19 PROCEDURE — 87660 TRICHOMONAS VAGIN DIR PROBE: CPT

## 2021-06-19 PROCEDURE — 86900 BLOOD TYPING SEROLOGIC ABO: CPT

## 2021-06-19 PROCEDURE — 86901 BLOOD TYPING SEROLOGIC RH(D): CPT

## 2021-06-19 RX ORDER — 0.9 % SODIUM CHLORIDE 0.9 %
500 INTRAVENOUS SOLUTION INTRAVENOUS ONCE
Status: DISCONTINUED | OUTPATIENT
Start: 2021-06-20 | End: 2021-06-26

## 2021-06-19 RX ORDER — ACETAMINOPHEN 500 MG
1000 TABLET ORAL EVERY 6 HOURS PRN
Status: DISCONTINUED | OUTPATIENT
Start: 2021-06-19 | End: 2021-06-26

## 2021-06-19 ASSESSMENT — PAIN DESCRIPTION - LOCATION: LOCATION: VAGINA;ABDOMEN

## 2021-06-19 ASSESSMENT — PAIN DESCRIPTION - FREQUENCY: FREQUENCY: INTERMITTENT

## 2021-06-19 ASSESSMENT — PAIN DESCRIPTION - ONSET: ONSET: ON-GOING

## 2021-06-19 ASSESSMENT — PAIN DESCRIPTION - PAIN TYPE: TYPE: ACUTE PAIN

## 2021-06-19 ASSESSMENT — ENCOUNTER SYMPTOMS
PHOTOPHOBIA: 0
NAUSEA: 0
ABDOMINAL PAIN: 0
WHEEZING: 0
RHINORRHEA: 0
VOMITING: 0
SHORTNESS OF BREATH: 0
BACK PAIN: 1

## 2021-06-19 ASSESSMENT — PAIN SCALES - GENERAL: PAINLEVEL_OUTOF10: 6

## 2021-06-19 ASSESSMENT — PAIN DESCRIPTION - PROGRESSION: CLINICAL_PROGRESSION: GRADUALLY WORSENING

## 2021-06-19 ASSESSMENT — PAIN DESCRIPTION - DESCRIPTORS: DESCRIPTORS: SHARP;PRESSURE

## 2021-06-19 NOTE — TELEPHONE ENCOUNTER
Received Physician health link regarding vaginal discharge and cramp. Called the patient 2 times but she didn't receive the call, voice message was left to call back.

## 2021-06-19 NOTE — LETTER
[facility]  [address] [address]  Dept: [phone]  Loc: [phone]          To Whom It May Concern:        Jonas Solares has been under the care of our facility. Her partner, Victoria Woods has been a vital part of her care and was present with her in the hospital on 6/21/21 and 6/22/21. Please contact us with any questions or concerns.                  Wyatt Bullard, DO  OB/GYN   [facility], [address]  6/23/2021, 12:06 AM

## 2021-06-20 PROBLEM — O09.90 HIGH-RISK PREGNANCY, UNSPECIFIED TRIMESTER: Status: ACTIVE | Noted: 2021-06-20

## 2021-06-20 LAB
CULTURE: NORMAL
Lab: NORMAL
SPECIMEN DESCRIPTION: NORMAL

## 2021-06-20 PROCEDURE — 2580000003 HC RX 258: Performed by: STUDENT IN AN ORGANIZED HEALTH CARE EDUCATION/TRAINING PROGRAM

## 2021-06-20 PROCEDURE — 1220000000 HC SEMI PRIVATE OB R&B

## 2021-06-20 PROCEDURE — 6370000000 HC RX 637 (ALT 250 FOR IP): Performed by: STUDENT IN AN ORGANIZED HEALTH CARE EDUCATION/TRAINING PROGRAM

## 2021-06-20 PROCEDURE — 99232 SBSQ HOSP IP/OBS MODERATE 35: CPT | Performed by: OBSTETRICS & GYNECOLOGY

## 2021-06-20 PROCEDURE — 6360000002 HC RX W HCPCS: Performed by: STUDENT IN AN ORGANIZED HEALTH CARE EDUCATION/TRAINING PROGRAM

## 2021-06-20 RX ORDER — DIPHENHYDRAMINE HYDROCHLORIDE 50 MG/ML
25 INJECTION INTRAMUSCULAR; INTRAVENOUS EVERY 6 HOURS PRN
Status: DISCONTINUED | OUTPATIENT
Start: 2021-06-20 | End: 2021-06-26

## 2021-06-20 RX ORDER — SODIUM CHLORIDE 9 MG/ML
INJECTION, SOLUTION INTRAVENOUS CONTINUOUS
Status: DISCONTINUED | OUTPATIENT
Start: 2021-06-20 | End: 2021-06-21

## 2021-06-20 RX ADMIN — ACETAMINOPHEN 1000 MG: 500 TABLET ORAL at 23:56

## 2021-06-20 RX ADMIN — CEFTRIAXONE SODIUM 1000 MG: 1 INJECTION, POWDER, FOR SOLUTION INTRAMUSCULAR; INTRAVENOUS at 07:05

## 2021-06-20 ASSESSMENT — PAIN SCALES - GENERAL: PAINLEVEL_OUTOF10: 6

## 2021-06-20 NOTE — ED NOTES
Writer assisted OB with pelvic exam. Cultures taken, labeled, and sent to ED.        Ashtyn Templeton RN  06/19/21 [id number]

## 2021-06-20 NOTE — PROGRESS NOTES
OB/GYN PROGRESS NOTE    Bharath Ramos is a 38 y.o. female  at [place] [address] Day: 2    Subjective:   Patient has been seen and examined. Patient is doing well and stating she is attributing her symptoms to a UTI with increasing urinary frequency as she has had UTI's in the past. Denies LOF or vaginal bleeding and has not had any intermittent pelvic pressure/pain overnight. The patient reports fetal movement is present, denies contractions, denies loss of fluid, denies vaginal bleeding. Patient denies headache, vision changes, nausea, vomiting, fever, chills, shortness of breath, chest pain, RUQ pain, abdominal pain, diarrhea, dysuria or, hematuria. Objective:   Vitals:  Vitals:    21 1950   BP: 114/73   Pulse: 94   Resp: 18   Temp: 97.9 °F (36.6 °C)   TempSrc: Oral   SpO2: 99%   Height: 5' 5\" (1.651 m)     FHT: 127bpm via BSUS    Physical Exam:  General appearance:  no apparent distress, alert and cooperative  HEENT: head atraumatic, normocephalic, moist mucous membranes, trachea midline  Neurologic:  alert, oriented, normal speech, no focal findings or movement disorder noted  Lungs:  No increased work of breathing, good air exchange, clear to auscultation bilaterally, no crackles or wheezing  Heart:  normal S1 and S2, regular rate and rhythm and no murmur    Abdomen:  soft, gravid, non-tender, no rebound, guarding, or rigidity and no RUQ or epigastric tenderness  Extremities:  no calf tenderness, non edematous  Musculoskeletal: Gross strength equal and intact throughout, no gross abnormalities, range of motion normal in hips, knees, shoulders and spine, CVA tenderness: none  Psychiatric: Mood appropriate, normal affect   Rectal Exam: not indicated  Pelvic Exam: not indicated    DATA:  Labs:   Results for orders placed or performed during the hospital encounter of 21   VAGINITIS DNA PROBE    Specimen: Vaginal   Result Value Ref Range    Specimen Description . VAGINA     Special Requests NOT REPORTED     Direct Exam NEGATIVE for Gardnerella vaginalis     Direct Exam NEGATIVE for Candida sp. Direct Exam NEGATIVE for Trichomonas vaginalis     Direct Exam       Method of testing is a DNA probe intended for detection and identification of Candida species, Gardnerella vaginalis, and Trichomonas vaginalis nucleic acid in vaginal fluid specimens from patients with symptoms of vaginitis/vaginosis. COVID-19, Rapid    Specimen: Nasopharyngeal Swab   Result Value Ref Range    Specimen Description . NASOPHARYNGEAL SWAB     SARS-CoV-2, Rapid Not Detected Not Detected   Urinalysis with microscopic   Result Value Ref Range    Color, UA DARK YELLOW (A) YELLOW    Turbidity UA CLOUDY (A) CLEAR    Glucose, Ur NEGATIVE NEGATIVE    Bilirubin Urine NEGATIVE NEGATIVE    Ketones, Urine TRACE (A) NEGATIVE    Specific Gravity, UA 1.039 (H) 1.005 - 1.030    Urine Hgb NEGATIVE NEGATIVE    pH, UA 5.5 5.0 - 8.0    Protein, UA TRACE (A) NEGATIVE    Urobilinogen, Urine Normal Normal    Nitrite, Urine NEGATIVE NEGATIVE    Leukocyte Esterase, Urine SMALL (A) NEGATIVE    -          WBC, UA 10 TO 20 0 - 5 /HPF    RBC, UA 2 TO 5 0 - 2 /HPF    Casts UA NOT REPORTED 0 - 2 /LPF    Crystals, UA CALCIUM OXALATE (A) None /HPF    Crystals, UA FEW (A) None /HPF    Epithelial Cells UA 10 TO 20 0 - 5 /HPF    Renal Epithelial, UA NOT REPORTED 0 /HPF    Bacteria, UA MODERATE (A) None    Mucus, UA 1+ (A) None    Trichomonas, UA NOT REPORTED None    Amorphous, UA NOT REPORTED None    Other Observations UA NOT REPORTED NOT REQ.     Yeast, UA NOT REPORTED None   CBC Auto Differential   Result Value Ref Range    WBC 9.7 3.5 - 11.3 k/uL    RBC 3.95 3.95 - 5.11 m/uL    Hemoglobin 10.8 (L) 11.9 - 15.1 g/dL    Hematocrit 34.6 (L) 36.3 - 47.1 %    MCV 87.6 82.6 - 102.9 fL    MCH 27.3 25.2 - 33.5 pg    MCHC 31.2 28.4 - 34.8 g/dL    RDW 13.9 11.8 - 14.4 %    Platelets 837 615 - 497 k/uL    MPV 9.9 8.1 - 13.5 fL    NRBC Automated 0.0 0.0 per 100 WBC - Last TSH on 6/4/21 0.60               - Pt denies any s/s hypo/hyperthyroidism     Adrenal insufficiency               - Pt not on any steroids currently and denies any s/s      LGSIL (POLO-1) on Colpo 2/2/21              - Pap smear on 12/29/20 with ASC-H +Other HRHPV     Trichomonas vaginalis infection 2/21/21 (TOR neg)              - GC/C pending   - Vaginitis negative     E. coli UTI during pregnancy               - UCx pending     AMA (NIPT wnl)     Short Interval Pregnancy              - Last delivery 12/19/20     Anxiety/Depression              - Stable on no medications              - Denies any SI/HI     Alpha Thalassemia minor              - aa/a- noted on carrier screening (in media section)     FHx Polydactyly     FHx DM (early 1hr GTT wnl)     Penicillin allergy (hives, SOB)     Tobacco use              - Pt smokes 1/2 PPD              - Encouraged cessation      BMI 33    Patient Active Problem List    Diagnosis Date Noted    20 weeks gestation of pregnancy 06/19/2021    Asthma 06/19/2021    Thalassemia minor 06/19/2021    Penicillin allergy (hives, SOB) 06/19/2021    Positive depression screening 05/05/2021     Overview Note:     PHQ-9  14. Pt is established with a private psychiatrist ( Dr. Yan Rojas )  and was taken off her meds. Pt will plan to discuss being put back on at her next virtual visit. Pt denies suicidal ideation or thoughts of harming others at this time 5/5/21.    Referral faxed to [facility] for enrollment-SK      Short Interval Pregnancy 05/03/2021    Hypothyroidism 05/03/2021     Overview Note:     TSH 0.02, T4 1.42 on 4/14/21  5/3/21-patient not on medication, follows with endocrinology  6/4/21 - TSH/T4 0.60/1.16      FHx DM 05/03/2021     Overview Note:     Early 1hr GTT wnl      Anemia 05/03/2021     Overview Note:     Pt taking PO iron      AMA (advanced maternal age) multigravida 35+ 04/28/2021     Overview Note:     First trimester low risk     

## 2021-06-20 NOTE — DISCHARGE SUMMARY
Obstetric Discharge Summary  [facility]    Patient Name: Gricelda Jimenez  Patient : 1985  Primary Care Physician: No primary care provider on file.   Admit Date: 2021    Principal Diagnosis: IUP at 20w4d, admitted for pelvic pain/pressure and vaginal discharge visibly dilated at 2cm     Her pregnancy has been complicated by:   Patient Active Problem List   Diagnosis    Thyroid nodule     h/o Major depression    Obesity    Strabismus    Hx Previable PPROM @19wk6d on 20    Anxiety    Adrenal insufficiency (HCC)    Tobacco use    SPTD after PPROM at 19w6d    ASCUS with positive high risk HPV cervical    LGSIL (POLO-1) on Colpo 21    Trichomonas vaginalis infection 21    E. coli UTI during pregnancy     Fam Hx Polydactyly    High-risk pregnancy, first trimester    AMA (advanced maternal age) multigravida 35+   Johanna Latin Short Interval Pregnancy    Hypothyroidism    FHx DM    Anemia    Positive depression screening    20 weeks gestation of pregnancy    Asthma    Thalassemia minor    Penicillin allergy (hives, SOB)    Premature dilatation of cervix during pregnancy    Threatened premature labor in second trimester    High-risk pregnancy, unspecified trimester    Threatened  labor, antepartum    Feeling pelvic pressure in pregnancy, antepartum    Short interval between pregnancies complicating pregnancy, antepartum    HRP (high risk pregnancy), second trimester    21 weeks gestation of pregnancy     Previable Infant 21 F APG / Wt 10.11 oz       Infection Present?: No  Hospital Acquired: No    Surgical Operations & Procedures:  Analgesia: none   Delivery Type: Spontaneous Vaginal Delivery: See Labor and Delivery Summary   Laceration(s): Absent    Consultations: NICU, MFM, Spiritual Services    Pertinent Findings & Procedures:   Gricelda Jimenez is a 38 y.o. female  at 22w2d admitted for pelvic pain/pressure and vaginal discharge visibly dilated at 2cm; received Rocephin x1 for suspected UTI. Pt called out with change in pressure/fetal movement and pt was visibly dilated to 3-4cm. HD #2 (): NICU consulted to discuss potential delivery of previable infant and dating for viability. HD#3 (): Patient was sent to [facility] for anatomy scan. Baby was found to be in breech presentation, posterior fundal placenta with an EFW 13oz. No obvious fetal anomalies were noted. Cervix was checked digitally and she was found to be completely dilated with 100% effacement. Decision made to admit for previable  labor. GBS swab collected. Azithro 1g PO x1, Ancef 1g IV Q8 x 48 hrs, Keflex 500 mg PO QID x5 days started. HD#4 (): Morphine 2 mg given for pain control as patient was feeling increased vaginal pressure. Bag was noted at +2 fetal station. Morphine 2 mg IV x2 and Zofran given for further pain control. Procardia 10 mg PO q8 hours for tocolysis ordered, but patient declined medication. HD#5 (): BSUS performed noting breech presentation with shoulders through cervix. Vaginal pressure and abdominal cramping increased and Morphine increased to 2mg IV every 3 hours. NICU team speaks again with patient to discuss limitations of pre viable gestational age. HD#6 (): Patient discussed care with NICU manager     HD#7 (): CBC noted to 9.0. Iron studies were wnl. Iron supplementation was started. HD#8 (): Patient called out in pain and was found sitting on bedside commode. Baby delivered in breech presentation in commode. She delivered by spontaneous vaginal a Live Born infant on 21.        Information for the patient's :  Jo-Ann Cortes [[id number]]   female   Birth Weight: 10.9 oz (0.31 kg)     Apgars: 1 at 1 minute and 1 at 5 minutes and 1 at 10 minutes and 1 at 15 minutes and 1 at 20 minutes     Postpartum course: normal.      Course of patient: uncomplicated    Discharge to: Home    Readmission planned: no     Recommendations on Discharge:     Medications:      Medication List      START taking these medications    docusate 100 MG Caps  Commonly known as: COLACE, DULCOLAX  Take 100 mg by mouth daily  Start taking on: June 27, 2021     ibuprofen 600 MG tablet  Commonly known as: ADVIL;MOTRIN  Take 1 tablet by mouth every 6 hours     sertraline 25 MG tablet  Commonly known as: Zoloft  Take 1 tablet by mouth daily        CHANGE how you take these medications    Prenatal Vitamin 27-0.8 MG Tabs  Take 1 tablet by mouth daily  What changed: Another medication with the same name was removed. Continue taking this medication, and follow the directions you see here.         CONTINUE taking these medications    acetaminophen 500 MG tablet  Commonly known as: TYLENOL  Take 1 tablet by mouth every 6 hours as needed for Pain     ferrous sulfate 325 (65 Fe) MG EC tablet  Commonly known as: Fe Tabs  Take 1 tablet by mouth 2 times daily     oxymetazoline 0.05 % nasal spray  Commonly known as: 12 Hour Nasal Dolores  2 sprays by Nasal route 2 times daily     vitamin D3 25 MCG (1000 UT) Tabs tablet  Commonly known as: CHOLECALCIFEROL  Take 1 tablet by mouth daily        STOP taking these medications    aspirin EC 81 MG EC tablet     NP Thyroid 30 MG tablet  Generic drug: thyroid     potassium chloride 10 MEQ extended release capsule  Commonly known as: MICRO-K     progesterone 200 MG Caps capsule  Commonly known as: Prometrium     Protonix 40 MG tablet  Generic drug: pantoprazole           Where to Get Your Medications      You can get these medications from any pharmacy    Bring a paper prescription for each of these medications  · docusate 100 MG Caps  · ferrous sulfate 325 (65 Fe) MG EC tablet  · ibuprofen 600 MG tablet  · sertraline 25 MG tablet           Activity: pelvic rest x 6 weeks  Diet: regular diet  Follow up: 2 weeks     Condition on discharge: stable    Discharge date: 6/26/21    Zari COOLEY Tammie Johns DO  Ob/Gyn Resident    Comments:  Home care and follow-up care were reviewed. Pelvic rest, and birth control were reviewed. Signs and symptoms of mastitis and post partum depression were reviewed. The patient is to notify her physician if any of these occur. The patient was counseled on secondary smoke risks and the increased risk of sudden infant death syndrome and respiratory problems to her baby with exposure. She was counseled on various alternate recommendations to decrease the exposure to secondary smoke to her children.

## 2021-06-20 NOTE — H&P
Ob/Gyn H&P  Hodan Dolores    Date: 2021       Time: 12:37 AM   Patient Name: Kathy Yoo     Patient : 1985  Room/Bed: 0703/0703-01    Admission Date/Time: 2021  7:58 PM      CC: Pelvic pressure and vaginal discharge     HPI: Kathy Yoo is a 38 y.o.  at 20w4d who presents with pelvic pressure that started Wednesday and worsened today to every few minutes. She noted mucousy vaginal discharge without odor or color and no leakage of fluid or vaginal bleeding. The patient reports fetal movement is present. Patient denies headache, vision changes, nausea, vomiting, fever, chills, shortness of breath, chest pain, RUQ pain, diarrhea, change in color/amount/odor of vaginal discharge, dysuria or, hematuria. She is very anxious due to significant history of spontaneous  delivery at [facility]. She stated last time, she had intermittent pain and she noted more discharge and went in to the ED for evaluation where she found out she was pregnant and her membranes were ruptured. Pt was given antibiotics and admitted and delivered later that day. Baby passed away 2 hours after delivery. This is a very wanted pregnancy and has been using vaginal progesterone nightly without difficulty. Last [facility] ultrasound on 21 showed cervical length of 27-32mm. DATING:  LMP: Patient's last menstrual period was 2021 (approximate).   Estimated Date of Delivery: 21   Based on: early ultrasound, at 7 3/7 weeks GA    PREGNANCY RISK FACTORS:  Patient Active Problem List   Diagnosis    Thyroid nodule     h/o Major depression    Obesity    Strabismus    Hx Previable PPROM @19wk6d on 20    Anxiety    Adrenal insufficiency (HCC)    Tobacco use    SPTD after PPROM at 19w6d    ASCUS with positive high risk HPV cervical    LGSIL (POLO-1) on Colpo 21    Trichomonas vaginalis infection 21    E. coli UTI during pregnancy     Fam Hx Polydactyly    High-risk pregnancy, first trimester    AMA (advanced maternal age) multigravida 35+   [facility] Short Interval Pregnancy    Hypothyroidism    FHx DM    Anemia    Positive depression screening    20 weeks gestation of pregnancy    Asthma    Thalassemia minor    Penicillin allergy (hives, SOB)        Steroids Given In This Pregnancy:  no     REVIEW OF SYSTEMS:   Constitutional: negative fever, negative chills, negative weight changes   HEENT: negative visual disturbances, negative headaches, negative dizziness  Breast: negative breast abnormalities, negative breast lumps, negative nipple discharge  Respiratory: negative dyspnea, negative cough, negative SOB  Cardiovascular: negative chest pain,  negative palpitations, negative arrhythmia, negative syncope   Gastrointestinal: negative abdominal pain, negative RUQ pain, negative N/V, negative diarrhea, negative constipation, negative bowel changes  Genitourinary: negative dysuria, negative hematuria, negative urinary incontinence, positive vaginal discharge, negative vaginal bleeding, positive pelvic pressure  Dermatological: negative rash, negative pruritis, negative mole or other skin changes  Hematologic: negative bruising, negative personal/family history of DVT/PE  Immunologic/Lymphatic: negative recent illness, negative recent sick contact  Musculoskeletal: negative back pain, negative myalgias, negative arthralgias  Neurological:  negative dizziness, negative migraines, negative seizures, negative weakness  Behavior/Psych: negative depression, negative anxiety, negative SI, negative HI    OBSTETRICAL HISTORY:   OB History    Para Term  AB Living   2 0 0 0 1 0   SAB TAB Ectopic Molar Multiple Live Births   1 0 0 0 0 0      # Outcome Date GA Lbr Adi/2nd Weight Sex Delivery Anes PTL Lv   2 Current            1 SAB 20:47 9 oz (0.255 kg) M Vag-Spont None Y ND      Complications: Chorioamnionitis      Name: Starsumi Chain Apgar1: 5  Apgar5: 1       PAST MEDICAL HISTORY:   has a past medical history of Anxiety, Asthma, GERD (gastroesophageal reflux disease), Hypothyroidism, Major depression, Morbid obesity, and Tension type headache. PAST SURGICAL HISTORY:   has no past surgical history on file. ALLERGIES:  is allergic to iodine, penicillin g, prednisolone, and dye  [iodides]. MEDICATIONS:  Prior to Admission medications    Medication Sig Start Date End Date Taking?  Authorizing Provider   aspirin EC 81 MG EC tablet Take 1 tablet by mouth daily 6/14/21   Cristina Hamilton, DO   Prenatal Vit-Fe Fumarate-FA (PRENATAL VITAMIN) 27-0.8 MG TABS Take 1 tablet by mouth daily 6/14/21   Cristina Hamilton, DO   oxymetazoline (12 HOUR NASAL SPRAY) 0.05 % nasal spray 2 sprays by Nasal route 2 times daily  Patient not taking: Reported on 6/14/2021 5/24/21 5/24/22  Jean Carlos Richter,    progesterone (PROMETRIUM) 200 MG CAPS capsule Take 1 capsule by mouth nightly Place one pill vaginally each evening 5/3/21   Mariposa Reveal, DO   aspirin EC 81 MG EC tablet Take 1 tablet by mouth daily 5/3/21   Mariposa Reveal, DO   Prenatal Vit-Fe Fumarate-FA (PRENATAL VITAMIN) 27-1 MG TABS tablet  2/24/21   Historical Provider, MD   NP THYROID 30 MG tablet 30 mg  Patient not taking: Reported on 6/4/2021 12/23/20   Historical Provider, MD   potassium chloride (MICRO-K) 10 MEQ extended release capsule 10 mEq 1/14/21   Historical Provider, MD   pantoprazole (PROTONIX) 40 MG tablet 40 mg  Patient not taking: Reported on 6/4/2021    Historical Provider, MD   acetaminophen (TYLENOL) 500 MG tablet Take 1 tablet by mouth every 6 hours as needed for Pain 1/20/21   Julio Maradiaga,    ferrous sulfate (FE TABS) 325 (65 Fe) MG EC tablet Take 1 tablet by mouth 2 times daily  Patient not taking: Reported on 6/14/2021 10/3/17   Wayne Gonzalez MD   Cholecalciferol (VITAMIN D3) 1000 units TABS Take 1 tablet by mouth daily 7/31/17   Jazzy Curran MD FAMILY HISTORY:  family history includes Depression in her father; Diabetes in her sister; Thyroid Disease in her father. SOCIAL HISTORY:   reports that she has quit smoking. Her smoking use included cigars. She smoked 1.00 pack per day. She has never used smokeless tobacco. She reports that she does not drink alcohol and does not use drugs.     VITALS:  Vitals:    06/19/21 1948   BP: 114/73   Pulse: 94   Resp: 18   Temp: 97.9 °F (36.6 °C)   TempSrc: Oral   SpO2: 99%   Height: 5' 5\" (1.651 m)         PHYSICAL EXAM:  Fetal Heart Tones: obtained in the ED and verbal report 140-150bpm    General appearance:  no apparent distress but anxious and tearful, alert and cooperative  HEENT: head atraumatic, normocephalic, moist mucous membranes, trachea midline  Neurologic:  alert, oriented, normal speech, no focal findings or movement disorder noted  Lungs:  No increased work of breathing, good air exchange, clear to auscultation bilaterally, no crackles or wheezing  Heart:  regular rate and rhythm and no murmur, rubs, gallops  Abdomen:  soft, gravid, no rebound, guarding, rigidity, non-tender, no right upper quadrant tenderness, uterus non-tender, no signs of abruption and no signs of chorioamnionitis  Extremities:  no calf tenderness, non edematous  Musculoskeletal: Gross strength equal and intact throughout, CVA tenderness: none  Psychiatric: Mood appropriate, normal affect   Rectal Exam: not indicated   Pelvic exam:  Chaperone for Intimate Exam: Chaperone was present for entire exam, Chaperone Name: Maria C Thurman RN    Sterile Speculum Exam:   Urethral meatus: normal appearing   Vulva: Normal hair distribution, normal appearing vulva, no masses, tenderness or lesions, normal clitoris   Vagina: Normal appearing vaginal mucosa without lesions, physiologic appearing vaginal discharge noted in the posterior vault, no lacerations   Cervix: Normal appearing cervix without lesions, external os visibly dilated to 2 cm with bulging membranes, no lacerations or abnormal lesions visualized    DATA:  Membranes Ruptured: No  Valsalva/Pooling: absent  Vaginal Bleeding: absent    OMM Structural Exam:  Chief Complaint:  Pregnancy    Anterior/ Posterior Spinal Curves: Lumbar Lordosis -  Increased  Scoliosis (Lateral Spinal Curves): None  Assessment Tool:  T= Tenderness, A= Asymmetry, R= Restricted Motion (A=Active, P=Passive), T=Tissue Texture Changes  Region Evaluated : Severity / Specific of Major Somatic Dysfunction  M99.03 Lumbar -  Minor TART - more than BG levels -   Major Correlations with:  Genitourinary  Structural Diagnosis: Increased lumbar lordosis  Treatment Plan: Outpatient     PRENATAL LAB RESULTS:   Blood Type/Rh: A pos  Antibody Screen: negative  Hemoglobin, Hematocrit, Platelets: 87.6 / 31.0 / 256  Rubella: immune  T.  Pallidum, IgG: non-reactive   Hepatitis B Surface Antigen: non-reactive   HIV: non-reactive   Sickle Cell Screen: negative  Gonorrhea: negative  Chlamydia: negative  Urine culture: negative, date: 21    Early 1 hour Glucose Tolerance Test: 111  Early 3 hour Glucose Tolerance Test: not done  1 hour Glucose Tolerance Test: not done  3 hour Glucose Tolerance Test: not done    Group B Strep: not done  Cystic Fibrosis Screen: not done  First Trimester Screen: low risk  MSAFP/Multiple Markers: normal  Non-Invasive Prenatal Testing: no aneuploidy detected  Anatomy US: not done due to GA    ASSESSMENT & PLAN:  Elijah Daley is a 38 y.o. female  at 22w2d IUP   - GBS unknown / Rh positive / R immune   - No indication for GBS prophylaxis    Pelvic pressure/vaginal discharge with hx sPTD after PPROM at 19w6d on 20   - VSS   - -150bpm per ED resident ultrasound   - SSE: Visually dilated to 2cm with bulgy bag of water   - GC/C, vaginitis obtained   - UCx pending   - Pt stating she feels intermittent pressure/pain but has improved since admission   - Due to hx, would recommend patient be admitted for observation to L&D under service of Dr. Miriam Trivedi for continued evaluation of labor   - CBC, T&S, COVID ordered   - Will obtain FHT qd   - Continue expectant management    - Had a long discussion with her and her FOB about inability to resuscitate  at this gestational age and would not recommend celestone for fetal lung maturity, magnesium sulfate for fetal neuro-protection, or latency or GBS prophylactic antibiotics. As patient continues to have intermittent pelvic pain, concern for labor at this time and would not be a candidate for cerclage placement at this time. Pt and FOB verbalized and expressed understanding.   - Will continue to evaluate and make pt NPO @[place]    Asthma (no meds)    Hypothyroidism (no meds)   - Followed by Dr. Christine Coffey   - Last TSH on 21 0.60    - Pt denies any s/s hypo/hyperthyroidism    Adrenal insufficiency    - Pt not on any steroids currently and denies any s/s     LGSIL (POLO-1) on Colpo 21   - Pap smear on 20 with ASC-H +Other HRHPV    Trichomonas vaginalis infection 21 (TOR neg)   - GC/C, vaginitis obtained on admission    E. coli UTI during pregnancy    - UCx pending    AMA (NIPT wnl)    Short Interval Pregnancy   - Last delivery 20    Anxiety/Depression   - Stable on no medications   - Denies any SI/HI    Alpha Thalassemia minor   - aa/a- noted on carrier screening (in media section)    FHx Polydactyly    FHx DM (early 1hr GTT wnl)    Penicillin allergy (hives, SOB)    Tobacco use   - Pt smokes 1/2 PPD   - Encouraged cessation     BMI 33    Patient Active Problem List    Diagnosis Date Noted    20 weeks gestation of pregnancy 2021    Asthma 2021    Thalassemia minor 2021    Penicillin allergy (hives, SOB) 2021    Positive depression screening 2021     PHQ-9  14. Pt is established with a private psychiatrist ( Dr. Joanie Rojas )  and was taken off her meds.  Pt will plan to discuss being put back on at her next virtual visit. Pt denies suicidal ideation or thoughts of harming others at this time 5/5/21. Referral faxed to [facility] for enrollment-SK      Short Interval Pregnancy 05/03/2021    Hypothyroidism 05/03/2021     TSH 0.02, T4 1.42 on 4/14/21  5/3/21-patient not on medication, follows with endocrinology  6/4/21 - TSH/T4 0.60/1.16      FHx DM 05/03/2021     Early 1hr GTT wnl      Anemia 05/03/2021     Pt taking PO iron      AMA (advanced maternal age) multigravida 35+ 04/28/2021     First trimester low risk      High-risk pregnancy, first trimester 04/09/2021 4/9/21- MFM referral placed for first trimester screen, anatomy scan and NIPT      Fam Hx Polydactyly 04/05/2021     FOB sister w/ polydactyly      E. 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 Please ask patient if she would like to switch to injectable and contact me-Damien CURRY      Hx Previable PPROM @19wk6d on 12/19/20 12/20/2020     The patient did not know she was pregnant prior to rupture.  Anxiety 12/20/2020    Adrenal insufficiency ([address] 12/20/2020    Tobacco use 12/20/2020    Obesity 09/15/2017     Early 1hr GTT wnl 111      Strabismus 09/15/2017     h/o Major depression 04/30/2013     No meds      Thyroid nodule 03/29/2012     CT neck : 5/2/2017  Impression: Large mass in the lower pole of the left lobe of the thyroid gland plunging into the superior mediastinum and displacing the trachea somewhat toward the right.  This has progressed dramatically since 2007.    FNA: Benign. Nodular goiter. 12/20/2016             Plan discussed with Dr. Almodovar , who is agreeable.      Steroids

## 2021-06-20 NOTE — PROGRESS NOTES
Obstetric/Gynecology Resident Interval Note    Pt called out stating she has pelvic pressure and fetal movement in the vagina. Pt seen and evaluated and she was very distressed and tearful. SSE completed and cervix was visually dilated to 3-4 cm with bag intact with some physiologic discharge in the vault. Swab of discharge obtained and nitrazine negative. Negative pooling. Due to cervix visually dilated more, concern for  labor and discussed at length how patient is not a candidate for cerclage at this time. Pt and FOB very tearful but quiet. Advised pt to call out when ready to discuss further management or if she has any other questions. Pt verbalized and expressed understanding. DO EARL Vela Resident, PGY3  [facility]  2021, 7:09 AM            Attending Physician Statement  I have discussed the care of Alexi Dooley, including pertinent history and exam findings,  with the resident. I have seen and examined the patient and the key elements of all parts of the encounter have been performed by me. Patient seen and evaluated. She is tearful and reports frustration as to being offered a cerclage and now finding she is not a candidate. Patient counseled on different types of indications for cerclage including history indicated, ultrasound indicated, and physical exam indicated (ACOG Practice Bulletin 142). Reviewed that unfortunately with most recent pelvic exam and findings consistent with additional cervical dilation compared to exam at initial presentation, concern is for underlying  labor. Patient counseled that risk of early effacement and dilation is known to have associated risk of  delivery has multiple potential etiologies including infection, fetal anomalies, and idiopatic. Discussed that the presence of labor is a contraindication for rescue cerclage. Patient advised that current recommendation is for continued close observation.  Reviewed that advanced cervical dilation and  labor can occasionally stall out. Discussed current guidelines for resuscitation of periviable neonates. Reviewed that it would not be until 21w5d that NICU consultation and celestone would be recommended. Until then, recommendation is to continue daily vaginal progesterone. All questions answered. Patient and partner vocalized understanding. Encouraged them to call out if any additional concerns or questions. At this time, encouraged patient to eat breakfast as she no longer needs to be NPO. I agree with the assessment, plan and orders as documented by the resident.   [facility] Modifier)    Electronically signed by Jonny Negro DO on 2021 at 9:18 AM

## 2021-06-20 NOTE — ED NOTES
Report to L&D RN. No further questions at this time. Awaiting COVID swab to result prior to transferring to floor.      Jermaine Arteaga RN  06/19/21 [id number]

## 2021-06-20 NOTE — ED NOTES
Writer assisted Dr. Raheem Maher with pelvic exam.  Abnormality seen, pelvic immediately stopped and Dr. Raheem Maher updated the patient. OB to be called.      Teagan Jacobo RN  06/19/21 [id number]

## 2021-06-20 NOTE — FLOWSHEET NOTE
Dr. Kathleen Carrasco to room, discussed reasoning as to why she is no longer a candidate for a Cerclage. Pt asking appropriate questions. Pt tearful at this time, support offered. Pt requesting to eat and drink at this time. Beverages provided, pt significant other went to cafeteria to get food for her.

## 2021-06-20 NOTE — ED PROVIDER NOTES
[facility]     Emergency Department     Faculty Note/ Attestation      Pt Name: Wood Marvin                                       MRN: [id number]  [place] 1985  Date of evaluation: 2021    Patients PCP:    No primary care provider on file. Attestation  I performed a history and physical examination of the patient and discussed management with the resident. I reviewed the residents note and agree with the documented findings and plan of care. Any areas of disagreement are noted on the chart. I was personally present for the key portions of any procedures. I have documented in the chart those procedures where I was not present during the key portions. I have reviewed the emergency nurses triage note. I agree with the chief complaint, past medical history, past surgical history, allergies, medications, social and family history as documented unless otherwise noted below. For Physician Assistant/ Nurse Practitioner cases/documentation I have personally evaluated this patient and have completed at least one if not all key elements of the E/M (history, physical exam, and MDM). Additional findings are as noted.     Initial Screens:             Vitals:    Vitals:    21 194   BP: 114/73   Pulse: 94   Resp: 18   Temp: 97.9 °F (36.6 °C)   TempSrc: Oral   SpO2: 99%   Height: 5' 5\" (1.651 m)       CHIEF COMPLAINT       Chief Complaint   Patient presents with    Vaginal Discharge     (Almost 21 weeks pregnant), yellow discharge, \"I feel fetal movement in my vaginal area\"    Abdominal Pain       Patient is a 77-year-old G2, P1 who had a premature birth in the past patient currently on progesterone to prevent  labor patient concerned because she is feeling movement which she feels like in her vagina patient having some abdominal pain and cramping no fevers chills no nausea vomiting        EMERGENCY DEPARTMENT COURSE:     -------------------------  BP: 114/73, Temp: 97.9 °F (36.6 °C), Pulse: 94, Resp: 18  Physical Exam __  Constitutional:  oriented to person, place, and time. appears well-developed and well-nourished. HENT: no facial swelling or edema  Head: Normocephalic and atraumatic. Eyes: Right eye exhibits no discharge. Left eye exhibits no discharge. No scleral icterus. Neck: No JVD present. No tracheal deviation present. Cardiovascular: pulses present in extremities  Pulmonary/Chest: Effort normal. No respiratory distress. Musculoskeletal: Normal range of motion. exhibits no edema. Neurological: they are alert and oriented to person, place, and time. Skin: Skin is warm and dry. Psychiatric: has a normal mood and affect. behavior is normal.  ABD: Gravid uterus, ultrasound shows fetal heart movement in the appropriate range images obtained and in Q path fetal movement also present    Comments  Resident will perform pelvic exam will assess cervical os given the patient's 21 weeks will need to discuss with OB after evaluation and ensuring the patient not having signs of fetal presentation on exam    Given the buldge concern for the membranes being present will need OB admission    Steve Lamb DO,, DO, RDMS.   Attending Emergency Physician          Steve Lamb DO  06/20/21 0020

## 2021-06-20 NOTE — CONSULTS
[address]    Date: 2021       Time: 11:59 PM   Patient Name: Sneha Hull     Patient : 1985  Room/Bed: [facility]    Admission Date/Time: 2021  7:58 PM      CC: Pelvic pressure and vaginal discharge     HPI: Sneha Hull is a 38 y.o.  at 20w4d who presents with pelvic pressure that started Wednesday and worsened today to every few minutes. She noted mucousy vaginal discharge without odor or color and no leakage of fluid or vaginal bleeding. The patient reports fetal movement is present. Patient denies headache, vision changes, nausea, vomiting, fever, chills, shortness of breath, chest pain, RUQ pain, diarrhea, change in color/amount/odor of vaginal discharge, dysuria or, hematuria. She is very anxious due to significant history of spontaneous  delivery at [facility]. She stated last time, she had intermittent pain and she noted more discharge and went in to the ED for evaluation where she found out she was pregnant and her membranes were ruptured. Pt was given antibiotics and admitted and delivered later that day. Baby passed away 2 hours after delivery. This is a very wanted pregnancy and has been using vaginal progesterone nightly without difficulty. Last [facility] ultrasound on 21 showed cervical length of 27-32mm. DATING:  LMP: Patient's last menstrual period was 2021 (approximate).   Estimated Date of Delivery: 21   Based on: early ultrasound, at 7 3/7 weeks GA    PREGNANCY RISK FACTORS:  Patient Active Problem List   Diagnosis    Thyroid nodule     h/o Major depression    Obesity    Strabismus    Hx Previable PPROM @19wk6d on 20    Anxiety    Adrenal insufficiency (HCC)    Tobacco use    SPTD after PPROM at 19w6d    ASCUS with positive high risk HPV cervical    LGSIL (POLO-1) on Colpo 21    Trichomonas vaginalis infection 21    E. coli UTI during pregnancy     Fam Hx Polydactyly    High-risk pregnancy, first trimester    AMA (advanced maternal age) multigravida 35+   [place] Short Interval Pregnancy    Hypothyroidism    FHx DM    Anemia    Positive depression screening    20 weeks gestation of pregnancy    Asthma    Thalassemia minor    Penicillin allergy (hives, SOB)        Steroids Given In This Pregnancy:  no     REVIEW OF SYSTEMS:   Constitutional: negative fever, negative chills, negative weight changes   HEENT: negative visual disturbances, negative headaches, negative dizziness  Breast: negative breast abnormalities, negative breast lumps, negative nipple discharge  Respiratory: negative dyspnea, negative cough, negative SOB  Cardiovascular: negative chest pain,  negative palpitations, negative arrhythmia, negative syncope   Gastrointestinal: negative abdominal pain, negative RUQ pain, negative N/V, negative diarrhea, negative constipation, negative bowel changes  Genitourinary: negative dysuria, negative hematuria, negative urinary incontinence, positive vaginal discharge, negative vaginal bleeding, positive pelvic pressure  Dermatological: negative rash, negative pruritis, negative mole or other skin changes  Hematologic: negative bruising, negative personal/family history of DVT/PE  Immunologic/Lymphatic: negative recent illness, negative recent sick contact  Musculoskeletal: negative back pain, negative myalgias, negative arthralgias  Neurological:  negative dizziness, negative migraines, negative seizures, negative weakness  Behavior/Psych: negative depression, negative anxiety, negative SI, negative HI    OBSTETRICAL HISTORY:   OB History    Para Term  AB Living   2 0 0 0 1 0   SAB TAB Ectopic Molar Multiple Live Births   1 0 0 0 0 0      # Outcome Date GA Lbr Adi/2nd Weight Sex Delivery Anes PTL Lv   2 Current            1 SAB /:47 9 oz (0.255 kg) M Vag-Spont None Y ND      Complications: Chorioamnionitis      Name: Amarilis Kim Apgar1: 5  Apgar5: 1       PAST MEDICAL HISTORY:   has a past medical history of Anxiety, Asthma, GERD (gastroesophageal reflux disease), Hypothyroidism, Major depression, Morbid obesity, and Tension type headache. PAST SURGICAL HISTORY:   has no past surgical history on file. ALLERGIES:  is allergic to iodine, penicillin g, prednisolone, and dye  [iodides]. MEDICATIONS:  Prior to Admission medications    Medication Sig Start Date End Date Taking?  Authorizing Provider   aspirin EC 81 MG EC tablet Take 1 tablet by mouth daily 6/14/21   Frankey Honer Cacciotti, DO   Prenatal Vit-Fe Fumarate-FA (PRENATAL VITAMIN) 27-0.8 MG TABS Take 1 tablet by mouth daily 6/14/21   Frankey Honer Cacciotti, DO   oxymetazoline (12 HOUR NASAL SPRAY) 0.05 % nasal spray 2 sprays by Nasal route 2 times daily  Patient not taking: Reported on 6/14/2021 5/24/21 5/24/22  Savanah Sandhu,    progesterone (PROMETRIUM) 200 MG CAPS capsule Take 1 capsule by mouth nightly Place one pill vaginally each evening 5/3/21   Vero Calderón DO   aspirin EC 81 MG EC tablet Take 1 tablet by mouth daily 5/3/21   Vero Peon, DO   Prenatal Vit-Fe Fumarate-FA (PRENATAL VITAMIN) 27-1 MG TABS tablet  2/24/21   Historical Provider, MD   NP THYROID 30 MG tablet 30 mg  Patient not taking: Reported on 6/4/2021 12/23/20   Historical Provider, MD   potassium chloride (MICRO-K) 10 MEQ extended release capsule 10 mEq 1/14/21   Historical Provider, MD   pantoprazole (PROTONIX) 40 MG tablet 40 mg  Patient not taking: Reported on 6/4/2021    Historical Provider, MD   acetaminophen (TYLENOL) 500 MG tablet Take 1 tablet by mouth every 6 hours as needed for Pain 1/20/21   Guilherme Maradiaga DO   ferrous sulfate (FE TABS) 325 (65 Fe) MG EC tablet Take 1 tablet by mouth 2 times daily  Patient not taking: Reported on 6/14/2021 10/3/17   Jim Nj MD   Cholecalciferol (VITAMIN D3) 1000 units TABS Take 1 tablet by mouth daily 7/31/17   Sarabjit Roe MD FAMILY HISTORY:  family history includes Depression in her father; Diabetes in her sister; Thyroid Disease in her father. SOCIAL HISTORY:   reports that she has quit smoking. Her smoking use included cigars. She smoked 1.00 pack per day. She has never used smokeless tobacco. She reports that she does not drink alcohol and does not use drugs.     VITALS:  Vitals:    06/19/21 1948   BP: 114/73   Pulse: 94   Resp: 18   Temp: 97.9 °F (36.6 °C)   TempSrc: Oral   SpO2: 99%   Height: 5' 5\" (1.651 m)         PHYSICAL EXAM:  Fetal Heart Tones: obtained in the ED and verbal report 140-150bpm    General appearance:  no apparent distress but anxious and tearful, alert and cooperative  HEENT: head atraumatic, normocephalic, moist mucous membranes, trachea midline  Neurologic:  alert, oriented, normal speech, no focal findings or movement disorder noted  Lungs:  No increased work of breathing, good air exchange, clear to auscultation bilaterally, no crackles or wheezing  Heart:  regular rate and rhythm and no murmur, rubs, gallops  Abdomen:  soft, gravid, no rebound, guarding, rigidity, non-tender, no right upper quadrant tenderness, uterus non-tender, no signs of abruption and no signs of chorioamnionitis  Extremities:  no calf tenderness, non edematous  Musculoskeletal: Gross strength equal and intact throughout, CVA tenderness: none  Psychiatric: Mood appropriate, normal affect   Rectal Exam: not indicated   Pelvic exam:  Chaperone for Intimate Exam: Chaperone was present for entire exam, Chaperone Name: Klarissa Ray RN    Sterile Speculum Exam:   Urethral meatus: normal appearing   Vulva: Normal hair distribution, normal appearing vulva, no masses, tenderness or lesions, normal clitoris   Vagina: Normal appearing vaginal mucosa without lesions, physiologic appearing vaginal discharge noted in the posterior vault, no lacerations   Cervix: Normal appearing cervix without lesions, external os visibly dilated to 2 cm with bulging membranes, no lacerations or abnormal lesions visualized    DATA:  Membranes Ruptured: No  Valsalva/Pooling: absent  Vaginal Bleeding: absent    OMM Structural Exam:  Chief Complaint:  Pregnancy    Anterior/ Posterior Spinal Curves: Lumbar Lordosis -  Increased  Scoliosis (Lateral Spinal Curves): None  Assessment Tool:  T= Tenderness, A= Asymmetry, R= Restricted Motion (A=Active, P=Passive), T=Tissue Texture Changes  Region Evaluated : Severity / Specific of Major Somatic Dysfunction  M99.03 Lumbar -  Minor TART - more than BG levels -   Major Correlations with:  Genitourinary  Structural Diagnosis: Increased lumbar lordosis  Treatment Plan: Outpatient     PRENATAL LAB RESULTS:   Blood Type/Rh: A pos  Antibody Screen: negative  Hemoglobin, Hematocrit, Platelets: 96.0 / 60.9 / 256  Rubella: immune  T.  Pallidum, IgG: non-reactive   Hepatitis B Surface Antigen: non-reactive   HIV: non-reactive   Sickle Cell Screen: negative  Gonorrhea: negative  Chlamydia: negative  Urine culture: negative, date: 21    Early 1 hour Glucose Tolerance Test: 111  Early 3 hour Glucose Tolerance Test: not done  1 hour Glucose Tolerance Test: not done  3 hour Glucose Tolerance Test: not done    Group B Strep: not done  Cystic Fibrosis Screen: not done  First Trimester Screen: low risk  MSAFP/Multiple Markers: normal  Non-Invasive Prenatal Testing: no aneuploidy detected  Anatomy US: not done due to GA    ASSESSMENT & PLAN:  Lizette Jenkins is a 38 y.o. female  at 22w2d IUP   - GBS unknown / Rh positive / R immune   - No indication for GBS prophylaxis    Pelvic pressure/vaginal discharge with hx sPTD after PPROM at 19w6d on 20   - VSS   - -150bpm per ED resident ultrasound   - SSE: Visually dilated to 2cm with bulgy bag of water   - GC/C, vaginitis obtained   - UCx pending   - Pt stating she feels intermittent pressure/pain but has improved since admission   - Due to hx, would recommend patient be admitted for observation to L&D under service of Dr. NINO [facility] for continued evaluation of labor   - CBC, T&S, COVID ordered   - Will obtain FHT qd   - Continue expectant management    - Had a long discussion with her and her FOB about inability to resuscitate  at this gestational age and would not recommend celestone for fetal lung maturity, magnesium sulfate for fetal neuro-protection, or latency or GBS prophylactic antibiotics. As patient continues to have intermittent pelvic pain, concern for labor at this time and would not be a candidate for cerclage placement at this time. Pt and FOB verbalized and expressed understanding.   - Will continue to evaluate and make pt NPO @[place]    Asthma (no meds)    Hypothyroidism (no meds)   - Followed by Dr. Neida Garcia   - Last TSH on 21 0.60    - Pt denies any s/s hypo/hyperthyroidism    Adrenal insufficiency    - Pt not on any steroids currently and denies any s/s     LGSIL (POLO-1) on Colpo 21   - Pap smear on 20 with ASC-H +Other HRHPV    Trichomonas vaginalis infection 21 (TOR neg)   - GC/C, vaginitis obtained on admission    E. coli UTI during pregnancy    - UCx pending    AMA (NIPT wnl)    Short Interval Pregnancy   - Last delivery 20    Anxiety/Depression   - Stable on no medications   - Denies any SI/HI    Alpha Thalassemia minor   - aa/a- noted on carrier screening (in media section)    FHx Polydactyly    FHx DM (early 1hr GTT wnl)    Penicillin allergy (hives, SOB)    Tobacco use   - Pt smokes 1/2 PPD   - Encouraged cessation     BMI 33    Patient Active Problem List    Diagnosis Date Noted    20 weeks gestation of pregnancy 2021    Asthma 2021    Thalassemia minor 2021    Penicillin allergy (hives, SOB) 2021    Positive depression screening 2021     PHQ-9  14. Pt is established with a private psychiatrist ( Dr. Aguilar Rojas )  and was taken off her meds.  Pt will plan to discuss being put back on at her next virtual visit. Pt denies suicidal ideation or thoughts of harming others at this time 5/5/21. Referral faxed to [facility] for enrollment-[facility]      Short Interval Pregnancy 05/03/2021    Hypothyroidism 05/03/2021     TSH 0.02, T4 1.42 on 4/14/21  5/3/21-patient not on medication, follows with endocrinology  6/4/21 - TSH/T4 0.60/1.16      FHx DM 05/03/2021     Early 1hr GTT wnl      Anemia 05/03/2021     Pt taking PO iron      AMA (advanced maternal age) multigravida 35+ 04/28/2021     First trimester low risk      High-risk pregnancy, first trimester 04/09/2021 4/9/21- MFM referral placed for first trimester screen, anatomy scan and NIPT      Fam Hx Polydactyly 04/05/2021     FOB sister w/ polydactyly      E. 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 Please ask patient if she would like to switch to injectable and contact me-Damien CURRY      Hx Previable PPROM @19wk6d on 12/19/20 12/20/2020     The patient did not know she was pregnant prior to rupture.  Anxiety 12/20/2020    Adrenal insufficiency ([address] 12/20/2020    Tobacco use 12/20/2020    Obesity 09/15/2017     Early 1hr GTT wnl 111      Strabismus 09/15/2017     h/o Major depression 04/30/2013     No meds      Thyroid nodule 03/29/2012     CT neck : 5/2/2017  Impression: Large mass in the lower pole of the left lobe of the thyroid gland plunging into the superior mediastinum and displacing the trachea somewhat toward the right.  This has progressed dramatically since 2007.    FNA: Benign. Nodular goiter. 12/20/2016             Plan discussed with Dr. Lynda Gallegos, who is agreeable.      Steroids given this admission: No    Risks, benefits, alternatives and possible complications have been discussed in detail with the patient. Admission, and post admission procedures and expectations were discussed in detail. All questions were answered.     Attending's Name: Dr. Hoang Srivastava DO  Ob/Gyn Resident  6/19/2021 11:30PM

## 2021-06-20 NOTE — ED PROVIDER NOTES
[facility]  ED  Emergency Department Encounter  Emergency Medicine Resident     Pt Name: Reba Borja  MRN: [id number]  [place] 1985  Date of evaluation: 21  PCP:  No primary care provider on file. CHIEF COMPLAINT       Chief Complaint   Patient presents with    Vaginal Discharge     (Almost 21 weeks pregnant), yellow discharge, \"I feel fetal movement in my vaginal area\"    Abdominal Pain       HISTORY OFPRESENT ILLNESS  (Location/Symptom, Timing/Onset, Context/Setting, Quality, Duration, Modifying Factors,Severity.)      Reba Borja is a 38 y. o.yo female who presents with vaginal discharge. Patient is almost 21 weeks pregnant. She is on progesterone suppositories given her history of  labor. She states she has been using her progesterone suppositories however she was initially having some white discharge that now turned yellowish. She states she thinks the mucous plug came out. She also states she has been feeling some pelvic pressure and is feeling like there is fetal movement in her vagina. She feels no true contractions. She is still feeling fetal movement. PAST MEDICAL / SURGICAL / SOCIAL / FAMILY HISTORY      has a past medical history of Anxiety, Asthma, GERD (gastroesophageal reflux disease), Hypothyroidism, Major depression, Morbid obesity, and Tension type headache.     has no past surgical history on file.      Social History     Socioeconomic History    Marital status: Single     Spouse name: Not on file    Number of children: Not on file    Years of education: Not on file    Highest education level: Not on file   Occupational History    Not on file   Tobacco Use    Smoking status: Former Smoker     Packs/day: 1.00     Types: Cigars    Smokeless tobacco: Never Used    Tobacco comment: Black and Mild   Vaping Use    Vaping Use: Never used   Substance and Sexual Activity    Alcohol use: No    Drug use: Never    Sexual activity: Yes Partners: Male   Other Topics Concern    Not on file   Social History Narrative    Not on file     Social Determinants of Health     Financial Resource Strain:     Difficulty of Paying Living Expenses:    Food Insecurity:     Worried About Running Out of Food in the Last Year:     [address] in the Last Year:    Transportation Needs:     Lack of Transportation (Medical):  Lack of Transportation (Non-Medical):    Physical Activity:     Days of Exercise per Week:     Minutes of Exercise per Session:    Stress:     Feeling of Stress :    Social Connections:     Frequency of Communication with Friends and Family:     Frequency of Social Gatherings with Friends and Family:     Attends Denominational Services:     Active Member of Clubs or Organizations:     Attends Club or Organization Meetings:     Marital Status:    Intimate Partner Violence:     Fear of Current or Ex-Partner:     Emotionally Abused:     Physically Abused:     Sexually Abused:        Family History   Problem Relation Age of Onset    Depression Father     Thyroid Disease Father     Diabetes Sister         Allergies:  Iodine, Penicillin g, Prednisolone, and Dye  [iodides]    Home Medications:  Prior to Admission medications    Medication Sig Start Date End Date Taking?  Authorizing Provider   aspirin EC 81 MG EC tablet Take 1 tablet by mouth daily 6/14/21   Miguelina Hamilton, DO   Prenatal Vit-Fe Fumarate-FA (PRENATAL VITAMIN) 27-0.8 MG TABS Take 1 tablet by mouth daily 6/14/21   Miguelina Hamilton DO   oxymetazoline (12 HOUR NASAL SPRAY) 0.05 % nasal spray 2 sprays by Nasal route 2 times daily  Patient not taking: Reported on 6/14/2021 5/24/21 5/24/22  [place] Serum, DO   progesterone (PROMETRIUM) 200 MG CAPS capsule Take 1 capsule by mouth nightly Place one pill vaginally each evening 5/3/21   Carmelita Fragoso DO   aspirin EC 81 MG EC tablet Take 1 tablet by mouth daily 5/3/21   Carmelita Fragoso, DO   Prenatal Vit-Fe Fumarate-FA (PRENATAL VITAMIN) 27-1 MG TABS tablet  2/24/21   Historical Provider, MD   NP THYROID 30 MG tablet 30 mg  Patient not taking: Reported on 6/4/2021 12/23/20   Historical Provider, MD   potassium chloride (MICRO-K) 10 MEQ extended release capsule 10 mEq 1/14/21   Historical Provider, MD   pantoprazole (PROTONIX) 40 MG tablet 40 mg  Patient not taking: Reported on 6/4/2021    Historical Provider, MD   acetaminophen (TYLENOL) 500 MG tablet Take 1 tablet by mouth every 6 hours as needed for Pain 1/20/21   Sanaz Myriam Maradiaga DO   ferrous sulfate (FE TABS) 325 (65 Fe) MG EC tablet Take 1 tablet by mouth 2 times daily  Patient not taking: Reported on 6/14/2021 10/3/17   Db Calzada MD   Cholecalciferol (VITAMIN D3) 1000 units TABS Take 1 tablet by mouth daily 7/31/17   Denny Yoo MD       REVIEW OFSYSTEMS    (2-9 systems for level 4, 10 or more for level 5)      Review of Systems   Constitutional: Negative for chills and fever. HENT: Negative for congestion and rhinorrhea. Eyes: Negative for photophobia and visual disturbance. Respiratory: Negative for shortness of breath and wheezing. Cardiovascular: Negative for chest pain and palpitations. Gastrointestinal: Negative for abdominal pain, nausea and vomiting. Genitourinary: Positive for vaginal discharge. Musculoskeletal: Positive for back pain. Skin: Negative for pallor. Neurological: Negative for dizziness and headaches. PHYSICAL EXAM   (up to 7 for level 4, 8 or more forlevel 5)      INITIAL VITALS:   ED Triage Vitals [06/19/21 1948]   BP Temp Temp Source Pulse Resp SpO2 Height Weight   114/73 97.9 °F (36.6 °C) Oral 94 18 99 % 5' 5\" (1.651 m) --       Physical Exam  Constitutional:       Appearance: She is well-developed. She is obese. HENT:      Head: Normocephalic and atraumatic. Eyes:      Extraocular Movements: Extraocular movements intact.       Conjunctiva/sclera: Conjunctivae normal.   Cardiovascular: Rate and Rhythm: Normal rate. Heart sounds: Normal heart sounds. Pulmonary:      Effort: Pulmonary effort is normal.   Abdominal:      Comments: Soft gravid abdomen with fundal height at the umbilicus   Genitourinary:     Comments: Partially dilated cervix with blue smooth surface coming from os  Musculoskeletal:         General: No deformity. Normal range of motion. Cervical back: Normal range of motion and neck supple. Skin:     General: Skin is warm. Capillary Refill: Capillary refill takes less than 2 seconds. Neurological:      Mental Status: She is alert and oriented to person, place, and time. Mental status is at baseline. DIFFERENTIAL  DIAGNOSIS     PLAN (LABS / IMAGING / EKG):  Orders Placed This Encounter   Procedures    Culture, Urine    C.trachomatis N.gonorrhoeae DNA    VAGINITIS DNA PROBE    COVID-19, Rapid    Urinalysis with microscopic    CBC Auto Differential    Vaginal exam    Inpatient consult to Obstetrics / Gynecology    TYPE AND SCREEN    Transfer Patient    PATIENT STATUS (FROM ED OR OR/PROCEDURAL) Observation       MEDICATIONS ORDERED:  No orders of the defined types were placed in this encounter. Initial MDM/Plan: 38 y.o. female who presents with 64-year-old female who is approximately 21 weeks pregnant comes in with vaginal discharge. History of  labor. UA and pelvic labs sent. Third during pelvic exam, eyes appear dilated and concern for sac versus uterine cord. Emergent OB/GYN consult. Likely admission.     DIAGNOSTIC RESULTS / EMERGENCYDEPARTMENT COURSE / MDM     LABS:  Labs Reviewed   URINALYSIS WITH MICROSCOPIC - Abnormal; Notable for the following components:       Result Value    Color, UA DARK YELLOW (*)     Turbidity UA CLOUDY (*)     Ketones, Urine TRACE (*)     Specific Gravity, UA 1.039 (*)     Protein, UA TRACE (*)     Leukocyte Esterase, Urine SMALL (*)     Crystals, UA CALCIUM OXALATE (*)     Crystals, UA FEW (*) Bacteria, UA MODERATE (*)     Mucus, UA 1+ (*)     All other components within normal limits   CULTURE, URINE   C.TRACHOMATIS N.GONORRHOEAE DNA   VAGINITIS DNA PROBE   COVID-19, RAPID   CBC WITH AUTO DIFFERENTIAL   TYPE AND SCREEN         RADIOLOGY:  No results found. EKG  none    All EKG's are interpreted by the Emergency Department Physicianwho either signs or Co-signs this chart in the absence of a cardiologist.    EMERGENCY DEPARTMENT COURSE:    Admit to OB/GYN for  dilation of cervix. Per OB/GYN resident, patient is approximately 2 cm dilated. I did get fetal heart tones via bedside ultrasound at approximately 150 bpm.      PROCEDURES:  None    CONSULTS:  IP CONSULT TO OB GYN    CRITICAL CARE:      FINAL IMPRESSION      1. Premature dilatation of cervix during pregnancy          DISPOSITION / PLAN     DISPOSITION Admitted 2021 09:46:41 PM      PATIENT REFERRED TO:  No follow-up provider specified. DISCHARGE MEDICATIONS:  New Prescriptions    No medications on file       Bethene Lesch, DO  Emergency Medicine Resident    (Please note that portions of this note were completed with a voice recognition program.Efforts were made to edit the dictations but occasionally words are mis-transcribed. )\      Bethene Lesch, DO  Resident  [id number]

## 2021-06-20 NOTE — ED PROVIDER NOTES
[facility] [facility]  Emergency Department  Emergency Medicine Resident Sign-out     Care of Bharath Ramos was assumed from Dr. Tata Lopez and is being seen for Vaginal Discharge ((Almost 21 weeks pregnant), yellow discharge, \"I feel fetal movement in my vaginal area\") and Abdominal Pain  . The patient's initial evaluation and plan have been discussed with the prior provider who initially evaluated the patient. EMERGENCY DEPARTMENT COURSE / MEDICAL DECISION MAKING:       MEDICATIONS GIVEN:  No orders of the defined types were placed in this encounter. LABS / RADIOLOGY:     Results for orders placed or performed during the hospital encounter of 06/19/21   Urinalysis with microscopic   Result Value Ref Range    Color, UA DARK YELLOW (A) YELLOW    Turbidity UA CLOUDY (A) CLEAR    Glucose, Ur NEGATIVE NEGATIVE    Bilirubin Urine NEGATIVE NEGATIVE    Ketones, Urine TRACE (A) NEGATIVE    Specific Gravity, UA 1.039 (H) 1.005 - 1.030    Urine Hgb NEGATIVE NEGATIVE    pH, UA 5.5 5.0 - 8.0    Protein, UA TRACE (A) NEGATIVE    Urobilinogen, Urine Normal Normal    Nitrite, Urine NEGATIVE NEGATIVE    Leukocyte Esterase, Urine SMALL (A) NEGATIVE    -          WBC, UA 10 TO 20 0 - 5 /HPF    RBC, UA 2 TO 5 0 - 2 /HPF    Casts UA NOT REPORTED 0 - 2 /LPF    Crystals, UA CALCIUM OXALATE (A) None /HPF    Crystals, UA FEW (A) None /HPF    Epithelial Cells UA 10 TO 20 0 - 5 /HPF    Renal Epithelial, UA NOT REPORTED 0 /HPF    Bacteria, UA MODERATE (A) None    Mucus, UA 1+ (A) None    Trichomonas, UA NOT REPORTED None    Amorphous, UA NOT REPORTED None    Other Observations UA NOT REPORTED NOT REQ. Yeast, UA NOT REPORTED None       No orders to display         RECENT VITALS:     Temp: 97.9 °F (36.6 °C),  Pulse: 94, Resp: 18, BP: 114/73, SpO2: 99 %    This patient is a 38 y.o. Female with vaginal discharge, feeling fetal movement in the vagina. FHR 150s.  Cevical insufficiency with bag in the vaginal canal. No contractions. Admitted to OB. OUTSTANDING TASKS / RECOMMENDATIONS:    Awaiting bed     FINAL IMPRESSION:     1. Premature dilatation of cervix during pregnancy        DISPOSITION:         DISPOSITION:  []  Discharge   []  Transfer -    [x]  Admission -  Ob   []  Against Medical Advice   []  Eloped   FOLLOW-UP: No follow-up provider specified.    DISCHARGE MEDICATIONS: New Prescriptions    No medications on file           Lianet Álvarez MD  Emergency Medicine Resident  [facility]      Lianet Álvarez [place]  Resident  06/20/21 [id number]

## 2021-06-21 PROCEDURE — 76811 OB US DETAILED SNGL FETUS: CPT | Performed by: OBSTETRICS & GYNECOLOGY

## 2021-06-21 PROCEDURE — 87081 CULTURE SCREEN ONLY: CPT

## 2021-06-21 PROCEDURE — 1220000000 HC SEMI PRIVATE OB R&B

## 2021-06-21 PROCEDURE — 6370000000 HC RX 637 (ALT 250 FOR IP): Performed by: STUDENT IN AN ORGANIZED HEALTH CARE EDUCATION/TRAINING PROGRAM

## 2021-06-21 PROCEDURE — 76817 TRANSVAGINAL US OBSTETRIC: CPT | Performed by: OBSTETRICS & GYNECOLOGY

## 2021-06-21 RX ORDER — CEFAZOLIN SODIUM 1 G/50ML
1000 INJECTION, SOLUTION INTRAVENOUS EVERY 8 HOURS
Status: COMPLETED | OUTPATIENT
Start: 2021-06-21 | End: 2021-06-23

## 2021-06-21 RX ORDER — ASPIRIN 81 MG/1
81 TABLET, CHEWABLE ORAL DAILY
Status: DISCONTINUED | OUTPATIENT
Start: 2021-06-21 | End: 2021-06-26

## 2021-06-21 RX ORDER — CEFAZOLIN SODIUM 1 G/50ML
1000 INJECTION, SOLUTION INTRAVENOUS EVERY 8 HOURS
Status: DISCONTINUED | OUTPATIENT
Start: 2021-06-21 | End: 2021-06-21

## 2021-06-21 RX ORDER — VITAMIN A, ASCORBIC ACID, CHOLECALCIFEROL, .ALPHA.-TOCOPHEROL ACETATE, DL-, THIAMINE MONONITRATE, RIBOFLAVIN, NIACINAMIDE, PYRIDOXINE HYDROCHLORIDE, FOLIC ACID, CYANOCOBALAMIN, CALCIUM CARBONATE, IRON, ZINC OXIDE, AND CUPRIC OXIDE 4000; 120; 400; 22; 1.84; 3; 20; 10; 1; 12; 200; 29; 25; 2 [IU]/1; MG/1; [IU]/1; [IU]/1; MG/1; MG/1; MG/1; MG/1; MG/1; UG/1; MG/1; MG/1; MG/1; MG/1
1 TABLET ORAL DAILY
Status: DISCONTINUED | OUTPATIENT
Start: 2021-06-21 | End: 2021-06-26 | Stop reason: HOSPADM

## 2021-06-21 RX ORDER — SODIUM CHLORIDE, SODIUM LACTATE, POTASSIUM CHLORIDE, CALCIUM CHLORIDE 600; 310; 30; 20 MG/100ML; MG/100ML; MG/100ML; MG/100ML
INJECTION, SOLUTION INTRAVENOUS CONTINUOUS
Status: DISCONTINUED | OUTPATIENT
Start: 2021-06-21 | End: 2021-06-26 | Stop reason: HOSPADM

## 2021-06-21 RX ORDER — CEPHALEXIN 500 MG/1
500 CAPSULE ORAL EVERY 6 HOURS SCHEDULED
Status: DISCONTINUED | OUTPATIENT
Start: 2021-06-23 | End: 2021-06-21

## 2021-06-21 RX ORDER — CEPHALEXIN 500 MG/1
500 CAPSULE ORAL EVERY 6 HOURS SCHEDULED
Status: DISCONTINUED | OUTPATIENT
Start: 2021-06-23 | End: 2021-06-24

## 2021-06-21 RX ORDER — AZITHROMYCIN 250 MG/1
1000 TABLET, FILM COATED ORAL ONCE
Status: COMPLETED | OUTPATIENT
Start: 2021-06-21 | End: 2021-06-21

## 2021-06-21 RX ADMIN — PROGESTERONE 200 MG: 100 INSERT VAGINAL at 21:32

## 2021-06-21 RX ADMIN — AZITHROMYCIN 1000 MG: 250 TABLET, FILM COATED ORAL at 15:12

## 2021-06-21 NOTE — CARE COORDINATION
I met with mom Rosendo Gerard at the request of the OB staff to to answer her questions with regards to viability and discuss plans and options of  delivery. Ms. Mahad Sprague is a 41-year-old  2 para 0-0-1-0. She has a history of  delivery with fetal loss at 19 weeks gestation a year ago. This pregnancy is sure of dates with a first trimester ultrasound. Ms. Rosendo Gerard has a past medical history significant for anxiety, smoking, elevated BMI, hypothyroidism not on any medication but is followed by endocrinology, this pregnancy complicated by trichomonas and E. coli infection. Mom is seen by MFM with a history of marginal cord insertion on ultrasound . Mom is on aspirin and progesterone. Ms. Rosendo Gerard presented today with  labor and a bulging bag only 2 cm dilated. Given the previous history, Ms. Rosendo Gerard is very concerned with regards to limits of viability. We discussed American Academy of pediatrics recommendations with regards to resuscitation of infants at the age of viability. We discussed lung maturation and how that limits the ability to resuscitate infants less than 22 weeks gestation. We discussed the poor survival rate with over 80% mortality, and the high incidence of moderate to significant disability in survivors for those infants delivered at 20 weeks gestation. We discussed the change in mortality noted at 24 weeks and 28 weeks gestational age as well as a change in morbidity. We discussed the factors that can contribute to increased mortality and morbidity including sex, gestational age, weight, maternal steroids antenatally and intraventricular hemorrhage, necrotizing enterocolitis, and pulmonary immaturity and RDS postnatally. While the -American female does have increased chance of survival with premature gestational age, the estimated fetal weight is not known at 25 weeks. I explained to Ms. Rosendo Gerard if she does decide to consider resuscitation at 22 weeks

## 2021-06-21 NOTE — PROGRESS NOTES
Maternal Fetal Medicine   Ultrasound Interval Note    Leslie Bay is a 38 y.o. female  at 23w11d     Case discussed with Dr. Preeti Vasquez. [facility] Report: breech, posterior fundal placenta, EFW 13oz. No obvious anatomical anomalies. Plan: Please refer to report for further details. Dr. Meagan Albert updated, OB Residents updated,  RN updated.      Dasia Cazares DO  Ob/Gyn Resident  2021, 9:57 AM

## 2021-06-21 NOTE — PROGRESS NOTES
time      Assessment/Plan:  [facility] is a 38 y.o. female  at [address]   - Rh positive/ Rubella immune/ GBS unknown   - No indication for GBS prophylaxis at this time   - Continue PNV, SCDs when non ambulatory   - VSS   - COVID -19 screening negative on admission    - CBC, T&S were ordered on admission. Repeat q3d. Pelvic Pressure   - Patient had SSE on admission that showed cervix 1 cm dilated. Repeat exam showed progression to 3cm visually dilated.    - GC/C collected on admission pending. Vaginitis negative on admission.    - Urine culture collected on admission negative. - Patient is reporting continued cramping and pelvic pressure. Declined SSE at this time. - Medications for pain offered, patient declined   - High suspicion for previable  labor with advanced cervical dilation.    - Patient was advised to notify staff immediately if she feels increased pain/pressure.    - NICU consult completed ()   - Continue expectant management as fetus is still previable at this time. Plan for re-evaluation in AM to evaluate for cervical change unless needed before. Hx of Spontaneous Previable Delivery after PPROM at 19w6d (G1)   - Patient has been compliant with progesterone suppositories nightly. Ordered this admission.      Asthma (no medications)    Hypothyroidism   - Followed by Dr. Alia Urena   - Last TSH 21 0.60   - Controlled on no medications   - Denies any s/s of hypo/hyperthyroidism    Adrenal Insufficiency   - Patient has not required medications/steroids at this time   - Denies any s/s    LGSIL (CIN1) on Colposcopy 21   - Pap 20 with ASC-H and positive HR HPV (other, non typed)   - Recommend close f/u in postpartum period    Trichomonas infection in pregnancy (TOR negative)   - Positive 21   - TOR negative   - Vaginitis negative on admission     E Coli UTI during Pregnancy   - Urine culture negative on admission   - Denies dysuria    AMA (NIPT wnl)    Short Interval Pregnancy   - Last delivery 12/19/20    Anxiety/Depression   - Stable on no medications   - Denies any SI/HI    Alpha Thalassemia Minor   - aa/a- noted on carrier screening (in media section)   - low risk fetus via the [employer] complete test from [employer]    [facility] is scheduled for today @ 0830. Will have patient attending appointment if she is stable at that time. [facility]   - Patient's sister   - Early 1hr GTT wnl    Penicillin allergy   - Hives, SOB    Tobacco Use   - Patient smokes 1/2 PPD   - Cessation encouraged    BMI 33    Patient Active Problem List    Diagnosis Date Noted    High-risk pregnancy, unspecified trimester 06/20/2021    Premature dilatation of cervix during pregnancy     Threatened premature labor in second trimester     20 weeks gestation of pregnancy 06/19/2021    Asthma 06/19/2021    Thalassemia minor 06/19/2021    Penicillin allergy (hives, SOB) 06/19/2021    Positive depression screening 05/05/2021     Overview Note:     PHQ-9  14. Pt is established with a private psychiatrist ( Dr. Joanie Rojas )  and was taken off her meds. Pt will plan to discuss being put back on at her next virtual visit. Pt denies suicidal ideation or thoughts of harming others at this time 5/5/21.    Referral faxed to [employer] for enrollment-SK      Short Interval Pregnancy 05/03/2021    Hypothyroidism 05/03/2021     Overview Note:     TSH 0.02, T4 1.42 on 4/14/21  5/3/21-patient not on medication, follows with endocrinology  6/4/21 - TSH/T4 0.60/1.16      FHx DM 05/03/2021     Overview Note:     Early 1hr GTT wnl      Anemia 05/03/2021     Overview Note:     Pt taking PO iron      AMA (advanced maternal age) multigravida 35+ 04/28/2021     Overview Note:     First trimester low risk      High-risk pregnancy, first trimester 04/09/2021     Overview Note:     4/9/21- MFM referral placed for first trimester screen, anatomy scan and NIPT      Fam Hx Polydactyly 04/05/2021     Overview Note:     FOB sister w/ polydactyly      E. coli UTI during pregnancy  03/12/2021    LGSIL (POLO-1) on Colpo 2/2/21 03/01/2021     Overview Note:     9 o'clock and 12 o'clock biopsies  Needs repeat Pap smear 2/2/22      Trichomonas vaginalis infection 2/21/21 03/01/2021     Overview Note:     Neg 4/5/21  Neg 5/3/21      ASCUS with positive high risk HPV cervical 01/11/2021    SPTD after PPROM at 19w6d      Overview Note:     Pt is candidate for progesterone   Starting on vag progesterone per MFM. Please ask patient if she would like to switch to injectable and contact Margarette CURRY      Hx Previable PPROM @19wk6d on 12/19/20 12/20/2020     Overview Note:     The patient did not know she was pregnant prior to rupture.  Anxiety 12/20/2020    Adrenal insufficiency ([address] 12/20/2020    Tobacco use 12/20/2020    Obesity 09/15/2017     Overview Note:     Early 1hr GTT wnl 111      Strabismus 09/15/2017     h/o Major depression 04/30/2013     Overview Note:     No meds      Thyroid nodule 03/29/2012     Overview Note:     CT neck : 5/2/2017  Impression: Large mass in the lower pole of the left lobe of the thyroid gland plunging into the superior mediastinum and displacing the trachea somewhat toward the right.  This has progressed dramatically since 2007.    FNA: Benign. Nodular goiter. 12/20/2016             Will update Dr. Alexey Andrea.      Ernestina Hernandez DO  Ob/Gyn Resident  6/21/2021, 1:25 AM

## 2021-06-21 NOTE — FLOWSHEET NOTE
Patient calls out complaining of increased vaginal pain and pressure. Denying SSE or cervical exam at this time. Emotional support given by writer and Dr. Jimmy Cevallos. Patient requesting benadryl and Tylenol for pain control and sleep at this time.

## 2021-06-21 NOTE — PROGRESS NOTES
Ob Attending    In to speak with Anselmo Arambula and her partner Williamne Maegan about their concerns and the course of care and plan. Ms. Melody Silver is well known to me from her prior 19 week loss of her son Maame Rosales. She presented with  labor and advanced cervical dilation previously. Today she is admitted with advanced cervical dilation with bulging membranes after having pelvic pressure, cramping and increased vaginal discharge. She reports she has had chronic issues with her gall bladder and was diagnosed with a UTI on presentation so she has been having cramping but she is not sure if it is related to her GI issues as she describes them, the UTI or the cervical dilation. I spoke at length with Dr. Padmini Nassar who was the attending who completed a speculum exam with the resident team today and Ms. Tam's exam had changed from 2 cm to 3-4 cm visually after more pressure. I spoke at length with both Jannie Randolph and her partner about the risks/benefits and the decision making about placing a rescue cerclage. The contraindications to rescue cerclage are  labor, signs of infection (chorioamnionitis). Given the continued cramping and the cervical change we discussed that the risks likely outweigh the benefit of the cerclage. We also discussed shared decision making and I offered to complete another speculum exam to give another opinion about the cervix, her dilation and discuss further the risks/benefits of cerclage. I also offered to complete the exam tomorrow AM with my partner Dr. Meagan Albert so she could get two opinions with one exam.     We discussed the indications for cerclage with painless cervical dilation and how there are three indications for cerclage - history indicated with two or more second trimester losses without evidence of  labor, ultrasound, or exam indicated cerclage. We discussed the indications for each of these briefly.  We also discussed the risks of rupture of membranes, cervical laceration if  labor and ascending infection. Both she and her partner had an opportunity to have their questions answered. Bedside US completed as she wished to have some images and sound recording of her daughter's heart beat. Support offered. She also had a consult with the NICU team and had an opportunity to have her questions answered. She knows I remain available should she have any questions or concerns. We discussed option of going home until 21w5d when we would begin a steroid course in anticipation of 22 weeks when the first assessments and attempts at resuscitation could be considered.      Kaity Merida MD

## 2021-06-21 NOTE — PROGRESS NOTES
OB Attending    Care assumed from Dr. Jewels Rogers after extensive signout and care coordination. I met with Maria Elena Ribeiro and her  at 10am to review the plan of care. We discussed that a rescue cerclage was an appropriate option if her physical exam would still technically allow for a cerclage to be performed. Maria Elena Ribeiro asked for some time to discuss with her partner options and I returned to her room at 11am to perform a digital SVE. Exam was significant for a bulging bag in the vagina almost to the level of the introitus. We discussed for greater than 30 minutes the implications of this exam. I advised her that she is not a candidate for a cerclage and there is not enough cervix to perform this procedure and she is actively dilating which is a contraindication to this procedure. We discussed that we would manage her expectantly as an inpatient in hopes that she can remain pregnant until at least 22 weeks gestation when we would first consider resuscitation. Maria Elena Ribeiro definitively states that she would accept resusitation at this gestational age and is aware of the significant morbidity and mortality for the fetus at this gestational age. I advised her that we would first consider steroids at 21w5d (Sunday). She declines discussion with the NICU now as she appropriately states, \"I don't want to talk to them until there is going to be the option to do something. \" We will re-address consulting the NICU on Friday if she is still pregnant in anticipation of developing a clear plan for resuscitation. Maria lEena Ribeiro has significant trauma from her prior second trimester delivery and states that is still trying to work through that devastating event. We will initiate PPROM antibiotics and I discussed with Maria Elena Ribeiro that there is no RCT trials supporting these antibiotics for prolapsing membranes, but the risk associated with antibiotics is low.  Offered extensive emotional support and we will continue to have an ongoing conversation on the plan of care and how our team can best support her and her partner through this most difficult time.

## 2021-06-21 NOTE — FLOWSHEET NOTE
Dr. Halle Shukla and Dr. Graham Common here at bedside, discussing POC, possible cerclage placement. Questions answered. Pt and SO to discuss options.

## 2021-06-21 NOTE — PROGRESS NOTES
OB/GYN Resident Interval Note    Patient seen and examined. Got patient consent to obtained GBS swab. Swab was gently collected with Caryl Quick RN present. Explained risks/benefits of starting antibiotics. Patient was agreeable. Will start with Azithro 1g PO followed by Ancef 1 g IV Q8 x 48 hrs follow by Keflex 500 mg PO QID x5 days. Patient was agreeable to this treatment plan.      Dr Teresa Baca in agreement    Yamilex Cardenas DO   OB/GYN Resident  Pager [phone]  [facility]  6/21/2021, 2:24 PM

## 2021-06-22 LAB
ABO/RH: NORMAL
ANTIBODY SCREEN: NEGATIVE
ARM BAND NUMBER: NORMAL
BLOOD BANK SPECIMEN: NORMAL
EXPIRATION DATE: NORMAL
HCT VFR BLD CALC: 31 % (ref 36.3–47.1)
HEMOGLOBIN: 9.7 G/DL (ref 11.9–15.1)
MCH RBC QN AUTO: 26.7 PG (ref 25.2–33.5)
MCHC RBC AUTO-ENTMCNC: 31.3 G/DL (ref 28.4–34.8)
MCV RBC AUTO: 85.4 FL (ref 82.6–102.9)
NRBC AUTOMATED: 0 PER 100 WBC
PDW BLD-RTO: 13.7 % (ref 11.8–14.4)
PLATELET # BLD: 209 K/UL (ref 138–453)
PMV BLD AUTO: 9.6 FL (ref 8.1–13.5)
RBC # BLD: 3.63 M/UL (ref 3.95–5.11)
WBC # BLD: 8.3 K/UL (ref 3.5–11.3)

## 2021-06-22 PROCEDURE — 1220000000 HC SEMI PRIVATE OB R&B

## 2021-06-22 PROCEDURE — 2580000003 HC RX 258: Performed by: STUDENT IN AN ORGANIZED HEALTH CARE EDUCATION/TRAINING PROGRAM

## 2021-06-22 PROCEDURE — 36415 COLL VENOUS BLD VENIPUNCTURE: CPT

## 2021-06-22 PROCEDURE — 6370000000 HC RX 637 (ALT 250 FOR IP): Performed by: STUDENT IN AN ORGANIZED HEALTH CARE EDUCATION/TRAINING PROGRAM

## 2021-06-22 PROCEDURE — 96374 THER/PROPH/DIAG INJ IV PUSH: CPT

## 2021-06-22 PROCEDURE — 86901 BLOOD TYPING SEROLOGIC RH(D): CPT

## 2021-06-22 PROCEDURE — 96375 TX/PRO/DX INJ NEW DRUG ADDON: CPT

## 2021-06-22 PROCEDURE — 85027 COMPLETE CBC AUTOMATED: CPT

## 2021-06-22 PROCEDURE — 6360000002 HC RX W HCPCS: Performed by: STUDENT IN AN ORGANIZED HEALTH CARE EDUCATION/TRAINING PROGRAM

## 2021-06-22 PROCEDURE — 86850 RBC ANTIBODY SCREEN: CPT

## 2021-06-22 PROCEDURE — 86900 BLOOD TYPING SEROLOGIC ABO: CPT

## 2021-06-22 PROCEDURE — 96376 TX/PRO/DX INJ SAME DRUG ADON: CPT

## 2021-06-22 RX ORDER — DOCUSATE SODIUM 100 MG/1
100 CAPSULE, LIQUID FILLED ORAL DAILY
Status: DISCONTINUED | OUTPATIENT
Start: 2021-06-23 | End: 2021-06-26 | Stop reason: HOSPADM

## 2021-06-22 RX ORDER — MORPHINE SULFATE 2 MG/ML
2 INJECTION, SOLUTION INTRAMUSCULAR; INTRAVENOUS ONCE
Status: COMPLETED | OUTPATIENT
Start: 2021-06-22 | End: 2021-06-22

## 2021-06-22 RX ORDER — LORAZEPAM 0.5 MG/1
0.5 TABLET ORAL ONCE
Status: DISCONTINUED | OUTPATIENT
Start: 2021-06-22 | End: 2021-06-26

## 2021-06-22 RX ORDER — NIFEDIPINE 10 MG/1
10 CAPSULE ORAL EVERY 8 HOURS SCHEDULED
Status: DISPENSED | OUTPATIENT
Start: 2021-06-22 | End: 2021-06-24

## 2021-06-22 RX ORDER — MORPHINE SULFATE 10 MG/ML
8 INJECTION, SOLUTION INTRAMUSCULAR; INTRAVENOUS ONCE
Status: DISCONTINUED | OUTPATIENT
Start: 2021-06-22 | End: 2021-06-22

## 2021-06-22 RX ORDER — PROMETHAZINE HYDROCHLORIDE 25 MG/ML
25 INJECTION, SOLUTION INTRAMUSCULAR; INTRAVENOUS ONCE
Status: DISCONTINUED | OUTPATIENT
Start: 2021-06-22 | End: 2021-06-22

## 2021-06-22 RX ORDER — ONDANSETRON 2 MG/ML
4 INJECTION INTRAMUSCULAR; INTRAVENOUS EVERY 6 HOURS PRN
Status: DISCONTINUED | OUTPATIENT
Start: 2021-06-22 | End: 2021-06-26 | Stop reason: HOSPADM

## 2021-06-22 RX ADMIN — ASPIRIN 81 MG: 81 TABLET, CHEWABLE ORAL at 08:38

## 2021-06-22 RX ADMIN — ACETAMINOPHEN 1000 MG: 500 TABLET ORAL at 14:21

## 2021-06-22 RX ADMIN — MORPHINE SULFATE 2 MG: 2 INJECTION, SOLUTION INTRAMUSCULAR; INTRAVENOUS at 15:00

## 2021-06-22 RX ADMIN — Medication 1 TABLET: at 08:38

## 2021-06-22 RX ADMIN — PROGESTERONE 200 MG: 100 INSERT VAGINAL at 21:12

## 2021-06-22 RX ADMIN — MORPHINE SULFATE 2 MG: 2 INJECTION, SOLUTION INTRAMUSCULAR; INTRAVENOUS at 15:40

## 2021-06-22 RX ADMIN — ONDANSETRON 4 MG: 2 INJECTION INTRAMUSCULAR; INTRAVENOUS at 15:41

## 2021-06-22 RX ADMIN — CEFAZOLIN SODIUM 1000 MG: 1 INJECTION, SOLUTION INTRAVENOUS at 08:38

## 2021-06-22 RX ADMIN — MORPHINE SULFATE 2 MG: 2 INJECTION, SOLUTION INTRAMUSCULAR; INTRAVENOUS at 21:57

## 2021-06-22 RX ADMIN — SODIUM CHLORIDE, POTASSIUM CHLORIDE, SODIUM LACTATE AND CALCIUM CHLORIDE: 600; 310; 30; 20 INJECTION, SOLUTION INTRAVENOUS at 14:50

## 2021-06-22 RX ADMIN — CEFAZOLIN SODIUM 1000 MG: 1 INJECTION, SOLUTION INTRAVENOUS at 00:21

## 2021-06-22 RX ADMIN — CEFAZOLIN SODIUM 1000 MG: 1 INJECTION, SOLUTION INTRAVENOUS at 16:51

## 2021-06-22 ASSESSMENT — PAIN SCALES - GENERAL
PAINLEVEL_OUTOF10: 10
PAINLEVEL_OUTOF10: 7
PAINLEVEL_OUTOF10: 5
PAINLEVEL_OUTOF10: 5

## 2021-06-22 NOTE — PROGRESS NOTES
OB/GYN PROGRESS NOTE    Mercy Wright is a 38 y.o. female  at 20w0d, Hospital Day: 4    Subjective:   Patient has been seen and examined. Patient is resting in bed comfortably. She reports intermittent cramping and increased pressure. RN at bedside during evaluation and reports that patient has been requesting bed pan to urinate due to increased pressure when sitting on toilet. Discussed with patient R/B/A of bed rest including DVT, PE. Patient verbalized understanding. Encouraged compliance with SCDs and patient was in agreement with the plan. Patient denies any vaginal discharge and any urinary complaints. The patient reports fetal movement is present,  denies loss of fluid, denies vaginal bleeding. Patient denies headache, vision changes, nausea, vomiting, fever, chills, shortness of breath, chest pain, RUQ pain, abdominal pain, diarrhea, change in color/amount/odor of vaginal discharge, dysuria or, hematuria.      Objective:   Vitals:  Vitals:    21 2025 21 0809 21 1404 21 1919   BP: 104/71 (!) 125/94 101/66 (!) 89/45   Pulse: 87 88 91 86   Resp: 18 18     Temp:  97.9 °F (36.6 °C) 97.9 °F (36.6 °C) 98 °F (36.7 °C)   TempSrc:       SpO2:       Height:             FHT: 150 bpm via doppler    Physical Exam:  General appearance:  no apparent distress, alert and cooperative  HEENT: head atraumatic, normocephalic, moist mucous membranes, trachea midline  Neurologic:  alert, oriented, normal speech, no focal findings or movement disorder noted  Lungs:  No increased work of breathing, good air exchange, clear to auscultation bilaterally, no crackles or wheezing  Heart:  regular rate and rhythm and no murmur    Abdomen:  soft, gravid, non-tender, no rebound, guarding, or rigidity, no RUQ or epigastric tenderness, no signs or symptoms of abruption and no signs or symptoms of chorioamnionitis  Extremities:  no calf tenderness, non edematous, DTR's: +2/4 bilateral lower extremities Musculoskeletal: Gross strength equal and intact throughout, no gross abnormalities, range of motion normal in hips, knees, shoulders and spine, CVA tenderness: none  Psychiatric: Mood appropriate, normal affect   Rectal Exam: not indicated  Pelvic Exam: not indicated at this time      Assessment/Plan:  Alexi Dooley is a 38 y.o. female  at 21w0d IUP   - Rh positive/ Rubella immune/ GBS pending   - Continue PNV, SCDs, Progesterone suppositories daily   - VSS. Elevated BP x1, normotensive at this time.    - FHT daily   - CBC and T&S q3d, next today   - Maintain IV access   - Diet: General   - DVT prophylaxis: SCDs   - MFM US 21: breech presentation, posterior fundal placenta, EFW 13 oz. No obvious congenital anomalies. Advanced Cervical Dilation in Periviable Gestational Age   - VSS, afebrile   - On last sterile vaginal exam (21), there was bulging bag of membranes almost at level of introitus with no appreciation of cervix. - Not a candidate for cerclage at this time given advanced cervical dilation.   - Consider Celestone at 21w5d   - NICU consult declined by patient. - Viability considered at 22 weeks. She would accept resuscitation at that gestational age and has been counseled on significant morbidity and mortality for the fetus at that gestational age. - R/B/A were discussed regarding latency PPROM antibiotics. She desired to continue latency antibiotics despite counseling on no current RCT trials supporting these antibiotics for prolapsing membranes. She is S/p Azithromycin 1g PO x1. Will continue Ancef 1g IV q8h for 48 hours total. Will follow with Keflex 500mg PO QID x5 days. - Continue expectant management at this time. Hx of Spontaneous Previable Delivery after PPROM at 19w6d (G1)              - Patient has been compliant with progesterone suppositories nightly.  Ordered this admission.      Asthma (no medications)     Hypothyroidism              - Followed by  [facility]              - Last TSH 21 0.60              - Controlled on no medications              - Denies any s/s of hypo/hyperthyroidism     Adrenal Insufficiency              - Patient has not required medications/steroids at this time   - Recommend close f/u in PP period    Trichomonas infection in pregnancy (TOR negative)              - Positive 21              - TOR negative              - Vaginitis negative on admission      E Coli UTI during Pregnancy              - Urine culture negative on admission              - Denies dysuria     AMA (NIPT wnl)    Short Interval Pregnancy              - Last delivery 20     Anxiety/Depression              - Stable on no medications              - Denies any SI/HI     Alpha Thalassemia Minor              - aa/a- noted on carrier screening (in media section)              - low risk fetus via the [employer] complete test from [employer]    [id number] of [place]              - No evidence of polydactyly on MFM US 21     FHX of DM              - Patient's sister              - Early 1hr GTT wnl     Penicillin allergy              - Hives, SOB    Tobacco Use              - Patient smokes 1/2 PPD              - Cessation encouraged     BMI 33      Patient Active Problem List    Diagnosis Date Noted    Threatened  labor, antepartum     Feeling pelvic pressure in pregnancy, antepartum     Short interval between pregnancies complicating pregnancy, antepartum     Current pregnancy with history of pre-term labor in second trimester     High-risk pregnancy, unspecified trimester 2021    Premature dilatation of cervix during pregnancy     Threatened premature labor in second trimester     20 weeks gestation of pregnancy 2021    Asthma 2021    Thalassemia minor 2021    Penicillin allergy (hives, SOB) 2021    Positive depression screening 2021     Overview Note:     PHQ-9  14.   Pt is established with a private psychiatrist ( Dr. Suzanne Rojas )  and was taken off her meds. Pt will plan to discuss being put back on at her next virtual visit. Pt denies suicidal ideation or thoughts of harming others at this time 5/5/21. Referral faxed to [employer] for enrollment-SK      Short Interval Pregnancy 05/03/2021    Hypothyroidism 05/03/2021     Overview Note:     TSH 0.02, T4 1.42 on 4/14/21  5/3/21-patient not on medication, follows with endocrinology  6/4/21 - TSH/T4 0.60/1.16      FHx DM 05/03/2021     Overview Note:     Early 1hr GTT wnl      Anemia 05/03/2021     Overview Note:     Pt taking PO iron      AMA (advanced maternal age) multigravida 35+ 04/28/2021     Overview Note:     First trimester low risk      High-risk pregnancy, first trimester 04/09/2021     Overview Note:     4/9/21- [facility] referral placed for first trimester screen, anatomy scan and NIPT      Fam Hx Polydactyly 04/05/2021     Overview Note:     FOB sister w/ polydactyly      E. coli UTI during pregnancy  03/12/2021    LGSIL (POLO-1) on Colpo 2/2/21 03/01/2021     Overview Note:     9 o'clock and 12 o'clock biopsies  Needs repeat Pap smear 2/2/22      Trichomonas vaginalis infection 2/21/21 03/01/2021     Overview Note:     Neg 4/5/21  Neg 5/3/21      ASCUS with positive high risk HPV cervical 01/11/2021    SPTD after PPROM at 19w6d      Overview Note:     Pt is candidate for progesterone   Starting on vag progesterone per M. Please ask patient if she would like to switch to injectable and contact Margarette CURRY      Hx Previable PPROM @19wk6d on 12/19/20 12/20/2020     Overview Note:     The patient did not know she was pregnant prior to rupture.        Anxiety 12/20/2020    Adrenal insufficiency ([address] 12/20/2020    Tobacco use 12/20/2020    Obesity 09/15/2017     Overview Note:     Early 1hr GTT wnl 111      Strabismus 09/15/2017     h/o Major depression 04/30/2013     Overview Note:     No meds      Thyroid nodule 03/29/2012

## 2021-06-22 NOTE — CARE COORDINATION
ANTEPARTUM ADMISSION CARE COORDINATION NOTE    20 weeks gestation of pregnancy [Z3A.20]  High-risk pregnancy, unspecified trimester [O09.90]    Patient is here for advanced cervical dilation @ 21w0d w/ bulging bag of membranes @ introitus, not a candidate for cerclage at this time. Will meet with her to verify demos and DC plan.     CM continue to follow

## 2021-06-22 NOTE — CARE COORDINATION
ANTEPARTUM CARE COORDINATION NOTE    20 weeks gestation of pregnancy [Z3A.20]  High-risk pregnancy, unspecified trimester [O09.90]    Writer met w/ Dioni Mcintosh at bedside to discuss DCP. Writer verified name/address/phone number correct on facesheet    [place] [employer] insurance correct. No previous home care or dme. Barriers to Discharge: Advanced cervical dilation w/ bulging bag @ 21w0d    CM continue to follow for any DC needs.

## 2021-06-22 NOTE — FLOWSHEET NOTE
Patient denies any increased pelvic pressure. States intermittent cramping throughout the night, but able to sleep through. Patient using bed pan through the night on her own and does not call out for help. Denies needing any pain medication.

## 2021-06-22 NOTE — PROGRESS NOTES
OB/GYN Resident Interval Note    Patient seen and examined. Patient very tearful. Reports she feels increased cramping and pain in her vagina. States she thinks \"the baby is in her vagina\". Denies her water breaking. States she needs something for pain that is stronger than Tylenol.      Morphine 2mg IV ordered for pain control     Yamilex Cardenas DO   OB/GYN Resident  Pager [phone]  Kalli Ortiz, [address]  6/22/2021, 2:59 PM

## 2021-06-22 NOTE — PROGRESS NOTES
Ob/Gyn Interval Note     Was called to room by RN. Patient reports increase pressure and pain. Went to evaluate the patient and she was visibly upset and in acute distress. Senior resident called to room. SVE performed and amniotic sac felt with at +2 station with fetal feet actively kicking. The patient had just received Morphine 2 mg IV and an additional 2 mg was ordered. Will continue to monitor closely.     Vitals:    06/21/21 1919 06/22/21 0425 06/22/21 0757 06/22/21 1206   BP: (!) 89/45 112/86 107/64 99/66   Pulse: 86 72 88 81   Resp:   18 16   Temp: 98 °F (36.7 °C) 97.9 °F (36.6 °C) 98 °F (36.7 °C) 97.8 °F (36.6 °C)   TempSrc:       SpO2:       Height:           Kim Susana  OB/GYN Resident  [facility]  6/22/2021 3:22 PM

## 2021-06-22 NOTE — FLOWSHEET NOTE
Dr. Penny Jacobo at bedside at request of writer advocating for patient concerns/ questions about starting Magnesium sulfate, as pt has reviewed articles about periviable babies that have survived. After Dr. Penny Jacobo spoke with NICU, NICU stated that at this gestation, they would not resuscitate, however, they would attend delivery. Dr. Penny Jacobo talks explains that based on this, Magnesium would not be an option at this point. Dr. Penny Jacobo does discuss possibly initiating oral Procardia. Pt amenable to this option. She is also amenable to having NICU come for consult, as yesterday, she did not want to talk to NICU.

## 2021-06-23 PROCEDURE — 6370000000 HC RX 637 (ALT 250 FOR IP): Performed by: STUDENT IN AN ORGANIZED HEALTH CARE EDUCATION/TRAINING PROGRAM

## 2021-06-23 PROCEDURE — 2580000003 HC RX 258: Performed by: STUDENT IN AN ORGANIZED HEALTH CARE EDUCATION/TRAINING PROGRAM

## 2021-06-23 PROCEDURE — 96374 THER/PROPH/DIAG INJ IV PUSH: CPT

## 2021-06-23 PROCEDURE — 96376 TX/PRO/DX INJ SAME DRUG ADON: CPT

## 2021-06-23 PROCEDURE — 6360000002 HC RX W HCPCS: Performed by: STUDENT IN AN ORGANIZED HEALTH CARE EDUCATION/TRAINING PROGRAM

## 2021-06-23 PROCEDURE — 99252 IP/OBS CONSLTJ NEW/EST SF 35: CPT | Performed by: PEDIATRICS

## 2021-06-23 PROCEDURE — 1220000000 HC SEMI PRIVATE OB R&B

## 2021-06-23 RX ORDER — LORAZEPAM 0.5 MG/1
0.5 TABLET ORAL ONCE
Status: DISCONTINUED | OUTPATIENT
Start: 2021-06-23 | End: 2021-06-26

## 2021-06-23 RX ORDER — MORPHINE SULFATE 2 MG/ML
2 INJECTION, SOLUTION INTRAMUSCULAR; INTRAVENOUS
Status: DISCONTINUED | OUTPATIENT
Start: 2021-06-23 | End: 2021-06-26

## 2021-06-23 RX ADMIN — MORPHINE SULFATE 2 MG: 2 INJECTION, SOLUTION INTRAMUSCULAR; INTRAVENOUS at 17:25

## 2021-06-23 RX ADMIN — DOCUSATE SODIUM 100 MG: 100 CAPSULE, LIQUID FILLED ORAL at 09:02

## 2021-06-23 RX ADMIN — CEFAZOLIN SODIUM 1000 MG: 1 INJECTION, SOLUTION INTRAVENOUS at 00:29

## 2021-06-23 RX ADMIN — CEPHALEXIN 500 MG: 500 CAPSULE ORAL at 22:24

## 2021-06-23 RX ADMIN — MORPHINE SULFATE 2 MG: 2 INJECTION, SOLUTION INTRAMUSCULAR; INTRAVENOUS at 22:24

## 2021-06-23 RX ADMIN — MORPHINE SULFATE 2 MG: 2 INJECTION, SOLUTION INTRAMUSCULAR; INTRAVENOUS at 14:25

## 2021-06-23 RX ADMIN — MORPHINE SULFATE 2 MG: 2 INJECTION, SOLUTION INTRAMUSCULAR; INTRAVENOUS at 03:34

## 2021-06-23 RX ADMIN — SODIUM CHLORIDE, POTASSIUM CHLORIDE, SODIUM LACTATE AND CALCIUM CHLORIDE: 600; 310; 30; 20 INJECTION, SOLUTION INTRAVENOUS at 08:52

## 2021-06-23 RX ADMIN — SODIUM CHLORIDE, POTASSIUM CHLORIDE, SODIUM LACTATE AND CALCIUM CHLORIDE: 600; 310; 30; 20 INJECTION, SOLUTION INTRAVENOUS at 00:24

## 2021-06-23 RX ADMIN — MORPHINE SULFATE 2 MG: 2 INJECTION, SOLUTION INTRAMUSCULAR; INTRAVENOUS at 07:37

## 2021-06-23 RX ADMIN — PROGESTERONE 200 MG: 100 INSERT VAGINAL at 21:59

## 2021-06-23 RX ADMIN — CEFAZOLIN SODIUM 1000 MG: 1 INJECTION, SOLUTION INTRAVENOUS at 17:24

## 2021-06-23 RX ADMIN — CEFAZOLIN SODIUM 1000 MG: 1 INJECTION, SOLUTION INTRAVENOUS at 08:43

## 2021-06-23 RX ADMIN — MORPHINE SULFATE 2 MG: 2 INJECTION, SOLUTION INTRAMUSCULAR; INTRAVENOUS at 11:15

## 2021-06-23 ASSESSMENT — PAIN SCALES - GENERAL
PAINLEVEL_OUTOF10: 8
PAINLEVEL_OUTOF10: 7
PAINLEVEL_OUTOF10: 8
PAINLEVEL_OUTOF10: 10
PAINLEVEL_OUTOF10: 7
PAINLEVEL_OUTOF10: 10

## 2021-06-23 NOTE — FLOWSHEET NOTE
Procardia was given to pt at 1902 when writer was in room at 2115 the pill was still on side table. Pt then stated that she doesn't want to take the procardia at this time. Pt states she wants to sleep on it and decide if she would want to take the next dose. Pt is concerned about the effects it could take on her blood pressure and heart stating she has a history of heart palpitations. RN to update Dr Yves Rodriguez. Pt states her contractions are starting to come back and is requesting something to help her sleep at this time.

## 2021-06-23 NOTE — PROGRESS NOTES
Patient seen and examined. Patient states that she is frustrated that she feels like we are not doing anything for her and states that she has read different options online and would like to proceed with magnesium sulfate. Discussed with patient that all tocolytic options we have are only proven to increase latency by 48 hours. Discussed that no options are without risk. Patient states that she feels as though she must try something. Again reviewed why magnesium sulfate is not a reasonable therapy however offered Procardia for tocolysis for 4-6 doses to see if it provided any relief. Patient was agreeable and states she understands low likelihood of improvement in symptoms but that she would like to try something. Emotional support provided. Will continue to closely monitor.      Wileen Castleman,   [birth year], [id number]

## 2021-06-23 NOTE — FLOWSHEET NOTE
Pt calls out, states the pain is starting to get worse again. Writer discusses with pt, her procardia is due in 15 mins, and she may have another dose of Morphine in a half hour. Pt expresses concerns about taking Procardia, r/t decreasing her BP. Vital signs obtained. Pt amenable to taking Morphine, but states she will consider taking the Procardia. Writer updates Dr. Jon Tarango and Dr. Carl Molina.

## 2021-06-23 NOTE — FLOWSHEET NOTE
Dr. Adrian Lopez @ bedside for [address] and to discuss plan of care with patient.

## 2021-06-23 NOTE — FLOWSHEET NOTE
RN called to pt room for intermittent cramping/contractions that is starting to become more intense. Dr Wei Mcintosh notified.

## 2021-06-23 NOTE — PROGRESS NOTES
Ob Attending     In to speak with Ms. Juan R Stevens at the bedside this morning on rounds. She was concerned about her dating. We reviewed at length her dating. She is dated by TVUS at 7w3d us with CRL and fetal heart beat on 3/19/2021 consistent with a 5 week 0 day gestational sac US on 3/2/2021 both with due date of 11/2/2021. There is a letter given on the day of the 7w3d generated in the clinic which has dating based on an estimated LMP prior to her pregnancy encounter being updated with the 7w3d scan on 3/19/2021 that has incorrect dating. She is dated by excellent first trimester scan dating and was followed closely for pregnancy of unknown location with serial hgcs and serial ultrasounds with dating c/w the current dating. We reviewed that her dating is not subjective as it is based off actual measurements of both a gestational sac and a crown rump length which match with a due date of 11/2/2021. There has not been confusion from a medical standpoint about her dating. She also reports that early on she was not sure of her LMP and did not think she was that far along. What she recalls as her LMP matches the early [address]. Support offered as it is understandable to want different dating, and a different outcome. Reviewed with her the last pelvic exam with bag and fetal parts in the vagina. She requests ultrasound confirmation of this. US completed with fundal placenta, breech presentation and fetal parts to the fetal abdomen visible on US in the vagina. Excellent fetal movement observed. FHR wnl and grossly normal fluid observed. Reviewed again the reason behind steroids at 21w5d and second dose 21w6d for max benefit at 48 hours for evaluation for evaluation for attempt at resuscitation at 22 weeks by NICU team. Would also start magnesium for neuroprotection at that time. We spoke about these interventions and that data behind them as well as the timing.  Magnesium is used for neuroprotection not for tocolysis. She had been offered procardia for tocolysis but is still considering this option as her blood pressure has been running low. Reviewed the other options and risks/benefits for tocolysis including terbutaline, indomethacin, procardia. Reviewed magnesium and that she is correct in that it historically was used as a tocolytic but data did not support this indication for use use and the benefit of magnesium is for neuroprotection and seizure prophylaxis for pre-eclampsia when patients have that diagnosis. Plan per our NICU team is for evaluation for resuscitation at 22 weeks she had spoke with Dr. Chris Alonso about these plans. Continue inpatient monitoring, plan for steroids at 21w5d and 21w6d and magnesium for neuroprotection at 22 weeks. .     I share MsDago Anderson [place] and frustration with the limits of viability and let her know that I am also hoping and praying with her for the best outcome possible. We will continue to offer all available treatments at the appropriate times within the limits of viability and continue to care for her to the best of the ability of the current medical technology. Support offered. I will be happy to return to speak with her further about any questions she may have, to repeat an ultrasound when desired and for support as is helpful.      Amanda Villarreal MD

## 2021-06-23 NOTE — FLOWSHEET NOTE
Pt states she is more comfortable since receiving the morphine. Pt requested dopplers be done again before bed. Pt states she has not had a bowel movement today and is requesting a stool softener. Writer will let residents know.

## 2021-06-23 NOTE — PROGRESS NOTES
OB/GYN PROGRESS NOTE    Rose Davila is a 38 y.o. female  at [facility] Day: 5    Subjective:   Patient has been seen and examined. Patient is resting comfortably in bed. She does complain of intermittent painful contractions. She reports that the Morphine she received had helped. Patient denies any vaginal discharge and any urinary complaints. The patient reports fetal movement is present, complains of contractions, denies loss of fluid, denies vaginal bleeding. Patient denies headache, vision changes, nausea, vomiting, fever, chills, shortness of breath, chest pain, RUQ pain, abdominal pain, diarrhea, change in color/amount/odor of vaginal discharge, dysuria or, hematuria.      Objective:   Vitals:  Vitals:    21 1206 21 1644 21 1700 21 195   BP: 99/66 97/77  (!) 100/57   Pulse: 81 79  80   Resp: 16 17  18   Temp: 97.8 °F (36.6 °C) 97.9 °F (36.6 °C)  97.5 °F (36.4 °C)   TempSrc:       SpO2:  99%     Weight:   201 lb (91.2 kg)    Height:   5' 5\" (1.651 m)              Physical Exam:  General appearance:  no apparent distress, alert and cooperative  HEENT: head atraumatic, normocephalic, moist mucous membranes, trachea midline  Neurologic:  alert, oriented, normal speech, no focal findings or movement disorder noted  Lungs:  No increased work of breathing, good air exchange, clear to auscultation bilaterally, no crackles or wheezing  Heart:  regular rate and rhythm and no murmur    Abdomen:  soft, gravid, non-tender, no rebound, guarding, or rigidity, no RUQ or epigastric tenderness, no signs or symptoms of abruption and no signs or symptoms of chorioamnionitis  Extremities:  no calf tenderness, non edematous  Musculoskeletal: Gross strength equal and intact throughout, no gross abnormalities, range of motion normal in hips, knees, shoulders and spine, CVA tenderness: none  Psychiatric: Mood appropriate, normal affect   Rectal Exam: not indicated  Pelvic Exam: not indicated at this time    DATA:  Labs:   Recent Results (from the past 24 hour(s))   CBC    Collection Time: 21  5:05 AM   Result Value Ref Range    WBC 8.3 3.5 - 11.3 k/uL    RBC 3.63 (L) 3.95 - 5.11 m/uL    Hemoglobin 9.7 (L) 11.9 - 15.1 g/dL    Hematocrit 31.0 (L) 36.3 - 47.1 %    MCV 85.4 82.6 - 102.9 fL    MCH 26.7 25.2 - 33.5 pg    MCHC 31.3 28.4 - 34.8 g/dL    RDW 13.7 11.8 - 14.4 %    Platelets 744 831 - 040 k/uL    MPV 9.6 8.1 - 13.5 fL    NRBC Automated 0.0 0.0 per 100 WBC   TYPE AND SCREEN    Collection Time: 21  5:05 AM   Result Value Ref Range    Expiration Date 2021,2359     Arm Band Number [id number]     ABO/Rh A POSITIVE     Antibody Screen NEGATIVE    BLOOD BANK SPECIMEN    Collection Time: 21  5:05 AM   Result Value Ref Range    Blood Bank Specimen NOT REPORTED              Assessment/Plan:  Robert Yee is a 38 y.o. female  at 21w1d IUP   - Rh positive/ Rubella immune/ GBS pending   - Continue PNV, SCDs, Progesterone suppositories daily   - VSS   - FHT daily   - CBC, T&S q3d- next on 21   - Maintain IV access   - Diet: general   - DVT prophylaxis: SCDs   - MFM US 21: breech presentation, posterior fundal placenta, EFW 13 oz. No obvious congenital anomalies     Advanced Cervical Dilation in Periviable Gestational Age              - VSS, afebrile              - On last sterile vaginal exam (21), there was bulging bag of membranes almost at +2 station with no appreciation of cervix. - Not a candidate for cerclage at this time given advanced cervical dilation.              - Consider Celestone at 21w5d              - NICU consult declined by patient. - Viability considered at 22 weeks. She would accept resuscitation at that gestational age and has been counseled on significant morbidity and mortality for the fetus at that gestational age. - R/B/A were discussed regarding latency PPROM antibiotics.  She desired to continue DM              - Patient's sister              - Early 1hr GTT wnl     Penicillin allergy              - Hives, SOB     Tobacco Use              - Patient smokes 1/2 PPD, but has not been using tobacco products during this admission              - Continued cessation encouraged     BMI 33      Patient Active Problem List    Diagnosis Date Noted    Threatened  labor, antepartum     Feeling pelvic pressure in pregnancy, antepartum     Short interval between pregnancies complicating pregnancy, antepartum     Current pregnancy with history of pre-term labor in second trimester     High-risk pregnancy, unspecified trimester 2021    Premature dilatation of cervix during pregnancy     Threatened premature labor in second trimester     20 weeks gestation of pregnancy 2021    Asthma 2021    Thalassemia minor 2021    Penicillin allergy (hives, SOB) 2021    Positive depression screening 2021     Overview Note:     PHQ-9  14. Pt is established with a private psychiatrist ( Dr. Christella Fothergill Haddox )  and was taken off her meds. Pt will plan to discuss being put back on at her next virtual visit. Pt denies suicidal ideation or thoughts of harming others at this time 21.    Referral faxed to [facility] for enrollment-SK      Short Interval Pregnancy 2021    Hypothyroidism 2021     Overview Note:     TSH 0.02, T4 1.42 on 4/14/21  5/3/21-patient not on medication, follows with endocrinology  21 - TSH/T4 0.60/1.16      FHx DM 2021     Overview Note:     Early 1hr GTT wnl      Anemia 2021     Overview Note:     Pt taking PO iron      AMA (advanced maternal age) multigravida 35+ 2021     Overview Note:     First trimester low risk      High-risk pregnancy, first trimester 2021     Overview Note:     21- M referral placed for first trimester screen, anatomy scan and NIPT      Fam Hx Polydactyly 2021     Overview Note:     FOB sister w/ polydactyly      E. coli UTI during pregnancy  2021    LGSIL (POLO-1) on [place] 2021     Overview Note:     9 o'clock and 12 o'clock biopsies  Needs repeat Pap smear 22      Trichomonas vaginalis infection 2021     Overview Note:     Neg 21  Neg 5/3/21      ASCUS with positive high risk HPV cervical 2021    SPTD after PPROM at 19w6d      Overview Note:     Pt is candidate for progesterone   Starting on vag progesterone per MFM. Please ask patient if she would like to switch to injectable and contact me-Damien CURRY      Hx Previable PPROM @19wk6d on 20     Overview Note:     The patient did not know she was pregnant prior to rupture.  Anxiety 2020    Adrenal insufficiency ([facility] [address] 2020    Tobacco use 2020    Obesity 09/15/2017     Overview Note:     Early 1hr GTT wnl 111      Strabismus 09/15/2017     h/o Major depression 2013     Overview Note:     No meds      Thyroid nodule 2012     Overview Note:     CT neck : 2017  Impression: Large mass in the lower pole of the left lobe of the thyroid gland plunging into the superior mediastinum and displacing the trachea somewhat toward the right.  This has progressed dramatically since .    FNA: Benign. Nodular goiter. 2016             Will update Dr. Licha Valladares. Ramandeep Hsieh DO  Ob/Gyn Resident  2021, 12:04 AM     Date: 2021  Time: 9:25 AM      Patient Name: Alexi Dooley  Patient : 1985  Room/Bed: [facility]/0703-  Admission Date/Time: 2021  7:58 PM        Attending Physician Statement  I have personally seen, evaluated and discussed the care of Alexi Dooley, including pertinent history and exam findings with the resident. I have reviewed and edited their note in the electronic medical record. The key elements of all parts of the encounter have been performed/reviewed by me.   I agree with the assessment, plan and orders as documented by the resident. The level of care submitted represents to the best of my ability the care documented in the medical record today. GC Modifier. This service has been performed in part by a resident under the direction of a teaching physician.         Attending's Name:  Ilene Jaramillo MD

## 2021-06-24 LAB
CULTURE: NORMAL
Lab: NORMAL
SPECIMEN DESCRIPTION: NORMAL

## 2021-06-24 PROCEDURE — 2580000003 HC RX 258: Performed by: STUDENT IN AN ORGANIZED HEALTH CARE EDUCATION/TRAINING PROGRAM

## 2021-06-24 PROCEDURE — 6370000000 HC RX 637 (ALT 250 FOR IP): Performed by: STUDENT IN AN ORGANIZED HEALTH CARE EDUCATION/TRAINING PROGRAM

## 2021-06-24 PROCEDURE — 96374 THER/PROPH/DIAG INJ IV PUSH: CPT

## 2021-06-24 PROCEDURE — 96376 TX/PRO/DX INJ SAME DRUG ADON: CPT

## 2021-06-24 PROCEDURE — 1220000000 HC SEMI PRIVATE OB R&B

## 2021-06-24 PROCEDURE — 99231 SBSQ HOSP IP/OBS SF/LOW 25: CPT | Performed by: OBSTETRICS & GYNECOLOGY

## 2021-06-24 PROCEDURE — 6360000002 HC RX W HCPCS: Performed by: STUDENT IN AN ORGANIZED HEALTH CARE EDUCATION/TRAINING PROGRAM

## 2021-06-24 RX ORDER — CEPHALEXIN 250 MG/5ML
500 POWDER, FOR SUSPENSION ORAL EVERY 6 HOURS SCHEDULED
Status: DISCONTINUED | OUTPATIENT
Start: 2021-06-24 | End: 2021-06-26

## 2021-06-24 RX ADMIN — SODIUM CHLORIDE, POTASSIUM CHLORIDE, SODIUM LACTATE AND CALCIUM CHLORIDE: 600; 310; 30; 20 INJECTION, SOLUTION INTRAVENOUS at 09:44

## 2021-06-24 RX ADMIN — MORPHINE SULFATE 2 MG: 2 INJECTION, SOLUTION INTRAMUSCULAR; INTRAVENOUS at 09:54

## 2021-06-24 RX ADMIN — MORPHINE SULFATE 2 MG: 2 INJECTION, SOLUTION INTRAMUSCULAR; INTRAVENOUS at 05:34

## 2021-06-24 RX ADMIN — MORPHINE SULFATE 2 MG: 2 INJECTION, SOLUTION INTRAMUSCULAR; INTRAVENOUS at 01:43

## 2021-06-24 RX ADMIN — Medication 500 MG: at 19:35

## 2021-06-24 RX ADMIN — ACETAMINOPHEN 1000 MG: 500 TABLET ORAL at 08:40

## 2021-06-24 RX ADMIN — SODIUM CHLORIDE, POTASSIUM CHLORIDE, SODIUM LACTATE AND CALCIUM CHLORIDE: 600; 310; 30; 20 INJECTION, SOLUTION INTRAVENOUS at 01:58

## 2021-06-24 RX ADMIN — PROGESTERONE 200 MG: 100 INSERT VAGINAL at 21:58

## 2021-06-24 RX ADMIN — MORPHINE SULFATE 2 MG: 2 INJECTION, SOLUTION INTRAMUSCULAR; INTRAVENOUS at 22:06

## 2021-06-24 ASSESSMENT — PAIN SCALES - GENERAL
PAINLEVEL_OUTOF10: 7
PAINLEVEL_OUTOF10: 6
PAINLEVEL_OUTOF10: 7
PAINLEVEL_OUTOF10: 7
PAINLEVEL_OUTOF10: 9

## 2021-06-24 NOTE — PROGRESS NOTES
Ob/Gyn Interval Note     Was notified by RN that the patient c/o waking up to a \"wet spot. \" Went to evaluate the patient. She denies any continued leaking or large gush of fluid. She denies any wet spots currently. Offered to do an exam to verify and patient declined at this time. She states her pain/pressure is the same. She is comfortable at this time. Reviewed with the patient again the plan for Celestone at 21w5d with resuscitation at 22w0d. Told patient to let me know if she changes her mind about an exam or if she feeling any increase in pressure.      Vitals:    06/23/21 1733 06/23/21 2354 06/24/21 0145 06/24/21 0535   BP: (!) 102/55 122/67 117/64 115/63   Pulse: 97 82 89 78   Resp: 17 18 20 18   Temp: 98 °F (36.7 °C) 98.3 °F (36.8 °C) 98.1 °F (36.7 °C) 98 °F (36.7 °C)   TempSrc:       SpO2:   98% 98%   Weight:       Height:           Neida Rosenberg  OB/GYN Resident  [facility]  6/24/2021 8:05 AM

## 2021-06-24 NOTE — FLOWSHEET NOTE
Writer to room to administer morphine as patient requested. She is requesting an ultrasound at this time stating \" I would like to see if the baby is still done far and if so I need to make some decisions about my plan of care. \" when writer asked patient to further elaborate on what she means by that statement, patient could not give a clear answer and stated \" I dont know but I have been sitting here and might need to make some decisions. \" Writer offers emotional support to patient and explains that the residents are in a c/s at this time and will be in to see her as soon as possible. Patient agreeable to this plan.

## 2021-06-24 NOTE — FLOWSHEET NOTE
Patient called writer to room  to address her many concerns about her care. Patient very upset at this time and continues to state that she feels as if we aren't doing anything for her and her unborn baby. Patient states that she would like to speak to a patient advocate about her care. Writer explains that her nursing staff, physicians, and the rest of the staff involved in her care have been and will continue to be great advocates for her, but if there is someone else she would like to talk to, we can address her needs. Patient states that shes already spoke with NICU and does not want to talk with them again. Writer states that our nurse manager can come speak with her and hear her concerns. Writer also continues to educate patient on why certain medical interventions were not done and patient still is not agreeable to this and states \"it should be my decision and not the hospital policy on my gestational age. \" Patient also requesting her signed consent forms stating that \" I signed the cerclage consent forms and then it was never even done. \" Writer notifies Dr. Dodie Marrufo and Vincent Dozier about the situation. Sotero Reddy up to patients room accompanied by Shaunna Ron and Dr. Dodie Marrufo to speak with patient at 65. Sotero Reddy spoke with patient for 30+ minutes about situation.

## 2021-06-24 NOTE — FLOWSHEET NOTE
Patient called out and writer to room. Patient stating that when she woke up she noticed to have increased fluid in her underwear and wasn't sure if her water broke. Writer updated residents on patient status and Dr. Mercy Argueta to room at [phone] to evaluate patient. Patient refusing vaginal exam at this time, but states she does not notice to be leaking anymore. Patient asking many questions at this time and not accepting that she cannot have celestone for 3 more days. She states that she thinks her dating may not be correct despite her 5 week and 7 week dating ultrasounds. Dr. Mercy Argueta and writer reassured patient that her dating is accurate and at this time, we still need to wait until she is 21 and 5 days in order to give celestone. Patient also states she wants all of this information in writing and that she is advocating for herself to receive the celestone before 21 and 5. Dr. Mercy Argueta educates patient again on protocols for waiting for the celestone and about resuscitation of an infant at that gestational age. Patient denying any further questions at this time. Will continue to monitor.

## 2021-06-24 NOTE — CARE COORDINATION
[facility] Flow/Interdisciplinary Rounds Progress Note    Quality Flow Rounds held on June 24, 2021 at [address] [facility] Attending:   and OB Attending and Residents    Barriers to Discharge: 21w2d with advance cervical dilation and bulging bag. Last BSUS by attending 6.23 baby was noted to be breech and have shoulders through the cervix    Anticipated Discharge Date:  Inpatient through delivery    Anticipated Discharge Disposition: will DC home    Readmission Risk              Risk of Unplanned Readmission:  15           Discussed patient goal for the day, patient clinical progression, and barriers to discharge. The following Goal(s) of the Day/Commitment(s) have been identified:  plan for Celestone at 21w5d with resuscitation at 22w0d.       Sherlyn Lopez RN  June 24, 2021

## 2021-06-24 NOTE — PROGRESS NOTES
Ob Attending Late Entry Note     Patient asked for ultrasound as she was feeling more pressure. Ultrasound provided at her request fetal part through the shoulders now through the cervix on ultrasound. +Fetal movement . Reviewed her labs with both her and her partner Jean Carlos Handler as they had several questions about her labs and the interpretation. She has been anemic with hemoglobin of 9.7 yesterday 6/22/2021. Support offered given the heart breaking situation and offered to return should she have any questions or if there is anything I might provide for her as far as information, support. She understands that should she feel the urge to push she can let us know and that would be reasonable given the progression of the fetus into the vagina. NICU team again had seen them today to discuss limits of viability an resuscitation. Will continue with plan for the NICU team to come to delivery for assessment and evaluation but that the plan is for steroids at 21w5d and 21w6d and attempt at resuscitation at 22w0d. I let Mamta Freire and Jean Carlos Handler know how much I share their grief and will continue to hope with them.      Nasima Davis MD

## 2021-06-24 NOTE — PROGRESS NOTES
moist mucous membranes, trachea midline  Neurologic:  alert, oriented, normal speech, no focal findings or movement disorder noted  Lungs:  No increased work of breathing  Heart:  regular rate and rhythm and no murmur    Abdomen:  soft, gravid, non-tender, no rebound, guarding, or rigidity, no RUQ or epigastric tenderness, no signs or symptoms of abruption and no signs or symptoms of chorioamnionitis  Extremities:  no calf tenderness, non edematous  Musculoskeletal: Gross strength equal and intact throughout, no gross abnormalities, range of motion normal in hips, knees, shoulders and spine, CVA tenderness: none  Psychiatric: Mood appropriate, normal affect   Rectal Exam: not indicated  Pelvic Exam: not indicated at this time        Assessment/Plan:  Mercy Wright is a 38 y.o. female  at 22w2d IUP              - Rh positive/ Rubella immune/ GBS pending              - Continue PNV, SCDs, Progesterone suppositories daily              - VSS              - FHT daily              - CBC, T&S q3d- next on 21              - Maintain IV access              - Diet: general              - DVT prophylaxis: SCDs when non ambulatory              - [facility] US 21: breech presentation, posterior fundal placenta, EFW 13 oz. No obvious congenital anomalies      Advanced Cervical Dilation in Periviable Gestational Age              - VSS, afebrile              - On last sterile vaginal exam (21), there was bulging bag of membranes almost at +2 station with no appreciation of cervix. - On last BSUS (21): breech presentation, shoulders through cervix              - Not a candidate for cerclage at this time given advanced cervical dilation with bulging membranes. - Consider Celestone at 21w5d              - NICU consulted and in to speak with patient yesterday. NICU plans to attend birth to evaluate for possible resuscitation              - Viability considered at 22 weeks.  She would accept resuscitation at that gestational age and has been counseled on significant morbidity and mortality for the fetus at that gestational age. - R/B/A were discussed regarding latency PPROM antibiotics. She desired to continue latency antibiotics despite counseling on no current RCT trials supporting these antibiotics for prolapsing membranes. She is S/p Azithromycin 1g PO x1 and Ancef 1g IV q8h for 48 hours total. Will continue Keflex 500mg PO QID x5 days.              - Patient was previously requesting tocolysis. R/B/A were discussed since tocolysis is not recommended in previable infant. Patient still was desiring something to stop contractions. Nifedipine 10mg PO was ordered, however patient declined medication.              - Patient has been reporting painful contractions and pelvic pressure. She has received Morphine 2mg IV q3-4 hours              - Continue expectant management     Hx of Spontaneous Previable Delivery after PPROM at 19w6d (G1)              - Patient has been compliant with progesterone suppositories nightly. Ordered this admission.       Asthma (no medications)              - Denies any SOB and wheezing     Hypothyroidism              - Followed by Dr. Cristiane Davies              - Last TSH 6/4/21 0.60              - Controlled on no medications              - Denies any s/s of hypo/hyperthyroidism     Adrenal Insufficiency              - Patient has not required medications/steroids at this time              - Recommend close f/u in PP period     Trichomonas infection in pregnancy (TOR negative)              - Positive 2/21/21              - TOR negative              - Vaginitis negative on admission      E Coli UTI during Pregnancy              - Urine culture negative on admission              - Denies dysuria     AMA (NIPT wnl)     Short Interval Pregnancy              - Last delivery 12/19/20     Anxiety/Depression              - Stable on no medications              - Denies any SI/HI     Alpha Thalassemia Minor              - aa/a- noted on carrier screening (in media section)              - low risk fetus via the [place] complete test from [facility] of Polydactyly              - No evidence of polydactyly on MFM US 21     FHX of DM              - Patient's sister              - Early 1hr GTT wnl     Penicillin allergy              - Hives, SOB     Tobacco Use              - Patient smokes 1/2 PPD, but has not been using tobacco products during this admission              - Continued cessation encouraged     BMI 33      Patient Active Problem List    Diagnosis Date Noted    Threatened  labor, antepartum     Feeling pelvic pressure in pregnancy, antepartum     Short interval between pregnancies complicating pregnancy, antepartum     Current pregnancy with history of pre-term labor in second trimester     High-risk pregnancy, unspecified trimester 2021    Premature dilatation of cervix during pregnancy     Threatened premature labor in second trimester     20 weeks gestation of pregnancy 2021    Asthma 2021    Thalassemia minor 2021    Penicillin allergy (hives, SOB) 2021    Positive depression screening 2021     Overview Note:     PHQ-9  14. Pt is established with a private psychiatrist ( Dr. Robert Rojas )  and was taken off her meds. Pt will plan to discuss being put back on at her next virtual visit. Pt denies suicidal ideation or thoughts of harming others at this time 21.    Referral faxed to [facility] for enrollment-SK      Short Interval Pregnancy 2021    Hypothyroidism 2021     Overview Note:     TSH 0.02, T4 1.42 on 4/14/21  5/3/21-patient not on medication, follows with endocrinology  21 - TSH/T4 0.60/1.16      FHx DM 2021     Overview Note:     Early 1hr GTT wnl      Anemia 2021     Overview Note:     Pt taking PO iron      AMA (advanced maternal age) multigravida 33+ 04/28/2021     Overview Note:     First trimester low risk      High-risk pregnancy, first trimester 04/09/2021     Overview Note:     4/9/21- MFM referral placed for first trimester screen, anatomy scan and NIPT      Fam Hx Polydactyly 04/05/2021     Overview Note:     FOB sister w/ polydactyly      E. coli UTI during pregnancy  03/12/2021    LGSIL (POLO-1) on Colpo 2/2/21 03/01/2021     Overview Note:     9 o'clock and 12 o'clock biopsies  Needs repeat Pap smear 2/2/22      Trichomonas vaginalis infection 2/21/21 03/01/2021     Overview Note:     Neg 4/5/21  Neg 5/3/21      ASCUS with positive high risk HPV cervical 01/11/2021    SPTD after PPROM at 19w6d      Overview Note:     Pt is candidate for progesterone   Starting on vag progesterone per MFM. Please ask patient if she would like to switch to injectable and contact Margarette CURRY      Hx Previable PPROM @19wk6d on 12/19/20 12/20/2020     Overview Note:     The patient did not know she was pregnant prior to rupture.  Anxiety 12/20/2020    Adrenal insufficiency ([address] 12/20/2020    Tobacco use 12/20/2020    Obesity 09/15/2017     Overview Note:     Early 1hr GTT wnl 111      Strabismus 09/15/2017     h/o Major depression 04/30/2013     Overview Note:     No meds      Thyroid nodule 03/29/2012     Overview Note:     CT neck : 5/2/2017  Impression: Large mass in the lower pole of the left lobe of the thyroid gland plunging into the superior mediastinum and displacing the trachea somewhat toward the right.  This has progressed dramatically since 2007.    FNA: Benign. Nodular goiter. 12/20/2016             Will update Dr. Armani Pulido. Omar Castillo DO  Ob/Gyn Resident  6/24/2021, 1:54 AM           Attending Physician Statement  I have discussed the care of Romana Carrion, including pertinent history and exam findings,  with the resident.  I have seen and examined the patient and the key elements of all parts of the encounter have been performed by me. I agree with the assessment, plan and orders as documented by the resident.   [facility] Modifier)    Daniel Gupta DO

## 2021-06-25 LAB
ABO/RH: NORMAL
ABO/RH: NORMAL
ANTIBODY SCREEN: NEGATIVE
ANTIBODY SCREEN: NEGATIVE
ARM BAND NUMBER: NORMAL
ARM BAND NUMBER: NORMAL
BLOOD BANK SPECIMEN: NORMAL
EXPIRATION DATE: NORMAL
EXPIRATION DATE: NORMAL
HCT VFR BLD CALC: 29.1 % (ref 36.3–47.1)
HEMOGLOBIN: 9 G/DL (ref 11.9–15.1)
MCH RBC QN AUTO: 26.9 PG (ref 25.2–33.5)
MCHC RBC AUTO-ENTMCNC: 30.9 G/DL (ref 28.4–34.8)
MCV RBC AUTO: 86.9 FL (ref 82.6–102.9)
NRBC AUTOMATED: 0 PER 100 WBC
PDW BLD-RTO: 13.9 % (ref 11.8–14.4)
PLATELET # BLD: 191 K/UL (ref 138–453)
PMV BLD AUTO: 9.5 FL (ref 8.1–13.5)
RBC # BLD: 3.35 M/UL (ref 3.95–5.11)
WBC # BLD: 7.4 K/UL (ref 3.5–11.3)

## 2021-06-25 PROCEDURE — 96376 TX/PRO/DX INJ SAME DRUG ADON: CPT

## 2021-06-25 PROCEDURE — 96374 THER/PROPH/DIAG INJ IV PUSH: CPT

## 2021-06-25 PROCEDURE — 6370000000 HC RX 637 (ALT 250 FOR IP): Performed by: STUDENT IN AN ORGANIZED HEALTH CARE EDUCATION/TRAINING PROGRAM

## 2021-06-25 PROCEDURE — 2580000003 HC RX 258: Performed by: STUDENT IN AN ORGANIZED HEALTH CARE EDUCATION/TRAINING PROGRAM

## 2021-06-25 PROCEDURE — 85027 COMPLETE CBC AUTOMATED: CPT

## 2021-06-25 PROCEDURE — 86900 BLOOD TYPING SEROLOGIC ABO: CPT

## 2021-06-25 PROCEDURE — 83540 ASSAY OF IRON: CPT

## 2021-06-25 PROCEDURE — 6360000002 HC RX W HCPCS: Performed by: STUDENT IN AN ORGANIZED HEALTH CARE EDUCATION/TRAINING PROGRAM

## 2021-06-25 PROCEDURE — 1220000000 HC SEMI PRIVATE OB R&B

## 2021-06-25 PROCEDURE — 83550 IRON BINDING TEST: CPT

## 2021-06-25 PROCEDURE — 86901 BLOOD TYPING SEROLOGIC RH(D): CPT

## 2021-06-25 PROCEDURE — 86850 RBC ANTIBODY SCREEN: CPT

## 2021-06-25 PROCEDURE — 36415 COLL VENOUS BLD VENIPUNCTURE: CPT

## 2021-06-25 PROCEDURE — 82728 ASSAY OF FERRITIN: CPT

## 2021-06-25 RX ORDER — LANOLIN ALCOHOL/MO/W.PET/CERES
325 CREAM (GRAM) TOPICAL
Status: DISCONTINUED | OUTPATIENT
Start: 2021-06-26 | End: 2021-06-26 | Stop reason: HOSPADM

## 2021-06-25 RX ADMIN — Medication 500 MG: at 09:32

## 2021-06-25 RX ADMIN — ACETAMINOPHEN 1000 MG: 500 TABLET ORAL at 17:01

## 2021-06-25 RX ADMIN — SODIUM CHLORIDE, POTASSIUM CHLORIDE, SODIUM LACTATE AND CALCIUM CHLORIDE: 600; 310; 30; 20 INJECTION, SOLUTION INTRAVENOUS at 00:57

## 2021-06-25 RX ADMIN — MAGNESIUM HYDROXIDE 30 ML: 400 SUSPENSION ORAL at 22:26

## 2021-06-25 RX ADMIN — Medication 500 MG: at 15:11

## 2021-06-25 RX ADMIN — MORPHINE SULFATE 2 MG: 2 INJECTION, SOLUTION INTRAMUSCULAR; INTRAVENOUS at 21:26

## 2021-06-25 RX ADMIN — DOCUSATE SODIUM 100 MG: 100 CAPSULE, LIQUID FILLED ORAL at 08:49

## 2021-06-25 RX ADMIN — SODIUM CHLORIDE, POTASSIUM CHLORIDE, SODIUM LACTATE AND CALCIUM CHLORIDE: 600; 310; 30; 20 INJECTION, SOLUTION INTRAVENOUS at 23:11

## 2021-06-25 RX ADMIN — Medication 500 MG: at 02:54

## 2021-06-25 RX ADMIN — MORPHINE SULFATE 2 MG: 2 INJECTION, SOLUTION INTRAMUSCULAR; INTRAVENOUS at 15:26

## 2021-06-25 ASSESSMENT — PAIN DESCRIPTION - DESCRIPTORS
DESCRIPTORS: PRESSURE
DESCRIPTORS: PRESSURE

## 2021-06-25 ASSESSMENT — PAIN SCALES - GENERAL
PAINLEVEL_OUTOF10: 7
PAINLEVEL_OUTOF10: 7
PAINLEVEL_OUTOF10: 4

## 2021-06-25 NOTE — FLOWSHEET NOTE
Assessment as charted this am.  Pt continues to state she feels vaginal pressure, rates around a 3. She also states she has small bleeding when she wipes. Writer sees that pt has her vaginal progesterone dose from last night still sitting on her over bed stand. Also, there still remains her dose of Keflex suspension untouched from earlier this am, on her stand. Pt states, \"those pills are too big, I use my own progesterone\", and she stated, \"I was asleep\" when her nurse brought her Keflex last pm.  Writer explains to pt, concern for safety leaving meds at bedside, and if she does not want to take any specific meds at the time, just to let writer know. Pt verbalizes understanding.

## 2021-06-25 NOTE — CARE COORDINATION
Social Work    Sw reviewed pt's chart to learn that pt is GA 21w3d and will remain in hospital until delivery. Record shows pt is upset that if baby born before 22w GA there will be no resuscitation. Sw checked in with mom to introduce self and offer support. Pt reports she has a support system, but also states that does not matter because right now all that matters is her baby being ok, and that she feels her baby has hung on this long and deserves to be fought for. Mom verbally discussed she recently lost another pregnancy and says she cannot grieve 2 babies at the same time. Sw actively listened, showed empathy and compassion to pt. Pt denied any current needs and continued to focus on her desire for baby to be resuscitated if born before GA 22 weeks. Sw discussed Spiritual Care services with pt, she is aware she can let her nurse know if she desires services. Pt also aware she can ask for Sw if she needs to talk or any social needs arise. Sw will remain available for support as needed.

## 2021-06-26 VITALS
OXYGEN SATURATION: 98 % | RESPIRATION RATE: 18 BRPM | DIASTOLIC BLOOD PRESSURE: 67 MMHG | WEIGHT: 201 LBS | TEMPERATURE: 97.9 F | SYSTOLIC BLOOD PRESSURE: 108 MMHG | HEIGHT: 65 IN | HEART RATE: 89 BPM | BODY MASS INDEX: 33.49 KG/M2

## 2021-06-26 LAB
FERRITIN: 34 UG/L (ref 13–150)
IRON SATURATION: 8 % (ref 20–55)
IRON: 24 UG/DL (ref 37–145)
TOTAL IRON BINDING CAPACITY: 307 UG/DL (ref 250–450)
UNSATURATED IRON BINDING CAPACITY: 283 UG/DL (ref 112–347)

## 2021-06-26 PROCEDURE — 10D17Z9 MANUAL EXTRACTION OF PRODUCTS OF CONCEPTION, RETAINED, VIA NATURAL OR ARTIFICIAL OPENING: ICD-10-PCS | Performed by: OBSTETRICS & GYNECOLOGY

## 2021-06-26 PROCEDURE — 6370000000 HC RX 637 (ALT 250 FOR IP): Performed by: STUDENT IN AN ORGANIZED HEALTH CARE EDUCATION/TRAINING PROGRAM

## 2021-06-26 PROCEDURE — 96374 THER/PROPH/DIAG INJ IV PUSH: CPT

## 2021-06-26 PROCEDURE — 88305 TISSUE EXAM BY PATHOLOGIST: CPT

## 2021-06-26 PROCEDURE — 7200000001 HC VAGINAL DELIVERY

## 2021-06-26 PROCEDURE — 6360000002 HC RX W HCPCS: Performed by: STUDENT IN AN ORGANIZED HEALTH CARE EDUCATION/TRAINING PROGRAM

## 2021-06-26 PROCEDURE — 6360000002 HC RX W HCPCS

## 2021-06-26 PROCEDURE — 96375 TX/PRO/DX INJ NEW DRUG ADDON: CPT

## 2021-06-26 RX ORDER — METHYLERGONOVINE MALEATE 0.2 MG/1
200 TABLET ORAL ONCE
Status: COMPLETED | OUTPATIENT
Start: 2021-06-26 | End: 2021-06-26

## 2021-06-26 RX ORDER — KETOROLAC TROMETHAMINE 30 MG/ML
INJECTION, SOLUTION INTRAMUSCULAR; INTRAVENOUS
Status: COMPLETED
Start: 2021-06-26 | End: 2021-06-26

## 2021-06-26 RX ORDER — IBUPROFEN 600 MG/1
600 TABLET ORAL EVERY 6 HOURS
Status: DISCONTINUED | OUTPATIENT
Start: 2021-06-26 | End: 2021-06-26

## 2021-06-26 RX ORDER — PSEUDOEPHEDRINE HCL 30 MG
100 TABLET ORAL DAILY
Qty: 30 CAPSULE | Refills: 0 | Status: SHIPPED | OUTPATIENT
Start: 2021-06-27 | End: 2022-07-09

## 2021-06-26 RX ORDER — KETOROLAC TROMETHAMINE 30 MG/ML
30 INJECTION, SOLUTION INTRAMUSCULAR; INTRAVENOUS ONCE
Status: DISCONTINUED | OUTPATIENT
Start: 2021-06-26 | End: 2021-06-26 | Stop reason: HOSPADM

## 2021-06-26 RX ORDER — LANOLIN ALCOHOL/MO/W.PET/CERES
325 CREAM (GRAM) TOPICAL 2 TIMES DAILY
Qty: 90 TABLET | Refills: 3 | Status: SHIPPED | OUTPATIENT
Start: 2021-06-26 | End: 2021-07-06

## 2021-06-26 RX ORDER — SERTRALINE HYDROCHLORIDE 25 MG/1
25 TABLET, FILM COATED ORAL DAILY
Qty: 30 TABLET | Refills: 3 | Status: SHIPPED | OUTPATIENT
Start: 2021-06-26 | End: 2021-07-19 | Stop reason: ALTCHOICE

## 2021-06-26 RX ORDER — IBUPROFEN 600 MG/1
600 TABLET ORAL EVERY 6 HOURS
Qty: 30 TABLET | Refills: 0 | Status: SHIPPED | OUTPATIENT
Start: 2021-06-26 | End: 2021-07-19

## 2021-06-26 RX ORDER — MORPHINE SULFATE 2 MG/ML
2 INJECTION, SOLUTION INTRAMUSCULAR; INTRAVENOUS ONCE
Status: COMPLETED | OUTPATIENT
Start: 2021-06-26 | End: 2021-06-26

## 2021-06-26 RX ORDER — ACETAMINOPHEN 500 MG
1000 TABLET ORAL EVERY 6 HOURS PRN
Status: DISCONTINUED | OUTPATIENT
Start: 2021-06-26 | End: 2021-06-26 | Stop reason: HOSPADM

## 2021-06-26 RX ORDER — IBUPROFEN 600 MG/1
600 TABLET ORAL EVERY 6 HOURS
Status: DISCONTINUED | OUTPATIENT
Start: 2021-06-26 | End: 2021-06-26 | Stop reason: HOSPADM

## 2021-06-26 RX ORDER — SIMETHICONE 80 MG
80 TABLET,CHEWABLE ORAL EVERY 6 HOURS PRN
Status: DISCONTINUED | OUTPATIENT
Start: 2021-06-26 | End: 2021-06-26 | Stop reason: HOSPADM

## 2021-06-26 RX ORDER — KETOROLAC TROMETHAMINE 30 MG/ML
30 INJECTION, SOLUTION INTRAMUSCULAR; INTRAVENOUS ONCE
Status: COMPLETED | OUTPATIENT
Start: 2021-06-26 | End: 2021-06-26

## 2021-06-26 RX ORDER — MORPHINE SULFATE 2 MG/ML
INJECTION, SOLUTION INTRAMUSCULAR; INTRAVENOUS
Status: COMPLETED
Start: 2021-06-26 | End: 2021-06-26

## 2021-06-26 RX ADMIN — KETOROLAC TROMETHAMINE 30 MG: 30 INJECTION, SOLUTION INTRAMUSCULAR; INTRAVENOUS at 14:16

## 2021-06-26 RX ADMIN — FERROUS SULFATE TAB EC 325 MG (65 MG FE EQUIVALENT) 325 MG: 325 (65 FE) TABLET DELAYED RESPONSE at 09:03

## 2021-06-26 RX ADMIN — MORPHINE SULFATE 2 MG: 2 INJECTION, SOLUTION INTRAMUSCULAR; INTRAVENOUS at 13:25

## 2021-06-26 RX ADMIN — Medication 1 TABLET: at 09:03

## 2021-06-26 RX ADMIN — ASPIRIN 81 MG: 81 TABLET, CHEWABLE ORAL at 09:02

## 2021-06-26 RX ADMIN — Medication 500 MG: at 00:00

## 2021-06-26 RX ADMIN — MORPHINE SULFATE 2 MG: 2 INJECTION, SOLUTION INTRAMUSCULAR; INTRAVENOUS at 15:00

## 2021-06-26 RX ADMIN — METHYLERGONOVINE 200 MCG: 0.2 TABLET ORAL at 19:08

## 2021-06-26 RX ADMIN — Medication 500 MG: at 06:38

## 2021-06-26 RX ADMIN — BENZOCAINE AND MENTHOL 1 LOZENGE: 15; 3.6 LOZENGE ORAL at 06:21

## 2021-06-26 ASSESSMENT — PAIN SCALES - GENERAL
PAINLEVEL_OUTOF10: 8
PAINLEVEL_OUTOF10: 9
PAINLEVEL_OUTOF10: 10
PAINLEVEL_OUTOF10: 10

## 2021-06-26 NOTE — FLOWSHEET NOTE
1404- Pt calls RN to room, pt sitting on bedside commode crying, stating she feels something coming out. Dr. Ariadna Garcia, Dr. Ascencio and Dr. Bharti Garcia called to room. Fetus delivered in bedside commode. 1406-delivery of previable fetus in bedside commode  1407-Dr. Bradley-Travis clamps and cuts umbillical cord  3465- NICU staff to room (see delivery summary), fetus assessed and determined too small for intervention, baby wrapped and placed in mother's arms, heart beat still present at this time  1415- Pt requests Neonatologist explain why her baby wasn't recisitated  46- Dr. Jennifer Freeman and RYAN Saenz to room, explains reasoning for inability to resuscitate to patient, FOB, and person on the patient's phone. Pt states multiple times that she's going to take legal action because the hospital should have given her a cerclage when she came in, and a couple days of gestation shouldn't matter. 1425- RYAN Saenz listens to fetus- Heartbeat still present at this time  46- Dr. Bharti Garcia to room- explains reasoning for not being able to give cerclage and possible plan for next pregnancy due to request. Pt upset at this time, states she's not worried about next time because she's \"not about to keep getting pregnant for these hospitals to keep killing her baby\"  12- Dr. Bharti Garcia assesses pt bleeding due to retained placenta, bleeding stable at this time.

## 2021-06-26 NOTE — FLOWSHEET NOTE
RN to room, pain assessed. Pt expresses that she is experiencing no physical pain, but a lot of emotional pain. Pt states \"ya'll are going to have to write me a prescription for some antidepressants because I'm not going to be able to handle this. \" Richy Grover states she will update physicians. Dr. Diana Dickson and Dr. Jasper Washburn updated at this time.

## 2021-06-26 NOTE — CARE COORDINATION
Continuing DC planning:      GA: 21 4/7  Hospital Day: 8  FHT: 140/doppler  MFM US 6/21/21: breech presentation, posterior fundal placenta, EFW 13 oz. No obvious congenital anomalies. Patient c/o some vaginal spotting and intermittent cramping, otherwise stable at this time. Admitted 6/18/21 for advanced cervical dilation (+2 on 6/22) with bulging membranes and no appreciable cervix. Multiple discussions with mom regarding considering viability at 22 weeks, with Celestone to start at 21 5/7. PLAN:   PNV, SCDs, Progesterone suppositories daily  FHT daily  CBC, T&S q3d due today  Maintain IV access  Diet: general  DVT prophylaxis: SCDs when non ambulatory  Keflex 500mg PO QID x5 days. Morphine 2mg IV q3 . Continue expectant management    Case Management will continue to follow.       Will remain inpatient until delivery.

## 2021-06-26 NOTE — L&D DELIVERY NOTE
delivered   Estimated blood loss:  less than 50mL immediately following delivery  Condition:  mother stable, NICU assessed infant and  is unable to be resuscitated   Counts: instrument and sponge counts correct  Blood Type and Rh: A POSITIVE  Rubella Immunity Status: immune     William Loco DO  Ob/Gyn Resident  2021, 3:13 PM      Mother's Information    Labor Events     labor?: Yes  Rupture type: Intact     Mother Delivery Information    Episiotomy: None  Lacerations: None  Repair Suture: None  Surgical or Additional Est. Blood Loss (mL): 0 (View Only): Edit in Flowsheets   Combined Est. Blood Loss (mL): 0        Brittni Drilling Girl Dioni Mcintosh [[id number]]    Labor Events     labor?: Yes   steroids?: None  Cervical ripening date/time:     Antibiotics received during labor?: Yes  Rupture Identifier: Sac 1   Rupture date/time: 21 14:05:00   Rupture type:  Intact  Fluid color: Clear, Bloody Show  Fluid odor: None  Induction: None  Augmentation: None   Additional complications:  OB: DELIVERY - COMPLICATIONS   Delivery of viable fetus during abdominal pregnancy in first trimester, single or unspecified fetus         Anesthesia    Method: None     Assisted Delivery Details    Forceps attempted?: No  Vacuum extractor attempted?: No     Document Additional Attempt       Document Additional Attempt             Shoulder Dystocia    Shoulder dystocia present?: No  Add Second Maneuver  Add Third Maneuver  Add Fourth Maneuver  Add Fifth Maneuver  Add Sixth Maneuver  Add Seventh Maneuver  Add Eighth Maneuver  Add Ninth Maneuver      Presentation    Presentation: Vertex     [place] Information    Head delivery date/time: 2021 14:06:00   Changing the 's delivery date/time could affect patient care.:    Delivery date/time:  21 1406   Delivery type: Vaginal, Spontaneous    Details:        Delivery Providers    Delivering clinician: Ascencion Vila DO Provider Role    Conchita Miller [place]     MD Liz Gamboa MD Eloy Conroy, RN       Cord    Vessels: 3 Vessels  Complications: None  Delayed cord clamping?: No  Cord blood disposition: Discard  Gases sent?: No  Stem cell collection (by provider): No     Apgars    Living status: Living  Apgars   1 Minute:  5 Minute:  10 Minute 15 Minute 20 Minute   Skin Color: 0  0  0      Heart Rate: 1  1  1      Reflex Irritability: 0  0  0      Muscle Tone: 0  0  0      Respiratory Effort: 0  0  0      Total: 1  1  1                 [place] Measurements    Weight: 310 g       Delivery Information    Episiotomy: None  Perineal lacerations: None    Vaginal laceration: No    Cervical laceration: No    Surgical or additional est. blood loss (mL): 0 (View Only): Edit in Flowsheets   Combined est. blood loss (mL): 0  Repair suture: None     Other Procedures    Procedures: None

## 2021-06-26 NOTE — FLOWSHEET NOTE
SPIRITUAL CARE PROGRESS NOTE     Spiritual Assessment: Pt tearful and grieving upon  arrival.     Intervention:  facilitated pt exploring feelings, thoughts and concerns.  prayed with pt, baby, and significant other.  provided blessing.  Outcome: Pt was receptive to visit, blessing, and prayer. Pt expressed gratitude for visit, blessing, and prayer. 06/26/21 5979   Encounter Summary   Services provided to: Patient;Significant other   Referral/Consult From: Nurse   Support System Significant other;Family members   Continue Visiting   (6/26/21)   Complexity of Encounter High   Length of Encounter 15 minutes   Spiritual Assessment Completed Yes   Crisis   Type Emotional distress   Assessment Tearful;Grieving;Helplessness   Intervention Explored feelings, thoughts, concerns;Prayer;[place]; Active listening;Sustaining presence/ Ministry of presence; Discussed death   Outcome Expressed gratitude;Expressed feelings/needs/concerns; Tearful;Receptive

## 2021-06-26 NOTE — PROGRESS NOTES
OB/GYN Progress Note    Patient was seen and evaluated. She is requesting discharge home. Her bleeding is stable. She received one dose oral methergine after her IV infiltrated and IV pit could no longer be run. VSS. She will follow up in clinic.     Robbie Tubbs DO, PGY-1  Ob/Gyn Resident  [address]  06/26/21

## 2021-06-26 NOTE — PROGRESS NOTES
OB/GYN PROGRESS NOTE    Ramon Garner is a 38 y.o. female  at [address] [place] [address] Day: 8    Subjective:   Patient has been seen and examined. Patient is doing well, complaining of some intermittent vaginal spotting that is dark red color but stable for the last few days. She also notes intermittent pelvic cramping and pressure but is otherwise very comfortable. Denies any leakage of fluid. The patient reports fetal movement is present. Patient denies headache, vision changes, nausea, vomiting, fever, chills, shortness of breath, chest pain, RUQ pain, abdominal pain, diarrhea, dysuria or, hematuria.      Objective:   Vitals:  Vitals:    21 0832 21 1512 21 2046 21 0003   BP: (!) 103/57 87/69 (!) 101/49 (!) 120/52   Pulse: 90 86 88 83   Resp: 18 16 16 17   Temp: 97.9 °F (36.6 °C) 98.2 °F (36.8 °C) 98 °F (36.7 °C) 98 °F (36.7 °C)   TempSrc:       SpO2:       Weight:       Height:             FHT: 140bpm via doppler    Physical Exam:  General appearance:  no apparent distress, alert and cooperative  HEENT: head atraumatic, normocephalic, moist mucous membranes, trachea midline  Neurologic:  alert, oriented, normal speech, no focal findings or movement disorder noted  Lungs:  No increased work of breathing, good air exchange, clear to auscultation bilaterally, no crackles or wheezing  Heart:  normal S1 and S2, regular rate and rhythm and no murmur    Abdomen:  soft, gravid, non-tender, no rebound, guarding, or rigidity, no RUQ or epigastric tenderness, no signs or symptoms of abruption and no signs or symptoms of chorioamnionitis  Extremities:  no calf tenderness, non edematous  Musculoskeletal: Gross strength equal and intact throughout, no gross abnormalities, range of motion normal in hips, knees, shoulders and spine  Psychiatric: Mood appropriate, normal affect   Rectal Exam: not indicated  Pelvic Exam: not indicated    DATA:  Labs:   Lab Results   Component Value Date    WBC 7.4 anemia    - VSS    - Will consider IV iron infusions     sPTD after PPROM at 19w6d on 20   - Pt has been compliant with progesterone suppositories nightly which was ordered    Asthma (no medications)   - Denies any SOB, wheezing   - Stable on no medications     Hypothyroidism (no meds)   - Follows with Dr. Cristiane Davies   - Last TSH 21 at 0.60   - Stable on no medications and denies any s/s hypo/hyperthyroidism    Adrenal insufficiency     - Stable on no medications     Trichomonas 21 (TOR neg)    E. coli UTI during pregnancy    - UCx neg on admission    AMA (NIPT wnl)    Short Interval Pregnancy    Alpha Thal Minor (low risk fetus)   - aa/a- noted on carrier screening and low risk to fetus on [place] complete test    FHx Polydactyly   - No evidence of polydactyly on MFM ultrasound    FHx DM (early 1hr GTT wnl)   - Early 1hr GTT wnl     Penicillin allergy (hives, SOB)    Anxiety/Depression   - Stable on no medications    Tobacco use   - Pt smokes 1/2 PPD    - Continued cessation encouraged    BMI 33      Patient Active Problem List    Diagnosis Date Noted    21 weeks gestation of pregnancy     Threatened  labor, antepartum     Feeling pelvic pressure in pregnancy, antepartum     Short interval between pregnancies complicating pregnancy, antepartum     HRP (high risk pregnancy), second trimester     High-risk pregnancy, unspecified trimester 2021    Premature dilatation of cervix during pregnancy     Threatened premature labor in second trimester     20 weeks gestation of pregnancy 2021    Asthma 2021    Thalassemia minor 2021    Penicillin allergy (hives, SOB) 2021    Positive depression screening 2021     Overview Note:     PHQ-9  14. Pt is established with a private psychiatrist ( Dr. Charito Rojas )  and was taken off her meds. Pt will plan to discuss being put back on at her next virtual visit.   Pt denies suicidal ideation or thoughts of harming others at this time 5/5/21. Referral faxed to [facility] for enrollment-SK      Short Interval Pregnancy 05/03/2021    Hypothyroidism 05/03/2021     Overview Note:     TSH 0.02, T4 1.42 on 4/14/21  5/3/21-patient not on medication, follows with endocrinology  6/4/21 - TSH/T4 0.60/1.16      FHx DM 05/03/2021     Overview Note:     Early 1hr GTT wnl      Anemia 05/03/2021     Overview Note:     Pt taking PO iron      AMA (advanced maternal age) multigravida 35+ 04/28/2021     Overview Note:     First trimester low risk      High-risk pregnancy, first trimester 04/09/2021     Overview Note:     4/9/21- [facility] referral placed for first trimester screen, anatomy scan and NIPT      Fam Hx Polydactyly 04/05/2021     Overview Note:     FOB sister w/ polydactyly      E. coli UTI during pregnancy  03/12/2021    LGSIL (POLO-1) on Colpo 2/2/21 03/01/2021     Overview Note:     9 o'clock and 12 o'clock biopsies  Needs repeat Pap smear 2/2/22      Trichomonas vaginalis infection 2/21/21 03/01/2021     Overview Note:     Neg 4/5/21  Neg 5/3/21      ASCUS with positive high risk HPV cervical 01/11/2021    SPTD after PPROM at 19w6d      Overview Note:     Pt is candidate for progesterone   Starting on vag progesterone per MFM. Please ask patient if she would like to switch to injectable and contact Margarette CURRY      Hx Previable PPROM @19wk6d on 12/19/20 12/20/2020     Overview Note:     The patient did not know she was pregnant prior to rupture.        Anxiety 12/20/2020    Adrenal insufficiency ([address] 12/20/2020    Tobacco use 12/20/2020    Obesity 09/15/2017     Overview Note:     Early 1hr GTT wnl 111      Strabismus 09/15/2017     h/o Major depression 04/30/2013     Overview Note:     No meds      Thyroid nodule 03/29/2012     Overview Note:     CT neck : 5/2/2017  Impression: Large mass in the lower pole of the left lobe of the thyroid gland plunging into the superior mediastinum and displacing the trachea somewhat toward the right.  This has progressed dramatically since 2007.    FNA: Benign. Nodular goiter. 12/20/2016             Will update Dr. Mora.      Angela Eisenberg DO  Ob/Gyn Resident  6/26/2021, 12:22 AM

## 2021-06-26 NOTE — PROGRESS NOTES
In Nikunj Coker Attending    Patient is still upset after NICU discussion about why they didn't resuscitation her baby and wanted to discuss what OB physicians can do if she gets pregnant again in the near future. Because this is her 2nd 2nd trimester loss the following could be offered. 1. At 3-4 weeks post delivery do full miscarriage workup  2. With next pregnancy between 12-14 weeks offer and do a vaginal cerclage  3. Starting at 14-16 weeks start progesterone shots or vaginal suppositories. 4. Ultrasounds for cervical length  5.  Removal of cerclage at 36 weeks or signs of labor

## 2021-06-26 NOTE — PROGRESS NOTES
OB/GYN Resident Interval Note    Patient seen and examined. Reports she is having increased uterine cramping. She agreed to gentle fundal massage and pelvic examination. She passed off the baby to her partner for exam.     Ringed forceps were placed on the cord to aid in traction. With fundal massage and patient pushing, placenta delivered. Placenta was inspected and appeared to be intact, had 3 vessels and there appeared to be 50% abruption noted. Will send to pathology for further assessment. Vulva and introitus were inspected and no tears or lacerations noted. Postpartum pitocin started and explanation on why we are giving pitocin discussed to patient with RN present. Will order Tylenol for further pain control. Will continue to let family grieve and await spiritual services.        Mitali Kam, DO   OB/GYN Resident  Pager [phone]  [facility], [place] [address]  6/26/2021, 4:12 PM

## 2021-06-29 ENCOUNTER — FOLLOWUP TELEPHONE ENCOUNTER (OUTPATIENT)
Dept: OBGYN | Age: 36
End: 2021-06-29

## 2021-06-29 LAB — SURGICAL PATHOLOGY REPORT: NORMAL

## 2021-06-29 NOTE — TELEPHONE ENCOUNTER
SW spoke with Pt to monitor moods and needs. Pt recently lost her second pregnancy due to  labor. Pt stated she is very sad but also very hurt and angry and feels enough wasn't done while in the hospital. Pt stated she has an appointment scheduled with her psychiatrist in the next hour or two. Pt stated she was prescribed medication for depression upon discharge but has not filled it as of this day. Pt stated she has good support around her at this time and was thankful to speak with SOTO. SW reminded client that she could contact myself at any time and will continue to follow up.

## 2021-07-01 RX ORDER — SERTRALINE HYDROCHLORIDE 25 MG/1
25 TABLET, FILM COATED ORAL DAILY
Qty: 30 TABLET | Refills: 3 | Status: SHIPPED | OUTPATIENT
Start: 2021-07-01 | End: 2021-07-19 | Stop reason: ALTCHOICE

## 2021-07-01 RX ORDER — DOCUSATE SODIUM 100 MG/1
100 CAPSULE, LIQUID FILLED ORAL 2 TIMES DAILY PRN
Qty: 60 CAPSULE | Refills: 1 | Status: SHIPPED | OUTPATIENT
Start: 2021-07-01 | End: 2022-07-09

## 2021-07-01 RX ORDER — IBUPROFEN 600 MG/1
600 TABLET ORAL EVERY 6 HOURS PRN
Qty: 120 TABLET | Refills: 0 | Status: SHIPPED | OUTPATIENT
Start: 2021-07-01 | End: 2022-08-06

## 2021-07-06 ENCOUNTER — TELEPHONE (OUTPATIENT)
Dept: OBGYN | Age: 36
End: 2021-07-06

## 2021-07-06 RX ORDER — LANOLIN ALCOHOL/MO/W.PET/CERES
325 CREAM (GRAM) TOPICAL
Qty: 90 TABLET | Refills: 3 | Status: SHIPPED | OUTPATIENT
Start: 2021-07-06

## 2021-07-06 NOTE — TELEPHONE ENCOUNTER
OB/GYN Resident Telephone Encounter    Patient called stating that she never picked up her Rx for iron. States she is doing well overall. Reports she picked up motrin, zoloft and colace that was previously sent to her pharmacy on 7/1/21. Rx iron sent to [facility]. Instructed patient to take every other day. She had no further questions.        Hilda Maldonado DO   OB/GYN Resident  Pager [phone]  [facility], [place]  7/6/2021, 6:23 PM

## 2021-07-10 ENCOUNTER — HOSPITAL ENCOUNTER (EMERGENCY)
Age: 36
Discharge: HOME | End: 2021-07-10
Attending: EMERGENCY MEDICINE
Payer: MEDICARE

## 2021-07-10 ENCOUNTER — APPOINTMENT (OUTPATIENT)
Dept: GENERAL RADIOLOGY | Age: 36
End: 2021-07-10
Payer: MEDICARE

## 2021-07-10 ENCOUNTER — APPOINTMENT (OUTPATIENT)
Dept: CT IMAGING | Age: 36
End: 2021-07-10
Payer: MEDICARE

## 2021-07-10 VITALS
TEMPERATURE: 97.7 F | SYSTOLIC BLOOD PRESSURE: 131 MMHG | RESPIRATION RATE: 16 BRPM | DIASTOLIC BLOOD PRESSURE: 76 MMHG | HEART RATE: 59 BPM | OXYGEN SATURATION: 98 %

## 2021-07-10 DIAGNOSIS — N39.0 URINARY TRACT INFECTION WITHOUT HEMATURIA, SITE UNSPECIFIED: Primary | ICD-10-CM

## 2021-07-10 DIAGNOSIS — R07.89 ATYPICAL CHEST PAIN: ICD-10-CM

## 2021-07-10 LAB
-: ABNORMAL
ABSOLUTE EOS #: 0.16 K/UL (ref 0–0.44)
ABSOLUTE IMMATURE GRANULOCYTE: <0.03 K/UL (ref 0–0.3)
ABSOLUTE LYMPH #: 3.56 K/UL (ref 1.1–3.7)
ABSOLUTE MONO #: 0.73 K/UL (ref 0.1–1.2)
ALBUMIN SERPL-MCNC: 3.6 G/DL (ref 3.5–5.2)
ALBUMIN/GLOBULIN RATIO: 1.1 (ref 1–2.5)
ALP BLD-CCNC: 87 U/L (ref 35–104)
ALT SERPL-CCNC: 8 U/L (ref 5–33)
AMORPHOUS: ABNORMAL
ANION GAP SERPL CALCULATED.3IONS-SCNC: 7 MMOL/L (ref 9–17)
AST SERPL-CCNC: 13 U/L
BACTERIA: ABNORMAL
BASOPHILS # BLD: 1 % (ref 0–2)
BASOPHILS ABSOLUTE: 0.04 K/UL (ref 0–0.2)
BILIRUB SERPL-MCNC: 0.19 MG/DL (ref 0.3–1.2)
BILIRUBIN DIRECT: 0.09 MG/DL
BILIRUBIN URINE: NEGATIVE
BILIRUBIN, INDIRECT: 0.1 MG/DL (ref 0–1)
BUN BLDV-MCNC: 9 MG/DL (ref 6–20)
BUN/CREAT BLD: ABNORMAL (ref 9–20)
CALCIUM SERPL-MCNC: 8.6 MG/DL (ref 8.6–10.4)
CASTS UA: ABNORMAL /LPF (ref 0–8)
CHLORIDE BLD-SCNC: 105 MMOL/L (ref 98–107)
CO2: 28 MMOL/L (ref 20–31)
COLOR: YELLOW
CREAT SERPL-MCNC: 0.55 MG/DL (ref 0.5–0.9)
CRYSTALS, UA: ABNORMAL /HPF
DIFFERENTIAL TYPE: ABNORMAL
EOSINOPHILS RELATIVE PERCENT: 2 % (ref 1–4)
EPITHELIAL CELLS UA: ABNORMAL /HPF (ref 0–5)
GFR AFRICAN AMERICAN: >60 ML/MIN
GFR NON-AFRICAN AMERICAN: >60 ML/MIN
GFR SERPL CREATININE-BSD FRML MDRD: ABNORMAL ML/MIN/{1.73_M2}
GFR SERPL CREATININE-BSD FRML MDRD: ABNORMAL ML/MIN/{1.73_M2}
GLOBULIN: ABNORMAL G/DL (ref 1.5–3.8)
GLUCOSE BLD-MCNC: 96 MG/DL (ref 70–99)
GLUCOSE URINE: NEGATIVE
HCT VFR BLD CALC: 35.3 % (ref 36.3–47.1)
HEMOGLOBIN: 10.7 G/DL (ref 11.9–15.1)
IMMATURE GRANULOCYTES: 0 %
KETONES, URINE: ABNORMAL
LACTIC ACID, WHOLE BLOOD: 0.9 MMOL/L (ref 0.7–2.1)
LACTIC ACID: NORMAL MMOL/L
LEUKOCYTE ESTERASE, URINE: ABNORMAL
LIPASE: 20 U/L (ref 13–60)
LYMPHOCYTES # BLD: 42 % (ref 24–43)
MCH RBC QN AUTO: 26.2 PG (ref 25.2–33.5)
MCHC RBC AUTO-ENTMCNC: 30.3 G/DL (ref 28.4–34.8)
MCV RBC AUTO: 86.3 FL (ref 82.6–102.9)
MONOCYTES # BLD: 9 % (ref 3–12)
MUCUS: ABNORMAL
NITRITE, URINE: NEGATIVE
NRBC AUTOMATED: 0 PER 100 WBC
OTHER OBSERVATIONS UA: ABNORMAL
PDW BLD-RTO: 13.4 % (ref 11.8–14.4)
PH UA: 5.5 (ref 5–8)
PLATELET # BLD: 377 K/UL (ref 138–453)
PLATELET ESTIMATE: ABNORMAL
PMV BLD AUTO: 8.8 FL (ref 8.1–13.5)
POTASSIUM SERPL-SCNC: 4 MMOL/L (ref 3.7–5.3)
PROTEIN UA: ABNORMAL
RBC # BLD: 4.09 M/UL (ref 3.95–5.11)
RBC # BLD: ABNORMAL 10*6/UL
RBC UA: ABNORMAL /HPF (ref 0–4)
RENAL EPITHELIAL, UA: ABNORMAL /HPF
SEG NEUTROPHILS: 46 % (ref 36–65)
SEGMENTED NEUTROPHILS ABSOLUTE COUNT: 4.06 K/UL (ref 1.5–8.1)
SODIUM BLD-SCNC: 140 MMOL/L (ref 135–144)
SPECIFIC GRAVITY UA: 1.03 (ref 1–1.03)
TOTAL PROTEIN: 6.9 G/DL (ref 6.4–8.3)
TRICHOMONAS: ABNORMAL
TROPONIN INTERP: NORMAL
TROPONIN T: NORMAL NG/ML
TROPONIN, HIGH SENSITIVITY: <6 NG/L (ref 0–14)
TURBIDITY: ABNORMAL
URINE HGB: ABNORMAL
UROBILINOGEN, URINE: NORMAL
WBC # BLD: 8.6 K/UL (ref 3.5–11.3)
WBC # BLD: ABNORMAL 10*3/UL
WBC UA: ABNORMAL /HPF (ref 0–5)
YEAST: ABNORMAL

## 2021-07-10 PROCEDURE — 81001 URINALYSIS AUTO W/SCOPE: CPT

## 2021-07-10 PROCEDURE — 96374 THER/PROPH/DIAG INJ IV PUSH: CPT

## 2021-07-10 PROCEDURE — 96375 TX/PRO/DX INJ NEW DRUG ADDON: CPT

## 2021-07-10 PROCEDURE — 83605 ASSAY OF LACTIC ACID: CPT

## 2021-07-10 PROCEDURE — 74177 CT ABD & PELVIS W/CONTRAST: CPT

## 2021-07-10 PROCEDURE — 6360000002 HC RX W HCPCS: Performed by: STUDENT IN AN ORGANIZED HEALTH CARE EDUCATION/TRAINING PROGRAM

## 2021-07-10 PROCEDURE — 80048 BASIC METABOLIC PNL TOTAL CA: CPT

## 2021-07-10 PROCEDURE — 83690 ASSAY OF LIPASE: CPT

## 2021-07-10 PROCEDURE — 93005 ELECTROCARDIOGRAM TRACING: CPT | Performed by: STUDENT IN AN ORGANIZED HEALTH CARE EDUCATION/TRAINING PROGRAM

## 2021-07-10 PROCEDURE — 6360000004 HC RX CONTRAST MEDICATION: Performed by: STUDENT IN AN ORGANIZED HEALTH CARE EDUCATION/TRAINING PROGRAM

## 2021-07-10 PROCEDURE — 80076 HEPATIC FUNCTION PANEL: CPT

## 2021-07-10 PROCEDURE — 71260 CT THORAX DX C+: CPT

## 2021-07-10 PROCEDURE — 99284 EMERGENCY DEPT VISIT MOD MDM: CPT

## 2021-07-10 PROCEDURE — 71046 X-RAY EXAM CHEST 2 VIEWS: CPT

## 2021-07-10 PROCEDURE — 96376 TX/PRO/DX INJ SAME DRUG ADON: CPT

## 2021-07-10 PROCEDURE — 85025 COMPLETE CBC W/AUTO DIFF WBC: CPT

## 2021-07-10 PROCEDURE — 84484 ASSAY OF TROPONIN QUANT: CPT

## 2021-07-10 RX ORDER — DIPHENHYDRAMINE HYDROCHLORIDE 50 MG/ML
50 INJECTION INTRAMUSCULAR; INTRAVENOUS EVERY 6 HOURS PRN
Status: DISCONTINUED | OUTPATIENT
Start: 2021-07-10 | End: 2021-07-10 | Stop reason: HOSPADM

## 2021-07-10 RX ORDER — CEPHALEXIN 500 MG/1
500 CAPSULE ORAL 2 TIMES DAILY
Qty: 14 CAPSULE | Refills: 0 | Status: SHIPPED | OUTPATIENT
Start: 2021-07-10 | End: 2021-07-10

## 2021-07-10 RX ORDER — METHYLPREDNISOLONE SODIUM SUCCINATE 125 MG/2ML
40 INJECTION, POWDER, LYOPHILIZED, FOR SOLUTION INTRAMUSCULAR; INTRAVENOUS ONCE
Status: DISCONTINUED | OUTPATIENT
Start: 2021-07-10 | End: 2021-07-10

## 2021-07-10 RX ORDER — CEPHALEXIN 125 MG/5ML
500 POWDER, FOR SUSPENSION ORAL 2 TIMES DAILY
Qty: 280 ML | Refills: 0 | Status: SHIPPED | OUTPATIENT
Start: 2021-07-10 | End: 2021-07-17

## 2021-07-10 RX ORDER — METHYLPREDNISOLONE SODIUM SUCCINATE 125 MG/2ML
40 INJECTION, POWDER, LYOPHILIZED, FOR SOLUTION INTRAMUSCULAR; INTRAVENOUS ONCE
Status: COMPLETED | OUTPATIENT
Start: 2021-07-10 | End: 2021-07-10

## 2021-07-10 RX ORDER — DIPHENHYDRAMINE HYDROCHLORIDE 50 MG/ML
50 INJECTION INTRAMUSCULAR; INTRAVENOUS EVERY 6 HOURS PRN
Status: DISCONTINUED | OUTPATIENT
Start: 2021-07-10 | End: 2021-07-10

## 2021-07-10 RX ORDER — CEPHALEXIN 500 MG/1
500 CAPSULE ORAL ONCE
Status: DISCONTINUED | OUTPATIENT
Start: 2021-07-10 | End: 2021-07-10

## 2021-07-10 RX ADMIN — METHYLPREDNISOLONE SODIUM SUCCINATE 40 MG: 125 INJECTION, POWDER, FOR SOLUTION INTRAMUSCULAR; INTRAVENOUS at 04:07

## 2021-07-10 RX ADMIN — METHYLPREDNISOLONE SODIUM SUCCINATE 40 MG: 125 INJECTION, POWDER, FOR SOLUTION INTRAMUSCULAR; INTRAVENOUS at 09:02

## 2021-07-10 RX ADMIN — DIPHENHYDRAMINE HYDROCHLORIDE 50 MG: 50 INJECTION INTRAMUSCULAR; INTRAVENOUS at 07:02

## 2021-07-10 RX ADMIN — IOPAMIDOL 75 ML: 755 INJECTION, SOLUTION INTRAVENOUS at 09:28

## 2021-07-10 ASSESSMENT — PAIN DESCRIPTION - DESCRIPTORS: DESCRIPTORS: SPASM

## 2021-07-10 ASSESSMENT — PAIN DESCRIPTION - FREQUENCY: FREQUENCY: CONTINUOUS

## 2021-07-10 ASSESSMENT — PAIN SCALES - GENERAL: PAINLEVEL_OUTOF10: 8

## 2021-07-10 ASSESSMENT — PAIN DESCRIPTION - PAIN TYPE: TYPE: ACUTE PAIN

## 2021-07-10 ASSESSMENT — PAIN DESCRIPTION - ORIENTATION: ORIENTATION: UPPER

## 2021-07-10 ASSESSMENT — PAIN DESCRIPTION - LOCATION: LOCATION: BACK;CHEST

## 2021-07-10 NOTE — ED NOTES
The following labs labeled with pt sticker and tubed:     [x] Lavender   [] on ice   [] Blue   [x] Green/yellow  [x] Green/black [] on ice  [] Pink  [] Red  [] Yellow       [] COVID-19 swab    [] Rapid     [] Urine Sample  [] Pelvic Cultures    [] Blood Cultures            Deepti Oh RN  07/10/21 4484

## 2021-07-10 NOTE — ED NOTES
Writer at bedside to medicate pt with last dose for CT prep, pt reports that she does not want the medication. Dr August Oneal notified.       Vickie Wiseman RN  07/10/21 3838

## 2021-07-10 NOTE — ED PROVIDER NOTES
[facility]     Emergency Department     Faculty Attestation    I performed a history and physical examination of the patient and discussed management with the resident. I have reviewed and agree with the residents findings including all diagnostic interpretations, and treatment plans as written. Any areas of disagreement are noted on the chart. I was personally present for the key portions of any procedures. I have documented in the chart those procedures where I was not present during the key portions. I have reviewed the emergency nurses triage note. I agree with the chief complaint, past medical history, past surgical history, allergies, medications, social and family history as documented unless otherwise noted below. Documentation of the HPI, Physical Exam and Medical Decision Making performed by kimberly is based on my personal performance of the HPI, PE and MDM. For Physician Assistant/ Nurse Practitioner cases/documentation I have personally evaluated this patient and have completed at least one if not all key elements of the E/M (history, physical exam, and MDM). Additional findings are as noted. 37 yo F c/o back & chest pain, post partum 1 wk / fetal demise,   No fever, no injury, no vomit,   pe Jovana RN escort for exam / June Sanes 15, Neck supple, abdomen mild epigastric tenderness, no distension, no rigidity, no rebound, no calf swelling, no calf tenderness,     Ct chest / ct abdomen ordered with pre treatment protocol  //care turned over to day shift ct pending,     EKG Interpretation    Interpreted by me  Normal sinus, heart rate 71, no ischemia, left axis, QT corrected 402     CRITICAL CARE: There was a high probability of clinically significant/life threatening deterioration in this patient's condition which required my urgent intervention. Total critical care time was 5 minutes.   This excludes any time for separately reportable procedures.        [facility] 24, DO  07/10/21 2325 [facility], DO  07/10/21 2325 [facility], DO  07/10/21 2325 [facility], DO  07/10/21 2224

## 2021-07-10 NOTE — ED NOTES
The following labs labeled with pt sticker and tubed:     [] Lavender   [] on ice   [] Blue   [] Green/yellow  [] Green/black [] on ice  [] Pink  [] Red  [] Yellow       [] COVID-19 swab    [] Rapid     [x] Urine Sample  [] Pelvic Cultures    [] Blood Cultures            Eduardo Redmond RN  07/10/21 0033

## 2021-07-10 NOTE — ED PROVIDER NOTES
[address] [facility]  Emergency Department  Emergency Medicine Resident Sign-out     Care of Melchor Rendon was assumed from Dr. Otilia Ragland and is being seen for Back Pain and Chest Pain (1 week postpartum delivered at [facility])  . The patient's initial evaluation and plan have been discussed with the prior provider who initially evaluated the patient. EMERGENCY DEPARTMENT COURSE / MEDICAL DECISION MAKING:       MEDICATIONS GIVEN:  Orders Placed This Encounter   Medications    methylPREDNISolone sodium (SOLU-MEDROL) injection 40 mg    DISCONTD: diphenhydrAMINE (BENADRYL) injection 50 mg    DISCONTD: methylPREDNISolone sodium (SOLU-MEDROL) injection 40 mg    methylPREDNISolone sodium (SOLU-MEDROL) injection 40 mg    DISCONTD: diphenhydrAMINE (BENADRYL) injection 50 mg    iopamidol (ISOVUE-370) 76 % injection 75 mL    DISCONTD: cephALEXin (KEFLEX) capsule 500 mg     Order Specific Question:   Antimicrobial Indications     Answer:   Urinary Tract Infection    DISCONTD: cephALEXin (KEFLEX) 500 MG capsule     Sig: Take 1 capsule by mouth 2 times daily for 7 days     Dispense:  14 capsule     Refill:  0    cephALEXin (KEFLEX) 125 MG/5ML suspension     Sig: Take 20 mLs by mouth 2 times daily for 7 days     Dispense:  280 mL     Refill:  0       LABS / RADIOLOGY:     Labs Reviewed   CBC WITH AUTO DIFFERENTIAL - Abnormal; Notable for the following components:       Result Value    Hemoglobin 10.7 (*)     Hematocrit 35.3 (*)     All other components within normal limits   BASIC METABOLIC PANEL - Abnormal; Notable for the following components:    Anion Gap 7 (*)     All other components within normal limits   HEPATIC FUNCTION PANEL - Abnormal; Notable for the following components:     Total Bilirubin 0.19 (*)     All other components within normal limits   URINALYSIS WITH MICROSCOPIC - Abnormal; Notable for the following components:    Turbidity UA CLOUDY (*)     Ketones, Urine TRACE (*)     Urine Hgb Jul 10, 2021   9853 Long discussion with patient. At this time she is now on a wait for CT scan. She also does not want any additional Solu-Medrol. States that she is way too long. She feels that her symptoms are due to indigestion. Did stress the importance of CT chest, abdomen, and pelvis. discussed that patient could still have a pulmonary embolism or other life-threatening cause of her symptoms that cannot be ruled out without further imaging and testing such as a CAT scan. She is understanding but would like to leave at this time. Patient verbalized understanding. At this time has the capacity to make decisions. Patient will sign out [address] [ZT]   0902 Patient now is agreeable to Solu-Medrol and does not want to sign out [address]. [ZT]      ED Course User Index  [ZT] Sheng Roth,      Patient did eventually complete course of Solu-Medrol and Benadryl prior to CT imaging and contrast study. There is no evidence of pulmonary embolism. Redemonstration of large thyroid nodule which patient is aware of and is already following up with endocrinology with plans for potential surgery in the future. CT abdomen with no acute abnormalities. Discussed results with patient as well as follow-up information. No evidence of allergic reaction. She is eager to be discharged home at this time. Is agreeable with with follow-up as well as discharge instructions. All questions answered. Return precautions provided. OUTSTANDING TASKS / RECOMMENDATIONS:    1. Follow-up CT PE study  2. Follow-up CT abdomen/pelvis  3. Discharged with treatment for UTI     FINAL IMPRESSION:     1. Urinary tract infection without hematuria, site unspecified    2.  Atypical chest pain        DISPOSITION:         DISPOSITION:  [x]  Discharge   []  Transfer -    []  Admission -     []  Against Medical Advice   []  Eloped   FOLLOW-UP: [facility]  [address]

## 2021-07-10 NOTE — ED PROVIDER NOTES
[facility] ED  Emergency Department Encounter  Emergency Medicine Resident     Pt Name: Guanaco Jeffers  MRN: [id number]  [place] 1985  Date of evaluation: 7/10/21  PCP:  No primary care provider on file. CHIEF COMPLAINT       Chief Complaint   Patient presents with    Back Pain    Chest Pain     1 week postpartum delivered at 21weeks       HISTORY OFPRESENT ILLNESS  (Location/Symptom, Timing/Onset, Context/Setting, Quality, Duration, Modifying Factors,Severity.)      Guanaco Jeffers is a 38 y. o.yo female who presents with chest pain acute onset for the past several hours, radiating to her back. She says that deep inhalation makes pain worse. Denies fever, chills, recent URI, constipation diarrhea, leg swelling. She does have some abdominal tenderness. She had  labor at 20 weeks at the end of , resulting in a stillbirth. PAST MEDICAL / SURGICAL / SOCIAL / FAMILY HISTORY      has a past medical history of Anxiety, Asthma, GERD (gastroesophageal reflux disease), Hypothyroidism, Major depression, Morbid obesity, and Tension type headache.     has no past surgical history on file. Social:  reports that she has quit smoking. Her smoking use included cigars. She smoked 1.00 pack per day. She has never used smokeless tobacco. She reports that she does not drink alcohol and does not use drugs. Family Hx:   Family History   Problem Relation Age of Onset    Depression Father     Thyroid Disease Father     Diabetes Sister         Allergies:  Iodine, Penicillin g, Prednisolone, and Dye  [iodides]    Home Medications:  Prior to Admission medications    Medication Sig Start Date End Date Taking?  Authorizing Provider   cephALEXin [facility]) 125 MG/5ML suspension Take 20 mLs by mouth 2 times daily for 7 days 7/10/21 7/17/21 Yes John DO Renetta   ferrous sulfate (FE TABS) 325 (65 Fe) MG EC tablet Take 1 tablet by mouth daily (with breakfast) 21   Laurie Fay Krysten Crumb, DO   sertraline (ZOLOFT) 25 MG tablet Take 1 tablet by mouth daily 7/1/21   Lacretia Jenna, DO   ibuprofen (ADVIL;MOTRIN) 600 MG tablet Take 1 tablet by mouth every 6 hours as needed for Pain 7/1/21   Lacretia Jenna, DO   docusate sodium (COLACE) 100 MG capsule Take 1 capsule by mouth 2 times daily as needed for Constipation 7/1/21   Lacretia Jenna, DO   ibuprofen (ADVIL;MOTRIN) 600 MG tablet Take 1 tablet by mouth every 6 hours 6/26/21   Sergio Barksdale, DO   docusate sodium (COLACE, DULCOLAX) 100 MG CAPS Take 100 mg by mouth daily 6/27/21   Sergio Barksdale, DO   sertraline (ZOLOFT) 25 MG tablet Take 1 tablet by mouth daily 6/26/21   Gay Plascencia, DO   Prenatal Vit-Fe Fumarate-FA (PRENATAL VITAMIN) 27-0.8 MG TABS Take 1 tablet by mouth daily 6/14/21   Pink Cutter Alfonso, DO   oxymetazoline (12 HOUR NASAL SPRAY) 0.05 % nasal spray 2 sprays by Nasal route 2 times daily  Patient not taking: Reported on 6/14/2021 5/24/21 5/24/22  Khadar Moon, DO   acetaminophen (TYLENOL) 500 MG tablet Take 1 tablet by mouth every 6 hours as needed for Pain 1/20/21   Billy Maradiaga, DO   Cholecalciferol (VITAMIN D3) 1000 units TABS Take 1 tablet by mouth daily 7/31/17   Ashleigh Costa MD       REVIEW OFSYSTEMS    (2-9 systems for level 4, 10 or more for level 5)      Review of Systems   Constitutional: Negative for appetite change, chills, fatigue and fever. HENT: Negative for congestion, rhinorrhea, sneezing and sore throat. Eyes: Negative for visual disturbance. Respiratory: Positive for chest tightness and shortness of breath. Negative for cough. Cardiovascular: Positive for chest pain. Negative for leg swelling. Gastrointestinal: Positive for abdominal pain. Negative for diarrhea, nausea and vomiting. Genitourinary: Negative for dysuria. Musculoskeletal: Negative for myalgias, neck pain and neck stiffness. Skin: Negative for rash and wound.    Neurological: Negative for dizziness, syncope, light-headedness and headaches. Psychiatric/Behavioral: Negative for dysphoric mood and suicidal ideas. PHYSICAL EXAM   (up to 7 for level 4, 8 or more forlevel 5)      INITIAL VITALS:   Vitals:    07/10/21 0302   BP: 131/76   Pulse: 59   Resp:    Temp:    SpO2: 98%    /76   Pulse 59   Temp 97.7 °F (36.5 °C) (Oral)   Resp 16   LMP 01/17/2021 (Approximate)   SpO2 98%   Breastfeeding Unknown     Physical Exam  Vitals and nursing note reviewed. Constitutional:       General: She is not in acute distress. Appearance: Normal appearance. She is not ill-appearing or diaphoretic. HENT:      Head: Normocephalic. Nose: Nose normal.      Mouth/Throat:      Mouth: Mucous membranes are moist.      Pharynx: Oropharynx is clear. Eyes:      Extraocular Movements: Extraocular movements intact. Conjunctiva/sclera: Conjunctivae normal.      Pupils: Pupils are equal, round, and reactive to light. Cardiovascular:      Rate and Rhythm: Normal rate and regular rhythm. Pulses: Normal pulses. Heart sounds: Normal heart sounds. Pulmonary:      Effort: Pulmonary effort is normal. No respiratory distress. Breath sounds: Normal breath sounds. No wheezing or rales. Chest:      Chest wall: No tenderness. Abdominal:      General: There is no distension. Palpations: Abdomen is soft. Tenderness: There is abdominal tenderness. There is no guarding or rebound. Musculoskeletal:         General: Normal range of motion. Cervical back: Normal range of motion and neck supple. Skin:     General: Skin is warm. Capillary Refill: Capillary refill takes less than 2 seconds. Neurological:      General: No focal deficit present. Mental Status: She is alert and oriented to person, place, and time.    Psychiatric:         Mood and Affect: Mood normal.         Behavior: Behavior normal.         DIFFERENTIAL  DIAGNOSIS     Initial MDM/Plan: 38 y.o. female who presents with severe chest pain with associated shortness of breath acute onset for the past several hours. Also complaining of some abdominal pain. She had a recent  labor at 20 weeks gestation resulting in stillbirth at the end of . She denies fever, chills, nausea, vomiting, constipation, diarrhea. Vital signs reviewed, she is normotensive, not tachycardic, afebrile, saturating well on room air. Her heart is regular rate and rhythm with no murmurs rubs or gallops, lungs are clear to auscultation bilaterally. Her abdomen is mildly tender to palpation in suprapubic region. She has mild to minimal pedal edema to bilateral lower extremities. Given her recent birth, main concern is pulmonary embolism. I will plan to proceed with CT scan as I suspect her dimer will be elevated and PERC cannot be utilized due to trauma/surgery/childbirth. Initially, will scan the abdomen due to abdominal pain, though I suspect she has tenderness residually from the childbirth. Additionally, will obtain basic labs, LFT, urinalysis, lipase. Because she is allergic to iodine, will do Solu-Medrol, Benadryl, Solu-Medrol contrast preparation protocol. DIAGNOSTIC RESULTS / EMERGENCYDEPARTMENT COURSE / MDM     LABS:  Labs Reviewed   CBC WITH AUTO DIFFERENTIAL - Abnormal; Notable for the following components:       Result Value    Hemoglobin 10.7 (*)     Hematocrit 35.3 (*)     All other components within normal limits   BASIC METABOLIC PANEL - Abnormal; Notable for the following components:    Anion Gap 7 (*)     All other components within normal limits   HEPATIC FUNCTION PANEL - Abnormal; Notable for the following components:     Total Bilirubin 0.19 (*)     All other components within normal limits   URINALYSIS WITH MICROSCOPIC - Abnormal; Notable for the following components:    Turbidity UA CLOUDY (*)     Ketones, Urine TRACE (*)     Urine Hgb LARGE (*)     Protein, UA TRACE (*)     Leukocyte Esterase, Urine way too long. She feels that her symptoms are due to indigestion. Did stress the importance of CT chest, abdomen, and pelvis. discussed that patient could still have a pulmonary embolism or other life-threatening cause of her symptoms that cannot be ruled out without further imaging and testing such as a CAT scan. She is understanding but would like to leave at this time. Patient verbalized understanding. At this time has the capacity to make decisions. Patient will sign out [address] [ZT]   0902 Patient now is agreeable to Solu-Medrol and does not want to sign out [address]. [ZT]      ED Course User Index  [JT] Marce López MD  [ZT] Bernmarcusine Duty, DO        PROCEDURES:  None    CONSULTS:  None      FINAL IMPRESSION      1. Urinary tract infection without hematuria, site unspecified    2.  Atypical chest pain        DISPOSITION / PLAN     DISPOSITION Decision To Discharge 07/10/2021 10:40:26 AM      PATIENT REFERRED TO:  [facility]  [address] [place] [address]  [phone]    As needed, If symptoms worsen    [facility]  [place] [address]  [phone]  Schedule an appointment as soon as possible for a visit         DISCHARGE MEDICATIONS:  Discharge Medication List as of 7/10/2021 10:45 AM      START taking these medications    Details   cephALEXin (KEFLEX) 125 MG/5ML suspension Take 20 mLs by mouth 2 times daily for 7 days, Disp-280 mL, R-0Print             Marce óLpez MD  Emergency Medicine Resident    (Please note that portions of this note were completed with a voice recognition program.Efforts were made to edit the dictations but occasionally words are mis-transcribed.)        Marce López MD  Resident  07/13/21 2050

## 2021-07-10 NOTE — ED NOTES
Pt to ED c/o back spasms and CP. Pt states that back spasms started last night, and then they started to radiate into CP. Pt 1 week post partum, delivered . States she still has some cramping after delivering. Pt is alert and ambulatory, resting comfortably on stretcher, call light in reach, RR even and non-labored.       Sonny Rodriguez, RN  07/10/21 9310 [facility], RN  07/10/21 7468

## 2021-07-10 NOTE — ED PROVIDER NOTES
[address] HANDOFF       Handoff taken on the following patient from prior Attending Physician:  Pt Name: Amanda Wong  PCP:  No primary care provider on file. Attestation  I was available and discussed any additional care issues that arose and coordinated the management plans with the resident(s) caring for the patient during my duty period. Any areas of disagreement with resident's documentation of care or procedures are noted on the chart. I was personally present for the key portions of any/all procedures during my duty period. I have documented in the chart those procedures where I was not present during the key portions.          CHIEF COMPLAINT       Chief Complaint   Patient presents with    Back Pain    Chest Pain     1 week postpartum delivered at 21weeks         CURRENT MEDICATIONS     Previous Medications  Previous Medications    ACETAMINOPHEN (TYLENOL) 500 MG TABLET    Take 1 tablet by mouth every 6 hours as needed for Pain    CHOLECALCIFEROL (VITAMIN D3) 1000 UNITS TABS    Take 1 tablet by mouth daily    DOCUSATE SODIUM (COLACE) 100 MG CAPSULE    Take 1 capsule by mouth 2 times daily as needed for Constipation    DOCUSATE SODIUM (COLACE, DULCOLAX) 100 MG CAPS    Take 100 mg by mouth daily    FERROUS SULFATE (FE TABS) 325 (65 FE) MG EC TABLET    Take 1 tablet by mouth daily (with breakfast)    IBUPROFEN (ADVIL;MOTRIN) 600 MG TABLET    Take 1 tablet by mouth every 6 hours    IBUPROFEN (ADVIL;MOTRIN) 600 MG TABLET    Take 1 tablet by mouth every 6 hours as needed for Pain    OXYMETAZOLINE (12 HOUR NASAL SPRAY) 0.05 % NASAL SPRAY    2 sprays by Nasal route 2 times daily    PRENATAL VIT-FE FUMARATE-FA (PRENATAL VITAMIN) 27-0.8 MG TABS    Take 1 tablet by mouth daily    SERTRALINE (ZOLOFT) 25 MG TABLET    Take 1 tablet by mouth daily    SERTRALINE (ZOLOFT) 25 MG TABLET    Take 1 tablet by mouth daily       Encounter Medications  Orders Placed This Encounter Medications    methylPREDNISolone sodium (SOLU-MEDROL) injection 40 mg    DISCONTD: diphenhydrAMINE (BENADRYL) injection 50 mg    DISCONTD: methylPREDNISolone sodium (SOLU-MEDROL) injection 40 mg    methylPREDNISolone sodium (SOLU-MEDROL) injection 40 mg    diphenhydrAMINE (BENADRYL) injection 50 mg    iopamidol (ISOVUE-370) 76 % injection 75 mL       ALLERGIES     is allergic to iodine, penicillin g, prednisolone, and dye  [iodides]. 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 Follow-up   CT of the chest with IV contrast is recommended for further evaluation. CT CHEST PULMONARY EMBOLISM W CONTRAST    (Results Pending)   CT ABDOMEN PELVIS W IV CONTRAST Additional Contrast? None    (Results Pending)       LABS:  Labs Reviewed   CBC WITH AUTO DIFFERENTIAL - Abnormal; Notable for the following components:       Result Value    Hemoglobin 10.7 (*)     Hematocrit 35.3 (*)     All other components within normal limits   BASIC METABOLIC PANEL - Abnormal; Notable for the following components:    Anion Gap 7 (*)     All other components within normal limits   HEPATIC FUNCTION PANEL - Abnormal; Notable for the following components: Total Bilirubin 0.19 (*)     All other components within normal limits   URINALYSIS WITH MICROSCOPIC - Abnormal; Notable for the following components:    Turbidity UA CLOUDY (*)     Ketones, Urine TRACE (*)     Urine Hgb LARGE (*)     Protein, UA TRACE (*)     Leukocyte Esterase, Urine MODERATE (*)     Bacteria, UA FEW (*)     All other components within normal limits   LIPASE   TROPONIN   LACTIC ACID, PLASMA           PLAN/ TASKS OUTSTANDING     This patient is a 38 y.o. Female.     Patient is spontaneous  1 week ago, today complains of chest back and abdominal pain. Concern for PE, allergic to MRI contrast was premedicated. 9:43 AM EDT  I was called to the CT scanner, patient was concerned about the CT. We discussed the risks and benefits, she states she had a known allergy to MRI contrast but does not remember any reaction to CT dye. We explained that we can get the scan done about 5 minutes and deal with any consequences of the dye or we can defer the scan and place her in observation. She elected to continue with the CT scan.     (Please note that portions of this note were completed with a voice recognition program.  Efforts were made to edit the dictations but occasionally words are mis-transcribed.)    Sandra Dawson MD,, MD  Attending Emergency Physician       Sandra Dawson MD  07/10/21 2600

## 2021-07-12 LAB
EKG ATRIAL RATE: 71 BPM
EKG P AXIS: 37 DEGREES
EKG P-R INTERVAL: 144 MS
EKG Q-T INTERVAL: 370 MS
EKG QRS DURATION: 70 MS
EKG QTC CALCULATION (BAZETT): 402 MS
EKG R AXIS: -11 DEGREES
EKG T AXIS: 12 DEGREES
EKG VENTRICULAR RATE: 71 BPM

## 2021-07-12 PROCEDURE — 93010 ELECTROCARDIOGRAM REPORT: CPT | Performed by: INTERNAL MEDICINE

## 2021-07-12 ASSESSMENT — ENCOUNTER SYMPTOMS
COUGH: 0
SORE THROAT: 0
VOMITING: 0
RHINORRHEA: 0
SHORTNESS OF BREATH: 1
ABDOMINAL PAIN: 1
NAUSEA: 0
CHEST TIGHTNESS: 1
DIARRHEA: 0

## 2021-07-14 ENCOUNTER — TELEPHONE (OUTPATIENT)
Dept: OBGYN | Age: 36
End: 2021-07-14

## 2021-07-14 RX ORDER — CEPHALEXIN 250 MG/5ML
250 POWDER, FOR SUSPENSION ORAL 4 TIMES DAILY
Qty: 200 ML | Refills: 0 | Status: SHIPPED | OUTPATIENT
Start: 2021-07-14 | End: 2021-07-23 | Stop reason: ALTCHOICE

## 2021-07-14 NOTE — TELEPHONE ENCOUNTER
Rx for liquid keflex sent. I found an Iron supplement that should be covered by her insurance however no liquid iron is covered.

## 2021-07-14 NOTE — TELEPHONE ENCOUNTER
Keflex was ordered as a tablet and he needs it to be in liquid form,. And the Iron needs a prior auth.  Please advise

## 2021-07-19 ENCOUNTER — FOLLOWUP TELEPHONE ENCOUNTER (OUTPATIENT)
Dept: OBGYN | Age: 36
End: 2021-07-19

## 2021-07-19 ENCOUNTER — POSTPARTUM VISIT (OUTPATIENT)
Dept: OBGYN | Age: 36
End: 2021-07-19
Payer: MEDICARE

## 2021-07-19 VITALS
DIASTOLIC BLOOD PRESSURE: 80 MMHG | HEIGHT: 65 IN | BODY MASS INDEX: 32.58 KG/M2 | WEIGHT: 195.56 LBS | HEART RATE: 99 BPM | SYSTOLIC BLOOD PRESSURE: 106 MMHG

## 2021-07-19 DIAGNOSIS — K80.20 GALL STONES: Primary | ICD-10-CM

## 2021-07-19 DIAGNOSIS — Z13.31 POSITIVE DEPRESSION SCREENING: ICD-10-CM

## 2021-07-19 PROBLEM — Z3A.20 20 WEEKS GESTATION OF PREGNANCY: Status: RESOLVED | Noted: 2021-06-19 | Resolved: 2021-07-19

## 2021-07-19 PROCEDURE — 1036F TOBACCO NON-USER: CPT | Performed by: STUDENT IN AN ORGANIZED HEALTH CARE EDUCATION/TRAINING PROGRAM

## 2021-07-19 PROCEDURE — G8427 DOCREV CUR MEDS BY ELIG CLIN: HCPCS | Performed by: STUDENT IN AN ORGANIZED HEALTH CARE EDUCATION/TRAINING PROGRAM

## 2021-07-19 PROCEDURE — 1111F DSCHRG MED/CURRENT MED MERGE: CPT | Performed by: STUDENT IN AN ORGANIZED HEALTH CARE EDUCATION/TRAINING PROGRAM

## 2021-07-19 PROCEDURE — 99213 OFFICE O/P EST LOW 20 MIN: CPT | Performed by: STUDENT IN AN ORGANIZED HEALTH CARE EDUCATION/TRAINING PROGRAM

## 2021-07-19 PROCEDURE — G8419 CALC BMI OUT NRM PARAM NOF/U: HCPCS | Performed by: STUDENT IN AN ORGANIZED HEALTH CARE EDUCATION/TRAINING PROGRAM

## 2021-07-19 RX ORDER — ECHINACEA PURPUREA EXTRACT 125 MG
1 TABLET ORAL PRN
Qty: 1 BOTTLE | Refills: 3 | Status: SHIPPED | OUTPATIENT
Start: 2021-07-19 | End: 2022-07-09

## 2021-07-19 NOTE — PROGRESS NOTES
Postpartum Visit    Aliyah Verma is a 38 y.o. female , 4 weeks Post Partum s/p  of previable infant on 21    The patient was seen. She complains of depression and fatigue. The patient was seen by social work and RN prior to myself for postpartum depression and grief surrounding the loss of her nonviable infant. During conversation she experiences tangential thought of concerns for a sinus infection, overall systemic infection, back pain (between scapula) which started 3 days ago, and history of gallstones and need for prior referral information. She is nontoxic appearing and in no distress. Discussed use of nasal saline spray and antihistamine or symptomatic relief and unlikely diagnosis of sinutisitis/mastoiditis which she brought up. In regards to back pain between her scapula she was already evaluated in the ED and received cardiac workup and rule out PE all of which were normal. Discussed these results with the patient. New referral placed for GI for history of gallstones. Teresa French saw the patient and will be reaching out to trauma recovery and other resources to ensure the patient is connected with the appropriate people. Leeanne Cheema reports she has a psychiatrist however, feels that this is out of their wheel house and that her needs would be better served by someone else. She reports compliance with zoloft 25mg daily and is amenable to increasing dose to 50mg daily. Leeanne Cheema states she lives alone but that she does have people who come and check no her daily and does feel that she has support. She had previous suicidal ideations however, denies current SI/HI. She states she would never do that but it was a moment of desparation. Discussed calling 911 or immediately presenting to the nearest ED if these thoughts occurred. She verbalized understanding and again stated she would not do that and she just needs to heal.     The patient completed the E.P.D.S.  Post Partum Depression Evaluation form and EPDS Score of 25 (see above). She does have signs or symptoms of post partum depression. She denies any suicidal thoughts with a plan, intent to harm others and delusional ideas. She does admit to having good home support. Today her lochia is light she denies any dizziness or shortness of breath. Her bowels are regular and she denies any urinary tract symptomology. She is using abstinence or condoms for family planning. OB History    Para Term  AB Living   2 0 0 0 1 0   SAB TAB Ectopic Molar Multiple Live Births   1 0 0 0 0 0     Patient Active Problem List   Diagnosis    Thyroid nodule     h/o Major depression    Obesity    Strabismus    Hx Previable PPROM @19wk6d on 20    Anxiety    Adrenal insufficiency (HCC)    Tobacco use    SPTD after PPROM at 19w6d    ASCUS with positive high risk HPV cervical    LGSIL (POLO-1) on Colpo 21    Trichomonas vaginalis infection 21    E. coli UTI during pregnancy     Fam Hx Polydactyly    High-risk pregnancy, first trimester    AMA (advanced maternal age) multigravida 35+   [facility] Short Interval Pregnancy    Hypothyroidism    FHx DM    Anemia    Positive depression screening    Asthma    Thalassemia minor    Penicillin allergy (hives, SOB)    Premature dilatation of cervix during pregnancy    Threatened premature labor in second trimester    High-risk pregnancy, unspecified trimester    Threatened  labor, antepartum    Feeling pelvic pressure in pregnancy, antepartum    Short interval between pregnancies complicating pregnancy, antepartum    HRP (high risk pregnancy), second trimester     Previable Infant 21 F APG / Wt 10.11 oz       Vitals:   Blood pressure 106/80, pulse 99, height 5' 5\" (1.651 m), weight 195 lb 9 oz (88.7 kg), last menstrual period 2021, not currently breastfeeding.     Physical Exam:  General:  no apparent distress, alert and cooperative  Lungs:  No 05/03/2021     TSH 0.02, T4 1.42 on 4/14/21  5/3/21-patient not on medication, follows with endocrinology  6/4/21 - TSH/T4 0.60/1.16      FHx DM 05/03/2021     Early 1hr GTT wnl      Anemia 05/03/2021     Pt taking PO iron      AMA (advanced maternal age) multigravida 35+ 04/28/2021     First trimester low risk      High-risk pregnancy, first trimester 04/09/2021 4/9/21- MFM referral placed for first trimester screen, anatomy scan and NIPT      Fam Hx Polydactyly 04/05/2021     FOB sister w/ polydactyly      E. 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 Please ask patient if she would like to switch to injectable and contact Margarette CURRY      Hx Previable PPROM @19wk6d on 12/19/20 12/20/2020     The patient did not know she was pregnant prior to rupture.  Anxiety 12/20/2020    Adrenal insufficiency ([address] 12/20/2020    Tobacco use 12/20/2020    Obesity 09/15/2017     Early 1hr GTT wnl 111      Strabismus 09/15/2017     h/o Major depression 04/30/2013     No meds      Thyroid nodule 03/29/2012     CT neck : 5/2/2017  Impression: Large mass in the lower pole of the left lobe of the thyroid gland plunging into the superior mediastinum and displacing the trachea somewhat toward the right.  This has progressed dramatically since 2007.    FNA: Benign. Nodular goiter. 12/20/2016           Return in about 1 week (around 7/26/2021) for Depression followup. Counseling Completed:  · Signs & Symptoms of mastitis and when to notify office discussed.   · Abstinence, family planning counseling and STD counseling discussed    Keny Hodges DO  Ob/Gyn Resident  [facility], [facility]  7/19/2021, 6:39 PM

## 2021-07-20 ENCOUNTER — CLINICAL DOCUMENTATION (OUTPATIENT)
Dept: PSYCHIATRY | Age: 36
End: 2021-07-20

## 2021-07-20 ENCOUNTER — TELEPHONE (OUTPATIENT)
Dept: OBGYN | Age: 36
End: 2021-07-20

## 2021-07-20 NOTE — PROGRESS NOTES
I have discussed the care of the patient including pertinent history and examination findings with the resident. I agree with the assessment, and and orders as documented  by the resident. After conferring with SW , it was felt pt not a risk for suicide. Plan is to keep close contact with pt and refer to trauma recovery for grief counseling. GE Modifier: This service has been performed by a resident physician under the direction of a teaching physician.

## 2021-07-20 NOTE — TELEPHONE ENCOUNTER
I called our patient back as she left me a voice mail. She reports just getting out of bed, states she did not sleep well. Plans to call Eric John the  at [facility] as I told her she  left her a voice mail to schedule her  therapy sessions. Contact # provided to the patient     I reviewed Sleep Hygiene with the patient. Pt is agreeable and verb understanding. Encourage her to take care of herself and eat healthy today. She appreciated the call and stated she enjoys the calls to check in on her at this time. I will follow up with her tomorrow.

## 2021-07-20 NOTE — PROGRESS NOTES
Pt called therapist back. Therapist and pt spoke about setting up first appt. Pt will be seen virtually on Thursday at 330. Pt requests to be seen virtually first as she is not ready to come back to the hospital.  Pt reports struggling a lot lately with her two pregnancy losses as well as past experiences as a child. Pt denies any current Suicidal ideation, thoughts, or plan and reports that she had those thoughts but doesn't right now. She identified her Rastafari and spiritual beliefs as a big motivator in not attempting suicide. Therapist offered emotional support to pt over phone and began building rapport. Pt notes she is not interested in any groups.

## 2021-07-20 NOTE — TELEPHONE ENCOUNTER
I called our patient today as planned,  to see how she is feeling and to review the plan of care for her referral to the outpatient therapy through the trauma recovery program.     Pt did not answer, I left a voice mail with my direct extension for her to call me back. I spoke  to [facility] from the trauma recovery program.   [facility] report given regarding the referral of our patient to their outpatient therapy program.     Plan- The patient will be contacted today or  tomorrow to schedule an appointment for outpatient therapy.

## 2021-07-21 ENCOUNTER — TELEPHONE (OUTPATIENT)
Dept: OBGYN | Age: 36
End: 2021-07-21

## 2021-07-21 NOTE — TELEPHONE ENCOUNTER
I called this patient to see how she was feeling today. She reports that she is doing ok. Has an appt for therapy 7/22/21 with [facility] with Arley Clifton. Pt also scheduled to return to the office 7/26/21. Pt agreeable to the plan of care and verb when to call or directly go to the Ed if needed.

## 2021-07-22 ENCOUNTER — HOSPITAL ENCOUNTER (OUTPATIENT)
Dept: PSYCHIATRY | Age: 36
Setting detail: THERAPIES SERIES
Discharge: HOME | End: 2021-07-22
Payer: MEDICARE

## 2021-07-22 PROCEDURE — 90791 PSYCH DIAGNOSTIC EVALUATION: CPT | Performed by: SOCIAL WORKER
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
## 2021-07-23 ENCOUNTER — HOSPITAL ENCOUNTER (OUTPATIENT)
Age: 36
Setting detail: OBSERVATION
Discharge: HOME | End: 2021-07-24
Attending: EMERGENCY MEDICINE | Admitting: EMERGENCY MEDICINE
Payer: MEDICARE

## 2021-07-23 ENCOUNTER — APPOINTMENT (OUTPATIENT)
Dept: ULTRASOUND IMAGING | Age: 36
End: 2021-07-23
Payer: MEDICARE

## 2021-07-23 DIAGNOSIS — K80.21 CHOLELITHIASIS WITH BILIARY OBSTRUCTION: ICD-10-CM

## 2021-07-23 DIAGNOSIS — R10.11 RIGHT UPPER QUADRANT ABDOMINAL PAIN: Primary | ICD-10-CM

## 2021-07-23 DIAGNOSIS — K80.20 GALLSTONES: ICD-10-CM

## 2021-07-23 LAB
-: ABNORMAL
ABSOLUTE EOS #: 0.08 K/UL (ref 0–0.44)
ABSOLUTE IMMATURE GRANULOCYTE: 0.04 K/UL (ref 0–0.3)
ABSOLUTE LYMPH #: 2.7 K/UL (ref 1.1–3.7)
ABSOLUTE MONO #: 0.63 K/UL (ref 0.1–1.2)
ALBUMIN SERPL-MCNC: 3.4 G/DL (ref 3.5–5.2)
ALBUMIN/GLOBULIN RATIO: 0.9 (ref 1–2.5)
ALP BLD-CCNC: 84 U/L (ref 35–104)
ALT SERPL-CCNC: <5 U/L (ref 5–33)
AMORPHOUS: ABNORMAL
ANION GAP SERPL CALCULATED.3IONS-SCNC: 7 MMOL/L (ref 9–17)
AST SERPL-CCNC: 25 U/L
BACTERIA: ABNORMAL
BASOPHILS # BLD: 1 % (ref 0–2)
BASOPHILS ABSOLUTE: 0.07 K/UL (ref 0–0.2)
BILIRUB SERPL-MCNC: <0.1 MG/DL (ref 0.3–1.2)
BILIRUBIN URINE: NEGATIVE
BUN BLDV-MCNC: 5 MG/DL (ref 6–20)
BUN/CREAT BLD: ABNORMAL (ref 9–20)
CALCIUM SERPL-MCNC: 8.6 MG/DL (ref 8.6–10.4)
CASTS UA: ABNORMAL /LPF (ref 0–8)
CHLORIDE BLD-SCNC: 105 MMOL/L (ref 98–107)
CO2: 26 MMOL/L (ref 20–31)
COLOR: YELLOW
CREAT SERPL-MCNC: 0.62 MG/DL (ref 0.5–0.9)
CRYSTALS, UA: ABNORMAL /HPF
DIFFERENTIAL TYPE: ABNORMAL
EOSINOPHILS RELATIVE PERCENT: 1 % (ref 1–4)
EPITHELIAL CELLS UA: ABNORMAL /HPF (ref 0–5)
GFR AFRICAN AMERICAN: >60 ML/MIN
GFR NON-AFRICAN AMERICAN: >60 ML/MIN
GFR SERPL CREATININE-BSD FRML MDRD: ABNORMAL ML/MIN/{1.73_M2}
GFR SERPL CREATININE-BSD FRML MDRD: ABNORMAL ML/MIN/{1.73_M2}
GLUCOSE BLD-MCNC: 99 MG/DL (ref 70–99)
GLUCOSE URINE: NEGATIVE
HCT VFR BLD CALC: 37.8 % (ref 36.3–47.1)
HEMOGLOBIN: 11.6 G/DL (ref 11.9–15.1)
IMMATURE GRANULOCYTES: 1 %
KETONES, URINE: ABNORMAL
LEUKOCYTE ESTERASE, URINE: ABNORMAL
LIPASE: 12 U/L (ref 13–60)
LYMPHOCYTES # BLD: 31 % (ref 24–43)
MCH RBC QN AUTO: 26.3 PG (ref 25.2–33.5)
MCHC RBC AUTO-ENTMCNC: 30.7 G/DL (ref 28.4–34.8)
MCV RBC AUTO: 85.7 FL (ref 82.6–102.9)
MONOCYTES # BLD: 7 % (ref 3–12)
MUCUS: ABNORMAL
NITRITE, URINE: NEGATIVE
NRBC AUTOMATED: 0 PER 100 WBC
OTHER OBSERVATIONS UA: ABNORMAL
PDW BLD-RTO: 13.9 % (ref 11.8–14.4)
PH UA: 5.5 (ref 5–8)
PLATELET # BLD: 327 K/UL (ref 138–453)
PLATELET ESTIMATE: ABNORMAL
PMV BLD AUTO: 9.2 FL (ref 8.1–13.5)
POTASSIUM SERPL-SCNC: 4.3 MMOL/L (ref 3.7–5.3)
PROTEIN UA: NEGATIVE
RBC # BLD: 4.41 M/UL (ref 3.95–5.11)
RBC # BLD: ABNORMAL 10*6/UL
RBC UA: ABNORMAL /HPF (ref 0–4)
RENAL EPITHELIAL, UA: ABNORMAL /HPF
SEG NEUTROPHILS: 59 % (ref 36–65)
SEGMENTED NEUTROPHILS ABSOLUTE COUNT: 5.32 K/UL (ref 1.5–8.1)
SODIUM BLD-SCNC: 138 MMOL/L (ref 135–144)
SPECIFIC GRAVITY UA: 1.02 (ref 1–1.03)
TOTAL PROTEIN: 7 G/DL (ref 6.4–8.3)
TRICHOMONAS: ABNORMAL
TURBIDITY: CLEAR
URINE HGB: NEGATIVE
UROBILINOGEN, URINE: NORMAL
WBC # BLD: 8.8 K/UL (ref 3.5–11.3)
WBC # BLD: ABNORMAL 10*3/UL
WBC UA: ABNORMAL /HPF (ref 0–5)
YEAST: ABNORMAL

## 2021-07-23 PROCEDURE — 96365 THER/PROPH/DIAG IV INF INIT: CPT

## 2021-07-23 PROCEDURE — 2580000003 HC RX 258: Performed by: EMERGENCY MEDICINE

## 2021-07-23 PROCEDURE — 80053 COMPREHEN METABOLIC PANEL: CPT

## 2021-07-23 PROCEDURE — 85025 COMPLETE CBC W/AUTO DIFF WBC: CPT

## 2021-07-23 PROCEDURE — 6360000002 HC RX W HCPCS: Performed by: EMERGENCY MEDICINE

## 2021-07-23 PROCEDURE — 2580000003 HC RX 258

## 2021-07-23 PROCEDURE — 76705 ECHO EXAM OF ABDOMEN: CPT

## 2021-07-23 PROCEDURE — 83690 ASSAY OF LIPASE: CPT

## 2021-07-23 PROCEDURE — 81001 URINALYSIS AUTO W/SCOPE: CPT

## 2021-07-23 PROCEDURE — 99284 EMERGENCY DEPT VISIT MOD MDM: CPT

## 2021-07-23 PROCEDURE — G0378 HOSPITAL OBSERVATION PER HR: HCPCS

## 2021-07-23 RX ORDER — SODIUM CHLORIDE 0.9 % (FLUSH) 0.9 %
5-40 SYRINGE (ML) INJECTION PRN
Status: DISCONTINUED | OUTPATIENT
Start: 2021-07-23 | End: 2021-07-24 | Stop reason: HOSPADM

## 2021-07-23 RX ORDER — ONDANSETRON 4 MG/1
4 TABLET, ORALLY DISINTEGRATING ORAL EVERY 8 HOURS PRN
Status: DISCONTINUED | OUTPATIENT
Start: 2021-07-23 | End: 2021-07-24 | Stop reason: HOSPADM

## 2021-07-23 RX ORDER — 0.9 % SODIUM CHLORIDE 0.9 %
500 INTRAVENOUS SOLUTION INTRAVENOUS ONCE
Status: COMPLETED | OUTPATIENT
Start: 2021-07-23 | End: 2021-07-23

## 2021-07-23 RX ORDER — SODIUM CHLORIDE 0.9 % (FLUSH) 0.9 %
5-40 SYRINGE (ML) INJECTION EVERY 12 HOURS SCHEDULED
Status: DISCONTINUED | OUTPATIENT
Start: 2021-07-23 | End: 2021-07-24 | Stop reason: HOSPADM

## 2021-07-23 RX ORDER — NITROFURANTOIN 25; 75 MG/1; MG/1
100 CAPSULE ORAL 2 TIMES DAILY
Qty: 20 CAPSULE | Refills: 0 | Status: SHIPPED | OUTPATIENT
Start: 2021-07-23 | End: 2021-07-24 | Stop reason: SDUPTHER

## 2021-07-23 RX ORDER — ONDANSETRON 2 MG/ML
4 INJECTION INTRAMUSCULAR; INTRAVENOUS ONCE
Status: DISCONTINUED | OUTPATIENT
Start: 2021-07-23 | End: 2021-07-24 | Stop reason: HOSPADM

## 2021-07-23 RX ORDER — TRAMADOL HYDROCHLORIDE 50 MG/1
25 TABLET ORAL ONCE
Status: DISCONTINUED | OUTPATIENT
Start: 2021-07-23 | End: 2021-07-23

## 2021-07-23 RX ORDER — SODIUM CHLORIDE 9 MG/ML
25 INJECTION, SOLUTION INTRAVENOUS PRN
Status: DISCONTINUED | OUTPATIENT
Start: 2021-07-23 | End: 2021-07-24 | Stop reason: HOSPADM

## 2021-07-23 RX ORDER — KETOROLAC TROMETHAMINE 15 MG/ML
15 INJECTION, SOLUTION INTRAMUSCULAR; INTRAVENOUS ONCE
Status: DISCONTINUED | OUTPATIENT
Start: 2021-07-23 | End: 2021-07-24 | Stop reason: HOSPADM

## 2021-07-23 RX ORDER — ACETAMINOPHEN 325 MG/1
650 TABLET ORAL EVERY 4 HOURS PRN
Status: DISCONTINUED | OUTPATIENT
Start: 2021-07-23 | End: 2021-07-24 | Stop reason: HOSPADM

## 2021-07-23 RX ORDER — ONDANSETRON 2 MG/ML
4 INJECTION INTRAMUSCULAR; INTRAVENOUS EVERY 6 HOURS PRN
Status: DISCONTINUED | OUTPATIENT
Start: 2021-07-23 | End: 2021-07-24 | Stop reason: HOSPADM

## 2021-07-23 RX ADMIN — SODIUM CHLORIDE 500 ML: 0.9 INJECTION, SOLUTION INTRAVENOUS at 20:45

## 2021-07-23 RX ADMIN — CEFTRIAXONE SODIUM 1000 MG: 1 INJECTION, POWDER, FOR SOLUTION INTRAMUSCULAR; INTRAVENOUS at 23:24

## 2021-07-23 ASSESSMENT — ENCOUNTER SYMPTOMS
ABDOMINAL PAIN: 1
NAUSEA: 0
VOMITING: 0
CONSTIPATION: 0

## 2021-07-23 NOTE — ED NOTES
Bed: 29  Expected date:   Expected time:   Means of arrival:   Comments:     Masno Boudreaux, ANTOINETTE  07/23/21 1946

## 2021-07-24 ENCOUNTER — ANESTHESIA EVENT (OUTPATIENT)
Dept: OPERATING ROOM | Age: 36
End: 2021-07-24
Payer: MEDICARE

## 2021-07-24 ENCOUNTER — ANESTHESIA (OUTPATIENT)
Dept: OPERATING ROOM | Age: 36
End: 2021-07-24
Payer: MEDICARE

## 2021-07-24 VITALS
RESPIRATION RATE: 18 BRPM | OXYGEN SATURATION: 98 % | HEART RATE: 74 BPM | SYSTOLIC BLOOD PRESSURE: 123 MMHG | BODY MASS INDEX: 32.49 KG/M2 | HEIGHT: 65 IN | DIASTOLIC BLOOD PRESSURE: 76 MMHG | WEIGHT: 195 LBS | TEMPERATURE: 98.2 F

## 2021-07-24 LAB
ABSOLUTE EOS #: 0.16 K/UL (ref 0–0.44)
ABSOLUTE IMMATURE GRANULOCYTE: 0.03 K/UL (ref 0–0.3)
ABSOLUTE LYMPH #: 3.45 K/UL (ref 1.1–3.7)
ABSOLUTE MONO #: 0.73 K/UL (ref 0.1–1.2)
ALBUMIN SERPL-MCNC: 3.1 G/DL (ref 3.5–5.2)
ALBUMIN/GLOBULIN RATIO: 0.9 (ref 1–2.5)
ALP BLD-CCNC: 83 U/L (ref 35–104)
ALT SERPL-CCNC: 6 U/L (ref 5–33)
ANION GAP SERPL CALCULATED.3IONS-SCNC: 6 MMOL/L (ref 9–17)
AST SERPL-CCNC: 15 U/L
BASOPHILS # BLD: 1 % (ref 0–2)
BASOPHILS ABSOLUTE: 0.06 K/UL (ref 0–0.2)
BILIRUB SERPL-MCNC: 0.28 MG/DL (ref 0.3–1.2)
BILIRUBIN DIRECT: 0.09 MG/DL
BILIRUBIN, INDIRECT: 0.19 MG/DL (ref 0–1)
BUN BLDV-MCNC: 4 MG/DL (ref 6–20)
BUN/CREAT BLD: ABNORMAL (ref 9–20)
CALCIUM SERPL-MCNC: 8.5 MG/DL (ref 8.6–10.4)
CHLORIDE BLD-SCNC: 103 MMOL/L (ref 98–107)
CO2: 24 MMOL/L (ref 20–31)
CREAT SERPL-MCNC: 0.5 MG/DL (ref 0.5–0.9)
DIFFERENTIAL TYPE: ABNORMAL
EOSINOPHILS RELATIVE PERCENT: 2 % (ref 1–4)
GFR AFRICAN AMERICAN: >60 ML/MIN
GFR NON-AFRICAN AMERICAN: >60 ML/MIN
GFR SERPL CREATININE-BSD FRML MDRD: ABNORMAL ML/MIN/{1.73_M2}
GFR SERPL CREATININE-BSD FRML MDRD: ABNORMAL ML/MIN/{1.73_M2}
GLOBULIN: ABNORMAL G/DL (ref 1.5–3.8)
GLUCOSE BLD-MCNC: 80 MG/DL (ref 70–99)
HCT VFR BLD CALC: 35.8 % (ref 36.3–47.1)
HEMOGLOBIN: 10.7 G/DL (ref 11.9–15.1)
IMMATURE GRANULOCYTES: 0 %
LYMPHOCYTES # BLD: 35 % (ref 24–43)
MCH RBC QN AUTO: 26 PG (ref 25.2–33.5)
MCHC RBC AUTO-ENTMCNC: 29.9 G/DL (ref 28.4–34.8)
MCV RBC AUTO: 87.1 FL (ref 82.6–102.9)
MONOCYTES # BLD: 7 % (ref 3–12)
NRBC AUTOMATED: 0 PER 100 WBC
PDW BLD-RTO: 13.9 % (ref 11.8–14.4)
PLATELET # BLD: 281 K/UL (ref 138–453)
PLATELET ESTIMATE: ABNORMAL
PMV BLD AUTO: 9.7 FL (ref 8.1–13.5)
POTASSIUM SERPL-SCNC: 3.7 MMOL/L (ref 3.7–5.3)
RBC # BLD: 4.11 M/UL (ref 3.95–5.11)
RBC # BLD: ABNORMAL 10*6/UL
SARS-COV-2, RAPID: NOT DETECTED
SEG NEUTROPHILS: 55 % (ref 36–65)
SEGMENTED NEUTROPHILS ABSOLUTE COUNT: 5.42 K/UL (ref 1.5–8.1)
SODIUM BLD-SCNC: 133 MMOL/L (ref 135–144)
SPECIMEN DESCRIPTION: NORMAL
TOTAL PROTEIN: 6.4 G/DL (ref 6.4–8.3)
WBC # BLD: 9.9 K/UL (ref 3.5–11.3)
WBC # BLD: ABNORMAL 10*3/UL

## 2021-07-24 PROCEDURE — 99243 OFF/OP CNSLTJ NEW/EST LOW 30: CPT | Performed by: INTERNAL MEDICINE

## 2021-07-24 PROCEDURE — 36415 COLL VENOUS BLD VENIPUNCTURE: CPT

## 2021-07-24 PROCEDURE — 87635 SARS-COV-2 COVID-19 AMP PRB: CPT

## 2021-07-24 PROCEDURE — G0378 HOSPITAL OBSERVATION PER HR: HCPCS

## 2021-07-24 PROCEDURE — 80048 BASIC METABOLIC PNL TOTAL CA: CPT

## 2021-07-24 PROCEDURE — 81025 URINE PREGNANCY TEST: CPT

## 2021-07-24 PROCEDURE — 85025 COMPLETE CBC W/AUTO DIFF WBC: CPT

## 2021-07-24 PROCEDURE — 80076 HEPATIC FUNCTION PANEL: CPT

## 2021-07-24 RX ORDER — ONDANSETRON 2 MG/ML
4 INJECTION INTRAMUSCULAR; INTRAVENOUS DAILY PRN
Status: DISCONTINUED | OUTPATIENT
Start: 2021-07-24 | End: 2021-07-24 | Stop reason: HOSPADM

## 2021-07-24 RX ORDER — ONDANSETRON 4 MG/1
4 TABLET, FILM COATED ORAL EVERY 8 HOURS PRN
Qty: 20 TABLET | Refills: 0 | Status: SHIPPED | OUTPATIENT
Start: 2021-07-24

## 2021-07-24 RX ORDER — SODIUM CHLORIDE, SODIUM LACTATE, POTASSIUM CHLORIDE, CALCIUM CHLORIDE 600; 310; 30; 20 MG/100ML; MG/100ML; MG/100ML; MG/100ML
INJECTION, SOLUTION INTRAVENOUS CONTINUOUS
Status: DISCONTINUED | OUTPATIENT
Start: 2021-07-24 | End: 2021-07-24 | Stop reason: HOSPADM

## 2021-07-24 RX ORDER — NITROFURANTOIN 25; 75 MG/1; MG/1
100 CAPSULE ORAL 2 TIMES DAILY
Qty: 20 CAPSULE | Refills: 0 | Status: SHIPPED | OUTPATIENT
Start: 2021-07-24 | End: 2021-08-03

## 2021-07-24 RX ORDER — LIDOCAINE HYDROCHLORIDE 10 MG/ML
1 INJECTION, SOLUTION EPIDURAL; INFILTRATION; INTRACAUDAL; PERINEURAL
Status: DISCONTINUED | OUTPATIENT
Start: 2021-07-24 | End: 2021-07-24 | Stop reason: HOSPADM

## 2021-07-24 RX ORDER — FENTANYL CITRATE 50 UG/ML
25 INJECTION, SOLUTION INTRAMUSCULAR; INTRAVENOUS EVERY 5 MIN PRN
Status: DISCONTINUED | OUTPATIENT
Start: 2021-07-24 | End: 2021-07-24 | Stop reason: HOSPADM

## 2021-07-24 RX ORDER — SCOLOPAMINE TRANSDERMAL SYSTEM 1 MG/1
1 PATCH, EXTENDED RELEASE TRANSDERMAL ONCE
Status: DISCONTINUED | OUTPATIENT
Start: 2021-07-24 | End: 2021-07-24 | Stop reason: HOSPADM

## 2021-07-24 RX ORDER — OXYCODONE HYDROCHLORIDE 5 MG/1
5 TABLET ORAL EVERY 6 HOURS PRN
Qty: 16 TABLET | Refills: 0 | Status: SHIPPED | OUTPATIENT
Start: 2021-07-24 | End: 2021-07-29

## 2021-07-24 RX ORDER — MIDAZOLAM HYDROCHLORIDE 2 MG/2ML
1 INJECTION, SOLUTION INTRAMUSCULAR; INTRAVENOUS EVERY 10 MIN PRN
Status: DISCONTINUED | OUTPATIENT
Start: 2021-07-24 | End: 2021-07-24 | Stop reason: HOSPADM

## 2021-07-24 ASSESSMENT — PAIN DESCRIPTION - FREQUENCY: FREQUENCY: INTERMITTENT

## 2021-07-24 ASSESSMENT — PAIN DESCRIPTION - DIRECTION: RADIATING_TOWARDS: SPINE

## 2021-07-24 ASSESSMENT — PAIN DESCRIPTION - ONSET: ONSET: ON-GOING

## 2021-07-24 ASSESSMENT — PAIN DESCRIPTION - ORIENTATION: ORIENTATION: RIGHT

## 2021-07-24 ASSESSMENT — PAIN SCALES - GENERAL
PAINLEVEL_OUTOF10: 0
PAINLEVEL_OUTOF10: 3

## 2021-07-24 ASSESSMENT — PAIN DESCRIPTION - LOCATION: LOCATION: ABDOMEN

## 2021-07-24 ASSESSMENT — PAIN DESCRIPTION - DESCRIPTORS: DESCRIPTORS: SQUEEZING;TIGHTNESS

## 2021-07-24 ASSESSMENT — PAIN DESCRIPTION - PAIN TYPE: TYPE: ACUTE PAIN

## 2021-07-24 NOTE — ED PROVIDER NOTES
[facility] ED     Emergency Department     Faculty Attestation    I performed a history and physical examination of the patient and discussed management with the resident. I reviewed the residents note and agree with the documented findings and plan of care. Any areas of disagreement are noted on the chart. I was personally present for the key portions of any procedures. I have documented in the chart those procedures where I was not present during the key portions. I have reviewed the emergency nurses triage note. I agree with the chief complaint, past medical history, past surgical history, allergies, medications, social and family history as documented unless otherwise noted below. For Physician Assistant/ Nurse Practitioner cases/documentation I have personally evaluated this patient and have completed at least one if not all key elements of the E/M (history, physical exam, and MDM). Additional findings are as noted. This patient was evaluated in the Emergency Department for symptoms described in the history of present illness. He/she was evaluated in the context of the global COVID-19 pandemic, which necessitated consideration that the patient might be at risk for infection with the SARS-CoV-2 virus that causes COVID-19. Institutional protocols and algorithms that pertain to the evaluation of patients at risk for COVID-19 are in a state of rapid change based on information released by regulatory bodies including the CDC and federal and state organizations. These policies and algorithms were followed during the patient's care in the ED. Patient here with right upper quad abdominal pain rating the right flank. Known gallstones. Unfortunately just had a pregnancy loss at 21 weeks, that was 4 weeks ago. Pain is worse with eating nausea but no vomiting. Was seen week and a half ago here diagnosed with UTI sent home, though symptoms have improved with antibiotics.   On exam abdomen soft focal right upper quadrant tenderness with positive Mitchell sign but no rebound no guarding no right lower tenderness. Minimal right CVA tenderness. Bedside ultrasound does show gallstones in the bladder but no pericholecystic fluid. Old record review CAT scan on 7/10 showed significant gallstone burden. Seen on prior ultrasound from March 2021. Will order formal ultrasound, labs, possible admission    Critical Care     none    Bertha Thibodeaux MD, Hilda Hagan  Attending Emergency  Physician             Bertha Thibodeaux MD  07/23/21 2050    10:32 PM EDT  Discussion with the patient and her significant other regarding results of her gallbladder test.  Did recommend observation stay overnight for either MRCP or HIDA scan tomorrow and GI consult. Given normal labs no fever no vomiting, do not feel she needs emergent surgical consultation. After 2 lengthy discussions, patient has decided to go home tonight understands that she very well may need to come back. She does not want to try a p.o. challenge here because \"it is going to hurt. \"  Explained to patient my concern that she will take anything here she states that she will come back if needed. 11:03 PM EDT  Upon further review, patient did not actually leave, the nurse went in to take out her IV and give her her discharge instructions, and asked to see me again to discuss her plan of care. She and her significant other agained continued to talk and she now is willing to be admitted. Given this will place in observation as above for GI consult and hopefully additional testing tomorrow.        Bertha Thibodeaux MD  07/23/21 [id number]

## 2021-07-24 NOTE — ED PROVIDER NOTES
[facility]  ED  Emergency Department Encounter  EmergencyMedicine Resident     Pt Reanna Peralta  MRN: [id number]  Ivanna 1985  Date of evaluation: 21  PCP:  No primary care provider on file. This patient was evaluated in the Emergency Department for symptoms described in the history of present illness. The patient was evaluated in the context of the global COVID-19 pandemic, which necessitated consideration that the patient might be at risk for infection with the SARS-CoV-2 virus that causes COVID-19. Institutional protocols and algorithms that pertain to the evaluation of patients at risk for COVID-19 are in a state of rapid change based on information released by regulatory bodies including the CDC and federal and state organizations. These policies and algorithms were followed during the patient's care in the ED. CHIEF COMPLAINT       Chief Complaint   Patient presents with    Abdominal Pain       HISTORY OF PRESENT ILLNESS  (Location/Symptom, Timing/Onset, Context/Setting, Quality, Duration, Modifying Factors, Severity.)      Fe Araujo is a 38 y.o. female who presents with worsening abdominal pain. Pt stated that she was in the ED 14 days ago for abdominal pain when she had normal CT and was treated for UTI. Pt stated the pain did not seem to be improving and now is more severe at RUQ and radiates to the lower abdomen and back, described as pulling like, rated as 8/10 and associated with light color stool but no nausea, vomiting, diarrhea, chest pain or SOB. Pt had  last month on . PAST MEDICAL / SURGICAL / SOCIAL / FAMILY HISTORY      has a past medical history of Anxiety, Asthma, GERD (gastroesophageal reflux disease), Hypothyroidism, Major depression, Morbid obesity, and Tension type headache.       has no past surgical history on file.       Social History     Socioeconomic History    Marital status: Single     Spouse name: Not on file    Number of children: Not on file    Years of education: Not on file    Highest education level: Not on file   Occupational History    Not on file   Tobacco Use    Smoking status: Former Smoker     Packs/day: 1.00     Types: Cigars    Smokeless tobacco: Never Used    Tobacco comment: Black and Mild   Vaping Use    Vaping Use: Never used   Substance and Sexual Activity    Alcohol use: No    Drug use: Never    Sexual activity: Yes     Partners: Male   Other Topics Concern    Not on file   Social History Narrative    Not on file     Social Determinants of Health     Financial Resource Strain:     Difficulty of Paying Living Expenses:    Food Insecurity:     Worried About Running Out of Food in the Last Year:     [address] in the Last Year:    Transportation Needs:     Lack of Transportation (Medical):  Lack of Transportation (Non-Medical):    Physical Activity:     Days of Exercise per Week:     Minutes of Exercise per Session:    Stress:     Feeling of Stress :    Social Connections:     Frequency of Communication with Friends and Family:     Frequency of Social Gatherings with Friends and Family:     Attends [employer] Services:     Active Member of Clubs or Organizations:     Attends Club or Organization Meetings:     Marital Status:    Intimate Partner Violence:     Fear of Current or Ex-Partner:     Emotionally Abused:     Physically Abused:     Sexually Abused:        Family History   Problem Relation Age of Onset    Depression Father     Thyroid Disease Father     Diabetes Sister        Allergies:  Iodine, Penicillin g, and Dye  [iodides]    Home Medications:  Prior to Admission medications    Medication Sig Start Date End Date Taking?  Authorizing Provider   sodium chloride (ALTAMIST SPRAY) 0.65 % nasal spray 1 spray by Nasal route as needed for Congestion 7/19/21   Machelle Hamilton DO   sertraline (ZOLOFT) 50 MG tablet Take 1 tablet by mouth daily 7/19/21 11/1/21  Maria Elena Hamilton,    cephALEXin (KEFLEX) 250 MG/5ML suspension Take 5 mLs by mouth 4 times daily for 10 days 6/00/49 3/43/29  Angelique Avila DO   Ferrous Gluconate (IRON) 240 (27 Fe) MG TABS Take 1 tablet by mouth daily  Patient not taking: Reported on 7/19/2021 4/71/85 5/13/87  Angelique Avila DO   ferrous sulfate (FE TABS) 325 (65 Fe) MG EC tablet Take 1 tablet by mouth daily (with breakfast) 7/6/21   Maile Basurto DO   ibuprofen (ADVIL;MOTRIN) 600 MG tablet Take 1 tablet by mouth every 6 hours as needed for Pain  Patient not taking: Reported on 7/19/2021 7/1/21   Ramon Mcghee DO   docusate sodium (COLACE) 100 MG capsule Take 1 capsule by mouth 2 times daily as needed for Constipation 7/1/21   Ramon Mcghee,    docusate sodium (COLACE, DULCOLAX) 100 MG CAPS Take 100 mg by mouth daily 6/27/21   Maile Basurto DO   Prenatal Vit-Fe Fumarate-FA (PRENATAL VITAMIN) 27-0.8 MG TABS Take 1 tablet by mouth daily  Patient not taking: Reported on 7/19/2021 6/14/21   Maria Elena Hamilton DO   oxymetazoline (12 HOUR NASAL SPRAY) 0.05 % nasal spray 2 sprays by Nasal route 2 times daily 5/24/21 5/24/22  Kell Tamayo, DO   acetaminophen (TYLENOL) 500 MG tablet Take 1 tablet by mouth every 6 hours as needed for Pain 1/20/21   Ana Maradiaga, DO   Cholecalciferol (VITAMIN D3) 1000 units TABS Take 1 tablet by mouth daily 7/31/17   Abdon Huff MD       REVIEW OF SYSTEMS    (2-9 systems for level 4, 10 or more for level 5)      Review of Systems   Constitutional: Positive for fatigue. Gastrointestinal: Positive for abdominal pain. Negative for constipation, nausea and vomiting. Genitourinary: Negative. PHYSICAL EXAM   (up to 7 for level 4, 8 or more for level 5)      INITIAL VITALS:   /69   Pulse 59   Temp 98.2 °F (36.8 °C)   Resp 18   LMP 01/17/2021 (Approximate)   SpO2 100%     Physical Exam  Constitutional:       Appearance: She is well-developed. Cardiovascular:      Heart sounds: Normal heart sounds. Pulmonary:      Effort: Pulmonary effort is normal.      Breath sounds: Normal breath sounds. Abdominal:      Comments: Abdomen is obese, tender at the RUQ, +ve delgado sign, nl BS   Neurological:      General: No focal deficit present. Mental Status: She is alert.    Psychiatric:         Mood and Affect: Mood normal.         DIFFERENTIAL  DIAGNOSIS     PLAN (LABS / IMAGING / EKG):  Orders Placed This Encounter   Procedures    US GALLBLADDER RUQ    CBC WITH AUTO DIFFERENTIAL    COMPREHENSIVE METABOLIC PANEL    LIPASE    Urinalysis with microscopic       MEDICATIONS ORDERED:  Orders Placed This Encounter   Medications    0.9 % sodium chloride bolus    DISCONTD: traMADol (ULTRAM) tablet 25 mg    ketorolac (TORADOL) injection 15 mg    ondansetron (ZOFRAN) injection 4 mg       DDX: UTI, CHOLECYSTITIS    DIAGNOSTIC RESULTS / EMERGENCY DEPARTMENT COURSE / MDM   LAB RESULTS:  Results for orders placed or performed during the hospital encounter of 07/23/21   CBC WITH AUTO DIFFERENTIAL   Result Value Ref Range    WBC 8.8 3.5 - 11.3 k/uL    RBC 4.41 3.95 - 5.11 m/uL    Hemoglobin 11.6 (L) 11.9 - 15.1 g/dL    Hematocrit 37.8 36.3 - 47.1 %    MCV 85.7 82.6 - 102.9 fL    MCH 26.3 25.2 - 33.5 pg    MCHC 30.7 28.4 - 34.8 g/dL    RDW 13.9 11.8 - 14.4 %    Platelets 512 788 - 560 k/uL    MPV 9.2 8.1 - 13.5 fL    NRBC Automated 0.0 0.0 per 100 WBC    Differential Type NOT REPORTED     Seg Neutrophils 59 36 - 65 %    Lymphocytes 31 24 - 43 %    Monocytes 7 3 - 12 %    Eosinophils % 1 1 - 4 %    Basophils 1 0 - 2 %    Immature Granulocytes 1 (H) 0 %    Segs Absolute 5.32 1.50 - 8.10 k/uL    Absolute Lymph # 2.70 1.10 - 3.70 k/uL    Absolute Mono # 0.63 0.10 - 1.20 k/uL    Absolute Eos # 0.08 0.00 - 0.44 k/uL    Basophils Absolute 0.07 0.00 - 0.20 k/uL    Absolute Immature Granulocyte 0.04 0.00 - 0.30 k/uL    WBC Morphology NOT REPORTED     RBC Morphology NOT REPORTED     Platelet Estimate NOT REPORTED    COMPREHENSIVE METABOLIC PANEL   Result Value Ref Range    Glucose 99 70 - 99 mg/dL    BUN 5 (L) 6 - 20 mg/dL    CREATININE 0.62 0.50 - 0.90 mg/dL    Bun/Cre Ratio NOT REPORTED 9 - 20    Calcium 8.6 8.6 - 10.4 mg/dL    Sodium 138 135 - 144 mmol/L    Potassium 4.3 3.7 - 5.3 mmol/L    Chloride 105 98 - 107 mmol/L    CO2 26 20 - 31 mmol/L    Anion Gap 7 (L) 9 - 17 mmol/L    Alkaline Phosphatase 84 35 - 104 U/L    ALT <5 (L) 5 - 33 U/L    AST 25 <32 U/L    Total Bilirubin <0.10 (L) 0.3 - 1.2 mg/dL    Total Protein 7.0 6.4 - 8.3 g/dL    Albumin 3.4 (L) 3.5 - 5.2 g/dL    Albumin/Globulin Ratio 0.9 (L) 1.0 - 2.5    GFR Non-African American >60 >60 mL/min    GFR African American >60 >60 mL/min    GFR Comment          GFR Staging NOT REPORTED    LIPASE   Result Value Ref Range    Lipase 12 (L) 13 - 60 U/L   Urinalysis with microscopic   Result Value Ref Range    Color, UA YELLOW YELLOW    Turbidity UA CLEAR CLEAR    Glucose, Ur NEGATIVE NEGATIVE    Bilirubin Urine NEGATIVE NEGATIVE    Ketones, Urine TRACE (A) NEGATIVE    Specific Gravity, UA 1.021 1.005 - 1.030    Urine Hgb NEGATIVE NEGATIVE    pH, UA 5.5 5.0 - 8.0    Protein, UA NEGATIVE NEGATIVE    Urobilinogen, Urine Normal Normal    Nitrite, Urine NEGATIVE NEGATIVE    Leukocyte Esterase, Urine SMALL (A) NEGATIVE    -          WBC, UA 5 TO 10 0 - 5 /HPF    RBC, UA 2 TO 5 0 - 4 /HPF    Casts UA  0 - 8 /LPF     5 TO 10 HYALINE Reference range defined for non-centrifuged specimen. Crystals, UA NOT REPORTED None /HPF    Epithelial Cells UA 5 TO 10 0 - 5 /HPF    Renal Epithelial, UA NOT REPORTED 0 /HPF    Bacteria, UA FEW (A) None    Mucus, UA NOT REPORTED None    Trichomonas, UA NOT REPORTED None    Amorphous, UA NOT REPORTED None    Other Observations UA NOT REPORTED NOT REQ. Yeast, UA NOT REPORTED None       IMPRESSION:  38 y.o F presented with worsening abdominal pain after her last ED visit which she was treated for UTI. Pt has a hx of Gallstones. Unresolved UTI and Cholecystitis are on the differential.    RADIOLOGY:  RUQ US    . EMERGENCY DEPARTMENT COURSE:  ED Course as of Jul 23 2215 Fri Jul 23, 2021 2121 Pt were seen and examined. Toradolol, RUQ US, CBC, CMP, UA, LIPASE were ordered. [YK]   2122 UA showed few bacteria, small leuko (UTI). [YK]   2123 CBC anemia 11.6    [YK]   2124 Lipase(!): 12 [YK]   2124 Urinalysis with microscopic(!):    Color, UA YELLOW   Turbidity UA CLEAR   Glucose, UA NEGATIVE   Bilirubin, Urine NEGATIVE   Ketones, Urine TRACE(!)   Specific [place], UA 1.021   Urine Hgb NEGATIVE   pH, UA 5.5   Protein, UA NEGATIVE   Urobilinogen, Urine Normal   Nitrite, Urine NEGATIVE   Leukocyte Esterase, Urine SMALL(!)   -        WBC, UA 5 TO 10   RBC, UA 2 TO 5   Casts UA 5 TO 10 HYALINE Reference range defined for non-centrifuged specimen. Crystals, UA NOT REPORTED   Epithelial Cells, UA 5 TO 10   Renal E... [YK]   2125 COMPREHENSIVE METABOLIC PANEL(!):    Glucose 99   BUN 5(!)   Creatinine 0.62   Bun/Cre Ratio NOT REPORTED   Calcium 8.6   Sodium 138   Potassium 4.3   Chloride 105   CO2 26   Anion Gap 7(!)   Alk Phos 84   ALT <5(!)   AST 25   Bilirubin <0.10(!)   Total Protein 7.0   Albumin 3.4(!)   Albumin/Globulin Ratio 0.9(!)   GFR Non- >60   GFR  >60   GFR Comment        GFR Staging NOT REPORTED [YK]   2151 Pt refused Toradol and Zofran    [YK]   2212 RUQ US    Cholelithiasis and choledocholithiasis causing distal common bile duct  obstruction.  HIDA scan or MRCP may be helpful for further characterization  if clinically warranted.         [YK]   80 Pt was educated about the need of further imaging MR of the RUQ and possible ERCP to assess for possible Gallbladder removal    [YK]      ED Course User Index  [YK] Cindy Monet MD           CONSULTS:  None        FINAL IMPRESSION      Choledcholithasis    DISPOSITION / PLAN     DISPOSITION         PATIENT REFERRED TO:  No follow-up provider specified.     DISCHARGE MEDICATIONS:  New Prescriptions    No medications on file       Myrtha Leventhal, MD  Emergency Medicine Resident    (Please note that portions of thisnote were completed with a voice recognition program.  Efforts were made to edit the dictations but occasionally words are mis-transcribed.)     Myrtha Leventhal, MD  Resident  07/23/21 2071

## 2021-07-24 NOTE — PLAN OF CARE
Pt denies pain at time of discharge and remains free of falls or injury. Printed discharge instructions given and explained to pt. Pt relates understanding and cooperation.

## 2021-07-24 NOTE — CARE COORDINATION
Case Management Initial Discharge Plan  Nicolasa Kitchen,             Met with:patient to discuss discharge plans. Information verified: address, contacts, phone number, , insurance Yes  Insurance Provider: [employer]    Emergency Contact/Next of Kin name & number: grandmother sarah and mother nia as per face sheet  Who are involved in patient's support system? family    PCP: No primary care provider on file. Date of last visit: follows with OB on [address]      Discharge Planning    Living Arrangements:  [address] 1 stories  2 stairs to climb to get into front door, no stairs to climb to reach second floor  Location of bedroom/bathroom in home main    Patient able to perform ADL's:Independent    Current Services (outpatient & in home) DME  DME equipment: none  DME provider: na    Is patient receiving oral anticoagulation therapy? No    If indicated:   Physician managing anticoagulation treatment: na  Where does patient obtain lab work for ATC treatment? na      Potential Assistance Needed:  Meals On Wheels    Patient agreeable to home care: No  [place] of choice provided:  had home care in past doesnt remember who it was    Prior SNF/Rehab Placement and Facility: none  Agreeable to SNF/Rehab: No  [place] of choice provided: n/a     Evaluation: no    Expected Discharge date:       Patient expects to be discharged to: If home: is the family and/or caregiver wiling & able to provide support at home? yes  Who will be providing this support? Mother but she works    Follow Up Appointment: Best Day/ Time:      Transportation provider: medical cab or family  Transportation arrangements needed for discharge: No    Readmission Risk              Risk of Unplanned Readmission:  0             Does patient have a readmission risk score greater than 14?: No  If yes, follow-up appointment must be made within 7 days of discharge.      Goals of Care: pain control      Educated pt on transitional options, provided freedom of choice and are agreeable with plan      Discharge Plan: home independent, has transport, monitor for needs          Electronically signed by Kelle Nguyen RN on 7/24/21 at 12:10 PM EDT

## 2021-07-24 NOTE — PROGRESS NOTES
[facility]  CDU / OBSERVATION ENCOUNTER  ATTENDING NOTE       I performed a history and physical examination of the patient and discussed management with the resident or midlevel provider. I reviewed the resident or midlevel provider's note and agree with the documented findings and plan of care. Any areas of disagreement are noted on the chart. I was personally present for the key portions of any procedures. I have documented in the chart those procedures where I was not present during the key portions. I have reviewed the nurses notes. I agree with the chief complaint, past medical history, past surgical history, allergies, medications, social and family history as documented unless otherwise noted below. The Family history, social history, and ROS are effectively unchanged since admission unless noted elsewhere in the chart. Patient now pain-free. Patient with return of normal enzymes. Patient doing well. Patient declining any dye studies including MRCP and ERCP. Plan had been for ERCP earlier today but with resolution of symptoms and falling liver function studies patient is discharged in good condition with plan surgical follow-up.     Leatha José MD  Attending Emergency  Physician

## 2021-07-24 NOTE — PROGRESS NOTES
Per surgery and the patient, patient has elected to cancel the procedure at this time. Mikala Calderon RN on [employer] notified.

## 2021-07-24 NOTE — ED TRIAGE NOTES
Pt brought to 27 with co mid back pain x 4 weeks, after having baby. Pt co pain 7/10. Denies numbness or tingling. NAD noted at this time. Pt respirations are even and unlabored, pt is oriented X 4, speaking in complete sentences, bed is in the lowest position, call light is within reach. Will continue to monitor.

## 2021-07-24 NOTE — CONSULTS
[address]    GASTROENTEROLOGY CONSULT    Patient:   Lelia Pate   :    1985   Facility:   [facility]   Date:    2021  Admission Dx:  Cholelithiasis with biliary obstruction [K80.21]  Requesting physician: Briseida Beavers MD  Reason for consult:  Cholelithiasis with CBD obstruction   CC : RUQ pain    SUBJECTIVE     HISTORY OF PRESENT ILLNESS  This is a 38 y.o. AA  female who was admitted 2021 with Cholelithiasis with biliary obstruction [K80.21]. We have been asked to see the patient in consultation by Briseida Beavers MD for cholelithiasis with CBD obstruction. 78-year-old female with a history of depression, hypothyroidism, asthma and known cholelithiasis presented to the hospital with reports of right upper quadrant /epigastric abdominal pain. Patient reports she has known gallstones and since loss of pregnancy approximately 4 weeks ago, right upper quadrant abdominal pain has been progressively getting worse. She describes pain as a cramping type pain that occurs postprandially. It is associated with nausea but no vomiting. Pain occasionally radiates to scapular area. Patient underwent ultrasound showing cholelithiasis and choledocholithiasis. LFTs unremarkable. Upon physical exam patient is alert and oriented. Denies any right upper quadrant pain at this time. OBJECTIVE:     PAST MEDICAL/SURGICAL HISTORY  Past Medical History:   Diagnosis Date    Anxiety     Asthma     GERD (gastroesophageal reflux disease)     Hypothyroidism 5/3/2021    Major depression 2013    Morbid obesity 9/15/2017    Tension type headache 2013     History reviewed. No pertinent surgical history.     ALLERGIES:  Allergies   Allergen Reactions    Iodine Shortness Of Breath    Penicillin G Hives and Shortness Of Breath    Dye  [Iodides]     Benadryl [Diphenhydramine] Palpitations    Prednisone Nausea And Vomiting HOME MEDICATIONS:  Prior to Admission medications    Medication Sig Start Date End Date Taking?  Authorizing Provider   nitrofurantoin, macrocrystal-monohydrate, (MACROBID) 100 MG capsule Take 1 capsule by mouth 2 times daily for 10 days 7/23/21 8/2/21 Yes Lia Ackerman MD   sertraline (ZOLOFT) 50 MG tablet Take 1 tablet by mouth daily 7/19/21 11/1/21 Yes Dorothy Hamilton DO   ferrous sulfate (FE TABS) 325 (65 Fe) MG EC tablet Take 1 tablet by mouth daily (with breakfast) 7/6/21  Yes Maile Basurto DO   ibuprofen (ADVIL;MOTRIN) 600 MG tablet Take 1 tablet by mouth every 6 hours as needed for Pain 7/1/21  Yes Ramon Mcghee DO   docusate sodium (COLACE) 100 MG capsule Take 1 capsule by mouth 2 times daily as needed for Constipation 7/1/21  Yes Ramon Mcghee DO   acetaminophen (TYLENOL) 500 MG tablet Take 1 tablet by mouth every 6 hours as needed for Pain 1/20/21  Yes Gurmeet Maradiaga,    Cholecalciferol (VITAMIN D3) 1000 units TABS Take 1 tablet by mouth daily 7/31/17  Yes Abdon Huff MD   sodium chloride (ALTAMIST SPRAY) 0.65 % nasal spray 1 spray by Nasal route as needed for Congestion 7/19/21   Maria Elena Hamilton DO   Ferrous Gluconate (IRON) 240 (27 Fe) MG TABS Take 1 tablet by mouth daily 0/60/25 0/97/41  Angelique Avila DO   docusate sodium (COLACE, DULCOLAX) 100 MG CAPS Take 100 mg by mouth daily 6/27/21   Maile Basurto DO   Prenatal Vit-Fe Fumarate-FA (PRENATAL VITAMIN) 27-0.8 MG TABS Take 1 tablet by mouth daily  Patient not taking: Reported on 7/19/2021 6/14/21   Maria Elena Hamilton DO   oxymetazoline (12 HOUR NASAL SPRAY) 0.05 % nasal spray 2 sprays by Nasal route 2 times daily 5/24/21 5/24/22  Kell Tamayo DO       CURRENT MEDICATIONS:  Scheduled Meds:  Mony Osgood Hold] sertraline  50 mg Oral Daily    [MAR Hold] ketorolac  15 mg Intravenous Once    [MAR Hold] ondansetron  4 mg Intravenous Once    [MAR Hold] sodium chloride flush  5-40 mL Intravenous 2 times per day   Anjali Budd by provider] enoxaparin  40 mg Subcutaneous Daily     Continuous Infusions:   lactated ringers      [MAR Hold] sodium chloride       PRN Meds:[MAR Hold] sodium chloride flush, [MAR Hold] sodium chloride, [MAR Hold] acetaminophen, [MAR Hold] ondansetron **OR** [MAR Hold] ondansetron    SOCIAL HISTORY:     Tobacco:   reports that she has been smoking cigars. She has been smoking about 1.00 pack per day. She has never used smokeless tobacco.  Alcohol:   reports no history of alcohol use. Illicit drugs:  reports no history of drug use. FAMILY HISTORY:     Family History   Problem Relation Age of Onset    Depression Father     Thyroid Disease Father     Diabetes Sister        REVIEW OF SYSTEMS:    Constitutional: No fever, no chills, no lethargy, no weakness. HEENT:  No headache, otalgia, itchy eyes, nasal discharge or sore throat. Cardiac:  No chest pain, dyspnea, orthopnea or PND. Chest:   No cough, phlegm or wheezing. Abdomen:  + abdominal pain, + nausea, no vomiting. Neuro:  No focal weakness, abnormal movements or seizure like activity. Skin:   No rashes, no itching. :   No hematuria, no pyuria, no dysuria, no flank pain. Extremities:  No swelling or joint pains. ROS was otherwise negative except as mentioned in the [address] PHYSICAL EXAM:    /76   Pulse 74   Temp 98.2 °F (36.8 °C) (Temporal)   Resp 18   Ht 5' 5\" (1.651 m)   Wt 195 lb (88.5 kg)   LMP 01/17/2021 (Approximate)   SpO2 98%   BMI 32.45 kg/m²     GENERAL:   Well developed, Well nourished, No apparent distress  HEAD:   Normocephalic, Atraumatic  EENT:   EOMI, Sclera not icteric, Oropharynx moist   NECK:   Supple, Trachea midline  LUNGS:  CTA Bilaterally  HEART:  RRR, No murmur  ABDOMEN:   Soft, obese, Nontender, Nondistended, BS WNL  EXT:   No clubbing. No cyanosis. No edema. SKIN:   No rashes. No jaundice. No stigmata of liver disease.     MUSC/SKEL:   Adequate muscle bulk for patient's age, No significant synovitis, No deformities  NEURO:  A&O x Three, CN II- XII grossly intact      LABS AND IMAGING:     CBC  Recent Labs     07/23/21 2046 07/24/21  0910   WBC 8.8 9.9   HGB 11.6* 10.7*   HCT 37.8 35.8*   MCV 85.7 87.1   MCH 26.3 26.0   MCHC 30.7 29.9    281       IMMATURE PLTs  No results found for: PLTFLUORE    BMP  Recent Labs     07/23/21 2046 07/24/21  0910    133*   K 4.3 3.7    103   CO2 26 24   BUN 5* 4*   CREATININE 0.62 0.50   GLUCOSE 99 80   CALCIUM 8.6 8.5*       LFTS  Recent Labs     07/23/21 2046 07/24/21  0910   ALKPHOS 84 83   ALT <5* 6   AST 25 15   PROT 7.0 6.4   BILITOT <0.10* 0.28*   BILIDIR  --  0.09   LABALBU 3.4* 3.1*       AMYLASE/LIPASE/AMMONIA  Recent Labs     07/23/21 2046   LIPASE 12*       PT/INR  No results for input(s): PROTIME, INR in the last 72 hours. ANEMIA STUDIES  No results for input(s): IRON, LABIRON, TIBC, UIBC, FERRITIN, [id number], FOLATE, OCCULTBLD in the last 72 hours. IMAGING  XR CHEST (2 VW)    Result Date: 7/10/2021  EXAMINATION: TWO XRAY VIEWS OF THE CHEST 7/10/2021 2:25 am COMPARISON: 02/09/2008. HISTORY: ORDERING SYSTEM PROVIDED HISTORY: CP TECHNOLOGIST PROVIDED HISTORY: CP Reason for Exam: chest pain. pt states the pain starts in the back and radiates to the front FINDINGS: Heart size and pulmonary vasculature are normal.  Compared to the previous exam, there is enlargement of the upper mediastinum and displacement of the trachea to the right. The lungs are clear and normally expanded. Surrounding osseous and soft tissue structures are unremarkable. Compared to the 2008 study, there has been enlargement of the upper portion of the mediastinum and displacement of the trachea to the right. Follow-up CT of the chest with IV contrast is recommended for further evaluation.      CT ABDOMEN PELVIS W IV CONTRAST Additional Contrast? None    Result Date: 7/10/2021  EXAMINATION: CTA OF THE CHEST; CT OF THE ABDOMEN AND PELVIS WITH CONTRAST 7/10/2021 9:09 am TECHNIQUE: CTA of the chest was performed after the administration of intravenous contrast.  Multiplanar reformatted images are provided for review. MIP images are provided for review. Dose modulation, iterative reconstruction, and/or weight based adjustment of the mA/kV was utilized to reduce the radiation dose to as low as reasonably achievable.; CT of the abdomen and pelvis was performed with the administration of intravenous contrast. Multiplanar reformatted images are provided for review. Dose modulation, iterative reconstruction, and/or weight based adjustment of the mA/kV was utilized to reduce the radiation dose to as low as reasonably achievable. COMPARISON: CT abdomen pelvis 01/20/2021 HISTORY: Reason for Exam: recent postpartum, CP, SOB FINDINGS: Pulmonary Arteries: Pulmonary arteries are adequately opacified for evaluation. No evidence of intraluminal filling defect to suggest pulmonary embolism. Main pulmonary artery is normal in caliber. Mediastinum: Enlarged thyroid goiter appearing to involve the left lobe with substernal extension and associated rightward tracheal deviation. No evidence of mediastinal lymphadenopathy. Normal heart size. Normal thoracic aorta. Lungs/pleura: The lungs are without acute process. No focal consolidation or pulmonary edema. No evidence of pleural effusion or pneumothorax. Organs: The liver, spleen, pancreas, adrenal glands and kidneys appear unremarkable. Redemonstration of multiple gallstones. GI/Bowel: No evidence of bowel obstruction or inflammation. Pelvis: Bladder and uterus appear unremarkable. Ovaries appear to be normal in size. Peritoneum/Retroperitoneum: Normal caliber abdominal aorta. No suspicious lymphadenopathy. No ascites or free air. Bones/Soft Tissues: No significant osseous abnormality. *No evidence of pulmonary embolism with clear lungs.  *No evidence of acute process within the abdomen or pelvis *Enlarged abdomen pelvis 01/20/2021 HISTORY: Reason for Exam: recent postpartum, CP, SOB FINDINGS: Pulmonary Arteries: Pulmonary arteries are adequately opacified for evaluation. No evidence of intraluminal filling defect to suggest pulmonary embolism. Main pulmonary artery is normal in caliber. Mediastinum: Enlarged thyroid goiter appearing to involve the left lobe with substernal extension and associated rightward tracheal deviation. No evidence of mediastinal lymphadenopathy. Normal heart size. Normal thoracic aorta. Lungs/pleura: The lungs are without acute process. No focal consolidation or pulmonary edema. No evidence of pleural effusion or pneumothorax. Organs: The liver, spleen, pancreas, adrenal glands and kidneys appear unremarkable. Redemonstration of multiple gallstones. GI/Bowel: No evidence of bowel obstruction or inflammation. Pelvis: Bladder and uterus appear unremarkable. Ovaries appear to be normal in size. Peritoneum/Retroperitoneum: Normal caliber abdominal aorta. No suspicious lymphadenopathy. No ascites or free air. Bones/Soft Tissues: No significant osseous abnormality. *No evidence of pulmonary embolism with clear lungs. *No evidence of acute process within the abdomen or pelvis *Enlarged thyroid goiter appearing to involve the left lobe with substernal extension and associated rightward tracheal deviation. Recommend further evaluation with ultrasound. *Cholelithiasis. IMPRESSION:     1. Choledocholithiasis noted on imaging. LFTs normal.  No current abdominal pain. ? Passed stone   2. Cholelithiasis  3. Postprandial RUQ pain -consistent with biliary colic. Currently resolved      PLAN   1. Plan was for ERCP today. Patient currently declines to have procedure. We will order MRI/MRCP without contrast for further clarification- ? passed stone  2. Recommend general surgery evaluation. 3.  Okay for low-fat diet from GI perspective after MRI/MRCP .      This plan was formulated in collaboration with Dr. Shyam Hernández  Thank you for allowing us to participate in the care of your patient. Electronically signed by: TRACEY Archer CNP-CNP on 7/24/2021 at 12:05 PM     Please note that this note was generated using a voice recognition dictation software. Although every effort was made to ensure the accuracy of this automated transcription, some errors in transcription may have occurred.

## 2021-07-24 NOTE — FLOWSHEET NOTE
[facility] [address]  PROGRESS NOTE    Room # 6982/8756-24   Name: Sandra Walters            Age: 38 y.o. Gender: female            Admit Date & Time: 7/23/2021  7:46 PM    PATIENT/EVENT DESCRIPTION:  Sandra Walters is a 38 y.o. female        SPIRITUAL ASSESSMENT/INTERVENTION:  The patient was calm and approachable. The  was able to activity listen and explore their thoughts and feelings. They engaged in the conversation and appeared to be coping. The  offered spiritual support. SPIRITUAL CARE FOLLOW-UP PLAN:  Chaplains will remain available to offer spiritual and emotional support as needed. Chaplains can be contacted 24/7 by [address] [place] [address]    Electronically signed by Emily Angel, on 7/24/2021 at 1:37 PM.  Willem Stiles  [phone]             07/24/21 1337   Encounter Summary   Services provided to: Patient   Referral/Consult From: [facility] Family members   Continue Visiting   (07/24/21)   Complexity of Encounter Moderate   Length of Encounter 15 minutes   Spiritual Assessment Completed Yes   Routine   Type Initial   Assessment Calm; Approachable   Intervention Active listening   Outcome Engaged in conversation

## 2021-07-24 NOTE — ANESTHESIA PRE PROCEDURE
Department of Anesthesiology  Preprocedure Note       Name:  Aura Herrera   Age:  38 y.o.  :  1985                                          MRN:  [id number]         Date:  2021      Surgeon: Michael Lim):  Ryan Dorman MD    Procedure: Procedure(s):  ERCP ENDOSCOPIC RETROGRADE CHOLANGIOPANCREATOGRAPHY    Medications prior to admission:   Prior to Admission medications    Medication Sig Start Date End Date Taking?  Authorizing Provider   nitrofurantoin, macrocrystal-monohydrate, (MACROBID) 100 MG capsule Take 1 capsule by mouth 2 times daily for 10 days 21 Yes Roger Jacobsen MD   sertraline (ZOLOFT) 50 MG tablet Take 1 tablet by mouth daily 21 Yes Marya Hamilton, DO   ferrous sulfate (FE TABS) 325 (65 Fe) MG EC tablet Take 1 tablet by mouth daily (with breakfast) 21  Yes Chay Tripathi, DO   ibuprofen (ADVIL;MOTRIN) 600 MG tablet Take 1 tablet by mouth every 6 hours as needed for Pain 21  Yes Derrg Emerson, DO   docusate sodium (COLACE) 100 MG capsule Take 1 capsule by mouth 2 times daily as needed for Constipation 21  Yes Deri Crisp, DO   acetaminophen (TYLENOL) 500 MG tablet Take 1 tablet by mouth every 6 hours as needed for Pain 21  Yes Gurmeet Maradiaga, DO   Cholecalciferol (VITAMIN D3) 1000 units TABS Take 1 tablet by mouth daily 17  Yes Cordelia Mathew MD   sodium chloride (ALTAMIST SPRAY) 0.65 % nasal spray 1 spray by Nasal route as needed for Congestion 21   Claudene Marshall Cacciotti, DO   Ferrous Gluconate (IRON) 240 (27 Fe) MG TABS Take 1 tablet by mouth daily 90  Sania Montejo, DO   docusate sodium (COLACE, DULCOLAX) 100 MG CAPS Take 100 mg by mouth daily 21   Chay Tripathi, DO   Prenatal Vit-Fe Fumarate-FA (PRENATAL VITAMIN) 27-0.8 MG TABS Take 1 tablet by mouth daily  Patient not taking: Reported on 2021   Claudene Marshall Cacciotti, DO   oxymetazoline (12 HOUR NASAL SPRAY) 0.05 % nasal spray 2 sprays by Nasal route 2 times daily 5/24/21 5/24/22  Yue Rosas, DO       Current medications:    Current Facility-Administered Medications   Medication Dose Route Frequency Provider Last Rate Last Admin    sertraline (ZOLOFT) tablet 50 mg  50 mg Oral Daily E Danna Roger MD        ketorolac (TORADOL) injection 15 mg  15 mg Intravenous Once Samuel Kerr MD        ondansetron [facility] injection 4 mg  4 mg Intravenous Once Samuel Kerr MD        sodium chloride flush 0.9 % injection 5-40 mL  5-40 mL Intravenous 2 times per day Elenita Garza MD        sodium chloride flush 0.9 % injection 5-40 mL  5-40 mL Intravenous PRN Elenita Garza MD        0.9 % sodium chloride infusion  25 mL Intravenous PRN Elenita Garza MD        [Held by provider] enoxaparin (LOVENOX) injection 40 mg  40 mg Subcutaneous Daily E Danna Roger MD        acetaminophen (TYLENOL) tablet 650 mg  650 mg Oral Q4H PRN E Danna Roger MD        ondansetron (ZOFRAN-ODT) disintegrating tablet 4 mg  4 mg Oral Q8H PRN E Danna Roger MD        Or    ondansetron (ZOFRAN) injection 4 mg  4 mg Intravenous Q6H PRN Elenita Garza MD           Allergies: Allergies   Allergen Reactions    Iodine Shortness Of Breath    Penicillin G Hives and Shortness Of Breath    Dye  [Iodides]        Problem List:    Patient Active Problem List   Diagnosis Code    Thyroid nodule E04.1     h/o Major depression F32.9    Obesity E66.9    Strabismus H50.9    Hx Previable PPROM @19wk6d on 12/19/20 O09.90    Anxiety F41.9    Adrenal insufficiency (HCC) E27.40    Tobacco use Z72.0    SPTD after PPROM at [address] [place] [address]. 12X0    ASCUS with positive high risk HPV cervical R87.610, R87.810    LGSIL (POLO-1) on Colpo 2/2/21 N87.0    Trichomonas vaginalis infection 2/21/21 A59.9    E. coli UTI during pregnancy  N39.0, B96.20    Fam Hx Polydactyly O09.91    High-risk pregnancy, first trimester O46.80    AMA (advanced maternal age) multigravida 35+ O12.46   Estephania Barnett Interval Pregnancy O09.899    Hypothyroidism E03.9    FHx DM Z83.3    Anemia D64.9    Positive depression screening Z13.31    Asthma J45.909    Thalassemia minor D56.3    Penicillin allergy (hives, SOB) Z88.0    Premature dilatation of cervix during pregnancy O34.30    Threatened premature labor in second trimester O47.02    High-risk pregnancy, unspecified trimester O09.90    Threatened  labor, antepartum O47.00    Feeling pelvic pressure in pregnancy, antepartum O26.899, R10.2    Short interval between pregnancies complicating pregnancy, antepartum O09.899    HRP (high risk pregnancy), second trimester O09.92     Previable Infant 21 F APG // Wt 10.11 oz O80    Cholelithiasis with biliary obstruction K80.21       Past Medical History:        Diagnosis Date    Anxiety     Asthma     GERD (gastroesophageal reflux disease)     Hypothyroidism 5/3/2021    Major depression 2013    Morbid obesity 9/15/2017    Tension type headache 2013       Past Surgical History:  No past surgical history on file. Social History:    Social History     Tobacco Use    Smoking status: Former Smoker     Packs/day: 1.00     Types: Cigars    Smokeless tobacco: Never Used    Tobacco comment: Black and Mild   Substance Use Topics    Alcohol use:  No                                Counseling given: Not Answered  Comment: Black and Mild      Vital Signs (Current):   Vitals:    21 1926 21 0011 21 0759   BP: 118/69 113/77 105/65   Pulse: 59 68 70   Resp: 18 17 16   Temp: 98.2 °F (36.8 °C) 97.4 °F (36.3 °C) 97.2 °F (36.2 °C)   TempSrc:  Oral Oral   SpO2: 100% 99% 98%                                              BP Readings from Last 3 Encounters:   21 105/65   21 106/80   07/10/21 131/76       NPO Status:                                                                                 BMI:   Wt Readings from Last 3 Encounters:   21 195 lb 9 oz (88.7 kg) 06/22/21 201 lb (91.2 kg)   06/14/21 201 lb 3.2 oz (91.3 kg)     There is no height or weight on file to calculate BMI.    CBC:   Lab Results   Component Value Date    WBC 9.9 07/24/2021    RBC 4.11 07/24/2021    RBC 4.20 03/02/2020    RBC 6-10 03/02/2020    HGB 10.7 07/24/2021    HCT 35.8 07/24/2021    MCV 87.1 07/24/2021    RDW 13.9 07/24/2021     07/24/2021     05/08/2012       CMP:   Lab Results   Component Value Date     07/23/2021    K 4.3 07/23/2021     07/23/2021    CO2 26 07/23/2021    BUN 5 07/23/2021    CREATININE 0.62 07/23/2021    GFRAA >60 07/23/2021    LABGLOM >60 07/23/2021    GLUCOSE 99 07/23/2021    GLUCOSE 88 03/02/2020    PROT 7.0 07/23/2021    PROT 7.0 03/02/2020    CALCIUM 8.6 07/23/2021    BILITOT <0.10 07/23/2021    ALKPHOS 84 07/23/2021    AST 25 07/23/2021    ALT <5 07/23/2021       POC Tests: No results for input(s): POCGLU, POCNA, POCK, POCCL, POCBUN, POCHEMO, POCHCT in the last 72 hours.     Coags:   Lab Results   Component Value Date    PROTIME 14.0 03/02/2020    INR 1.08 03/02/2020       HCG (If Applicable):   Lab Results   Component Value Date    PREGTESTUR negative 02/02/2021    HCGQUANT 69,458 (H) 03/13/2021        ABGs: No results found for: PHART, PO2ART, VRL3NGY, RXJ3XAY, BEART, B5CYKDBM     Type & Screen (If Applicable):  No results found for: LABABO, LABRH    Drug/Infectious Status (If Applicable):  Lab Results   Component Value Date    HEPCAB NONREACTIVE 04/08/2021       COVID-19 Screening (If Applicable):   Lab Results   Component Value Date    COVID19 Not Detected 07/24/2021           Anesthesia Evaluation  Patient summary reviewed no history of anesthetic complications:   Airway: Mallampati: III        Dental:          Pulmonary:normal exam  breath sounds clear to auscultation  (+) asthma:                            Cardiovascular:Negative CV ROS  Exercise tolerance: good (>4 METS),           Rhythm: regular  Rate: normal Neuro/Psych:   (+) headaches:, psychiatric history:            GI/Hepatic/Renal:   (+) GERD:,           Endo/Other:    (+) hypothyroidism::., .                 Abdominal:             Vascular: negative vascular ROS. Other Findings:             Anesthesia Plan      general     ASA 3 - emergent       Induction: intravenous. MIPS: Postoperative opioids intended, Prophylactic antiemetics administered and Postoperative trial extubation. Anesthetic plan and risks discussed with patient. Plan discussed with CRNA.                   Florence Lopez MD   7/24/2021

## 2021-07-24 NOTE — H&P
[facility]  CDU / OBSERVATION eNCOUnter  Resident Note     Pt Name: Marija Mccall  MRN: [id number]  [place]ice[place]urt 1985  Date of evaluation: 21  Patient's PCP is : No primary care provider on file. CHIEF COMPLAINT       Chief Complaint   Patient presents with    Abdominal Pain       HISTORY OF PRESENT ILLNESS    Marija Mccall is a 38 y.o. female who presents with worsening abdominal pain which began several weeks ago. She was seen in the ED on 7/10 for chest pain which radiates to her back worse with deep inhalation. At that time, CT scan was negative and she was treated for UTI which seemed to improve symptoms slightly. Pain did not fully improve and is now localized and more sever in RUQ. The pain radiates to the lower abdomen and back and worsens with food. Patient also describes issues with mastoiditis and sinuses. She feels congested at this time. She also describes aching/crampy muscles in her thoracic spine that feel tight. PMH: Gallstones. Patient is 4 weeks postpartum after  labor at 20 weeks resulting in stillbirth. Prior history of SI however, denies at this moment.      Location/Symptom: RUQ abdominal pain  Timing/Onset: >2 weeks  Provocation: unclear  Quality: sharp, ache  Radiation: lower abdomen and back  Severity: 8/10  Timing/Duration: persistent, worse with food  Modifying Factors: none    REVIEW OF SYSTEMS       General ROS - No fevers, No malaise   Ophthalmic ROS - No discharge, No changes in vision  ENT ROS - Congestion, No sore throat, No rhinorrhea   Respiratory ROS - no shortness of breath, no cough, no  wheezing  Cardiovascular ROS - No chest pain, no dyspnea on exertion  Gastrointestinal ROS - Abdominal pain RUQ radiation to lower abdomen and back, pain worse with oral intake, no nausea or vomiting, no change in bowel habits, no black or bloody stools  Genito-Urinary ROS - No dysuria, trouble voiding, or hematuria  Musculoskeletal ROS - paraspinal tenderness/muscle tightness/cramps in thoracic spine, no step-offs, No arthalgias  Neurological ROS - No headache, no dizziness/lightheadedness, No focal weakness, no loss of sensation  Dermatological ROS - No lesions, No rash     (PQRS) Advance directives on face sheet per hospital policy. No change unless specifically mentioned in chart    PAST MEDICAL HISTORY    has a past medical history of Anxiety, Asthma, GERD (gastroesophageal reflux disease), Hypothyroidism, Major depression, Morbid obesity, and Tension type headache. I have reviewed the past medical history with the patient and it is pertinent to this complaint. SURGICAL HISTORY      has no past surgical history on file. I have reviewed and agree with Surgical History entered and it is pertinent to this complaint. CURRENT MEDICATIONS     sertraline (ZOLOFT) tablet 50 mg, Daily  ketorolac (TORADOL) injection 15 mg, Once  ondansetron (ZOFRAN) injection 4 mg, Once  sodium chloride flush 0.9 % injection 5-40 mL, 2 times per day  sodium chloride flush 0.9 % injection 5-40 mL, PRN  0.9 % sodium chloride infusion, PRN  enoxaparin (LOVENOX) injection 40 mg, Daily  acetaminophen (TYLENOL) tablet 650 mg, Q4H PRN  ondansetron (ZOFRAN-ODT) disintegrating tablet 4 mg, Q8H PRN   Or  ondansetron (ZOFRAN) injection 4 mg, Q6H PRN      All medication charted and reviewed. ALLERGIES     is allergic to iodine, penicillin g, and dye  [iodides]. FAMILY HISTORY     She indicated that her mother is alive. She indicated that her father is alive. She indicated that the status of her sister is unknown.     family history includes Depression in her father; Diabetes in her sister; Thyroid Disease in her father. The patient denies any pertinent family history. I have reviewed and agree with the family history entered. I have reviewed the Family History and it is not significant to the case    SOCIAL HISTORY      reports that she has quit smoking.  Her smoking use included cigars. She smoked 1.00 pack per day. She has never used smokeless tobacco. She reports that she does not drink alcohol and does not use drugs. I have reviewed and agree with all Social.  There are no concerns for substance abuse/use. PHYSICAL EXAM     INITIAL VITALS:  oral temperature is 97.4 °F (36.3 °C). Her blood pressure is 113/77 and her pulse is 68. Her respiration is 17 and oxygen saturation is 99%. CONSTITUTIONAL: AOx4, no apparent distress, appears stated age    HEAD: normocephalic, atraumatic, no mastoid tenderness   EYES: PERRLA, EOMI    ENT: moist mucous membranes, uvula midline, goiter   NECK: supple, symmetric   BACK: Symmetric, paraspinal tenderness to palpation   LUNGS: clear to auscultation bilaterally   CARDIOVASCULAR: regular rate and rhythm, no murmurs, rubs or gallops   ABDOMEN: RUQ tenderness with palpation. Abdomen soft and otherwise non-tender, non-distended with normal active bowel sounds    NEUROLOGIC:  MAEx4, no focal sensory or motor deficits   MUSCULOSKELETAL: no clubbing, cyanosis or edema   SKIN: no rash or wounds       DIFFERENTIAL DIAGNOSIS/MDM:     Cholecystitis   UTI/pyelonephritis  Kidney stone  SBO    DIAGNOSTIC RESULTS       RADIOLOGY:   I directly visualized the following  images and reviewed the radiologist interpretations:    Euel Barley    Result Date: 7/23/2021  EXAMINATION: RIGHT UPPER QUADRANT ULTRASOUND 7/23/2021 8:50 pm COMPARISON: CT abdomen and pelvis July 10, 2021 HISTORY: ORDERING SYSTEM PROVIDED HISTORY: RUQ pain, r/o cholecystitis TECHNOLOGIST PROVIDED HISTORY: RUQ pain, r/o cholecystitis FINDINGS: LIVER:  The liver demonstrates normal echogenicity without evidence of intrahepatic biliary ductal dilatation. BILIARY SYSTEM:  Cholelithiasis. Wall echo shadow sign. Common bile duct is within normal limits measuring 6.3 mm. Choledocholithiasis noted.  RIGHT KIDNEY: The right kidney is grossly unremarkable without evidence of to perform gallbladder removal on the same day as ERCP  · continue home medications and pain control  · Monitor vitals, labs, and imaging  · DISPO: patient wishes to be discharged without completing therapy. Discussed risks and benefits of leaving without full evaluation and treatment. Instructed patient when to return. CONSULTS:    IP CONSULT TO GI    PROCEDURES:  n/a      PATIENT REFERRED TO:    WAN Zazueta [address] [place] [id number] [phone]  In 2 days  Return to the ER immediately for worsening abdominal pain, fevers, vomiting, or if you have any other concerns      --  Bk Claros MD   Emergency Medicine Resident     This dictation was generated by voice recognition computer software. Although all attempts are made to edit the dictation for accuracy, there may be errors in the transcription that are not intended.

## 2021-07-24 NOTE — ED NOTES
Report called to [facility] RN.   All questions answered     Noam Novoa RN  07/23/21 [id number]

## 2021-07-24 NOTE — PLAN OF CARE
Pt denies suicidal ideation or plan. Pt evaluated by observation resident, Dr. Omar Hair. Suicide precautions discontinued.

## 2021-07-24 NOTE — ED PROVIDER NOTES
[facility] ED  Emergency Department  Emergency Medicine Resident Sign-out     Care of Amanda Wong was assumed from Dr. Gabino Sanchez and is being seen for Abdominal Pain  . The patient's initial evaluation and plan have been discussed with the prior provider who initially evaluated the patient. EMERGENCY DEPARTMENT COURSE / MEDICAL DECISION MAKING:       MEDICATIONS GIVEN:  Orders Placed This Encounter   Medications    0.9 % sodium chloride bolus    DISCONTD: traMADol (ULTRAM) tablet 25 mg    ketorolac (TORADOL) injection 15 mg    ondansetron (ZOFRAN) injection 4 mg    nitrofurantoin, macrocrystal-monohydrate, (MACROBID) 100 MG capsule     Sig: Take 1 capsule by mouth 2 times daily for 10 days     Dispense:  20 capsule     Refill:  0    cefTRIAXone (ROCEPHIN) 1000 mg IVPB in 50 mL D5W minibag     Order Specific Question:   Antimicrobial Indications     Answer:   Urinary Tract Infection       LABS / RADIOLOGY:     Labs Reviewed   CBC WITH AUTO DIFFERENTIAL - Abnormal; Notable for the following components:       Result Value    Hemoglobin 11.6 (*)     Immature Granulocytes 1 (*)     All other components within normal limits   COMPREHENSIVE METABOLIC PANEL - Abnormal; Notable for the following components:    BUN 5 (*)     Anion Gap 7 (*)     ALT <5 (*)     Total Bilirubin <0.10 (*)     Albumin 3.4 (*)     Albumin/Globulin Ratio 0.9 (*)     All other components within normal limits   LIPASE - Abnormal; Notable for the following components:    Lipase 12 (*)     All other components within normal limits   URINALYSIS WITH MICROSCOPIC - Abnormal; Notable for the following components:    Ketones, Urine TRACE (*)     Leukocyte Esterase, Urine SMALL (*)     Bacteria, UA FEW (*)     All other components within normal limits       XR CHEST (2 VW)    Result Date: 7/10/2021  EXAMINATION: TWO XRAY VIEWS OF THE CHEST 7/10/2021 2:25 am COMPARISON: 02/09/2008.  HISTORY: ORDERING SYSTEM PROVIDED HISTORY: CP TECHNOLOGIST PROVIDED HISTORY: CP Reason for Exam: chest pain. pt states the pain starts in the back and radiates to the front FINDINGS: Heart size and pulmonary vasculature are normal.  Compared to the previous exam, there is enlargement of the upper mediastinum and displacement of the trachea to the right. The lungs are clear and normally expanded. Surrounding osseous and soft tissue structures are unremarkable. Compared to the 2008 study, there has been enlargement of the upper portion of the mediastinum and displacement of the trachea to the right. Follow-up CT of the chest with IV contrast is recommended for further evaluation. CT ABDOMEN PELVIS W IV CONTRAST Additional Contrast? None    Result Date: 7/10/2021  EXAMINATION: CTA OF THE CHEST; CT OF THE ABDOMEN AND PELVIS WITH CONTRAST 7/10/2021 9:09 am TECHNIQUE: CTA of the chest was performed after the administration of intravenous contrast.  Multiplanar reformatted images are provided for review. MIP images are provided for review. Dose modulation, iterative reconstruction, and/or weight based adjustment of the mA/kV was utilized to reduce the radiation dose to as low as reasonably achievable.; CT of the abdomen and pelvis was performed with the administration of intravenous contrast. Multiplanar reformatted images are provided for review. Dose modulation, iterative reconstruction, and/or weight based adjustment of the mA/kV was utilized to reduce the radiation dose to as low as reasonably achievable. COMPARISON: CT abdomen pelvis 01/20/2021 HISTORY: Reason for Exam: recent postpartum, CP, SOB FINDINGS: Pulmonary Arteries: Pulmonary arteries are adequately opacified for evaluation. No evidence of intraluminal filling defect to suggest pulmonary embolism. Main pulmonary artery is normal in caliber. Mediastinum: Enlarged thyroid goiter appearing to involve the left lobe with substernal extension and associated rightward tracheal deviation.   No evidence of mediastinal lymphadenopathy. Normal heart size. Normal thoracic aorta. Lungs/pleura: The lungs are without acute process. No focal consolidation or pulmonary edema. No evidence of pleural effusion or pneumothorax. Organs: The liver, spleen, pancreas, adrenal glands and kidneys appear unremarkable. Redemonstration of multiple gallstones. GI/Bowel: No evidence of bowel obstruction or inflammation. Pelvis: Bladder and uterus appear unremarkable. Ovaries appear to be normal in size. Peritoneum/Retroperitoneum: Normal caliber abdominal aorta. No suspicious lymphadenopathy. No ascites or free air. Bones/Soft Tissues: No significant osseous abnormality. *No evidence of pulmonary embolism with clear lungs. *No evidence of acute process within the abdomen or pelvis *Enlarged thyroid goiter appearing to involve the left lobe with substernal extension and associated rightward tracheal deviation. Recommend further evaluation with ultrasound. *Cholelithiasis. US GALLBLADDER RUQ    Result Date: 7/23/2021  EXAMINATION: RIGHT UPPER QUADRANT ULTRASOUND 7/23/2021 8:50 pm COMPARISON: CT abdomen and pelvis July 10, 2021 HISTORY: ORDERING SYSTEM PROVIDED HISTORY: RUQ pain, r/o cholecystitis TECHNOLOGIST PROVIDED HISTORY: RUQ pain, r/o cholecystitis FINDINGS: LIVER:  The liver demonstrates normal echogenicity without evidence of intrahepatic biliary ductal dilatation. BILIARY SYSTEM:  Cholelithiasis. Wall echo shadow sign. Common bile duct is within normal limits measuring 6.3 mm. Choledocholithiasis noted. RIGHT KIDNEY: The right kidney is grossly unremarkable without evidence of hydronephrosis. PANCREAS:  Sub visualized due to bowel gas. OTHER: No evidence of right upper quadrant ascites. Cholelithiasis and choledocholithiasis causing distal common bile duct obstruction. HIDA scan or MRCP may be helpful for further characterization if clinically warranted.      CT CHEST PULMONARY EMBOLISM W CONTRAST    Result Date: 7/10/2021  EXAMINATION: CTA OF THE CHEST; CT OF THE ABDOMEN AND PELVIS WITH CONTRAST 7/10/2021 9:09 am TECHNIQUE: CTA of the chest was performed after the administration of intravenous contrast.  Multiplanar reformatted images are provided for review. MIP images are provided for review. Dose modulation, iterative reconstruction, and/or weight based adjustment of the mA/kV was utilized to reduce the radiation dose to as low as reasonably achievable.; CT of the abdomen and pelvis was performed with the administration of intravenous contrast. Multiplanar reformatted images are provided for review. Dose modulation, iterative reconstruction, and/or weight based adjustment of the mA/kV was utilized to reduce the radiation dose to as low as reasonably achievable. COMPARISON: CT abdomen pelvis 01/20/2021 HISTORY: Reason for Exam: recent postpartum, CP, SOB FINDINGS: Pulmonary Arteries: Pulmonary arteries are adequately opacified for evaluation. No evidence of intraluminal filling defect to suggest pulmonary embolism. Main pulmonary artery is normal in caliber. Mediastinum: Enlarged thyroid goiter appearing to involve the left lobe with substernal extension and associated rightward tracheal deviation. No evidence of mediastinal lymphadenopathy. Normal heart size. Normal thoracic aorta. Lungs/pleura: The lungs are without acute process. No focal consolidation or pulmonary edema. No evidence of pleural effusion or pneumothorax. Organs: The liver, spleen, pancreas, adrenal glands and kidneys appear unremarkable. Redemonstration of multiple gallstones. GI/Bowel: No evidence of bowel obstruction or inflammation. Pelvis: Bladder and uterus appear unremarkable. Ovaries appear to be normal in size. Peritoneum/Retroperitoneum: Normal caliber abdominal aorta. No suspicious lymphadenopathy. No ascites or free air. Bones/Soft Tissues: No significant osseous abnormality.      *No evidence of pulmonary embolism with clear lungs. *No evidence of acute process within the abdomen or pelvis *Enlarged thyroid goiter appearing to involve the left lobe with substernal extension and associated rightward tracheal deviation. Recommend further evaluation with ultrasound. *Cholelithiasis. RECENT VITALS:     Temp: 98.2 °F (36.8 °C),  Pulse: 59, Resp: 18, BP: 118/69, SpO2: 100 %      This patient is a 38 y.o. Female with right upper quadrant abdominal pain, recently treated for UTI 10 days ago. Patient states pain worsens with ingestion of food. RUQ US showing cholelithiasis with CBD obstruction. After multiple discussions per attending, patient agreeable to admission for further management of cholelithiasis. IV Rocephin started in ED for contaminated urine. Admitted to ETU, GI consulted. ED Course as of Jul 23 2304   Fri Jul 23, 2021 2121 Pt were seen and examined. Toradolol, RUQ US, CBC, CMP, UA, LIPASE were ordered. [YK]   2122 UA showed few bacteria, small leuko (UTI). [YK]   2123 CBC anemia 11.6    [YK]   2124 Lipase(!): 12 [YK]   2124 Urinalysis with microscopic(!):    Color, UA YELLOW   Turbidity UA CLEAR   Glucose, UA NEGATIVE   Bilirubin, Urine NEGATIVE   Ketones, Urine TRACE(!)   Specific [place], UA 1.021   Urine Hgb NEGATIVE   pH, UA 5.5   Protein, UA NEGATIVE   Urobilinogen, Urine Normal   Nitrite, Urine NEGATIVE   Leukocyte Esterase, Urine SMALL(!)   -        WBC, UA 5 TO 10   RBC, UA 2 TO 5   Casts UA 5 TO 10 HYALINE Reference range defined for non-centrifuged specimen.    Crystals, UA NOT REPORTED   Epithelial Cells, UA 5 TO 10   Renal E... [YK]   2125 COMPREHENSIVE METABOLIC PANEL(!):    Glucose 99   BUN 5(!)   Creatinine 0.62   Bun/Cre Ratio NOT REPORTED   Calcium 8.6   Sodium 138   Potassium 4.3   Chloride 105   CO2 26   Anion Gap 7(!)   Alk Phos 84   ALT <5(!)   AST 25   Bilirubin <0.10(!)   Total Protein 7.0   Albumin 3.4(!)   Albumin/Globulin Ratio 0.9(!)   GFR Non-African American >60   GFR  >60   GFR Comment        GFR Staging NOT REPORTED [YK]   2151 Pt refused Toradol and Zofran    [YK]   2212 RUQ US    Cholelithiasis and choledocholithiasis causing distal common bile duct  obstruction.  HIDA scan or MRCP may be helpful for further characterization  if clinically warranted. [YK]   80 Pt was educated about the need of further imaging MR of the RUQ and possible ERCP to assess for possible Gallbladder removal    [YK]      ED Course User Index  [YK] Johnnie Castro MD       OUTSTANDING TASKS / RECOMMENDATIONS:    1. GI consult  2. Awaiting bed     FINAL IMPRESSION:     1. Right upper quadrant abdominal pain    2.  Gallstones        DISPOSITION:         DISPOSITION:  []  Discharge   []  Transfer -    [x]  Admission - ETU    []  Against Medical Advice   []  Eloped   FOLLOW-UP: WAN Zazueta [address] [place] [address]  [phone]  In 2 days  Return to the ER immediately for worsening abdominal pain, fevers, vomiting, or if you have any other concerns     DISCHARGE MEDICATIONS: New Prescriptions    NITROFURANTOIN, MACROCRYSTAL-MONOHYDRATE, (MACROBID) 100 MG CAPSULE    Take 1 capsule by mouth 2 times daily for 10 days          Michael Webb MD  Emergency Medicine Resident  [facility]      Michael Webb MD  Resident  07/23/21 [id number]

## 2021-07-25 NOTE — CARE COORDINATION
Discharge Report  Late entry    [facility] Case Management Department  Written by: Kendy Knott RN    Patient Name: Aliyah Verma  Attending Provider: No att. providers found  Admit Date: 2021  7:46 PM  MRN: [id number]  Account: [de-identified]                     : 1985  Discharge Date: 2021      Disposition: home    Kendy Knott RN

## 2021-07-26 ENCOUNTER — FOLLOWUP TELEPHONE ENCOUNTER (OUTPATIENT)
Dept: OBGYN | Age: 36
End: 2021-07-26

## 2021-07-26 ENCOUNTER — POSTPARTUM VISIT (OUTPATIENT)
Dept: OBGYN | Age: 36
End: 2021-07-26
Payer: MEDICARE

## 2021-07-26 VITALS
HEIGHT: 65 IN | SYSTOLIC BLOOD PRESSURE: 98 MMHG | DIASTOLIC BLOOD PRESSURE: 70 MMHG | HEART RATE: 73 BPM | WEIGHT: 196 LBS | BODY MASS INDEX: 32.65 KG/M2

## 2021-07-26 DIAGNOSIS — Z13.31 POSITIVE DEPRESSION SCREENING: ICD-10-CM

## 2021-07-26 LAB — HCG, PREGNANCY URINE (POC): NEGATIVE

## 2021-07-26 PROCEDURE — 99024 POSTOP FOLLOW-UP VISIT: CPT | Performed by: STUDENT IN AN ORGANIZED HEALTH CARE EDUCATION/TRAINING PROGRAM

## 2021-07-26 NOTE — TELEPHONE ENCOUNTER
SW met with Pt to link with food and clothing resources in the community. Pt stated she was doing a little better than our last meeting but has been dealing with medical problems. Pt was able to discuss current feelings surrounding dealing with grief briefly but mostly focused on current issues. Pt agreed to contact SW if any additional assistance is needed.

## 2021-07-26 NOTE — PROGRESS NOTES
Postpartum Visit    Guanaco Jeffers is a 38 y.o. female , 5 weeks Post Partum s/p spontaneous vaginal delivery on 21 of previable infant    The patient was seen. Patient continues to struggle with depression but states she is getting better each day. She is working on her coping mechanisms and is hoping to move out of her current house and she feels it contributes to her current depression. Patient expresses appropriate thought processes and adamantly denies SI/HI. She expresses symptoms of appropriate grief. She feels comfortable being seen again at longer intervals and feels that her increase in Zoloft is helping. The patient completed the E.P.D.S. Post Partum Depression Evaluation form and EPDS Score of 21 which is improved from her score of 25 last week. Patient has no lochia jonel has not menstruated since her delivery. she denies any dizziness or shortness of breath. Her bowels are regular and she denies any urinary tract symptomology. She is using abstinence and condoms for STD protection. She is not currently desiring pregnancy.      OB History    Para Term  AB Living   2 0 0 0 1 0   SAB TAB Ectopic Molar Multiple Live Births   1 0 0 0 0 0     Patient Active Problem List   Diagnosis    Thyroid nodule     h/o Major depression    Obesity    Strabismus    Hx Previable PPROM @19wk6d on 20    Anxiety    Adrenal insufficiency (HCC)    Tobacco use    SPTD after PPROM at 19w6d    ASCUS with positive high risk HPV cervical    LGSIL (POLO-1) on [address] 21    Trichomonas vaginalis infection 21    E. coli UTI during pregnancy     Fam Hx Polydactyly    High-risk pregnancy, first trimester    AMA (advanced maternal age) multigravida 35+   Leida Solum Short Interval Pregnancy    Hypothyroidism    FHx DM    Anemia    Positive depression screening    Asthma    Thalassemia minor    Penicillin allergy (hives, SOB)    Premature dilatation of cervix during pregnancy    repeat Pap smear 2/2/22      Trichomonas vaginalis infection 2/21/21 03/01/2021     Neg 4/5/21  Neg 5/3/21      ASCUS with positive high risk HPV cervical 01/11/2021    SPTD after PPROM at 19w6d      Pt is candidate for progesterone   Starting on vag progesterone per MFM. Please ask patient if she would like to switch to injectable and contact Margarette CURRY      Hx Previable PPROM @19wk6d on 12/19/20 12/20/2020     The patient did not know she was pregnant prior to rupture.  Anxiety 12/20/2020    Adrenal insufficiency ([address] 12/20/2020    Tobacco use 12/20/2020    Obesity 09/15/2017     Early 1hr GTT wnl 111      Strabismus 09/15/2017     h/o Major depression 04/30/2013     No meds      Thyroid nodule 03/29/2012     CT neck : 5/2/2017  Impression: Large mass in the lower pole of the left lobe of the thyroid gland plunging into the superior mediastinum and displacing the trachea somewhat toward the right.  This has progressed dramatically since 2007.    FNA: Benign. Nodular goiter. 12/20/2016           Return in about 2 weeks (around 8/9/2021) for PP visit, close follow up for depression.     Counseling Completed:  · Abstinence, family planning counseling and STD counseling discussed  · No heavy lifting or [place] until 6 weeks post partum    Dasha Sandhu DO  Ob/Gyn Resident  [facility]  7/26/2021, 2:27 PM

## 2021-07-26 NOTE — DISCHARGE SUMMARY
CDU Discharge Summary        Patient:  Medina Antunez  YOB: 1985    MRN: [id number]   Acct: [de-identified]    Primary Care Physician: No primary care provider on file. Admit date:  7/23/2021  7:46 PM  Discharge date: 7/24/2021  2:38 PM    Discharge Diagnoses:     Acute right upper quadrant pain due to gallstones  Improved with NPO status,     Follow-up:  Call today/tomorrow for a follow up appointment with No primary care provider on file. , or return to the Emergency Room with worsening symptoms    Stressed to patient the importance of following up with primary care doctor for further workup/management of symptoms. Pt verbalizes understanding and agrees with plan. Discharge Medications:  none        Allie Pester   Home Medication Instructions [id number]    Printed on:07/26/21 6334   Medication Information                      acetaminophen (TYLENOL) 500 MG tablet  Take 1 tablet by mouth every 6 hours as needed for Pain             Cholecalciferol (VITAMIN D3) 1000 units TABS  Take 1 tablet by mouth daily             docusate sodium (COLACE) 100 MG capsule  Take 1 capsule by mouth 2 times daily as needed for Constipation             docusate sodium (COLACE, DULCOLAX) 100 MG CAPS  Take 100 mg by mouth daily             Ferrous Gluconate (IRON) 240 (27 Fe) MG TABS  Take 1 tablet by mouth daily             ferrous sulfate (FE TABS) 325 (65 Fe) MG EC tablet  Take 1 tablet by mouth daily (with breakfast)             ibuprofen (ADVIL;MOTRIN) 600 MG tablet  Take 1 tablet by mouth every 6 hours as needed for Pain             nitrofurantoin, macrocrystal-monohydrate, (MACROBID) 100 MG capsule  Take 1 capsule by mouth 2 times daily for 10 days             ondansetron (ZOFRAN) 4 MG tablet  Take 1 tablet by mouth every 8 hours as needed for Nausea             oxyCODONE (ROXICODONE) 5 MG immediate release tablet  Take 1 tablet by mouth every 6 hours as needed for Pain for up to 5 days. Intended supply: 3 days. Take lowest dose possible to manage pain             oxymetazoline (12 HOUR NASAL SPRAY) 0.05 % nasal spray  2 sprays by Nasal route 2 times daily             Prenatal Vit-Fe Fumarate-FA (PRENATAL VITAMIN) 27-0.8 MG TABS  Take 1 tablet by mouth daily             sertraline (ZOLOFT) 50 MG tablet  Take 1 tablet by mouth daily             sodium chloride (ALTAMIST SPRAY) 0.65 % nasal spray  1 spray by Nasal route as needed for Congestion                 Diet:  No diet orders on file , Advance as tolerated     Activity:  As tolerated    Consultants: IP CONSULT TO GI    Procedures:  Attempted ERCP and HIDA scan, patient refused interventions    Diagnostic Test:   Results for orders placed or performed during the hospital encounter of 07/23/21   COVID-19, Rapid    Specimen: Nasopharyngeal Swab   Result Value Ref Range    Specimen Description . NASOPHARYNGEAL SWAB     SARS-CoV-2, Rapid Not Detected Not Detected   CBC WITH AUTO DIFFERENTIAL   Result Value Ref Range    WBC 8.8 3.5 - 11.3 k/uL    RBC 4.41 3.95 - 5.11 m/uL    Hemoglobin 11.6 (L) 11.9 - 15.1 g/dL    Hematocrit 37.8 36.3 - 47.1 %    MCV 85.7 82.6 - 102.9 fL    MCH 26.3 25.2 - 33.5 pg    MCHC 30.7 28.4 - 34.8 g/dL    RDW 13.9 11.8 - 14.4 %    Platelets 149 828 - 261 k/uL    MPV 9.2 8.1 - 13.5 fL    NRBC Automated 0.0 0.0 per 100 WBC    Differential Type NOT REPORTED     Seg Neutrophils 59 36 - 65 %    Lymphocytes 31 24 - 43 %    Monocytes 7 3 - 12 %    Eosinophils % 1 1 - 4 %    Basophils 1 0 - 2 %    Immature Granulocytes 1 (H) 0 %    Segs Absolute 5.32 1.50 - 8.10 k/uL    Absolute Lymph # 2.70 1.10 - 3.70 k/uL    Absolute Mono # 0.63 0.10 - 1.20 k/uL    Absolute Eos # 0.08 0.00 - 0.44 k/uL    Basophils Absolute 0.07 0.00 - 0.20 k/uL    Absolute Immature Granulocyte 0.04 0.00 - 0.30 k/uL    WBC Morphology NOT REPORTED     RBC Morphology NOT REPORTED     Platelet Estimate NOT REPORTED    COMPREHENSIVE METABOLIC PANEL   Result Value Ref Range    Glucose 99 70 - 99 mg/dL    BUN 5 (L) 6 - 20 mg/dL    CREATININE 0.62 0.50 - 0.90 mg/dL    Bun/Cre Ratio NOT REPORTED 9 - 20    Calcium 8.6 8.6 - 10.4 mg/dL    Sodium 138 135 - 144 mmol/L    Potassium 4.3 3.7 - 5.3 mmol/L    Chloride 105 98 - 107 mmol/L    CO2 26 20 - 31 mmol/L    Anion Gap 7 (L) 9 - 17 mmol/L    Alkaline Phosphatase 84 35 - 104 U/L    ALT <5 (L) 5 - 33 U/L    AST 25 <32 U/L    Total Bilirubin <0.10 (L) 0.3 - 1.2 mg/dL    Total Protein 7.0 6.4 - 8.3 g/dL    Albumin 3.4 (L) 3.5 - 5.2 g/dL    Albumin/Globulin Ratio 0.9 (L) 1.0 - 2.5    GFR Non-African American >60 >60 mL/min    GFR African American >60 >60 mL/min    GFR Comment          GFR Staging NOT REPORTED    LIPASE   Result Value Ref Range    Lipase 12 (L) 13 - 60 U/L   Urinalysis with microscopic   Result Value Ref Range    Color, UA YELLOW YELLOW    Turbidity UA CLEAR CLEAR    Glucose, Ur NEGATIVE NEGATIVE    Bilirubin Urine NEGATIVE NEGATIVE    Ketones, Urine TRACE (A) NEGATIVE    Specific Gravity, UA 1.021 1.005 - 1.030    Urine Hgb NEGATIVE NEGATIVE    pH, UA 5.5 5.0 - 8.0    Protein, UA NEGATIVE NEGATIVE    Urobilinogen, Urine Normal Normal    Nitrite, Urine NEGATIVE NEGATIVE    Leukocyte Esterase, Urine SMALL (A) NEGATIVE    -          WBC, UA 5 TO 10 0 - 5 /HPF    RBC, UA 2 TO 5 0 - 4 /HPF    Casts UA  0 - 8 /LPF     5 TO 10 HYALINE Reference range defined for non-centrifuged specimen. Crystals, UA NOT REPORTED None /HPF    Epithelial Cells UA 5 TO 10 0 - 5 /HPF    Renal Epithelial, UA NOT REPORTED 0 /HPF    Bacteria, UA FEW (A) None    Mucus, UA NOT REPORTED None    Trichomonas, UA NOT REPORTED None    Amorphous, UA NOT REPORTED None    Other Observations UA NOT REPORTED NOT REQ.     Yeast, UA NOT REPORTED None   CBC Auto Differential   Result Value Ref Range    WBC 9.9 3.5 - 11.3 k/uL    RBC 4.11 3.95 - 5.11 m/uL    Hemoglobin 10.7 (L) 11.9 - 15.1 g/dL    Hematocrit 35.8 (L) 36.3 - 47.1 %    MCV 87.1 82.6 - 102.9 fL    MCH 26.0 25.2 - 33.5 pg    MCHC 29.9 28.4 - 34.8 g/dL    RDW 13.9 11.8 - 14.4 %    Platelets 450 481 - 743 k/uL    MPV 9.7 8.1 - 13.5 fL    NRBC Automated 0.0 0.0 per 100 WBC    Differential Type NOT REPORTED     Seg Neutrophils 55 36 - 65 %    Lymphocytes 35 24 - 43 %    Monocytes 7 3 - 12 %    Eosinophils % 2 1 - 4 %    Basophils 1 0 - 2 %    Immature Granulocytes 0 0 %    Segs Absolute 5.42 1.50 - 8.10 k/uL    Absolute Lymph # 3.45 1.10 - 3.70 k/uL    Absolute Mono # 0.73 0.10 - 1.20 k/uL    Absolute Eos # 0.16 0.00 - 0.44 k/uL    Basophils Absolute 0.06 0.00 - 0.20 k/uL    Absolute Immature Granulocyte 0.03 0.00 - 0.30 k/uL    WBC Morphology NOT REPORTED     RBC Morphology NOT REPORTED     Platelet Estimate NOT REPORTED    BASIC METABOLIC PANEL   Result Value Ref Range    Glucose 80 70 - 99 mg/dL    BUN 4 (L) 6 - 20 mg/dL    CREATININE 0.50 0.50 - 0.90 mg/dL    Bun/Cre Ratio NOT REPORTED 9 - 20    Calcium 8.5 (L) 8.6 - 10.4 mg/dL    Sodium 133 (L) 135 - 144 mmol/L    Potassium 3.7 3.7 - 5.3 mmol/L    Chloride 103 98 - 107 mmol/L    CO2 24 20 - 31 mmol/L    Anion Gap 6 (L) 9 - 17 mmol/L    GFR Non-African American >60 >60 mL/min    GFR African American >60 >60 mL/min    GFR Comment          GFR Staging NOT REPORTED    HEPATIC FUNCTION PANEL   Result Value Ref Range    Albumin 3.1 (L) 3.5 - 5.2 g/dL    Alkaline Phosphatase 83 35 - 104 U/L    ALT 6 5 - 33 U/L    AST 15 <32 U/L    Total Bilirubin 0.28 (L) 0.3 - 1.2 mg/dL    Bilirubin, Direct 0.09 <0.31 mg/dL    Bilirubin, Indirect 0.19 0.00 - 1.00 mg/dL    Total Protein 6.4 6.4 - 8.3 g/dL    Globulin NOT REPORTED 1.5 - 3.8 g/dL    Albumin/Globulin Ratio 0.9 (L) 1.0 - 2.5   POCT urine pregnancy   Result Value Ref Range    HCG, Pregnancy Urine (POC) NEGATIVE NEGATIVE     XR CHEST (2 VW)    Result Date: 7/10/2021  EXAMINATION: TWO XRAY VIEWS OF THE CHEST 7/10/2021 2:25 am COMPARISON: 02/09/2008.  HISTORY: ORDERING SYSTEM PROVIDED HISTORY: CP TECHNOLOGIST PROVIDED HISTORY: CP Reason for Exam: chest pain. pt states the pain starts in the back and radiates to the front FINDINGS: Heart size and pulmonary vasculature are normal.  Compared to the previous exam, there is enlargement of the upper mediastinum and displacement of the trachea to the right. The lungs are clear and normally expanded. Surrounding osseous and soft tissue structures are unremarkable. Compared to the 2008 study, there has been enlargement of the upper portion of the mediastinum and displacement of the trachea to the right. Follow-up CT of the chest with IV contrast is recommended for further evaluation. CT ABDOMEN PELVIS W IV CONTRAST Additional Contrast? None    Result Date: 7/10/2021  EXAMINATION: CTA OF THE CHEST; CT OF THE ABDOMEN AND PELVIS WITH CONTRAST 7/10/2021 9:09 am TECHNIQUE: CTA of the chest was performed after the administration of intravenous contrast.  Multiplanar reformatted images are provided for review. MIP images are provided for review. Dose modulation, iterative reconstruction, and/or weight based adjustment of the mA/kV was utilized to reduce the radiation dose to as low as reasonably achievable.; CT of the abdomen and pelvis was performed with the administration of intravenous contrast. Multiplanar reformatted images are provided for review. Dose modulation, iterative reconstruction, and/or weight based adjustment of the mA/kV was utilized to reduce the radiation dose to as low as reasonably achievable. COMPARISON: CT abdomen pelvis 01/20/2021 HISTORY: Reason for Exam: recent postpartum, CP, SOB FINDINGS: Pulmonary Arteries: Pulmonary arteries are adequately opacified for evaluation. No evidence of intraluminal filling defect to suggest pulmonary embolism. Main pulmonary artery is normal in caliber. Mediastinum: Enlarged thyroid goiter appearing to involve the left lobe with substernal extension and associated rightward tracheal deviation.   No evidence of mediastinal lymphadenopathy. Normal heart size. Normal thoracic aorta. Lungs/pleura: The lungs are without acute process. No focal consolidation or pulmonary edema. No evidence of pleural effusion or pneumothorax. Organs: The liver, spleen, pancreas, adrenal glands and kidneys appear unremarkable. Redemonstration of multiple gallstones. GI/Bowel: No evidence of bowel obstruction or inflammation. Pelvis: Bladder and uterus appear unremarkable. Ovaries appear to be normal in size. Peritoneum/Retroperitoneum: Normal caliber abdominal aorta. No suspicious lymphadenopathy. No ascites or free air. Bones/Soft Tissues: No significant osseous abnormality. *No evidence of pulmonary embolism with clear lungs. *No evidence of acute process within the abdomen or pelvis *Enlarged thyroid goiter appearing to involve the left lobe with substernal extension and associated rightward tracheal deviation. Recommend further evaluation with ultrasound. *Cholelithiasis. US GALLBLADDER RUQ    Result Date: 7/23/2021  EXAMINATION: RIGHT UPPER QUADRANT ULTRASOUND 7/23/2021 8:50 pm COMPARISON: CT abdomen and pelvis July 10, 2021 HISTORY: ORDERING SYSTEM PROVIDED HISTORY: RUQ pain, r/o cholecystitis TECHNOLOGIST PROVIDED HISTORY: RUQ pain, r/o cholecystitis FINDINGS: LIVER:  The liver demonstrates normal echogenicity without evidence of intrahepatic biliary ductal dilatation. BILIARY SYSTEM:  Cholelithiasis. Wall echo shadow sign. Common bile duct is within normal limits measuring 6.3 mm. Choledocholithiasis noted. RIGHT KIDNEY: The right kidney is grossly unremarkable without evidence of hydronephrosis. PANCREAS:  Sub visualized due to bowel gas. OTHER: No evidence of right upper quadrant ascites. Cholelithiasis and choledocholithiasis causing distal common bile duct obstruction. HIDA scan or MRCP may be helpful for further characterization if clinically warranted.      CT CHEST PULMONARY EMBOLISM W CONTRAST    Result Date: 7/10/2021  EXAMINATION: CTA OF THE CHEST; CT OF THE ABDOMEN AND PELVIS WITH CONTRAST 7/10/2021 9:09 am TECHNIQUE: CTA of the chest was performed after the administration of intravenous contrast.  Multiplanar reformatted images are provided for review. MIP images are provided for review. Dose modulation, iterative reconstruction, and/or weight based adjustment of the mA/kV was utilized to reduce the radiation dose to as low as reasonably achievable.; CT of the abdomen and pelvis was performed with the administration of intravenous contrast. Multiplanar reformatted images are provided for review. Dose modulation, iterative reconstruction, and/or weight based adjustment of the mA/kV was utilized to reduce the radiation dose to as low as reasonably achievable. COMPARISON: CT abdomen pelvis 01/20/2021 HISTORY: Reason for Exam: recent postpartum, CP, SOB FINDINGS: Pulmonary Arteries: Pulmonary arteries are adequately opacified for evaluation. No evidence of intraluminal filling defect to suggest pulmonary embolism. Main pulmonary artery is normal in caliber. Mediastinum: Enlarged thyroid goiter appearing to involve the left lobe with substernal extension and associated rightward tracheal deviation. No evidence of mediastinal lymphadenopathy. Normal heart size. Normal thoracic aorta. Lungs/pleura: The lungs are without acute process. No focal consolidation or pulmonary edema. No evidence of pleural effusion or pneumothorax. Organs: The liver, spleen, pancreas, adrenal glands and kidneys appear unremarkable. Redemonstration of multiple gallstones. GI/Bowel: No evidence of bowel obstruction or inflammation. Pelvis: Bladder and uterus appear unremarkable. Ovaries appear to be normal in size. Peritoneum/Retroperitoneum: Normal caliber abdominal aorta. No suspicious lymphadenopathy. No ascites or free air. Bones/Soft Tissues: No significant osseous abnormality. *No evidence of pulmonary embolism with clear lungs. *No evidence of acute process within the abdomen or pelvis *Enlarged thyroid goiter appearing to involve the left lobe with substernal extension and associated rightward tracheal deviation. Recommend further evaluation with ultrasound. *Cholelithiasis. Physical Exam:    General appearance - NAD, AOx 3   Lungs -CTAB, no R/R/R  Heart - RRR, no M/R/G  Abdomen - soft, RUQ tender to palpation   Neurological:  MAEx4, No focal motor deficit, sensory loss  Extremities - Cap refil <2 sec in all ext., no edema  Skin -warm, dry      Hospital Course:  Clinical course has improved, labs and imaging reviewed. Magan Durant originally presented to the hospital on 7/23/2021  7:46 PM. with right upper quadrant tenderness, that is worse with p.o. intake. At that time it was determined that She required further observation, GI evaluation, and general surgery evaluation. She was admitted and labs and imaging were followed daily. Imaging results as above. Ultrasound of the right upper quadrant showed cholelithiasis and distal common bile duct obstruction. Current LFTs within normal limits. Discussed with patient the cause of her pain and our recommendations for ERCP to remove the obstructing stone. Patient initially agreed to the procedure however became nervous of anesthesia. She is allergic to iodine and was worried someone would use iodine on her while she was under anesthesia. Attempts were made to calm the patient that iodine does not need to be used. As an alternative GI recommended HIDA scan to determine location of blockage adjusted that if the gallbladder could be visualized within the patient could just follow-up with general surgery as outpatient to schedule gallbladder removal.  Patient refuses treatment as well stating she did not trust the gadolinium used for the HIDA scan. Explained risks associated with leaving prior to completing medical therapy. Patient stated understanding.   Instructed patient to follow-up with general surgery as outpatient or return to the emergency department should symptoms worsen or she develop any fevers, intractable nausea vomiting or other new symptoms. Disposition: Home    Patient stated that they will not drive themselves home from the hospital if they have gotten pain killers/ narcotics earlier that day and that they will arrange for transportation on their own or work with the  for a ride. Patient counseled NOT to drive while under the influence of narcotics/ pain killers. Condition: Good    Patient stable and ready for discharge home. I have discussed plan of care with patient and they are in understanding. They were instructed to read discharge paperwork. All of their questions and concerns were addressed. Time Spent: 0 day      --  Clemente Shearer MD  Emergency Medicine Resident Physician    This dictation was generated by voice recognition computer software. Although all attempts are made to edit the dictation for accuracy, there may be errors in the transcription that are not intended.

## 2021-07-28 ENCOUNTER — HOSPITAL ENCOUNTER (OUTPATIENT)
Dept: PSYCHIATRY | Age: 36
Setting detail: THERAPIES SERIES
Discharge: HOME | End: 2021-07-28
Payer: MEDICARE

## 2021-07-28 PROCEDURE — 90837 PSYTX W PT 60 MINUTES: CPT | Performed by: SOCIAL WORKER
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
## 2021-07-30 ENCOUNTER — CLINICAL DOCUMENTATION (OUTPATIENT)
Dept: PSYCHIATRY | Age: 36
End: 2021-07-30

## 2021-08-04 ENCOUNTER — HOSPITAL ENCOUNTER (OUTPATIENT)
Dept: PSYCHIATRY | Age: 36
Setting detail: THERAPIES SERIES
Discharge: HOME | End: 2021-08-04
Payer: MEDICARE

## 2021-08-04 PROCEDURE — 90837 PSYTX W PT 60 MINUTES: CPT | Performed by: SOCIAL WORKER
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
## 2021-08-09 ENCOUNTER — HOSPITAL ENCOUNTER (OUTPATIENT)
Age: 36
Discharge: HOME | End: 2021-08-09
Payer: MEDICARE

## 2021-08-09 LAB
ABSOLUTE EOS #: 0.18 K/UL (ref 0–0.44)
ABSOLUTE IMMATURE GRANULOCYTE: 0.03 K/UL (ref 0–0.3)
ABSOLUTE LYMPH #: 2.9 K/UL (ref 1.1–3.7)
ABSOLUTE MONO #: 0.35 K/UL (ref 0.1–1.2)
ALBUMIN SERPL-MCNC: 3.7 G/DL (ref 3.5–5.2)
ALBUMIN/GLOBULIN RATIO: 1.1 (ref 1–2.5)
ALP BLD-CCNC: 90 U/L (ref 35–104)
ALT SERPL-CCNC: <5 U/L (ref 5–33)
ANION GAP SERPL CALCULATED.3IONS-SCNC: 10 MMOL/L (ref 9–17)
AST SERPL-CCNC: 12 U/L
BASOPHILS # BLD: 1 % (ref 0–2)
BASOPHILS ABSOLUTE: 0.04 K/UL (ref 0–0.2)
BILIRUB SERPL-MCNC: 0.25 MG/DL (ref 0.3–1.2)
BUN BLDV-MCNC: 4 MG/DL (ref 6–20)
BUN/CREAT BLD: ABNORMAL (ref 9–20)
CALCIUM SERPL-MCNC: 8.8 MG/DL (ref 8.6–10.4)
CHLORIDE BLD-SCNC: 105 MMOL/L (ref 98–107)
CO2: 25 MMOL/L (ref 20–31)
CREAT SERPL-MCNC: 0.81 MG/DL (ref 0.5–0.9)
DIFFERENTIAL TYPE: ABNORMAL
EOSINOPHILS RELATIVE PERCENT: 3 % (ref 1–4)
FERRITIN: 10 UG/L (ref 13–150)
FOLATE: 15 NG/ML
GFR AFRICAN AMERICAN: >60 ML/MIN
GFR NON-AFRICAN AMERICAN: >60 ML/MIN
GFR SERPL CREATININE-BSD FRML MDRD: ABNORMAL ML/MIN/{1.73_M2}
GFR SERPL CREATININE-BSD FRML MDRD: ABNORMAL ML/MIN/{1.73_M2}
GLUCOSE BLD-MCNC: 86 MG/DL (ref 70–99)
HCT VFR BLD CALC: 40.5 % (ref 36.3–47.1)
HEMOGLOBIN: 12.2 G/DL (ref 11.9–15.1)
HOMOCYSTEINE: 7.5 UMOL/L
IMMATURE GRANULOCYTES: 1 %
IRON SATURATION: 10 % (ref 20–55)
IRON: 31 UG/DL (ref 37–145)
LYMPHOCYTES # BLD: 45 % (ref 24–43)
MCH RBC QN AUTO: 25.3 PG (ref 25.2–33.5)
MCHC RBC AUTO-ENTMCNC: 30.1 G/DL (ref 28.4–34.8)
MCV RBC AUTO: 83.9 FL (ref 82.6–102.9)
MONOCYTES # BLD: 6 % (ref 3–12)
NRBC AUTOMATED: 0 PER 100 WBC
PDW BLD-RTO: 13.8 % (ref 11.8–14.4)
PLATELET # BLD: 339 K/UL (ref 138–453)
PLATELET ESTIMATE: ABNORMAL
PMV BLD AUTO: 9.2 FL (ref 8.1–13.5)
POTASSIUM SERPL-SCNC: 4.1 MMOL/L (ref 3.7–5.3)
RBC # BLD: 4.83 M/UL (ref 3.95–5.11)
RBC # BLD: ABNORMAL 10*6/UL
SEG NEUTROPHILS: 44 % (ref 36–65)
SEGMENTED NEUTROPHILS ABSOLUTE COUNT: 2.76 K/UL (ref 1.5–8.1)
SODIUM BLD-SCNC: 140 MMOL/L (ref 135–144)
TOTAL IRON BINDING CAPACITY: 309 UG/DL (ref 250–450)
TOTAL PROTEIN: 7.2 G/DL (ref 6.4–8.3)
UNSATURATED IRON BINDING CAPACITY: 278 UG/DL (ref 112–347)
VITAMIN B-12: 544 PG/ML (ref 232–1245)
WBC # BLD: 6.3 K/UL (ref 3.5–11.3)
WBC # BLD: ABNORMAL 10*3/UL

## 2021-08-09 PROCEDURE — 80053 COMPREHEN METABOLIC PANEL: CPT

## 2021-08-09 PROCEDURE — 82728 ASSAY OF FERRITIN: CPT

## 2021-08-09 PROCEDURE — 85025 COMPLETE CBC W/AUTO DIFF WBC: CPT

## 2021-08-09 PROCEDURE — 83921 ORGANIC ACID SINGLE QUANT: CPT

## 2021-08-09 PROCEDURE — 84425 ASSAY OF VITAMIN B-1: CPT

## 2021-08-09 PROCEDURE — 83540 ASSAY OF IRON: CPT

## 2021-08-09 PROCEDURE — 82607 VITAMIN B-12: CPT

## 2021-08-09 PROCEDURE — 82746 ASSAY OF FOLIC ACID SERUM: CPT

## 2021-08-09 PROCEDURE — 84207 ASSAY OF VITAMIN B-6: CPT

## 2021-08-09 PROCEDURE — 83090 ASSAY OF HOMOCYSTEINE: CPT

## 2021-08-09 PROCEDURE — 36415 COLL VENOUS BLD VENIPUNCTURE: CPT

## 2021-08-09 PROCEDURE — 84590 ASSAY OF VITAMIN A: CPT

## 2021-08-09 PROCEDURE — 83550 IRON BINDING TEST: CPT

## 2021-08-11 ENCOUNTER — HOSPITAL ENCOUNTER (OUTPATIENT)
Dept: PSYCHIATRY | Age: 36
Setting detail: THERAPIES SERIES
Discharge: HOME | End: 2021-08-11
Payer: MEDICARE

## 2021-08-11 LAB
RETINYL PALMITATE: <0.02 MG/L (ref 0–0.1)
VITAMIN A LEVEL: 0.4 MG/L (ref 0.3–1.2)
VITAMIN A, INTERP: NORMAL

## 2021-08-11 PROCEDURE — 90837 PSYTX W PT 60 MINUTES: CPT | Performed by: SOCIAL WORKER

## 2021-08-11 NOTE — PSYCHOTHERAPY
TELEHEALTH EVALUATION -- Audio/Visual (During UULTN-06 public health emergency)     [facility] Therapy Note  RADHA Saenz   8/11/2021  1:54 PM  Nicolasa Kitchen  1985  [id number]    Time spent with Patient: 60 minutes      Pt was provided informed consent for the [facility]. Discussed with patient model of service to include the limits of confidentiality (i.e. abuse reporting, suicide intervention, etc.) and short-term intervention focused approach. Pt indicated understanding. Session conducted via telephone for 3/4 of session because pt's Internet was not working. S:  Therapist met with pt via telehealth for session. Therapist and pt processed pt's current stressors and problem solved ways to manage them. Pt reported that she had to \"re-verify\" that she was in need of housing assistance, food stamps, and utility payments assistance. Pt reported that she had to reapply for them. Therapist helped pt prioritize and organize tasks that needed to be done. Pt and therapist discussed what she needed to do including complete application which is online. Therapist and pt problem solved with how she would obtain printer/fax/computer access and therapist encouraged pt to utilize [employer]. Therapist processed and validated pt's frustrations and getting overwhelmed by what she needed to do, but empowered pt to complete them. Pt's application is due on 7/15/65 and pt reported she will work on it today. Pt and therapist also discussed her applications with food stamps and utilities assistance. Pt reported she missed the deadline for food stamps because she was in the hospital but they are letting her apply. Pt should have food stamps again by the end of the month. Therapist and pt discussed obtaining other food resources and therapist sent pt links and information to local food nguyen and food maisha.   Pt will complete utility assistance application likely at the [facility] too. Pt and therapist also discussed and processed pt's declining physical health. Therapist offered psychoeducation about the connection between physical and mental health. Therapist encourage pt to seek care with a new PCP as pt has not been under one's care in a while. Pt reported she grew frustrated with her old doctors because she felt they did not listen to her. Therapist sent pt a link to insurance website to find a PCP close to her that is covered by insurance. Therapist and pt also processed how she has been doing. Therapist continued to assess for trauma symptoms and pt is displaying intrusive memories and negative thoughts and emotions. Pt is feeling low and has a lot of \"questions as to why\" everything happened. Pt and therapist discussed different modalities of treatment and offered psychoeducation on ways to heal from trauma. Therapist also offered brief psychoeducation on the tasks of mourning. Pt and therapist to process pt's grief and help her live in her new reality. Pt given hw assignment for next session to reflect on why her fetal losses occurred and how it affects her.       O:  MSE:     Appearance    alert, crying, mild distress  Appetite abnormal: current stomach issues   Sleep disturbance Yes  Fatigue Yes  Loss of pleasure Yes  Impulsive behavior No  Speech    normal rate, normal volume and well articulated  Mood    Depressed  Emptiness  Affect    depressed affect  Thought Content    excessive guilt, intrusive thoughts and all or nothing thinking  Thought Process    linear, goal directed and coherent  Associations    logical connections  Insight    Good  Judgment    Intact  Orientation    oriented to person, place, time, and general circumstances  Memory    recent and remote memory intact  Attention/Concentration    intact  Morbid ideation No  Suicide Assessment    no suicidal ideation    A:  Pt presented to session initially via telehealth before her Internet went out and session was switched to via the phone. Pt appeared distressed and distracted by tasks she needed to do to maintain food, housing and utilities. Pt was engaged in problem solving but also stated multuple times that she \"had a lot going on\" and identified reasons why it was hard to complete tasks which made her mad that she had to reverify her lack of income for these services. Therapist addressed her concerns and assisted her in problem solving to overcome barriers and be empowered to try and complete tasks anyway. Pt has ability to complete tasks and was given information on how to obtain food resources, a new PCP, and other resources through [employer]. Pt was also encouraged to reach out to  for housing. Pt processed some frustration and was given hw assignment for next therapy session. Pt was hesitant to do homework but reported she would try. Pt wants to begin to process grief.        History from Medical Record:        Diagnosis Date    Anxiety     Asthma     GERD (gastroesophageal reflux disease)     Hypothyroidism 5/3/2021    Major depression 4/30/2013    Morbid obesity 9/15/2017    Tension type headache 4/30/2013     Medications:   Current Outpatient Medications   Medication Sig Dispense Refill    ondansetron (ZOFRAN) 4 MG tablet Take 1 tablet by mouth every 8 hours as needed for Nausea (Patient not taking: Reported on 7/26/2021) 20 tablet 0    sodium chloride (ALTAMIST SPRAY) 0.65 % nasal spray 1 spray by Nasal route as needed for Congestion (Patient not taking: Reported on 7/26/2021) 1 Bottle 3    sertraline (ZOLOFT) 50 MG tablet Take 1 tablet by mouth daily 35 tablet 2    Ferrous Gluconate (IRON) 240 (27 Fe) MG TABS Take 1 tablet by mouth daily (Patient not taking: Reported on 7/26/2021) 30 tablet 3    ferrous sulfate (FE TABS) 325 (65 Fe) MG EC tablet Take 1 tablet by mouth daily (with breakfast) (Patient not taking: Reported on 7/26/2021) 90 tablet 3    ibuprofen (ADVIL;MOTRIN) 600 MG tablet Take 1 tablet by mouth every 6 hours as needed for Pain (Patient not taking: Reported on 7/26/2021) 120 tablet 0    docusate sodium (COLACE) 100 MG capsule Take 1 capsule by mouth 2 times daily as needed for Constipation (Patient not taking: Reported on 7/26/2021) 60 capsule 1    docusate sodium (COLACE, DULCOLAX) 100 MG CAPS Take 100 mg by mouth daily (Patient not taking: Reported on 7/26/2021) 30 capsule 0    Prenatal Vit-Fe Fumarate-FA (PRENATAL VITAMIN) 27-0.8 MG TABS Take 1 tablet by mouth daily (Patient not taking: Reported on 7/19/2021) 30 tablet 12    oxymetazoline (12 HOUR NASAL SPRAY) 0.05 % nasal spray 2 sprays by Nasal route 2 times daily (Patient not taking: Reported on 7/26/2021) 1 Bottle 3    acetaminophen (TYLENOL) 500 MG tablet Take 1 tablet by mouth every 6 hours as needed for Pain (Patient not taking: Reported on 7/26/2021) 40 tablet 0    Cholecalciferol (VITAMIN D3) 1000 units TABS Take 1 tablet by mouth daily 90 tablet 1     No current facility-administered medications for this encounter.        Social History:   Social History     Socioeconomic History    Marital status: Single     Spouse name: Not on file    Number of children: Not on file    Years of education: Not on file    Highest education level: Not on file   Occupational History    Not on file   Tobacco Use    Smoking status: Current Some Day Smoker     Packs/day: 1.00     Types: Cigars    Smokeless tobacco: Never Used    Tobacco comment: Black and Mild   Vaping Use    Vaping Use: Never used   Substance and Sexual Activity    Alcohol use: No    Drug use: Never    Sexual activity: Yes     Partners: Male   Other Topics Concern    Not on file   Social History Narrative    Not on file     Social Determinants of Health     Financial Resource Strain:     Difficulty of Paying Living Expenses:    Food Insecurity:     Worried About Running Out of Food in the Last Year:     [address] in the Last Year:    Transportation Needs:     Lack of Transportation (Medical):  Lack of Transportation (Non-Medical):    Physical Activity:     Days of Exercise per Week:     Minutes of Exercise per Session:    Stress:     Feeling of Stress :    Social Connections:     Frequency of Communication with Friends and Family:     Frequency of Social Gatherings with Friends and Family:     Attends [employer] Services:     Active Member of Clubs or Organizations:     Attends Club or Organization Meetings:     Marital Status:    Intimate Partner Violence:     Fear of Current or Ex-Partner:     Emotionally Abused:     Physically Abused:     Sexually Abused:        TOBACCO:   reports that she has been smoking cigars. She has been smoking about 1.00 pack per day. She has never used smokeless tobacco.  ETOH:   reports no history of alcohol use. Family History:   Family History   Problem Relation Age of Onset    Depression Father     Thyroid Disease Father     Diabetes Sister          Visit Diagnosis:   Post Traumatic Stress Disorder  Major Depressive Disorder, recurrent, severe, w/o psychotic features            Diagnoses from Medical Record: There were no encounter diagnoses. Pt interventions:  Provided education, Provided education on PTSD symptoms and treatment options for evidence-based treatment (Cognitive Processing Therapy and Prolonged Exposure), Discussed and problem-solved barriers in adhering to behavioral change plan, Motivational Interviewing to determine importance and readiness for change, Discussed potential barriers to change, Established rapport and Supportive techniques  Case management and problem solving       PLAN:       Were changes or additions made to the treatment plan today?   YES []   NO [x]      TREATMENT PLAN    8/11/2021      Problem or Issue Identified:    See decrease in PTSD symptoms and grief      Plan:      Progress on Goal    8/11/2021    Goal Met: YES []   NO [x]    Extending More Sessions:  YES [x]  NO []                  Pursuant to the emergency declaration under the [facility], [facility] waiver authority and the [employer] Act, this Virtual Visit was conducted, with patient's consent, to reduce the patient's risk of exposure to COVID-19 and provide continuity of care for an established patient. Services were provided through a video synchronous discussion virtually to substitute for in-person clinic visit.

## 2021-08-12 LAB — VITAMIN B6: 17.7 NMOL/L (ref 20–125)

## 2021-08-13 LAB
METHYLMALONIC ACID: 0.15 UMOL/L (ref 0–0.4)
VITAMIN B1 WHOLE BLOOD: 98 NMOL/L (ref 70–180)

## 2021-08-15 PROCEDURE — 99283 EMERGENCY DEPT VISIT LOW MDM: CPT

## 2021-08-16 ENCOUNTER — APPOINTMENT (OUTPATIENT)
Dept: GENERAL RADIOLOGY | Age: 36
End: 2021-08-16
Payer: MEDICARE

## 2021-08-16 ENCOUNTER — HOSPITAL ENCOUNTER (EMERGENCY)
Age: 36
Discharge: HOME | End: 2021-08-16
Attending: EMERGENCY MEDICINE
Payer: MEDICARE

## 2021-08-16 ENCOUNTER — CLINICAL DOCUMENTATION (OUTPATIENT)
Dept: PSYCHIATRY | Age: 36
End: 2021-08-16

## 2021-08-16 VITALS
RESPIRATION RATE: 19 BRPM | HEART RATE: 62 BPM | TEMPERATURE: 98.6 F | HEIGHT: 65 IN | SYSTOLIC BLOOD PRESSURE: 119 MMHG | BODY MASS INDEX: 32.49 KG/M2 | OXYGEN SATURATION: 96 % | WEIGHT: 195 LBS | DIASTOLIC BLOOD PRESSURE: 85 MMHG

## 2021-08-16 DIAGNOSIS — B96.89 BV (BACTERIAL VAGINOSIS): ICD-10-CM

## 2021-08-16 DIAGNOSIS — N30.00 ACUTE CYSTITIS WITHOUT HEMATURIA: Primary | ICD-10-CM

## 2021-08-16 DIAGNOSIS — N76.0 BV (BACTERIAL VAGINOSIS): ICD-10-CM

## 2021-08-16 LAB
-: ABNORMAL
ABSOLUTE EOS #: 0.09 K/UL (ref 0–0.44)
ABSOLUTE IMMATURE GRANULOCYTE: <0.03 K/UL (ref 0–0.3)
ABSOLUTE LYMPH #: 1.96 K/UL (ref 1.1–3.7)
ABSOLUTE MONO #: 0.43 K/UL (ref 0.1–1.2)
AMORPHOUS: ABNORMAL
ANION GAP SERPL CALCULATED.3IONS-SCNC: 11 MMOL/L (ref 9–17)
BACTERIA: ABNORMAL
BASOPHILS # BLD: 1 % (ref 0–2)
BASOPHILS ABSOLUTE: 0.04 K/UL (ref 0–0.2)
BILIRUBIN URINE: NEGATIVE
BUN BLDV-MCNC: 7 MG/DL (ref 6–20)
BUN/CREAT BLD: ABNORMAL (ref 9–20)
C TRACH DNA GENITAL QL NAA+PROBE: NEGATIVE
CALCIUM SERPL-MCNC: 8.8 MG/DL (ref 8.6–10.4)
CASTS UA: ABNORMAL /LPF (ref 0–8)
CHLORIDE BLD-SCNC: 102 MMOL/L (ref 98–107)
CO2: 25 MMOL/L (ref 20–31)
COLOR: YELLOW
COMMENT UA: ABNORMAL
CREAT SERPL-MCNC: 0.69 MG/DL (ref 0.5–0.9)
CRYSTALS, UA: ABNORMAL /HPF
D-DIMER QUANTITATIVE: 0.2 MG/L FEU
DIFFERENTIAL TYPE: ABNORMAL
DIRECT EXAM: ABNORMAL
EOSINOPHILS RELATIVE PERCENT: 1 % (ref 1–4)
EPITHELIAL CELLS UA: ABNORMAL /HPF (ref 0–5)
GFR AFRICAN AMERICAN: >60 ML/MIN
GFR NON-AFRICAN AMERICAN: >60 ML/MIN
GFR SERPL CREATININE-BSD FRML MDRD: ABNORMAL ML/MIN/{1.73_M2}
GFR SERPL CREATININE-BSD FRML MDRD: ABNORMAL ML/MIN/{1.73_M2}
GLUCOSE BLD-MCNC: 109 MG/DL (ref 70–99)
GLUCOSE URINE: NEGATIVE
HCG(URINE) PREGNANCY TEST: NEGATIVE
HCT VFR BLD CALC: 38.8 % (ref 36.3–47.1)
HEMOGLOBIN: 11.6 G/DL (ref 11.9–15.1)
IMMATURE GRANULOCYTES: 0 %
KETONES, URINE: ABNORMAL
LEUKOCYTE ESTERASE, URINE: ABNORMAL
LYMPHOCYTES # BLD: 28 % (ref 24–43)
Lab: ABNORMAL
MCH RBC QN AUTO: 25 PG (ref 25.2–33.5)
MCHC RBC AUTO-ENTMCNC: 29.9 G/DL (ref 28.4–34.8)
MCV RBC AUTO: 83.6 FL (ref 82.6–102.9)
MONOCYTES # BLD: 6 % (ref 3–12)
MUCUS: ABNORMAL
N. GONORRHOEAE DNA: NEGATIVE
NITRITE, URINE: NEGATIVE
NRBC AUTOMATED: 0 PER 100 WBC
OTHER OBSERVATIONS UA: ABNORMAL
PDW BLD-RTO: 14.2 % (ref 11.8–14.4)
PH UA: 6 (ref 5–8)
PLATELET # BLD: 304 K/UL (ref 138–453)
PLATELET ESTIMATE: ABNORMAL
PMV BLD AUTO: 9.4 FL (ref 8.1–13.5)
POTASSIUM SERPL-SCNC: 3.8 MMOL/L (ref 3.7–5.3)
PROTEIN UA: ABNORMAL
RBC # BLD: 4.64 M/UL (ref 3.95–5.11)
RBC # BLD: ABNORMAL 10*6/UL
RBC UA: ABNORMAL /HPF (ref 0–4)
RENAL EPITHELIAL, UA: ABNORMAL /HPF
SEG NEUTROPHILS: 64 % (ref 36–65)
SEGMENTED NEUTROPHILS ABSOLUTE COUNT: 4.56 K/UL (ref 1.5–8.1)
SODIUM BLD-SCNC: 138 MMOL/L (ref 135–144)
SPECIFIC GRAVITY UA: 1.02 (ref 1–1.03)
SPECIMEN DESCRIPTION: ABNORMAL
SPECIMEN DESCRIPTION: NORMAL
TRICHOMONAS: ABNORMAL
TROPONIN INTERP: NORMAL
TROPONIN INTERP: NORMAL
TROPONIN T: NORMAL NG/ML
TROPONIN T: NORMAL NG/ML
TROPONIN, HIGH SENSITIVITY: <6 NG/L (ref 0–14)
TROPONIN, HIGH SENSITIVITY: <6 NG/L (ref 0–14)
TSH SERPL DL<=0.05 MIU/L-ACNC: 0.3 MIU/L (ref 0.3–5)
TURBIDITY: ABNORMAL
URINE HGB: NEGATIVE
UROBILINOGEN, URINE: NORMAL
WBC # BLD: 7.1 K/UL (ref 3.5–11.3)
WBC # BLD: ABNORMAL 10*3/UL
WBC UA: ABNORMAL /HPF (ref 0–5)
YEAST: ABNORMAL

## 2021-08-16 PROCEDURE — 85025 COMPLETE CBC W/AUTO DIFF WBC: CPT

## 2021-08-16 PROCEDURE — 71045 X-RAY EXAM CHEST 1 VIEW: CPT

## 2021-08-16 PROCEDURE — 84443 ASSAY THYROID STIM HORMONE: CPT

## 2021-08-16 PROCEDURE — 6370000000 HC RX 637 (ALT 250 FOR IP): Performed by: STUDENT IN AN ORGANIZED HEALTH CARE EDUCATION/TRAINING PROGRAM

## 2021-08-16 PROCEDURE — 81025 URINE PREGNANCY TEST: CPT

## 2021-08-16 PROCEDURE — 80048 BASIC METABOLIC PNL TOTAL CA: CPT

## 2021-08-16 PROCEDURE — 93005 ELECTROCARDIOGRAM TRACING: CPT | Performed by: STUDENT IN AN ORGANIZED HEALTH CARE EDUCATION/TRAINING PROGRAM

## 2021-08-16 PROCEDURE — 84484 ASSAY OF TROPONIN QUANT: CPT

## 2021-08-16 PROCEDURE — 87480 CANDIDA DNA DIR PROBE: CPT

## 2021-08-16 PROCEDURE — 85379 FIBRIN DEGRADATION QUANT: CPT

## 2021-08-16 PROCEDURE — 81001 URINALYSIS AUTO W/SCOPE: CPT

## 2021-08-16 PROCEDURE — 87491 CHLMYD TRACH DNA AMP PROBE: CPT

## 2021-08-16 PROCEDURE — 2580000003 HC RX 258: Performed by: STUDENT IN AN ORGANIZED HEALTH CARE EDUCATION/TRAINING PROGRAM

## 2021-08-16 PROCEDURE — 87660 TRICHOMONAS VAGIN DIR PROBE: CPT

## 2021-08-16 PROCEDURE — 87510 GARDNER VAG DNA DIR PROBE: CPT

## 2021-08-16 PROCEDURE — 87591 N.GONORRHOEAE DNA AMP PROB: CPT

## 2021-08-16 PROCEDURE — 87086 URINE CULTURE/COLONY COUNT: CPT

## 2021-08-16 RX ORDER — CEPHALEXIN 500 MG/1
500 CAPSULE ORAL 2 TIMES DAILY
Qty: 14 CAPSULE | Refills: 0 | Status: SHIPPED | OUTPATIENT
Start: 2021-08-16 | End: 2021-08-16

## 2021-08-16 RX ORDER — CEPHALEXIN 500 MG/1
500 CAPSULE ORAL ONCE
Status: DISCONTINUED | OUTPATIENT
Start: 2021-08-16 | End: 2021-08-16

## 2021-08-16 RX ORDER — CEPHALEXIN 250 MG/5ML
500 POWDER, FOR SUSPENSION ORAL ONCE
Status: COMPLETED | OUTPATIENT
Start: 2021-08-16 | End: 2021-08-16

## 2021-08-16 RX ORDER — METRONIDAZOLE 500 MG/1
500 TABLET ORAL 2 TIMES DAILY
Qty: 14 TABLET | Refills: 0 | Status: SHIPPED | OUTPATIENT
Start: 2021-08-16 | End: 2021-08-16

## 2021-08-16 RX ORDER — CEPHALEXIN 125 MG/5ML
500 POWDER, FOR SUSPENSION ORAL 2 TIMES DAILY
Qty: 280 ML | Refills: 0 | Status: SHIPPED | OUTPATIENT
Start: 2021-08-16 | End: 2021-08-23

## 2021-08-16 RX ORDER — METRONIDAZOLE 500 MG/1
500 TABLET ORAL ONCE
Status: DISCONTINUED | OUTPATIENT
Start: 2021-08-16 | End: 2021-08-16

## 2021-08-16 RX ORDER — 0.9 % SODIUM CHLORIDE 0.9 %
1000 INTRAVENOUS SOLUTION INTRAVENOUS ONCE
Status: COMPLETED | OUTPATIENT
Start: 2021-08-16 | End: 2021-08-16

## 2021-08-16 RX ADMIN — SODIUM CHLORIDE 1000 ML: 9 INJECTION, SOLUTION INTRAVENOUS at 02:30

## 2021-08-16 RX ADMIN — CEPHALEXIN 500 MG: 250 FOR SUSPENSION ORAL at 04:50

## 2021-08-16 ASSESSMENT — ENCOUNTER SYMPTOMS
VOMITING: 0
ABDOMINAL PAIN: 0
NAUSEA: 0
SHORTNESS OF BREATH: 0
COUGH: 0
WHEEZING: 0
DIARRHEA: 0
SINUS PAIN: 1
SORE THROAT: 0
BACK PAIN: 0

## 2021-08-16 NOTE — ED PROVIDER NOTES
[facility] ED  Emergency Department Encounter  EmergencyMedicine Resident     Pt Surendra Knott  MRN: [id number]  [place] 1985  Date of evaluation: 8/16/21  PCP:  No primary care provider on file. CHIEF COMPLAINT       Chief Complaint   Patient presents with    Dizziness     Dizziness since 2200 yesterday evening.  Dysuria     burning with urination.  Palpitations     pt felt palpitations during dizziness, rapid HR       HISTORY OF PRESENT ILLNESS  (Location/Symptom, Timing/Onset, Context/Setting, Quality, Duration, Modifying Factors, Severity.)    This patient was evaluated in the Emergency Department for symptoms described in the history of present illness. He/she was evaluated in the context of the global COVID-19 pandemic, which necessitated consideration that the patient might be at risk for infection with the SARS-CoV-2 virus that causes COVID-19. Institutional protocols and algorithms that pertain to the evaluation of patients at risk for COVID-19 are in a state of rapid change based on information released by regulatory bodies including the CDC and federal and state organizations. These policies and algorithms were followed during the patient's care in the ED. Josefina Huang is a 38 y.o. female who presents to the ED today with multiple complaints including dizziness, near syncope, palpitations, chest discomfort, shortness of breath, dysuria, vaginal discharge. Patient notes that she has had some right lower dental discomfort. Thinks the dental pain may be due to a sinusitis that is been ongoing for the past few days. Took Tylenol approximately an hour prior to arrival.  Afterwards was resting when she suddenly felt lightheaded. Was sitting down when this occurred. At that time felt palpitations which made her more anxious. Palpitations were constant. Had some shortness of breath with this. Denies any recent fevers, chills, sweats, nausea, vomiting. Does note history of thyroid disorder as well as adrenal disorder for which she was previously on thyroid medication as well as hydrocortisone. She has not been on these medications since she was pregnant and gave birth at the end of June. EMS was called initially and came out the patient home and noted that she was tachycardic and mildly hypertensive. Patient also concerned that she has had    Whitish vaginal discharge with mild odor. No vaginal bleeding with exception of recent menstrual cycle which was slightly longer than normal but was her first menstrual cycle since giving birth in June. She also has some mild dysuria but no hematuria. No other abdominal pain or discomfort. No GI symptoms. Does have history of cholecystitis with plans to follow-up with GI physician as was unable to have ERCP during last admission. No abdominal pain in right upper quadrant at this time. PAST MEDICAL / SURGICAL / SOCIAL / FAMILY HISTORY      has a past medical history of Anxiety, Asthma, GERD (gastroesophageal reflux disease), Hypothyroidism, Major depression, Morbid obesity, and Tension type headache.     has no past surgical history on file.     Social History     Socioeconomic History    Marital status: Single     Spouse name: Not on file    Number of children: Not on file    Years of education: Not on file    Highest education level: Not on file   Occupational History    Not on file   Tobacco Use    Smoking status: Current Some Day Smoker     Packs/day: 1.00     Types: Cigars    Smokeless tobacco: Never Used    Tobacco comment: Black and Mild   Vaping Use    Vaping Use: Never used   Substance and Sexual Activity    Alcohol use: No    Drug use: Never    Sexual activity: Yes     Partners: Male   Other Topics Concern    Not on file   Social History Narrative    Not on file     Social Determinants of Health     Financial Resource Strain:     Difficulty of Paying Living Expenses:    Food Insecurity:  Worried About [facility] in the Last Year:    [address] in the Last Year:    Transportation Needs:     Lack of Transportation (Medical):  Lack of Transportation (Non-Medical):    Physical Activity:     Days of Exercise per Week:     Minutes of Exercise per Session:    Stress:     Feeling of Stress :    Social Connections:     Frequency of Communication with Friends and Family:     Frequency of Social Gatherings with Friends and Family:     Attends [employer] Services:     Active Member of Clubs or Organizations:     Attends Club or Organization Meetings:     Marital Status:    Intimate Partner Violence:     Fear of Current or Ex-Partner:     Emotionally Abused:     Physically Abused:     Sexually Abused:        Family History   Problem Relation Age of Onset    Depression Father     Thyroid Disease Father     Diabetes Sister        Allergies:  Iodine, Penicillin g, Dye  [iodides], Benadryl [diphenhydramine], and Prednisone    Home Medications:  Prior to Admission medications    Medication Sig Start Date End Date Taking?  Authorizing Provider   cephALEXin (KEFLEX) 125 MG/5ML suspension Take 20 mLs by mouth 2 times daily for 7 days 8/16/21 8/23/21 Yes Luke Hernández DO   metroNIDAZOLE (FLAGYL) Take 10 mLs by mouth every 12 hours for 7 days 8/16/21 8/23/21 Yes Baldo Mariee MD   ondansetron (ZOFRAN) 4 MG tablet Take 1 tablet by mouth every 8 hours as needed for Nausea  Patient not taking: Reported on 7/26/2021 7/24/21   Analy Franco MD   sodium chloride (ALTAMIST SPRAY) 0.65 % nasal spray 1 spray by Nasal route as needed for Congestion  Patient not taking: Reported on 7/26/2021 7/19/21   Shelia Warren DO   sertraline (ZOLOFT) 50 MG tablet Take 1 tablet by mouth daily 7/19/21 11/1/21  Claudene Marshall Cacciotti, DO   Ferrous Gluconate (IRON) 240 (27 Fe) MG TABS Take 1 tablet by mouth daily  Patient not taking: Reported on 7/26/2021 4/09/33 7/76/07  Sania Montejo DO ferrous sulfate (FE TABS) 325 (65 Fe) MG EC tablet Take 1 tablet by mouth daily (with breakfast)  Patient not taking: Reported on 7/26/2021 7/6/21   Khoa Fairchild DO   ibuprofen (ADVIL;MOTRIN) 600 MG tablet Take 1 tablet by mouth every 6 hours as needed for Pain  Patient not taking: Reported on 7/26/2021 7/1/21   Steffanie Leblanc DO   docusate sodium (COLACE) 100 MG capsule Take 1 capsule by mouth 2 times daily as needed for Constipation  Patient not taking: Reported on 7/26/2021 7/1/21   Steffanie Leblanc DO   docusate sodium (COLACE, DULCOLAX) 100 MG CAPS Take 100 mg by mouth daily  Patient not taking: Reported on 7/26/2021 6/27/21   Khoa Fairchild DO   Prenatal Vit-Fe Fumarate-FA (PRENATAL VITAMIN) 27-0.8 MG TABS Take 1 tablet by mouth daily  Patient not taking: Reported on 7/19/2021 6/14/21   Heather Hamilton,    oxymetazoline (12 HOUR NASAL SPRAY) 0.05 % nasal spray 2 sprays by Nasal route 2 times daily  Patient not taking: Reported on 7/26/2021 5/24/21 5/24/22  Kita Pittman DO   acetaminophen (TYLENOL) 500 MG tablet Take 1 tablet by mouth every 6 hours as needed for Pain  Patient not taking: Reported on 7/26/2021 1/20/21   Edwin Maradiaga, DO   Cholecalciferol (VITAMIN D3) 1000 units TABS Take 1 tablet by mouth daily 7/31/17   Yaneli Brumfield MD       REVIEW OF SYSTEMS    (2-9 systems for level 4, 10 or more for level 5)      Review of Systems   Constitutional: Negative for chills and fever. HENT: Positive for congestion, dental problem and sinus pain. Negative for sore throat. Eyes: Negative for visual disturbance. Respiratory: Negative for cough, shortness of breath and wheezing. Cardiovascular: Negative for chest pain. Gastrointestinal: Negative for abdominal pain, diarrhea, nausea and vomiting. Genitourinary: Positive for dysuria and vaginal discharge. Negative for hematuria and vaginal bleeding.    Musculoskeletal: Negative for back pain, neck pain and neck stiffness. Skin: Negative for rash. Neurological: Positive for light-headedness and headaches. Negative for dizziness and numbness. Hematological: Does not bruise/bleed easily. PHYSICAL EXAM   (up to 7 for level 4, 8 or more for level 5)      INITIAL VITALS:   /85   Pulse 62   Temp 98.6 °F (37 °C) (Oral)   Resp 19   Ht 5' 5\" (1.651 m)   Wt 195 lb (88.5 kg)   LMP 01/17/2021 (Approximate)   SpO2 96%   BMI 32.45 kg/m²     Physical Exam  Constitutional:       General: She is not in acute distress. Appearance: Normal appearance. HENT:      Head: Normocephalic and atraumatic. Nose: Nose normal.      Mouth/Throat:      Mouth: Mucous membranes are dry. Pharynx: No oropharyngeal exudate or posterior oropharyngeal erythema. Comments: Poor dentition. Fractured tooth right lower molar. No periapical abscess. No gingival swelling or bleeding. No trismus. Uvula midline. No pharyngeal erythema or exudate. Eyes:      Extraocular Movements: Extraocular movements intact. Conjunctiva/sclera: Conjunctivae normal.      Comments: Disconjugate gaze   Cardiovascular:      Rate and Rhythm: Normal rate and regular rhythm. Pulses: Normal pulses. Pulmonary:      Effort: Pulmonary effort is normal. No respiratory distress. Breath sounds: No wheezing, rhonchi or rales. Abdominal:      General: There is no distension. Palpations: Abdomen is soft. Tenderness: There is no abdominal tenderness. There is no guarding or rebound. Genitourinary:     Comments: Pelvic exam performed chaperone present. No external lesions. Mild white vaginal discharge. No cervical motion tenderness or adnexal tenderness. Musculoskeletal:         General: No swelling or deformity. Normal range of motion. Cervical back: Normal range of motion. No muscular tenderness. Comments: No peripheral edema or calf tenderness   Skin:     General: Skin is warm and dry.       Findings: No rash.   Neurological:      Mental Status: She is alert and oriented to person, place, and time. Motor: No weakness. Psychiatric:      Comments: Mildly anxious on exam but otherwise normal behavior.          DIFFERENTIAL  DIAGNOSIS     PLAN (LABS / IMAGING / EKG):  Orders Placed This Encounter   Procedures    VAGINITIS DNA PROBE    C.trachomatis N.gonorrhoeae DNA    Culture, Urine    XR CHEST PORTABLE    CBC Auto Differential    Basic Metabolic Panel w/ Reflex to MG    TSH with Reflex    D-Dimer, Quantitative    Troponin    Urinalysis Reflex to Culture    Microscopic Urinalysis    Troponin    Pregnancy, Urine    Vaginal exam    EKG 12 Lead       MEDICATIONS ORDERED:  Orders Placed This Encounter   Medications    0.9 % sodium chloride bolus    DISCONTD: cephALEXin (KEFLEX) capsule 500 mg     Order Specific Question:   Antimicrobial Indications     Answer:   Urinary Tract Infection    DISCONTD: cephALEXin (KEFLEX) 500 MG capsule     Sig: Take 1 capsule by mouth 2 times daily for 7 days     Dispense:  14 capsule     Refill:  0    cephALEXin (KEFLEX) 250 MG/5ML suspension 500 mg     Order Specific Question:   Antimicrobial Indications     Answer:   Urinary Tract Infection    cephALEXin (KEFLEX) 125 MG/5ML suspension     Sig: Take 20 mLs by mouth 2 times daily for 7 days     Dispense:  280 mL     Refill:  0    DISCONTD: metroNIDAZOLE (FLAGYL) tablet 500 mg     Order Specific Question:   Antimicrobial Indications     Answer:   STD infection    DISCONTD: metroNIDAZOLE (FLAGYL) 500 MG tablet     Sig: Take 1 tablet by mouth 2 times daily for 7 days     Dispense:  14 tablet     Refill:  0    metroNIDAZOLE (FLAGYL) oral suspension 500 mg     Order Specific Question:   Antimicrobial Indications     Answer:   STD infection    metroNIDAZOLE (FLAGYL)     Sig: Take 10 mLs by mouth every 12 hours for 7 days     Dispense:  1 Bottle     Refill:  0         DIAGNOSTIC RESULTS / EMERGENCY DEPARTMENT COURSE / MDM     Results for orders placed or performed during the hospital encounter of 08/16/21   VAGINITIS DNA PROBE    Specimen: Vaginal   Result Value Ref Range    Specimen Description . VAGINA     Special Requests NOT REPORTED     Direct Exam POSITIVE for Gardnerella vaginalis. (A)     Direct Exam NEGATIVE for Candida sp. Direct Exam NEGATIVE for Trichomonas vaginalis     Direct Exam       Method of testing is a DNA probe intended for detection and identification of Candida species, Gardnerella vaginalis, and Trichomonas vaginalis nucleic acid in vaginal fluid specimens from patients with symptoms of vaginitis/vaginosis.    CBC Auto Differential   Result Value Ref Range    WBC 7.1 3.5 - 11.3 k/uL    RBC 4.64 3.95 - 5.11 m/uL    Hemoglobin 11.6 (L) 11.9 - 15.1 g/dL    Hematocrit 38.8 36.3 - 47.1 %    MCV 83.6 82.6 - 102.9 fL    MCH 25.0 (L) 25.2 - 33.5 pg    MCHC 29.9 28.4 - 34.8 g/dL    RDW 14.2 11.8 - 14.4 %    Platelets 215 328 - 404 k/uL    MPV 9.4 8.1 - 13.5 fL    NRBC Automated 0.0 0.0 per 100 WBC    Differential Type NOT REPORTED     Seg Neutrophils 64 36 - 65 %    Lymphocytes 28 24 - 43 %    Monocytes 6 3 - 12 %    Eosinophils % 1 1 - 4 %    Basophils 1 0 - 2 %    Immature Granulocytes 0 0 %    Segs Absolute 4.56 1.50 - 8.10 k/uL    Absolute Lymph # 1.96 1.10 - 3.70 k/uL    Absolute Mono # 0.43 0.10 - 1.20 k/uL    Absolute Eos # 0.09 0.00 - 0.44 k/uL    Basophils Absolute 0.04 0.00 - 0.20 k/uL    Absolute Immature Granulocyte <0.03 0.00 - 0.30 k/uL    WBC Morphology NOT REPORTED     RBC Morphology NOT REPORTED     Platelet Estimate NOT REPORTED    Basic Metabolic Panel w/ Reflex to MG   Result Value Ref Range    Glucose 109 (H) 70 - 99 mg/dL    BUN 7 6 - 20 mg/dL    CREATININE 0.69 0.50 - 0.90 mg/dL    Bun/Cre Ratio NOT REPORTED 9 - 20    Calcium 8.8 8.6 - 10.4 mg/dL    Sodium 138 135 - 144 mmol/L    Potassium 3.8 3.7 - 5.3 mmol/L    Chloride 102 98 - 107 mmol/L    CO2 25 20 - 31 mmol/L    Anion Gap 11 9 - 17 mmol/L    GFR Non-African American >60 >60 mL/min    GFR African American >60 >60 mL/min    GFR Comment          GFR Staging NOT REPORTED    TSH with Reflex   Result Value Ref Range    TSH 0.30 0.30 - 5.00 mIU/L   D-Dimer, Quantitative   Result Value Ref Range    D-Dimer, Quant 0.20 mg/L FEU   Troponin   Result Value Ref Range    Troponin, High Sensitivity <6 0 - 14 ng/L    Troponin T NOT REPORTED <0.03 ng/mL    Troponin Interp NOT REPORTED    Urinalysis Reflex to Culture    Specimen: Urine, clean catch   Result Value Ref Range    Color, UA YELLOW YELLOW    Turbidity UA CLOUDY (A) CLEAR    Glucose, Ur NEGATIVE NEGATIVE    Bilirubin Urine NEGATIVE NEGATIVE    Ketones, Urine TRACE (A) NEGATIVE    Specific Gravity, UA 1.023 1.005 - 1.030    Urine Hgb NEGATIVE NEGATIVE    pH, UA 6.0 5.0 - 8.0    Protein, UA TRACE (A) NEGATIVE    Urobilinogen, Urine Normal Normal    Nitrite, Urine NEGATIVE NEGATIVE    Leukocyte Esterase, Urine LARGE (A) NEGATIVE    Urinalysis Comments NOT REPORTED    Microscopic Urinalysis   Result Value Ref Range    -          WBC, UA 20 TO 50 0 - 5 /HPF    RBC, UA 2 TO 5 0 - 4 /HPF    Casts UA NOT REPORTED 0 - 8 /LPF    Crystals, UA NOT REPORTED None /HPF    Epithelial Cells UA 10 TO 20 0 - 5 /HPF    Renal Epithelial, UA NOT REPORTED 0 /HPF    Bacteria, UA MODERATE (A) None    Mucus, UA NOT REPORTED None    Trichomonas, UA NOT REPORTED None    Amorphous, UA NOT REPORTED None    Other Observations UA NOT REPORTED NOT REQ. Yeast, UA NOT REPORTED None   Pregnancy, Urine   Result Value Ref Range    HCG(Urine) Pregnancy Test NEGATIVE NEGATIVE         RADIOLOGY:  XR CHEST PORTABLE   Final Result   No acute process.               EKG  No acute ST or T wave changes    All EKG's are interpreted by the Emergency Department Physician who either signs or Co-signs this chart in the absence of a cardiologist.    IMPRESSION/MDM/EMERGENCY DEPARTMENT COURSE:  Patient came to emergency department, HPI and physical exam were conducted. All nursing notes were reviewed. 40-year-old female present emergency department with multiple complaints including lightheadedness/dizziness, palpitations, chest discomfort, shortness of breath, anxiety, dysuria, vaginal discharge, possible sinus infection versus dental infection. No recent fever, chills, sweats. History of thyroid disorder as well as adrenal disorder. Currently not on any medications. Recently gave birth at the end of June. Vitals within normal limits. Patient laying comfortably in bed no acute distress. Does not appear acutely ill or toxic. Heart regular rate and abdomen. Lungs are clear to auscultate bilateral without wheeze rales or rhonchi. Abdomen soft, nontender nondistended. No peripheral edema or calf tenderness. Sensation grossly intact. Motor function normal.  Patient anxious on exam but answering all questions appropriately. Does not appear acutely intoxicated. Differential includes anxiety, ACS, arrhythmia, electrode abnormality, dehydration, UTI, STD, BV, yeast infection, sinusitis, dental infection, pneumonia, hyperthyroidism. Plan for cardiac work-up, TSH, fluids, UA, pelvic exam with swabs, telemetry monitoring. Patient remains hemodynamically stable. Anxiety is improved with rest.  Vitals remained within normal limits. Work-up was grossly unremarkable. Negative pregnancy test.  Point less than 6. D-dimer negative. TSH within normal limits. Patient given fluids and no longer has dizziness while at rest.  Urinalysis concerning for UTI, given first dose of Keflex in emergency department. Chest x-ray negative. Repeat troponin pending as well as vaginitis probe. Patient has a low heart score of 2. Plan for discharge if repeat troponin is negative. Signed out to Dr. Warnell Goldberg. FINAL IMPRESSION      1.  Acute cystitis without hematuria            DISPOSITION / PLAN     DISPOSITION Discharge - Pending Orders Complete 08/16/2021 04:39:31 AM      PATIENT REFERRED TO:  [facility]  [address] [place] [address]  [phone]  Schedule an appointment as soon as possible for a visit       [facility] ED  [address] [place] [address]  [phone]    As needed, If symptoms worsen      DISCHARGE MEDICATIONS:  New Prescriptions    CEPHALEXIN (KEFLEX) 125 MG/5ML SUSPENSION    Take 20 mLs by mouth 2 times daily for 7 days    METRONIDAZOLE (FLAGYL)    Take 10 mLs by mouth every 12 hours for 7 days       Alfred Marquez DO  Emergency Medicine Resident    (Please note that portions of thisnote were completed with a voice recognition program.  Efforts were made to edit the dictations but occasionally words are mis-transcribed.)       Alfred Marquez DO  Resident  08/16/21 1616

## 2021-08-16 NOTE — ED PROVIDER NOTES
[address] ED  Emergency Department  Emergency Medicine Resident Sign-out     Care of Domingo Choi was assumed from Dr. Yue Stafford and is being seen for Dizziness (Dizziness since 2200 yesterday evening.), Dysuria (burning with urination. ), and Palpitations (pt felt palpitations during dizziness, rapid HR)  . The patient's initial evaluation and plan have been discussed with the prior provider who initially evaluated the patient.      EMERGENCY DEPARTMENT COURSE / MEDICAL DECISION MAKING:       MEDICATIONS GIVEN:  Orders Placed This Encounter   Medications    0.9 % sodium chloride bolus    DISCONTD: cephALEXin (KEFLEX) capsule 500 mg     Order Specific Question:   Antimicrobial Indications     Answer:   Urinary Tract Infection    DISCONTD: cephALEXin (KEFLEX) 500 MG capsule     Sig: Take 1 capsule by mouth 2 times daily for 7 days     Dispense:  14 capsule     Refill:  0    cephALEXin (KEFLEX) 250 MG/5ML suspension 500 mg     Order Specific Question:   Antimicrobial Indications     Answer:   Urinary Tract Infection    cephALEXin (KEFLEX) 125 MG/5ML suspension     Sig: Take 20 mLs by mouth 2 times daily for 7 days     Dispense:  280 mL     Refill:  0       LABS / RADIOLOGY:     Labs Reviewed   CBC WITH AUTO DIFFERENTIAL - Abnormal; Notable for the following components:       Result Value    Hemoglobin 11.6 (*)     MCH 25.0 (*)     All other components within normal limits   BASIC METABOLIC PANEL W/ REFLEX TO MG FOR LOW K - Abnormal; Notable for the following components:    Glucose 109 (*)     All other components within normal limits   URINE RT REFLEX TO CULTURE - Abnormal; Notable for the following components:    Turbidity UA CLOUDY (*)     Ketones, Urine TRACE (*)     Protein, UA TRACE (*)     Leukocyte Esterase, Urine LARGE (*)     All other components within normal limits   MICROSCOPIC URINALYSIS - Abnormal; Notable for the following components:    Bacteria, UA MODERATE (*)     All other components within normal limits   VAGINITIS DNA PROBE   C.TRACHOMATIS N.GONORRHOEAE DNA   CULTURE, URINE   TSH WITH REFLEX   D-DIMER, QUANTITATIVE   TROPONIN   PREGNANCY, URINE   TROPONIN       US GALLBLADDER RUQ    Result Date: 7/23/2021  EXAMINATION: RIGHT UPPER QUADRANT ULTRASOUND 7/23/2021 8:50 pm COMPARISON: CT abdomen and pelvis July 10, 2021 HISTORY: ORDERING SYSTEM PROVIDED HISTORY: RUQ pain, r/o cholecystitis TECHNOLOGIST PROVIDED HISTORY: RUQ pain, r/o cholecystitis FINDINGS: LIVER:  The liver demonstrates normal echogenicity without evidence of intrahepatic biliary ductal dilatation. BILIARY SYSTEM:  Cholelithiasis. Wall echo shadow sign. Common bile duct is within normal limits measuring 6.3 mm. Choledocholithiasis noted. RIGHT KIDNEY: The right kidney is grossly unremarkable without evidence of hydronephrosis. PANCREAS:  Sub visualized due to bowel gas. OTHER: No evidence of right upper quadrant ascites. Cholelithiasis and choledocholithiasis causing distal common bile duct obstruction. HIDA scan or MRCP may be helpful for further characterization if clinically warranted. XR CHEST PORTABLE    Result Date: 8/16/2021  EXAMINATION: ONE XRAY VIEW OF THE CHEST 8/16/2021 2:53 am COMPARISON: Chest radiograph and CT chest angiogram 07/10/2021. HISTORY: ORDERING SYSTEM PROVIDED HISTORY: palpitations, evaluate for pneumonia TECHNOLOGIST PROVIDED HISTORY: palpitations, evaluate for pneumonia Reason for Exam: dizzy,heart racing,dysuria Acuity: Unknown Type of Exam: Unknown FINDINGS: The cardiac silhouette is normal in size. Rightward deviation of the trachea again seen from the previously demonstrated enlarged thyroid. No pneumothorax, vascular congestion, consolidation, or pleural effusion is identified. No acute osseous abnormality. No acute process. RECENT VITALS:     Temp: 98.6 °F (37 °C),  Pulse: 62, Resp: 19, BP: 119/85, SpO2: 96 %      This patient is a 38 y.o.  Female with lightheadedness, tachy on presentation, UTI concern, dysuria, negative work up, UTI on UA      ED Course as of Aug 16 0440   [facility] Aug 16, 2021   0322 D-Dimer, Quant: 0.20 [ZT]   0322 TSH: 0.30 [ZT]   0322 Troponin, High Sensitivity: <6 [ZT]   0438 DIRECT EXAM.(!): POSITIVE for Gardnerella vaginalis. [PS]      ED Course User Index  [PS] Pancho Vega MD  [ZT] Kennedy Laura,        OUTSTANDING TASKS / RECOMMENDATIONS:    1. BV pending - positive  2. 2nd trop  3. Keflex liquid px printed     FINAL IMPRESSION:     1.  Acute cystitis without hematuria        DISPOSITION:         DISPOSITION:  [x]  Discharge   []  Transfer -    []  Admission -     []  Against Medical Advice   []  Eloped   FOLLOW-UP: [facility]  [address] [place] [address]  [phone]    As needed, If symptoms worsen    [address] [place] [address]  [phone]  Schedule an appointment as soon as possible for a visit        DISCHARGE MEDICATIONS: New Prescriptions    CEPHALEXIN (KEFLEX) 125 MG/5ML SUSPENSION    Take 20 mLs by mouth 2 times daily for 7 days          Pancho Vega MD  Emergency Medicine Resident  [facility]       Pancho Vega MD  Resident  08/16/21 [id number]       Pancho Vega MD  Resident  08/16/21 2010

## 2021-08-16 NOTE — PROGRESS NOTES
Therapist received text message from pt seeing if therapist could talk on the phone. Therapist called pt to check in. Therapist left voicemail encouraging pt to call tomorrow if she needs anything.

## 2021-08-17 LAB
CULTURE: NORMAL
Lab: NORMAL
SPECIMEN DESCRIPTION: NORMAL

## 2021-08-17 NOTE — PROGRESS NOTES
Pt called therapist back and reported that she had gone to the hospital and had a lot on her mind. Pt denied any suicidal ideation. Therapist explained they could not talk about it now, but could move pt's Wednesday appt up to tomorrow.   Pt to be seen Wednesday 8/17/21 at 3:30 PM

## 2021-08-18 ENCOUNTER — HOSPITAL ENCOUNTER (OUTPATIENT)
Dept: PSYCHIATRY | Age: 36
Setting detail: THERAPIES SERIES
Discharge: HOME | End: 2021-08-18

## 2021-08-18 LAB
EKG ATRIAL RATE: 80 BPM
EKG P AXIS: 42 DEGREES
EKG P-R INTERVAL: 150 MS
EKG Q-T INTERVAL: 382 MS
EKG QRS DURATION: 72 MS
EKG QTC CALCULATION (BAZETT): 440 MS
EKG R AXIS: -8 DEGREES
EKG T AXIS: 6 DEGREES
EKG VENTRICULAR RATE: 80 BPM

## 2021-08-18 PROCEDURE — 90837 PSYTX W PT 60 MINUTES: CPT | Performed by: SOCIAL WORKER

## 2021-08-18 PROCEDURE — 93010 ELECTROCARDIOGRAM REPORT: CPT | Performed by: INTERNAL MEDICINE

## 2021-08-18 NOTE — PSYCHOTHERAPY
questioning decisions, feeling guilty over small things. Pt and therapist also explored wants vs needs and pt's tendency to second guess buying things in fear of \"not needing them/can't afford them. \"  Pt and therapist also discussed interpersonal relationships and pt associated people with helping her get things. Pt reports that she allows people to manipulate her and do what they want to her because they provide her with what she needs. Pt feels she cannot function on her own due to health problems and mental health. Pt and therapist explored her thoughts of \"my life is on pause\" and examined what pt wants to do with her life. Pt reports she wants to have an orderly house, finish school, and get a job. Pt feels she cannot do these things until she is healthy. Therapist pointed out to pt that she does display some hope in that she reported she can see things getting a little bit better and that she is here for a reason she just doesn't know what it is. Pt identified getting her health under control would make things \"a little bit better. \"  Therapist resent pt link to search for PCP and empowered pt to do it herself. Therapist informed pt that therapist could not pick a doctor for her, and gave pt instructions on how to search for one. Therapist requested copies of her ID and insurance card to be sent or brought in person at next week's appt.      O:  MSE:     Appearance    alert, crying, moderate distress  Appetite abnormal: current gallbladder problems   Sleep disturbance Yes  Fatigue Yes  Loss of pleasure Yes  Impulsive behavior No  Speech    spontaneous, rapid, loud and interrupting  Mood    Anxious  Guilty  Depressed  Worthless  Low self-esteem  Affect    depressed affect  Thought Content    hopelessness, helplessness, worthlessness, excessive guilt, intrusive thoughts and cognitive distortions  Thought Process    overabundance of ideas  Associations    logical connections  Insight    Good  Judgment Intact  Orientation    oriented to person, place, time, and general circumstances  Memory    recent and remote memory intact  Attention/Concentration    intact  Morbid ideation No  Suicide Assessment    no suicidal ideation    A:  Pt presented to therapy in a distressed mood and was tearful throughout session. Pt was engaged in discussion and ventilated frustrations and emotions throughout session. Pt identified multiple negative thinking patterns such as: I'm not good enough, I'm not worthy, and My life has no purpose. Pt reports these thoughts did not start after her pregnancy losses but grew worse when her children . Pt displays a lack of empowerment or belief in herself that she can complete tasks and has little motivation. Pt needs a lot of encouragement and does not believe that she can take care of herself. Pt was able to make connection between allowing people to treat her wrongly because she needs their help, so she feels indebted to them. Pt was given task of looking up new PCPs because she wants a new doctor. Pt seemed to think she could look it up and may call this week.        History from Medical Record:        Diagnosis Date    Anxiety     Asthma     GERD (gastroesophageal reflux disease)     Hypothyroidism 5/3/2021    Major depression 2013    Morbid obesity 9/15/2017    Tension type headache 2013     Medications:   Current Outpatient Medications   Medication Sig Dispense Refill    cephALEXin (KEFLEX) 125 MG/5ML suspension Take 20 mLs by mouth 2 times daily for 7 days 280 mL 0    metroNIDAZOLE (FLAGYL) Take 10 mLs by mouth every 12 hours for 7 days 1 Bottle 0    ondansetron (ZOFRAN) 4 MG tablet Take 1 tablet by mouth every 8 hours as needed for Nausea (Patient not taking: Reported on 2021) 20 tablet 0    sodium chloride (ALTAMIST SPRAY) 0.65 % nasal spray 1 spray by Nasal route as needed for Congestion (Patient not taking: Reported on 2021) 1 Bottle 3    sertraline (ZOLOFT) 50 MG tablet Take 1 tablet by mouth daily 35 tablet 2    Ferrous Gluconate (IRON) 240 (27 Fe) MG TABS Take 1 tablet by mouth daily (Patient not taking: Reported on 7/26/2021) 30 tablet 3    ferrous sulfate (FE TABS) 325 (65 Fe) MG EC tablet Take 1 tablet by mouth daily (with breakfast) (Patient not taking: Reported on 7/26/2021) 90 tablet 3    ibuprofen (ADVIL;MOTRIN) 600 MG tablet Take 1 tablet by mouth every 6 hours as needed for Pain (Patient not taking: Reported on 7/26/2021) 120 tablet 0    docusate sodium (COLACE) 100 MG capsule Take 1 capsule by mouth 2 times daily as needed for Constipation (Patient not taking: Reported on 7/26/2021) 60 capsule 1    docusate sodium (COLACE, DULCOLAX) 100 MG CAPS Take 100 mg by mouth daily (Patient not taking: Reported on 7/26/2021) 30 capsule 0    Prenatal Vit-Fe Fumarate-FA (PRENATAL VITAMIN) 27-0.8 MG TABS Take 1 tablet by mouth daily (Patient not taking: Reported on 7/19/2021) 30 tablet 12    oxymetazoline (12 HOUR NASAL SPRAY) 0.05 % nasal spray 2 sprays by Nasal route 2 times daily (Patient not taking: Reported on 7/26/2021) 1 Bottle 3    acetaminophen (TYLENOL) 500 MG tablet Take 1 tablet by mouth every 6 hours as needed for Pain (Patient not taking: Reported on 7/26/2021) 40 tablet 0    Cholecalciferol (VITAMIN D3) 1000 units TABS Take 1 tablet by mouth daily 90 tablet 1     No current facility-administered medications for this encounter.        Social History:   Social History     Socioeconomic History    Marital status: Single     Spouse name: Not on file    Number of children: Not on file    Years of education: Not on file    Highest education level: Not on file   Occupational History    Not on file   Tobacco Use    Smoking status: Current Some Day Smoker     Packs/day: 1.00     Types: Cigars    Smokeless tobacco: Never Used    Tobacco comment: Black and Mild   Vaping Use    Vaping Use: Never used   Substance and Sexual Activity  Alcohol use: No    Drug use: Never    Sexual activity: Yes     Partners: Male   Other Topics Concern    Not on file   Social History Narrative    Not on file     Social Determinants of Health     Financial Resource Strain:     Difficulty of Paying Living Expenses:    Food Insecurity:     Worried About Running Out of Food in the Last Year:     [address] in the Last Year:    Transportation Needs:     Lack of Transportation (Medical):  Lack of Transportation (Non-Medical):    Physical Activity:     Days of Exercise per Week:     Minutes of Exercise per Session:    Stress:     Feeling of Stress :    Social Connections:     Frequency of Communication with Friends and Family:     Frequency of Social Gatherings with Friends and Family:     Attends Confucianist Services:     Active Member of Clubs or Organizations:     Attends Club or Organization Meetings:     Marital Status:    Intimate Partner Violence:     Fear of Current or Ex-Partner:     Emotionally Abused:     Physically Abused:     Sexually Abused:        TOBACCO:   reports that she has been smoking cigars. She has been smoking about 1.00 pack per day. She has never used smokeless tobacco.  ETOH:   reports no history of alcohol use. Family History:   Family History   Problem Relation Age of Onset    Depression Father     Thyroid Disease Father     Diabetes Sister          Visit Diagnosis:   Post Traumatic Stress Disorder  Major Depressive Disorder, recurrent, severe, w/o psychotic features       Diagnoses from Medical Record: There were no encounter diagnoses.         Pt interventions:  Practiced assertive communication, Provided education, Discussed self-care (sleep, nutrition, rewarding activities, social support, exercise), Established rapport, Supportive techniques, CBT to target Negative thoughts  and Identified maladaptive thoughts        PLAN:   Explore negative thinking patterns, address maladaptive thinking, discuss National Emergencies Act, [facility] waiver authority and the [employer] and [employer] Act, this Virtual Visit was conducted, with patient's consent, to reduce the patient's risk of exposure to COVID-19 and provide continuity of care for an established patient. Services were provided through a video synchronous discussion virtually to substitute for in-person clinic visit.

## 2021-08-20 NOTE — ED PROVIDER NOTES
[address] [facility]   Emergency Department  Faculty Attestation       I performed a history and physical examination of the patient and discussed management with the resident. I reviewed the residents note and agree with the documented findings including all diagnostic interpretations and plan of care. Any areas of disagreement are noted on the chart. I was personally present for the key portions of any procedures. I have documented in the chart those procedures where I was not present during the key portions. I have reviewed the emergency nurses triage note. I agree with the chief complaint, past medical history, past surgical history, allergies, medications, social and family history as documented unless otherwise noted below. Documentation of the HPI, Physical Exam and Medical Decision Making performed by [email or web address] is based on my personal performance of the HPI, PE and MDM. For Physician Assistant/ Nurse Practitioner cases/documentation I have personally evaluated this patient and have completed at least one if not all key elements of the E/M (history, physical exam, and MDM). Additional findings are as noted. Pertinent Comments     Primary Care Physician: No primary care provider on file. ED Triage Vitals [08/15/21 2336]   BP Temp Temp Source Pulse Resp SpO2 Height Weight   (!) 126/46 98.6 °F (37 °C) Oral 129 16 95 % 5' 5\" (1.651 m) 195 lb (88.5 kg)        History/Physical: This is a 38 y.o. female who presents to the Emergency Department with multiple complaints including dizziness w/ associated near syncope, palpitations, chest pain (resolved), dysuria and vaginal discharge. Patient has been having some sinus congestion and she believes that may have caused her dizziness. However tonight she took Tylenol and after taking the Tylenol which only makes her sleepy, she felt more energized and dizzy with some palpitations. Those have now improved.   Patient is concerned she is a history of episode anxiety with spine. She is also been having vaginal discharge, but no abdominal pain. And some dysuria. On exam, patient does appear anxious but she is in no acute distress. She is normocephalic atraumatic with moist mucous membranes. Heart sounds initially are regular and normal rate for me on my exam, but was tachycardic in triage. Lungs are clear to auscultation. Abdomen soft nontender nondistended. Alert and oriented x4 with no focal neuro deficits.  exam per resident. MDM/Plan:   Multiple complaints. However she states that starting to feel improved. Heart rate was initially elevated when she arrived, but now is normal.  Will check basic labs, EKG, abdominal labs, and residents do pelvic.   If all work-up is negative likely plan for discharge home    EKG interpreted by me normal sinus rhythm at a rate of 80 with no signs of acute ischemia      Critical Care: None     Lia Hernandez MD  Attending Emergency Physician        Lia Hernandez MD  08/19/21 0976

## 2021-08-23 ENCOUNTER — HOSPITAL ENCOUNTER (EMERGENCY)
Age: 36
Discharge: HOME | End: 2021-08-24
Attending: EMERGENCY MEDICINE
Payer: MEDICARE

## 2021-08-23 ENCOUNTER — APPOINTMENT (OUTPATIENT)
Dept: ULTRASOUND IMAGING | Age: 36
End: 2021-08-23
Payer: MEDICARE

## 2021-08-23 ENCOUNTER — APPOINTMENT (OUTPATIENT)
Dept: GENERAL RADIOLOGY | Age: 36
End: 2021-08-23
Payer: MEDICARE

## 2021-08-23 VITALS
SYSTOLIC BLOOD PRESSURE: 122 MMHG | OXYGEN SATURATION: 98 % | HEIGHT: 65 IN | RESPIRATION RATE: 18 BRPM | TEMPERATURE: 99.1 F | DIASTOLIC BLOOD PRESSURE: 86 MMHG | BODY MASS INDEX: 32.49 KG/M2 | WEIGHT: 195 LBS | HEART RATE: 98 BPM

## 2021-08-23 DIAGNOSIS — R10.11 RIGHT UPPER QUADRANT ABDOMINAL PAIN: Primary | ICD-10-CM

## 2021-08-23 LAB
ABSOLUTE EOS #: 0.15 K/UL (ref 0–0.44)
ABSOLUTE IMMATURE GRANULOCYTE: <0.03 K/UL (ref 0–0.3)
ABSOLUTE LYMPH #: 3.47 K/UL (ref 1.1–3.7)
ABSOLUTE MONO #: 0.48 K/UL (ref 0.1–1.2)
BASOPHILS # BLD: 1 % (ref 0–2)
BASOPHILS ABSOLUTE: 0.05 K/UL (ref 0–0.2)
DIFFERENTIAL TYPE: ABNORMAL
EOSINOPHILS RELATIVE PERCENT: 2 % (ref 1–4)
HCT VFR BLD CALC: 37.6 % (ref 36.3–47.1)
HEMOGLOBIN: 11.5 G/DL (ref 11.9–15.1)
IMMATURE GRANULOCYTES: 0 %
LYMPHOCYTES # BLD: 49 % (ref 24–43)
MCH RBC QN AUTO: 25.7 PG (ref 25.2–33.5)
MCHC RBC AUTO-ENTMCNC: 30.6 G/DL (ref 28.4–34.8)
MCV RBC AUTO: 83.9 FL (ref 82.6–102.9)
MONOCYTES # BLD: 7 % (ref 3–12)
NRBC AUTOMATED: 0 PER 100 WBC
PDW BLD-RTO: 15 % (ref 11.8–14.4)
PLATELET # BLD: 288 K/UL (ref 138–453)
PLATELET ESTIMATE: ABNORMAL
PMV BLD AUTO: 9.4 FL (ref 8.1–13.5)
RBC # BLD: 4.48 M/UL (ref 3.95–5.11)
RBC # BLD: ABNORMAL 10*6/UL
SEG NEUTROPHILS: 41 % (ref 36–65)
SEGMENTED NEUTROPHILS ABSOLUTE COUNT: 2.86 K/UL (ref 1.5–8.1)
WBC # BLD: 7 K/UL (ref 3.5–11.3)
WBC # BLD: ABNORMAL 10*3/UL

## 2021-08-23 PROCEDURE — 80048 BASIC METABOLIC PNL TOTAL CA: CPT

## 2021-08-23 PROCEDURE — 84703 CHORIONIC GONADOTROPIN ASSAY: CPT

## 2021-08-23 PROCEDURE — 80076 HEPATIC FUNCTION PANEL: CPT

## 2021-08-23 PROCEDURE — 85025 COMPLETE CBC W/AUTO DIFF WBC: CPT

## 2021-08-23 PROCEDURE — 96360 HYDRATION IV INFUSION INIT: CPT

## 2021-08-23 PROCEDURE — 76705 ECHO EXAM OF ABDOMEN: CPT

## 2021-08-23 PROCEDURE — 93005 ELECTROCARDIOGRAM TRACING: CPT | Performed by: STUDENT IN AN ORGANIZED HEALTH CARE EDUCATION/TRAINING PROGRAM

## 2021-08-23 PROCEDURE — 83605 ASSAY OF LACTIC ACID: CPT

## 2021-08-23 PROCEDURE — 83690 ASSAY OF LIPASE: CPT

## 2021-08-23 PROCEDURE — 2580000003 HC RX 258: Performed by: STUDENT IN AN ORGANIZED HEALTH CARE EDUCATION/TRAINING PROGRAM

## 2021-08-23 PROCEDURE — 99282 EMERGENCY DEPT VISIT SF MDM: CPT

## 2021-08-23 PROCEDURE — 71045 X-RAY EXAM CHEST 1 VIEW: CPT

## 2021-08-23 RX ORDER — MORPHINE SULFATE 4 MG/ML
4 INJECTION, SOLUTION INTRAMUSCULAR; INTRAVENOUS ONCE
Status: DISCONTINUED | OUTPATIENT
Start: 2021-08-23 | End: 2021-08-24 | Stop reason: HOSPADM

## 2021-08-23 RX ORDER — 0.9 % SODIUM CHLORIDE 0.9 %
1000 INTRAVENOUS SOLUTION INTRAVENOUS ONCE
Status: COMPLETED | OUTPATIENT
Start: 2021-08-23 | End: 2021-08-24

## 2021-08-23 RX ADMIN — SODIUM CHLORIDE 1000 ML: 9 INJECTION, SOLUTION INTRAVENOUS at 23:49

## 2021-08-23 ASSESSMENT — PAIN DESCRIPTION - PROGRESSION: CLINICAL_PROGRESSION: GRADUALLY WORSENING

## 2021-08-23 ASSESSMENT — PAIN DESCRIPTION - DESCRIPTORS: DESCRIPTORS: SQUEEZING

## 2021-08-23 ASSESSMENT — PAIN DESCRIPTION - FREQUENCY: FREQUENCY: CONTINUOUS

## 2021-08-23 ASSESSMENT — PAIN DESCRIPTION - ONSET: ONSET: ON-GOING

## 2021-08-23 ASSESSMENT — PAIN DESCRIPTION - PAIN TYPE: TYPE: ACUTE PAIN

## 2021-08-23 ASSESSMENT — PAIN DESCRIPTION - LOCATION: LOCATION: ABDOMEN

## 2021-08-23 ASSESSMENT — PAIN SCALES - GENERAL: PAINLEVEL_OUTOF10: 7

## 2021-08-23 ASSESSMENT — PAIN DESCRIPTION - ORIENTATION: ORIENTATION: RIGHT;LEFT

## 2021-08-24 LAB
ALBUMIN SERPL-MCNC: 3.9 G/DL (ref 3.5–5.2)
ALBUMIN/GLOBULIN RATIO: 1.3 (ref 1–2.5)
ALP BLD-CCNC: 75 U/L (ref 35–104)
ALT SERPL-CCNC: <5 U/L (ref 5–33)
ANION GAP SERPL CALCULATED.3IONS-SCNC: 10 MMOL/L (ref 9–17)
AST SERPL-CCNC: 14 U/L
BILIRUB SERPL-MCNC: 0.25 MG/DL (ref 0.3–1.2)
BILIRUBIN DIRECT: 0.09 MG/DL
BILIRUBIN, INDIRECT: 0.16 MG/DL (ref 0–1)
BUN BLDV-MCNC: 5 MG/DL (ref 6–20)
BUN/CREAT BLD: ABNORMAL (ref 9–20)
CALCIUM SERPL-MCNC: 9 MG/DL (ref 8.6–10.4)
CHLORIDE BLD-SCNC: 104 MMOL/L (ref 98–107)
CO2: 23 MMOL/L (ref 20–31)
CREAT SERPL-MCNC: 0.68 MG/DL (ref 0.5–0.9)
GFR AFRICAN AMERICAN: >60 ML/MIN
GFR NON-AFRICAN AMERICAN: >60 ML/MIN
GFR SERPL CREATININE-BSD FRML MDRD: ABNORMAL ML/MIN/{1.73_M2}
GFR SERPL CREATININE-BSD FRML MDRD: ABNORMAL ML/MIN/{1.73_M2}
GLOBULIN: ABNORMAL G/DL (ref 1.5–3.8)
GLUCOSE BLD-MCNC: 95 MG/DL (ref 70–99)
HCG QUALITATIVE: NEGATIVE
LACTIC ACID, WHOLE BLOOD: 0.7 MMOL/L (ref 0.7–2.1)
LACTIC ACID: NORMAL MMOL/L
LIPASE: 10 U/L (ref 13–60)
POTASSIUM SERPL-SCNC: 4.1 MMOL/L (ref 3.7–5.3)
SODIUM BLD-SCNC: 137 MMOL/L (ref 135–144)
TOTAL PROTEIN: 6.8 G/DL (ref 6.4–8.3)

## 2021-08-24 RX ORDER — PANTOPRAZOLE SODIUM 20 MG/1
40 TABLET, DELAYED RELEASE ORAL DAILY
Qty: 14 TABLET | Refills: 0 | Status: SHIPPED | OUTPATIENT
Start: 2021-08-24 | End: 2021-08-31

## 2021-08-24 RX ORDER — DICYCLOMINE HYDROCHLORIDE 10 MG/1
20 CAPSULE ORAL EVERY 6 HOURS PRN
Qty: 20 CAPSULE | Refills: 0 | Status: SHIPPED | OUTPATIENT
Start: 2021-08-24

## 2021-08-24 ASSESSMENT — ENCOUNTER SYMPTOMS
VOMITING: 0
NAUSEA: 0
ABDOMINAL PAIN: 1
SHORTNESS OF BREATH: 0
BACK PAIN: 0

## 2021-08-24 NOTE — ED NOTES
The following labs labeled with pt sticker and tubed:     [x] Lavender   [] on ice   [] Blue   [x] Green/yellow  [x] Green/black [x] on ice  [] Pink  [] Red  [x] Yellow       [] COVID-19 swab    [] Rapid     [] Urine Sample  [] Pelvic Cultures    [] Blood Cultures            Arti Dumont RN  08/23/21 3870

## 2021-08-24 NOTE — ED PROVIDER NOTES
[facility]  ED  Emergency Department Encounter  Emergency Medicine Resident     Pt Name: Kittie Boxer  MRN: [id number]  Ivanna 1985  Date of evaluation: 8/23/21  PCP:  No primary care provider on file. CHIEF COMPLAINT       Chief Complaint   Patient presents with    Abdominal Pain    Hypertension       HISTORY OFPRESENT ILLNESS  (Location/Symptom, Timing/Onset, Context/Setting, Quality, Duration, Modifying Factors,Severity.)      Kittie Boxer is a 38 y. o.yo female who presents with abdominal pain. Patient here with abdominal pain, right upper quadrant extending into the epigastrium, states it started today after she ate, states she has progressively worsening abdominal pain if she tries to eat. States is 10 out of 10 in severity. Denies any traumatic injuries, states she was admitted and found to have cholelithiasis and choledocholithiasis on her last admission. Was supposed to get ERCP but left as the pain had resolved. Has not follow-up with surgery since then. Denies nausea or vomiting. Denies headache or vision changes or focal deficits. Denies chest pain but states that she feels like her heart is racing. PAST MEDICAL / SURGICAL / SOCIAL / FAMILY HISTORY      has a past medical history of Anxiety, Asthma, GERD (gastroesophageal reflux disease), Hypothyroidism, Major depression, Morbid obesity, and Tension type headache.     has no past surgical history on file.      Social History     Socioeconomic History    Marital status: Single     Spouse name: Not on file    Number of children: Not on file    Years of education: Not on file    Highest education level: Not on file   Occupational History    Not on file   Tobacco Use    Smoking status: Current Some Day Smoker     Packs/day: 1.00     Types: Cigars    Smokeless tobacco: Never Used    Tobacco comment: Black and Mild   Vaping Use    Vaping Use: Never used   Substance and Sexual Activity    Alcohol use: No    Drug use: Never    Sexual activity: Yes     Partners: Male   Other Topics Concern    Not on file   Social History Narrative    Not on file     Social Determinants of Health     Financial Resource Strain:     Difficulty of Paying Living Expenses:    Food Insecurity:     Worried About Running Out of Food in the Last Year:     [address] in the Last Year:    Transportation Needs:     Lack of Transportation (Medical):  Lack of Transportation (Non-Medical):    Physical Activity:     Days of Exercise per Week:     Minutes of Exercise per Session:    Stress:     Feeling of Stress :    Social Connections:     Frequency of Communication with Friends and Family:     Frequency of Social Gatherings with Friends and Family:     Attends Denominational Services:     Active Member of Clubs or Organizations:     Attends Club or Organization Meetings:     Marital Status:    Intimate Partner Violence:     Fear of Current or Ex-Partner:     Emotionally Abused:     Physically Abused:     Sexually Abused:        Family History   Problem Relation Age of Onset    Depression Father     Thyroid Disease Father     Diabetes Sister         Allergies:  Iodine, Penicillin g, Dye  [iodides], Benadryl [diphenhydramine], and Prednisone    Home Medications:  Prior to Admission medications    Medication Sig Start Date End Date Taking?  Authorizing Provider   pantoprazole (PROTONIX) 20 MG tablet Take 2 tablets by mouth daily for 7 days 8/24/21 8/31/21 Yes Sayra Cheung MD   dicyclomine (BENTYL) 10 MG capsule Take 2 capsules by mouth every 6 hours as needed (cramps) 8/24/21  Yes Sayra Cheung MD   ondansetron (ZOFRAN) 4 MG tablet Take 1 tablet by mouth every 8 hours as needed for Nausea  Patient not taking: Reported on 7/26/2021 7/24/21   Sherman Agrawal MD   sodium chloride (ALTAMIST SPRAY) 0.65 % nasal spray 1 spray by Nasal route as needed for Congestion  Patient not taking: Reported on 7/26/2021 7/19/21 GALLBLADDER RUQ    HCG Qualitative, Serum    CBC Auto Differential    Basic Metabolic Panel w/ Reflex to MG    Hepatic Function Panel    Lipase    Lactic Acid, Plasma    Saline lock IV    Insert peripheral IV       MEDICATIONS ORDERED:  Orders Placed This Encounter   Medications    0.9 % sodium chloride bolus    morphine injection 4 mg    pantoprazole (PROTONIX) 20 MG tablet     Sig: Take 2 tablets by mouth daily for 7 days     Dispense:  14 tablet     Refill:  0    dicyclomine (BENTYL) 10 MG capsule     Sig: Take 2 capsules by mouth every 6 hours as needed (cramps)     Dispense:  20 capsule     Refill:  0       DDX:     Abdominal pain, pancreatitis, cholecystitis, arrhythmia, pregnancy    Initial MDM/Plan: 38 y.o. female who presents with abdominal pain.       Pancreatitis versus cholecystitis:  Lipase normal  Right upper quadrant pain known history of cholelithiasis  Blood work unremarkable  Ultrasound of the gallbladder does not show signs of cholecystitis  Symptomatic resolution in the emergency department  Can follow-up in the [address]  Discharged in stable condition    Arrhythmia:  EKG unremarkable  Palpations on presentation  Chest x-ray unremarkable  Low heart score  Follow with outpatient primary care provider for further work-up    Pregnancy:  Pregnancy test negative    DIAGNOSTIC RESULTS / EMERGENCYDEPARTMENT COURSE / MDM     LABS:  Results for orders placed or performed during the hospital encounter of 08/23/21   HCG Qualitative, Serum   Result Value Ref Range    hCG Qual NEGATIVE NEGATIVE   CBC Auto Differential   Result Value Ref Range    WBC 7.0 3.5 - 11.3 k/uL    RBC 4.48 3.95 - 5.11 m/uL    Hemoglobin 11.5 (L) 11.9 - 15.1 g/dL    Hematocrit 37.6 36.3 - 47.1 %    MCV 83.9 82.6 - 102.9 fL    MCH 25.7 25.2 - 33.5 pg    MCHC 30.6 28.4 - 34.8 g/dL    RDW 15.0 (H) 11.8 - 14.4 %    Platelets 026 219 - 297 k/uL    MPV 9.4 8.1 - 13.5 fL    NRBC Automated 0.0 0.0 per 100 WBC    Differential Type NOT REPORTED     Seg Neutrophils 41 36 - 65 %    Lymphocytes 49 (H) 24 - 43 %    Monocytes 7 3 - 12 %    Eosinophils % 2 1 - 4 %    Basophils 1 0 - 2 %    Immature Granulocytes 0 0 %    Segs Absolute 2.86 1.50 - 8.10 k/uL    Absolute Lymph # 3.47 1.10 - 3.70 k/uL    Absolute Mono # 0.48 0.10 - 1.20 k/uL    Absolute Eos # 0.15 0.00 - 0.44 k/uL    Basophils Absolute 0.05 0.00 - 0.20 k/uL    Absolute Immature Granulocyte <0.03 0.00 - 0.30 k/uL    WBC Morphology NOT REPORTED     RBC Morphology ANISOCYTOSIS PRESENT     Platelet Estimate NOT REPORTED    Basic Metabolic Panel w/ Reflex to MG   Result Value Ref Range    Glucose 95 70 - 99 mg/dL    BUN 5 (L) 6 - 20 mg/dL    CREATININE 0.68 0.50 - 0.90 mg/dL    Bun/Cre Ratio NOT REPORTED 9 - 20    Calcium 9.0 8.6 - 10.4 mg/dL    Sodium 137 135 - 144 mmol/L    Potassium 4.1 3.7 - 5.3 mmol/L    Chloride 104 98 - 107 mmol/L    CO2 23 20 - 31 mmol/L    Anion Gap 10 9 - 17 mmol/L    GFR Non-African American >60 >60 mL/min    GFR African American >60 >60 mL/min    GFR Comment          GFR Staging NOT REPORTED    Hepatic Function Panel   Result Value Ref Range    Albumin 3.9 3.5 - 5.2 g/dL    Alkaline Phosphatase 75 35 - 104 U/L    ALT <5 (L) 5 - 33 U/L    AST 14 <32 U/L    Total Bilirubin 0.25 (L) 0.3 - 1.2 mg/dL    Bilirubin, Direct 0.09 <0.31 mg/dL    Bilirubin, Indirect 0.16 0.00 - 1.00 mg/dL    Total Protein 6.8 6.4 - 8.3 g/dL    Globulin NOT REPORTED 1.5 - 3.8 g/dL    Albumin/Globulin Ratio 1.3 1.0 - 2.5   Lipase   Result Value Ref Range    Lipase 10 (L) 13 - 60 U/L   Lactic Acid, Plasma   Result Value Ref Range    Lactic Acid NOT REPORTED mmol/L    Lactic Acid, Whole Blood 0.7 0.7 - 2.1 mmol/L       RADIOLOGY:  US GALLBLADDER RUQ   Final Result   1. Cholelithiasis, and choledocholithiasis again demonstrated.    2. Coarse echotexture throughout the liver, consistent with diffuse hepatic   steatosis         XR CHEST PORTABLE   Final Result   Stable chest radiograph, without evidence of acute cardiopulmonary disease             EKG  No ST segment elevation or depression seen on EKG    All EKG's are interpreted by the Emergency Department Physicianwho either signs or Co-signs this chart in the absence of a cardiologist.    EMERGENCY DEPARTMENT COURSE:  ED Course as of Aug 24 0138   Mon Aug 23, 2021   2250 Not hypertensive   BP: 122/86 [PS]   Tue Aug 24, 2021   0011 Patient seen and assessed in the emergency department no acute respiratory cardiovascular distress. Patient here with abdominal pain, right upper quadrant extending into the epigastrium, states it started today after she ate, states she has progressively worsening abdominal pain if she tries to eat. States is 10 out of 10 in severity. Denies any traumatic injuries, states she was admitted and found to have cholelithiasis and choledocholithiasis on her last admission. Was supposed to get ERCP but left as the pain had resolved. Has not follow-up with surgery since then. Denies nausea or vomiting. Denies headache or vision changes or focal deficits. Denies chest pain but states that she feels like her heart is racing.    [PS]      ED Course User Index  [PS] Armin Rader MD          PROCEDURES:  None    CONSULTS:  None    CRITICAL CARE:  Please see attending note    FINAL IMPRESSION      1.  Right upper quadrant abdominal pain          DISPOSITION / PLAN     DISPOSITION Decision To Discharge 08/24/2021 12:37:20 AM       PATIENT REFERRED TO:  [facility]  [address] [facility]  [phone]    As needed, If symptoms worsen    [address]  [address] [address] [phone]  In 1 week      [address] [address] [phone]    Make an appointment soon as possible      DISCHARGE MEDICATIONS:  Discharge Medication List as of 8/24/2021 12:36 AM      START taking these medications    Details   pantoprazole (PROTONIX) 20 MG tablet Take 2 tablets by mouth daily for 7 days, Disp-14 tablet, R-0Print      dicyclomine (BENTYL) 10 MG capsule Take 2 capsules by mouth every 6 hours as needed (cramps), Disp-20 capsule, R-0Print             Salma Daley MD  Emergency Medicine Resident    (Please note that portions of this note were completed with a voice recognition program.Efforts were made to edit the dictations but occasionally words are mis-transcribed.)       Salma Daley MD  Resident  08/24/21 2554

## 2021-08-24 NOTE — ED PROVIDER NOTES
Faculty Sign-Out Attestation  Handoff taken on the following patient from prior Attending Physician: Bennett    I was available and discussed any additional care issues that arose and coordinated the management plans with the resident(s) caring for the patient during my duty period. Any areas of disagreement with residents documentation of care or procedures are noted on the chart. I was personally present for the key portions of any/all procedures during my duty period. I have documented in the chart those procedures where I was not present during the key portions.     Abdominal pain, gb US pending, cholelithiasis, symptomatic treatment,   If s/s improved & lab stable, >> possible discharge    Travis Hyde DO  Attending Physician     Travis Hyde DO  08/23/21 2356    hcg-, lipase 10, wbc 7, cr 0.6  > s/s improved, discharged per plan     Travis Hyde DO  08/24/21 5015

## 2021-08-24 NOTE — ED PROVIDER NOTES
[facility]     Emergency Department     Faculty Attestation    I performed a history and physical examination of the patient and discussed management with the resident. I reviewed the residents note and agree with the documented findings and plan of care. Any areas of disagreement are noted on the chart. I was personally present for the key portions of any procedures. I have documented in the chart those procedures where I was not present during the key portions. I have reviewed the emergency nurses triage note. I agree with the chief complaint, past medical history, past surgical history, allergies, medications, social and family history as documented unless otherwise noted below. For Physician Assistant/ Nurse Practitioner cases/documentation I have personally evaluated this patient and have completed at least one if not all key elements of the E/M (history, physical exam, and MDM). Additional findings are as noted. Patient is a 33-year-old female presenting with upper abdominal pain. Patient has history of symptomatic cholelithiasis however she states that she has been eating and very fat-free diet because of this although now for the past couple days she has been having epigastric abdominal pain with eating anything immediately. Denies any nausea or vomiting or black or tarry stools. She notes a history of gastritis and stomach ulcers in the past but is not currently on any acid reducing medications at home. Denies any fevers, chills, sweats, chest pain or shortness of breath, abdominal surgeries, lower abdominal pain, urinary symptoms. On exam vitals are normal patient is in no acute distress. Abdomen is soft and mildly tender in the epigastric region as well as the right upper quadrant without any peritoneal findings. Heart and lung sounds normal.  Will obtain abdominal labs, gallbladder ultrasound.   If work-up is negative, plan to treat for gastritis/possible PUD with Protonix and close follow-up.         Urszula Cabrera DO  Emergency medicine physician  11:39 PM              Isael Boyce DO  08/23/21 1403

## 2021-08-26 LAB
BUN BLDV-MCNC: 4 MG/DL (ref 7–25)
CALCIUM SERPL-MCNC: 8.8 MG/DL (ref 8.6–10.3)
CHLORIDE BLD-SCNC: 106 MEQ/L (ref 98–107)
CO2: 28 MEQ/L (ref 21–31)
CREAT SERPL-MCNC: 0.75 MG/DL (ref 0.6–1.2)
EGFR AFRICAN AMERICAN: >60 ML/MIN/1.73SQ M
EGFR IF NONAFRICAN AMERICAN: >60 ML/MIN/1.73SQ M
EKG ATRIAL RATE: 90 BPM
EKG P AXIS: 44 DEGREES
EKG P-R INTERVAL: 144 MS
EKG Q-T INTERVAL: 356 MS
EKG QRS DURATION: 72 MS
EKG QTC CALCULATION (BAZETT): 435 MS
EKG R AXIS: -15 DEGREES
EKG T AXIS: 39 DEGREES
EKG VENTRICULAR RATE: 90 BPM
GLUCOSE: 89 MG/DL (ref 70–100)
IRON SATURATION: 11 % (ref 20–50)
IRON: 37 MCG/DL (ref 50–212)
LIPASE: 10 UNITS/L (ref 11–82)
POTASSIUM SERPL-SCNC: 3.7 MEQ/L (ref 3.5–5.1)
SODIUM BLD-SCNC: 138 MEQ/L (ref 136–145)
T4 FREE: 0.92 NG/DL (ref 0.71–1.85)
TOTAL IRON BINDING CAPACITY: 331 MCG/DL (ref 250–450)
TSH, 3RD GENERATION: 0.41 UIU/ML (ref 0.34–5.6)
UNSATURATED IRON BINDING CAPACITY: 294 MCG/DL (ref 155–355)
VITAMIN D 25-HYDROXY: 47.3 NG/ML (ref 30–80)

## 2021-08-26 PROCEDURE — 93010 ELECTROCARDIOGRAM REPORT: CPT | Performed by: INTERNAL MEDICINE

## 2021-09-03 ENCOUNTER — HOSPITAL ENCOUNTER (OUTPATIENT)
Dept: PSYCHIATRY | Age: 36
Discharge: HOME | End: 2021-09-03
Payer: MEDICARE

## 2021-09-03 PROCEDURE — 90837 PSYTX W PT 60 MINUTES: CPT | Performed by: SOCIAL WORKER

## 2021-09-07 NOTE — PSYCHOTHERAPY
TELEHEALTH EVALUATION -- Audio/Visual (During KJDFX-86 public health emergency)     [facility] [facility] Therapy Note  RADHA Sigaal   9/3/21  9:00 AM  Medina Antunez  1985  [id number]    Time spent with Patient: 60 minutes      Pt and therapist met for session via telehealth. [place] was at her [place] in [place] and therapist was in the office at the [facility]. S:   Pt and therapist discussed and processed her last week. Therapist gave pt space to ventilate and offered support while using active listening to help pt feel heard and to insure an accurate understanding of pt's current needs. Pt reports that she has still been feeling physically sick and that she feels her health is declining. Pt reports she did not find a new PCP and therapist used motivational interviewing to assess pt's readiness to change and complete tasks and to encourage her to seek help for her physical health. Therapist assisted pt in processing her hesitation about seeking a new PCP and pt reports that she feels she can't trust them after many have not listened to her in the past.  Therapist encouraged pt to seek help from her GI doctor if she was not comfortable seeing a PCP. Pt reports she had plans to call her GI doctor soon since she has not seen them since before COVID. Pt and therapist continued to discuss pt's current needs and pt reports that she has been having \"panic attack-like\" experiences. She reports that the feeling starts in her stomach and works up to her heart where she experiences some chest tightness. Therapist used Socratic Dialogue and other discussion to help pt identify any patterns in the attacks. Pt could not identify any patterns and reports she feels fine and it comes out of nowhere. Pt reports then fearful or worrisome thoughts come after the pain starts, not before. Therapist encouraged pt to discuss this with a doctor to rule out any physical cause.   Therapist and pt also discussed the affect of stress on the body and that pt would benefit from addressing physical concerns as it is getting in the way of addressing mental health. Therapist attempted to discuss fears and worries with pt but she was fixated on discussing her physical health. Therapist and pt developed plan for pt to call GI doctor before next session. Pt would like to continue doing sessions via telehealth as she does not want to come to the 2nd floor. Therapist confirms they can do that, but will work up to getting pt back in person for some healthy exposure. Pt agreed. O:  MSE:     Appearance    alert, crying, moderate distress, uncooperative  Appetite abnormal: GI problems  Sleep disturbance Yes  Fatigue Yes  Loss of pleasure Yes  Impulsive behavior No  Speech    normal rate and normal volume  Mood    Anxious  Angry  Depressed  Affect    agitation  Thought Content    helplessness  Thought Process    rapid and overabundance of ideas  Associations    logical connections  Insight    Fair  Judgment    Intact  Orientation    oriented to person, place, time, and general circumstances  Memory    recent and remote memory intact  Attention/Concentration    intact  Morbid ideation No  Suicide Assessment    no suicidal ideation    A:   Pt presented to therapy in a low, anxious and slightly angry mood. She presented as agitated and frustrated over her physical health. Pt was focused on her physical health and has talked a lot about it in the last few sessions. Therapist recommended to pt multiple times to seek a new PCP and gave pt a link to do so, but pt is very hesitant to find a new doctor. Pt is distrusting of PCPs as they have not listened to her in the past.  Pt also reports she recently skipped a neurology appt due to being exhausted from a night of not sleeping. Pt has not seen her GI doctor since before COVID and reports she feels he GI symptoms are getting worse.   Pt agreed to call her GI doctor and discuss new (ADVIL;MOTRIN) 600 MG tablet Take 1 tablet by mouth every 6 hours as needed for Pain (Patient not taking: Reported on 7/26/2021) 120 tablet 0    docusate sodium (COLACE) 100 MG capsule Take 1 capsule by mouth 2 times daily as needed for Constipation (Patient not taking: Reported on 7/26/2021) 60 capsule 1    docusate sodium (COLACE, DULCOLAX) 100 MG CAPS Take 100 mg by mouth daily (Patient not taking: Reported on 7/26/2021) 30 capsule 0    Prenatal Vit-Fe Fumarate-FA (PRENATAL VITAMIN) 27-0.8 MG TABS Take 1 tablet by mouth daily (Patient not taking: Reported on 7/19/2021) 30 tablet 12    oxymetazoline (12 HOUR NASAL SPRAY) 0.05 % nasal spray 2 sprays by Nasal route 2 times daily (Patient not taking: Reported on 7/26/2021) 1 Bottle 3    acetaminophen (TYLENOL) 500 MG tablet Take 1 tablet by mouth every 6 hours as needed for Pain (Patient not taking: Reported on 7/26/2021) 40 tablet 0    Cholecalciferol (VITAMIN D3) 1000 units TABS Take 1 tablet by mouth daily 90 tablet 1     No current facility-administered medications for this encounter.        Social History:   Social History     Socioeconomic History    Marital status: Single     Spouse name: Not on file    Number of children: Not on file    Years of education: Not on file    Highest education level: Not on file   Occupational History    Not on file   Tobacco Use    Smoking status: Current Some Day Smoker     Packs/day: 1.00     Types: Cigars    Smokeless tobacco: Never Used    Tobacco comment: Black and Mild   Vaping Use    Vaping Use: Never used   Substance and Sexual Activity    Alcohol use: No    Drug use: Never    Sexual activity: Yes     Partners: Male   Other Topics Concern    Not on file   Social History Narrative    Not on file     Social Determinants of Health     Financial Resource Strain:     Difficulty of Paying Living Expenses:    Food Insecurity:     Worried About Running Out of Food in the Last Year:     [address] in the Last Year:    Transportation Needs:     Lack of Transportation (Medical):  Lack of Transportation (Non-Medical):    Physical Activity:     Days of Exercise per Week:     Minutes of Exercise per Session:    Stress:     Feeling of Stress :    Social Connections:     Frequency of Communication with Friends and Family:     Frequency of Social Gatherings with Friends and Family:     Attends [employer] Services:     Active Member of Clubs or Organizations:     Attends Club or Organization Meetings:     Marital Status:    Intimate Partner Violence:     Fear of Current or Ex-Partner:     Emotionally Abused:     Physically Abused:     Sexually Abused:        TOBACCO:   reports that she has been smoking cigars. She has been smoking about 1.00 pack per day. She has never used smokeless tobacco.  ETOH:   reports no history of alcohol use. Family History:   Family History   Problem Relation Age of Onset    Depression Father     Thyroid Disease Father     Diabetes Sister          Plan:  Pt will contact her GI doctor. Therapist will address pt's hesitancy to address mental health goals; possible tasks of mourning     Pt interventions:  Discussed various factors related to the development and maintenance of  stress (including biological, cognitive, behavioral, and environmental factors), Provided education, Discussed benefits of referral for specialty care, Motivational Interviewing to increase patient confidence and compliance with adhering to behavioral change plan, Motivational Interviewing to determine importance and readiness for change, Discussed potential barriers to change, Emphasized self-care as important for managing overall health, Provided Psychoeducation re: stress and the body, importance of having a PCP and Motivational Interviewing to target pt's hesitancy to locate a PCP      Pt Behavioral Change Plan:     Please Indicate where at in treatment:    Just Beginning Treatment:  []? Yes []?  NO Review of treatment:   []?YES []?NO                Are you extending sessions? []?YES []?NO       How many? Problem to address       Goal Plan Progress Session Goal Met? Grief  I will process the loss of my two children Engage in Grief-focused therapy to identify and address maladaptive thoughts associated with my loss.     Use tasks of grief  Use CBT to target cognitive distortions Pt has a lot of self-blame and lack of hope in future N/A No   PTSD I will decrease PTSD symptoms as evidenced by a lowered score on the PCL-5 Trauma focused therapy to address intrusive and arousal symptoms. Pt has negative thoughts surrounding her children's deaths and recurring memories  N/A No             Pursuant to the emergency declaration under the [facility], [facility] waiver authority and the [employer] [facility] and [employer] Act, this Virtual Visit was conducted, with patient's consent, to reduce the patient's risk of exposure to COVID-19 and provide continuity of care for an established patient. Services were provided through a video synchronous discussion virtually to substitute for in-person clinic visit.

## 2021-09-15 ENCOUNTER — HOSPITAL ENCOUNTER (OUTPATIENT)
Dept: PSYCHIATRY | Age: 36
Setting detail: THERAPIES SERIES
Discharge: HOME | End: 2021-09-15
Payer: MEDICARE

## 2021-09-15 PROCEDURE — 90837 PSYTX W PT 60 MINUTES: CPT | Performed by: SOCIAL WORKER

## 2021-09-16 ENCOUNTER — CLINICAL DOCUMENTATION (OUTPATIENT)
Dept: PSYCHIATRY | Age: 36
End: 2021-09-16

## 2021-09-16 NOTE — PSYCHOTHERAPY
TELEHEALTH EVALUATION -- Audio/Visual (During ZQJGL-61 public health emergency)     [address] [facility] Therapy Note  Javi Esparza SADIAABHILASH   9/15/21  11:00 AM  Mehul Ahumada  1985  [id number]    Patient location is at their home address. Location of clinician is at [facility] located at [address] [place] [address]. Time spent with Patient: 60 minutes      Pt was provided informed consent for the [facility] [facility]. Discussed with patient model of service to include the limits of confidentiality (i.e. abuse reporting, suicide intervention, etc.) and short-term intervention focused approach. Pt indicated understanding. S:  Therapist met with pt via [employer] for session. Pt presented as tired, low and sad during session but became more engaged as session progressed. Pt and therapist processed her last two weeks. Pt reports that she has not called a GI doctor yet and is still struggling with her health. Pt is on medicine for a H. Pylori and will attend to her gallbladder problems once that is resolved. Pt and therapist explored pt's current feelings and thoughts. Pt expressed that she \"feels like she is missing out on something\" and feels angry and sad about that . Pt feels like it is \"one thing after another\" and feels like she \"should be a mom. \"  Therapist led pt in exploring those thoughts and feelings further and encouraged her to avoid using statements like \"should\" as she could get stuck in treatment. Pt and therapist explored pt's past experiences and history of trauma. Therapist used Socratic Dialogue to help pt identify cognitive distortions and core beliefs. Pt and therapist explored pt's beliefs that she is to blame for losing her children. Pt reports she thinks, \"I can't do these things, nothing is attainable, and I'm not gonna do anything. \"  Pt reports she believes she is hopeless, worthless and helpless.   Pt and therapist explored pt's readiness for change and the pain she still feels in regards to her past, child losses, and health problems. Therapist offered education about CBT and the connection between thoughts, feelings and actions. Therapist also educated pt on behavioral activation and introduced homework to pt. Pt will begin thinking about things that she likes to do or that bring her happiness and therapist and pt will begin behavioral activation next week. O:  MSE:     Appearance    alert, crying, moderate distress  Appetite abnormal: stomach problems  Sleep disturbance Yes  Fatigue Yes  Loss of pleasure Yes  Impulsive behavior No  Speech    normal rate, normal volume and well articulated  Mood    Depressed  Worthless  Low self-esteem  Emptiness  Affect    depressed affect  Thought Content    hopelessness, helplessness, worthlessness and excessive guilt  Thought Process    rapid and overabundance of ideas  Associations    logical connections  Insight    Fair  Judgment    Intact  Orientation    oriented to person, place, time, and general circumstances  Memory    recent and remote memory intact  Attention/Concentration    intact  Morbid ideation No  Suicide Assessment    no suicidal ideation    A:  Pt presented to therapy as depressed, tired and agitated. Pt spent much of the session venting and expressing self, but seemed upset with therapist when therapist would attempt to use therapy techniques to address pt's statements. Pt appears to want to get better, but is hesitant to accept skills or education from therapist.  Pt is still in a lot of pain, emotionally and physically, and is struggling to engage in therapy to lean into that pain. Pt appears hopeless, but is not suicidal.  Pt does not know what to do, but does not seem to want to try the things that therapist has suggested. Pt has been through multiple traumas and is hesitant to doing work in therapy, likely due to the pain she feels from those traumas.   Pt would benefit from behavioral activation to try and introduce things that make her feel good/happy into her daily routine. Pt will also benefit from supportive counseling until pt is in a place to begin reprocessing trauma.           Visit Diagnosis:   PTSD  Major Depressive Disorder, recurrent, severe,      History from Medical Record:        Diagnosis Date    Anxiety     Asthma     GERD (gastroesophageal reflux disease)     Hypothyroidism 5/3/2021    Major depression 4/30/2013    Morbid obesity 9/15/2017    Tension type headache 4/30/2013     Medications:   Current Outpatient Medications   Medication Sig Dispense Refill    pantoprazole (PROTONIX) 20 MG tablet Take 2 tablets by mouth daily for 7 days 14 tablet 0    dicyclomine (BENTYL) 10 MG capsule Take 2 capsules by mouth every 6 hours as needed (cramps) 20 capsule 0    ondansetron (ZOFRAN) 4 MG tablet Take 1 tablet by mouth every 8 hours as needed for Nausea (Patient not taking: Reported on 7/26/2021) 20 tablet 0    sodium chloride (ALTAMIST SPRAY) 0.65 % nasal spray 1 spray by Nasal route as needed for Congestion (Patient not taking: Reported on 7/26/2021) 1 Bottle 3    sertraline (ZOLOFT) 50 MG tablet Take 1 tablet by mouth daily 35 tablet 2    Ferrous Gluconate (IRON) 240 (27 Fe) MG TABS Take 1 tablet by mouth daily (Patient not taking: Reported on 7/26/2021) 30 tablet 3    ferrous sulfate (FE TABS) 325 (65 Fe) MG EC tablet Take 1 tablet by mouth daily (with breakfast) (Patient not taking: Reported on 7/26/2021) 90 tablet 3    ibuprofen (ADVIL;MOTRIN) 600 MG tablet Take 1 tablet by mouth every 6 hours as needed for Pain (Patient not taking: Reported on 7/26/2021) 120 tablet 0    docusate sodium (COLACE) 100 MG capsule Take 1 capsule by mouth 2 times daily as needed for Constipation (Patient not taking: Reported on 7/26/2021) 60 capsule 1    docusate sodium (COLACE, DULCOLAX) 100 MG CAPS Take 100 mg by mouth daily (Patient not taking: Reported on 7/26/2021) 30 capsule 0    Prenatal Vit-Fe Fumarate-FA (PRENATAL VITAMIN) 27-0.8 MG TABS Take 1 tablet by mouth daily (Patient not taking: Reported on 7/19/2021) 30 tablet 12    oxymetazoline (12 HOUR NASAL SPRAY) 0.05 % nasal spray 2 sprays by Nasal route 2 times daily (Patient not taking: Reported on 7/26/2021) 1 Bottle 3    acetaminophen (TYLENOL) 500 MG tablet Take 1 tablet by mouth every 6 hours as needed for Pain (Patient not taking: Reported on 7/26/2021) 40 tablet 0    Cholecalciferol (VITAMIN D3) 1000 units TABS Take 1 tablet by mouth daily 90 tablet 1     No current facility-administered medications for this encounter. Social History:   Social History     Socioeconomic History    Marital status: Single     Spouse name: Not on file    Number of children: Not on file    Years of education: Not on file    Highest education level: Not on file   Occupational History    Not on file   Tobacco Use    Smoking status: Current Some Day Smoker     Packs/day: 1.00     Types: Cigars    Smokeless tobacco: Never Used    Tobacco comment: Black and Mild   Vaping Use    Vaping Use: Never used   Substance and Sexual Activity    Alcohol use: No    Drug use: Never    Sexual activity: Yes     Partners: Male   Other Topics Concern    Not on file   Social History Narrative    Not on file     Social Determinants of Health     Financial Resource Strain:     Difficulty of Paying Living Expenses:    Food Insecurity:     Worried About Running Out of Food in the Last Year:     Ran Out of Food in the Last Year:    Transportation Needs:     Lack of Transportation (Medical):      Lack of Transportation (Non-Medical):    Physical Activity:     Days of Exercise per Week:     Minutes of Exercise per Session:    Stress:     Feeling of Stress :    Social Connections:     Frequency of Communication with Friends and Family:     Frequency of Social Gatherings with Friends and Family:     Attends Congregational Services:     Active Member of Clubs or Organizations:     Attends Club or Organization Meetings:     Marital Status:    Intimate Partner Violence:     Fear of Current or Ex-Partner:     Emotionally Abused:     Physically Abused:     Sexually Abused:        TOBACCO:   reports that she has been smoking cigars. She has been smoking about 1.00 pack per day. She has never used smokeless tobacco.  ETOH:   reports no history of alcohol use. Family History:   Family History   Problem Relation Age of Onset    Depression Father     Thyroid Disease Father     Diabetes Sister            Pt interventions:  Provided education, Discussed self-care (sleep, nutrition, rewarding activities, social support, exercise), Discussed and problem-solved barriers in adhering to behavioral change plan, Motivational Interviewing to increase patient confidence and compliance with adhering to behavioral change plan, Motivational Interviewing to determine importance and readiness for change, Discussed potential barriers to change, Established rapport, Supportive techniques and CBT to target negative thoughts         PLAN:   Behavioral Activation, supportive counseling      Patient scheduled to return on:       Were changes or additions made to the treatment plan today? YES []   NO [x]  Noted changes:                Pursuant to the emergency declaration under the [facility], Count includes the [facility] waiver authority and the [employer] Act, this Virtual Visit was conducted, with patient's consent, to reduce the patient's risk of exposure to COVID-19 and provide continuity of care for an established patient. Services were provided through a video synchronous discussion virtually to substitute for in-person clinic visit.

## 2021-09-16 NOTE — PROGRESS NOTES
Therapist received missed call from pt. Therapist called pt back and left VM with available times for pt to call back if she needs to.

## 2021-09-30 ENCOUNTER — HOSPITAL ENCOUNTER (EMERGENCY)
Age: 36
Discharge: HOME | End: 2021-10-01
Attending: EMERGENCY MEDICINE
Payer: MEDICARE

## 2021-09-30 DIAGNOSIS — R10.10 PAIN OF UPPER ABDOMEN: Primary | ICD-10-CM

## 2021-09-30 DIAGNOSIS — K04.7 DENTAL ABSCESS: ICD-10-CM

## 2021-09-30 PROCEDURE — 96360 HYDRATION IV INFUSION INIT: CPT

## 2021-09-30 PROCEDURE — 99284 EMERGENCY DEPT VISIT MOD MDM: CPT

## 2021-09-30 PROCEDURE — 93005 ELECTROCARDIOGRAM TRACING: CPT | Performed by: STUDENT IN AN ORGANIZED HEALTH CARE EDUCATION/TRAINING PROGRAM

## 2021-09-30 ASSESSMENT — PAIN DESCRIPTION - PAIN TYPE: TYPE: ACUTE PAIN

## 2021-09-30 ASSESSMENT — PAIN DESCRIPTION - LOCATION: LOCATION: CHEST

## 2021-09-30 ASSESSMENT — PAIN SCALES - GENERAL: PAINLEVEL_OUTOF10: 6

## 2021-10-01 ENCOUNTER — APPOINTMENT (OUTPATIENT)
Dept: GENERAL RADIOLOGY | Age: 36
End: 2021-10-01
Payer: MEDICARE

## 2021-10-01 VITALS
WEIGHT: 190 LBS | DIASTOLIC BLOOD PRESSURE: 92 MMHG | TEMPERATURE: 97.8 F | OXYGEN SATURATION: 100 % | HEIGHT: 65 IN | SYSTOLIC BLOOD PRESSURE: 123 MMHG | HEART RATE: 67 BPM | BODY MASS INDEX: 31.65 KG/M2 | RESPIRATION RATE: 15 BRPM

## 2021-10-01 LAB
ABSOLUTE EOS #: 0.08 K/UL (ref 0–0.44)
ABSOLUTE IMMATURE GRANULOCYTE: <0.03 K/UL (ref 0–0.3)
ABSOLUTE LYMPH #: 2.39 K/UL (ref 1.1–3.7)
ABSOLUTE MONO #: 0.47 K/UL (ref 0.1–1.2)
ALBUMIN SERPL-MCNC: 4.2 G/DL (ref 3.5–5.2)
ALBUMIN/GLOBULIN RATIO: 1.3 (ref 1–2.5)
ALP BLD-CCNC: 84 U/L (ref 35–104)
ALT SERPL-CCNC: 8 U/L (ref 5–33)
ANION GAP SERPL CALCULATED.3IONS-SCNC: 12 MMOL/L (ref 9–17)
AST SERPL-CCNC: 15 U/L
BASOPHILS # BLD: 1 % (ref 0–2)
BASOPHILS ABSOLUTE: 0.03 K/UL (ref 0–0.2)
BILIRUB SERPL-MCNC: 0.3 MG/DL (ref 0.3–1.2)
BUN BLDV-MCNC: 7 MG/DL (ref 6–20)
BUN/CREAT BLD: ABNORMAL (ref 9–20)
CALCIUM SERPL-MCNC: 9 MG/DL (ref 8.6–10.4)
CHLORIDE BLD-SCNC: 104 MMOL/L (ref 98–107)
CO2: 22 MMOL/L (ref 20–31)
CREAT SERPL-MCNC: 0.7 MG/DL (ref 0.5–0.9)
D-DIMER QUANTITATIVE: 0.23 MG/L FEU
DIFFERENTIAL TYPE: ABNORMAL
EOSINOPHILS RELATIVE PERCENT: 1 % (ref 1–4)
GFR AFRICAN AMERICAN: >60 ML/MIN
GFR NON-AFRICAN AMERICAN: >60 ML/MIN
GFR SERPL CREATININE-BSD FRML MDRD: ABNORMAL ML/MIN/{1.73_M2}
GFR SERPL CREATININE-BSD FRML MDRD: ABNORMAL ML/MIN/{1.73_M2}
GLUCOSE BLD-MCNC: 101 MG/DL (ref 70–99)
HCT VFR BLD CALC: 42.2 % (ref 36.3–47.1)
HEMOGLOBIN: 13 G/DL (ref 11.9–15.1)
IMMATURE GRANULOCYTES: 0 %
LIPASE: 12 U/L (ref 13–60)
LYMPHOCYTES # BLD: 37 % (ref 24–43)
MCH RBC QN AUTO: 25.7 PG (ref 25.2–33.5)
MCHC RBC AUTO-ENTMCNC: 30.8 G/DL (ref 28.4–34.8)
MCV RBC AUTO: 83.4 FL (ref 82.6–102.9)
MONOCYTES # BLD: 7 % (ref 3–12)
NRBC AUTOMATED: 0 PER 100 WBC
PDW BLD-RTO: 16.1 % (ref 11.8–14.4)
PLATELET # BLD: 283 K/UL (ref 138–453)
PLATELET ESTIMATE: ABNORMAL
PMV BLD AUTO: 9.8 FL (ref 8.1–13.5)
POTASSIUM SERPL-SCNC: 3.8 MMOL/L (ref 3.7–5.3)
RBC # BLD: 5.06 M/UL (ref 3.95–5.11)
RBC # BLD: ABNORMAL 10*6/UL
SEG NEUTROPHILS: 54 % (ref 36–65)
SEGMENTED NEUTROPHILS ABSOLUTE COUNT: 3.47 K/UL (ref 1.5–8.1)
SODIUM BLD-SCNC: 138 MMOL/L (ref 135–144)
TOTAL PROTEIN: 7.5 G/DL (ref 6.4–8.3)
TROPONIN INTERP: NORMAL
TROPONIN T: NORMAL NG/ML
TROPONIN, HIGH SENSITIVITY: <6 NG/L (ref 0–14)
WBC # BLD: 6.5 K/UL (ref 3.5–11.3)
WBC # BLD: ABNORMAL 10*3/UL

## 2021-10-01 PROCEDURE — 71045 X-RAY EXAM CHEST 1 VIEW: CPT

## 2021-10-01 PROCEDURE — 84484 ASSAY OF TROPONIN QUANT: CPT

## 2021-10-01 PROCEDURE — 80053 COMPREHEN METABOLIC PANEL: CPT

## 2021-10-01 PROCEDURE — 83690 ASSAY OF LIPASE: CPT

## 2021-10-01 PROCEDURE — 2580000003 HC RX 258: Performed by: STUDENT IN AN ORGANIZED HEALTH CARE EDUCATION/TRAINING PROGRAM

## 2021-10-01 PROCEDURE — 85379 FIBRIN DEGRADATION QUANT: CPT

## 2021-10-01 PROCEDURE — 96360 HYDRATION IV INFUSION INIT: CPT

## 2021-10-01 PROCEDURE — 85025 COMPLETE CBC W/AUTO DIFF WBC: CPT

## 2021-10-01 RX ORDER — 0.9 % SODIUM CHLORIDE 0.9 %
1000 INTRAVENOUS SOLUTION INTRAVENOUS ONCE
Status: COMPLETED | OUTPATIENT
Start: 2021-10-01 | End: 2021-10-01

## 2021-10-01 RX ORDER — FENTANYL CITRATE 50 UG/ML
25 INJECTION, SOLUTION INTRAMUSCULAR; INTRAVENOUS ONCE
Status: DISCONTINUED | OUTPATIENT
Start: 2021-10-01 | End: 2021-10-01 | Stop reason: HOSPADM

## 2021-10-01 RX ADMIN — SODIUM CHLORIDE 1000 ML: 9 INJECTION, SOLUTION INTRAVENOUS at 01:30

## 2021-10-01 ASSESSMENT — ENCOUNTER SYMPTOMS
NAUSEA: 1
VOMITING: 0
CHEST TIGHTNESS: 1
SHORTNESS OF BREATH: 1
BACK PAIN: 0
CONSTIPATION: 0
DIARRHEA: 0
ABDOMINAL PAIN: 1

## 2021-10-01 NOTE — ED PROVIDER NOTES
[facility] ED  Emergency Department Encounter  EmergencyMedicine Resident     Pt Abdullahi Mcneal  MRN: [id number]  [place] 1985  Date of evaluation: 10/1/21  PCP:  No primary care provider on file. This patient was evaluated in the Emergency Department for symptoms described in the history of present illness. The patient was evaluated in the context of the global COVID-19 pandemic, which necessitated consideration that the patient might be at risk for infection with the SARS-CoV-2 virus that causes COVID-19. Institutional protocols and algorithms that pertain to the evaluation of patients at risk for COVID-19 are in a state of rapid change based on information released by regulatory bodies including the CDC and federal and state organizations. These policies and algorithms were followed during the patient's care in the ED. CHIEF COMPLAINT       Chief Complaint   Patient presents with    Shortness of Breath     x2 weeks    Chest Pain       HISTORY OF PRESENT ILLNESS  (Location/Symptom, Timing/Onset, Context/Setting, Quality, Duration, Modifying Factors, Severity.)      Gabriel Ratliff is a 38 y.o. female who presents with complaint of chest pain, shortness of breath as well as palpitations and lightheadedness. Past medical history significant for asthma, GERD, hypothyroidism as well as major depressive disorder. Patient states that she is currently being treated for H. pylori infection. For the past couple weeks she has had increasing episodes of nausea with indigestion that results in chest pain with palpitations. She states she feels that her heart is racing she feels lightheaded during these episodes. Denies any history of cardiac events or any family history of cardiac events. Patient did state that she is also in the process of having an elective cholecystectomy for gallstones.   Denies any hemoptysis, hormonal use, recent surgeries, immobilization, unilateral leg swelling, diarrhea, hematochezia, melena, fevers, chills, neck stiffness, dysuria, hematuria, rash or itch. PAST MEDICAL / SURGICAL / SOCIAL / FAMILY HISTORY      has a past medical history of Anxiety, Asthma, GERD (gastroesophageal reflux disease), Hypothyroidism, Major depression, Morbid obesity, and Tension type headache.       has no past surgical history on file. Social History     Socioeconomic History    Marital status: Single     Spouse name: Not on file    Number of children: Not on file    Years of education: Not on file    Highest education level: Not on file   Occupational History    Not on file   Tobacco Use    Smoking status: Current Some Day Smoker     Packs/day: 1.00     Types: Cigars    Smokeless tobacco: Never Used    Tobacco comment: Black and Mild   Vaping Use    Vaping Use: Never used   Substance and Sexual Activity    Alcohol use: No    Drug use: Never    Sexual activity: Yes     Partners: Male   Other Topics Concern    Not on file   Social History Narrative    Not on file     Social Determinants of Health     Financial Resource Strain:     Difficulty of Paying Living Expenses:    Food Insecurity:     Worried About Running Out of Food in the Last Year:     Ran Out of Food in the Last Year:    Transportation Needs:     Lack of Transportation (Medical):      Lack of Transportation (Non-Medical):    Physical Activity:     Days of Exercise per Week:     Minutes of Exercise per Session:    Stress:     Feeling of Stress :    Social Connections:     Frequency of Communication with Friends and Family:     Frequency of Social Gatherings with Friends and Family:     Attends Church Services:     Active Member of Clubs or Organizations:     Attends Club or Organization Meetings:     Marital Status:    Intimate Partner Violence:     Fear of Current or Ex-Partner:     Emotionally Abused:     Physically Abused:     Sexually Abused:        Family History   Problem Relation Age of Onset    Depression Father     Thyroid Disease Father     Diabetes Sister        Allergies:  Iodine, Penicillin g, Dye  [iodides], Benadryl [diphenhydramine], and Prednisone    Home Medications:  Prior to Admission medications    Medication Sig Start Date End Date Taking?  Authorizing Provider   pantoprazole (PROTONIX) 20 MG tablet Take 2 tablets by mouth daily for 7 days 8/24/21 8/31/21  Benjamin Collins MD   dicyclomine (BENTYL) 10 MG capsule Take 2 capsules by mouth every 6 hours as needed (cramps) 8/24/21   Benjamin Collins MD   ondansetron (ZOFRAN) 4 MG tablet Take 1 tablet by mouth every 8 hours as needed for Nausea  Patient not taking: Reported on 7/26/2021 7/24/21   Pedrito Reddy MD   sodium chloride (ALTAMIST SPRAY) 0.65 % nasal spray 1 spray by Nasal route as needed for Congestion  Patient not taking: Reported on 7/26/2021 7/19/21   [address] [place] [address], DO   sertraline (ZOLOFT) 50 MG tablet Take 1 tablet by mouth daily 7/19/21 11/1/21 Zonia Friday Alfonso, DO   Ferrous Gluconate (IRON) 240 (27 Fe) MG TABS Take 1 tablet by mouth daily  Patient not taking: Reported on 7/26/2021 2/78/15 7/12/18  Olen Boeck,    ferrous sulfate (FE TABS) 325 (65 Fe) MG EC tablet Take 1 tablet by mouth daily (with breakfast)  Patient not taking: Reported on 7/26/2021 7/6/21   Sharad Kevin DO   ibuprofen (ADVIL;MOTRIN) 600 MG tablet Take 1 tablet by mouth every 6 hours as needed for Pain  Patient not taking: Reported on 7/26/2021 7/1/21   Hoang Johnson DO   docusate sodium (COLACE) 100 MG capsule Take 1 capsule by mouth 2 times daily as needed for Constipation  Patient not taking: Reported on 7/26/2021 7/1/21   Hoang Johnson DO   docusate sodium (COLACE, DULCOLAX) 100 MG CAPS Take 100 mg by mouth daily  Patient not taking: Reported on 7/26/2021 6/27/21   Sharad Kevin DO   Prenatal Vit-Fe Fumarate-FA (PRENATAL VITAMIN) 27-0.8 MG TABS Take 1 tablet by mouth daily  Patient not taking: Reported on 7/19/2021 6/14/21   Patience Hamilton, DO   oxymetazoline (12 HOUR NASAL SPRAY) 0.05 % nasal spray 2 sprays by Nasal route 2 times daily  Patient not taking: Reported on 7/26/2021 5/24/21 5/24/22  Pernell Hurtado, DO   acetaminophen (TYLENOL) 500 MG tablet Take 1 tablet by mouth every 6 hours as needed for Pain  Patient not taking: Reported on 7/26/2021 1/20/21   Risa Maradiaga, DO   Cholecalciferol (VITAMIN D3) 1000 units TABS Take 1 tablet by mouth daily 7/31/17   Venkata Wick MD       REVIEW OF SYSTEMS    (2-9 systems for level 4, 10 or more for level 5)      Review of Systems   Constitutional: Negative for chills, fatigue and fever. HENT: Positive for congestion and dental problem. Respiratory: Positive for chest tightness and shortness of breath. Cardiovascular: Positive for chest pain and palpitations. Negative for leg swelling. Gastrointestinal: Positive for abdominal pain and nausea. Negative for constipation, diarrhea and vomiting. Genitourinary: Negative for dysuria and hematuria. Musculoskeletal: Negative for back pain, myalgias and neck stiffness. Skin: Negative for rash. Neurological: Positive for light-headedness. Negative for dizziness, facial asymmetry, speech difficulty and weakness. PHYSICAL EXAM   (up to 7 for level 4, 8 or more for level 5)      INITIAL VITALS:   BP (!) 123/92   Pulse 67   Temp 97.8 °F (36.6 °C)   Resp 15   Ht 5' 5\" (1.651 m)   Wt 190 lb (86.2 kg)   SpO2 100%   BMI 31.62 kg/m²     Physical Exam  Vitals reviewed. Constitutional:       Comments: Alert, no acute distress, nontoxic appearing obese female   HENT:      Mouth/Throat:      Comments: Examination there is prominent swelling of the right lower mandible. On oral exam there is no fluctuance palpation but there is extreme tenderness. Multiple dental caries noted. Cardiovascular:      Rate and Rhythm: Regular rhythm. Tachycardia present. Heart sounds:  No The mediastinal contours are within normal limits. The lungs are well aerated. The pleural surfaces are normal and no evidence of a pleural effusion is seen. Bones and soft tissues are unremarkable. Unremarkable single upright portable AP view of the chest.     EKG  EKG Interpretation    Interpreted by me    Rhythm: normal sinus   Rate: normal  Axis: Left  Ectopy: none  Conduction: normal  ST Segments: no acute change  T Waves: no acute change  Q Waves: none    Clinical Impression: Left axis deviation, no ischemic changes    All EKG's are interpreted by the Emergency Department Physician who either signs or Co-signs this chart in the absence of a cardiologist.    EMERGENCY DEPARTMENT COURSE:  Patient is a 67-year-old female presents with chief complaint of chest pain, shortness of breath that is been intermittent for the past few weeks to months. Patient had an acute worsening today. Is currently being treated for H. pylori. Troponin was normal as no acute changes on EKG making ACS unlikely. Chest x-ray ruled out pneumonia as well as pneumothorax. Patient is a low risk Wells as well as PERC negative. D-dimer low rules out pulmonary embolism. Bedside ultrasound did show gallstones but with an anterior wall thickness of less than 3 mm. This is unlikely to be cholecystitis. Chest x-ray not show any free air in the abdomen. This is most likely her H. Pylori/GERD. Patient also had a large right lower mandible possible dental abscess. Patient had this before in the past.  She is currently on amoxicillin for her triple therapy for her H. pylori. This antibiotic covers dental abscess. Patient was strongly encouraged to see her dentist as soon as possible. Patient was given resources for family medicine physician as well. Patient was agreeable to discharge with stable vital signs. PROCEDURES:  N/A    CONSULTS:  None    CRITICAL CARE:  N/A    FINAL IMPRESSION      1. Pain of upper abdomen    2.  Dental abscess          DISPOSITION / PLAN     DISPOSITION Decision To Discharge 10/01/2021 02:41:49 AM      PATIENT REFERRED TO:  [place] [id number]  [phone]  Schedule an appointment as soon as possible for a visit         DISCHARGE MEDICATIONS:  Discharge Medication List as of 10/1/2021  2:44 AM          Yara Hyatt DO  Emergency Medicine Resident    (Please note that portions of thisnote were completed with a voice recognition program.  Efforts were made to edit the dictations but occasionally words are mis-transcribed.)       Yeni Christiansen DO  Resident  10/01/21 [id number]

## 2021-10-01 NOTE — ED PROVIDER NOTES
[facility]  Emergency Department  Faculty Attestation     I performed a history and physical examination of the patient and discussed management with the resident. I reviewed the residents note and agree with the documented findings and plan of care. Any areas of disagreement are noted on the chart. I was personally present for the key portions of any procedures. I have documented in the chart those procedures where I was not present during the key portions. I have reviewed the emergency nurses triage note. I agree with the chief complaint, past medical history, past surgical history, allergies, medications, social and family history as documented unless otherwise noted below. For Physician Assistant/ Nurse Practitioner cases/documentation I have personally evaluated this patient and have completed at least one if not all key elements of the E/M (history, physical exam, and MDM). Additional findings are as noted. Primary Care Physician:  No primary care provider on file. Screenings:  [unfilled]    CHIEF COMPLAINT       Chief Complaint   Patient presents with    Shortness of Breath     x2 weeks    Chest Pain       RECENT VITALS:   Temp: 97.8 °F (36.6 °C),  Pulse: 98, Resp: 20, BP: 110/85    LABS:  Labs Reviewed   CBC WITH AUTO DIFFERENTIAL   COMPREHENSIVE METABOLIC PANEL W/ REFLEX TO MG FOR LOW K   LIPASE       Radiology  XR CHEST PORTABLE    (Results Pending)         EKG:   EKG Interpretation    Interpreted by me    Rhythm: normal sinus   Rate: normal  Axis: Left  Ectopy: none  Conduction: normal  ST Segments: no acute change  T Waves: no acute change  Q Waves: none    Clinical Impression: LVH, no acute ischemic changes    Attending Physician Additional  Notes    Patient had palpitations and then took her blood pressure and noted it was elevated. She does feel short of breath with this.  Most of her symptoms are orthostatic, when she supine she feels improved, standing she feels lightheaded presyncopal and palpitations. She is on medications for H pylori. She has known cholecystitis. She also has bacterial overgrowth in her stool and is concerned about that. She has brain fog and fatigue but this is been ongoing since her biliary diagnosis. No Covid symptoms. No Covid vaccine as yet. She has intermittent abdominal pain but not at this time. Intermittent reflux but none at this time. No cough sputum hemoptysis. No leg pain calf swelling or mobility. No history of VTE. On exam she is borderline tachycardic, afebrile, anicteric. Lungs are clear. Card exam is benign. Abdomen is soft nontender. No edema cords Homans or calf tenderness. EKG shows sinus tachycardia with LVH and no acute changes. Impression is palpitations, consider dehydration, anemia, electrolyte abnormality, less likely PE. Plan is laboratory studies, cardiac monitor, fluids, EKG, reassess, disposition. Luke [place].  Elodia Tyson MD, [facility]  Attending Emergency  Physician               Taty Batista MD  10/01/21 [id number]

## 2021-10-01 NOTE — ED TRIAGE NOTES
Chest pain and SOB strated 2 weeks ago. Pt is able to speak in sentences but willl take big breaths before and after.  NAD NSS

## 2021-10-02 LAB
EKG ATRIAL RATE: 100 BPM
EKG P AXIS: 33 DEGREES
EKG P-R INTERVAL: 144 MS
EKG Q-T INTERVAL: 336 MS
EKG QRS DURATION: 68 MS
EKG QTC CALCULATION (BAZETT): 433 MS
EKG R AXIS: -26 DEGREES
EKG T AXIS: 57 DEGREES
EKG VENTRICULAR RATE: 100 BPM

## 2021-10-02 PROCEDURE — 93010 ELECTROCARDIOGRAM REPORT: CPT | Performed by: INTERNAL MEDICINE

## 2021-10-11 ENCOUNTER — HOSPITAL ENCOUNTER (OUTPATIENT)
Dept: PSYCHIATRY | Age: 36
Setting detail: THERAPIES SERIES
Discharge: HOME | End: 2021-10-11
Payer: MEDICARE

## 2021-10-11 PROCEDURE — 90837 PSYTX W PT 60 MINUTES: CPT | Performed by: SOCIAL WORKER

## 2021-10-14 NOTE — PSYCHOTHERAPY
TELEHEALTH EVALUATION -- Audio/Visual (During NZCUW-25 public health emergency)     [address] [facility] Therapy Note  RADHA Mcgowan   10/11/21   1:00 PM  Geno Mcintosh [place]/  1985  [id number]    Patient location is at their home address. Location of clinician is at [facility] located at [address] [place] [address]. Time spent with Patient: 60 minutes      Pt was provided informed consent for the [facility]. Discussed with patient model of service to include the limits of confidentiality (i.e. abuse reporting, suicide intervention, etc.) and short-term intervention focused approach. Pt indicated understanding. S:  Pt met with therapist for session via [employer]. Pt and therapist discussed pt's adherence to therapy and lack of attendance to therapy. Pt and therapist processed pt's current functioning. Pt reports that she has been struggling with completing her responsibilities. Pt reports she has been skipping some doctor's appts too and feels like she \"just can't get them done. \"  Pt and therapist problem solved ways to help her attend therapy sessions and doctor's appointments. Pt and therapist processed pt's emotions and therapist used motivational interviewing and Socratic Dialogue to help pt explore her feelings and assess her readiness for change. Pt reports that she is struggling with her health. Therapist offered emotional support and gave pt space to ventilate her frustrations. Therapist validated pt's emotions and processed them with her. Pt and therapist made lists of things pt feels she needs to accomplish and brainstormed ways to get them done. Pt reports that she wants to keep coming to therapy, but it has been hard to keep up with appointments as she is not sleeping. Pt and therapist discussed pt's sleep and the importance of sleep hygiene. Pt and therapist discussed ways to improve her sleep.   Pt reports that she will continue to attend therapy sessions and will communicate with therapist if she can't make it. O:  MSE:     Appearance    alert, cooperative  Appetite abnormal: stomach problems   Sleep disturbance Yes  Fatigue Yes  Loss of pleasure No  Impulsive behavior No  Speech    normal rate and normal volume  Mood    Depressed  Affect    depressed affect  Thought Content    hopelessness and helplessness  Thought Process    linear, goal directed and coherent  Associations    logical connections  Insight    Fair  Judgment    Intact  Orientation    oriented to person, place, time, and general circumstances  Memory    recent memory intact, remote memory intact  Attention/Concentration    intact  Morbid ideation No  Suicide Assessment    no suicidal ideation    A:  Pt presented to therapy as tired and depressed but engaged in discussion. Pt displayed no hostility or anger and was not defensive in conversation as she had been in the past. Pt appears to have a decrease in functioning as she is not sleeping and is not attending therapy sessions or other doctors appointments. Pt acknowledged that she needed to go back to the doctor as her physical health has been bothering her significantly. Pt reports she will get back in touch with a GI doctor and will talk to her doctor about lack of sleep. Pt appears interested in continuing therapy, but tends to focus on her physical struggles rather than her emotions. Pt is hesitant to go into difficult topics.           Visit Diagnosis:   PTSD,  Major Depressive Disorder, recurrent, severe,      History from Medical Record:        Diagnosis Date    Anxiety     Asthma     GERD (gastroesophageal reflux disease)     Hypothyroidism 5/3/2021    Major depression 4/30/2013    Morbid obesity 9/15/2017    Tension type headache 4/30/2013     Medications:   Current Outpatient Medications   Medication Sig Dispense Refill    pantoprazole (PROTONIX) 20 MG tablet Take 2 tablets by mouth daily for 7 days 14 tablet 0    dicyclomine (BENTYL) 10 MG capsule Take 2 capsules by mouth every 6 hours as needed (cramps) 20 capsule 0    ondansetron (ZOFRAN) 4 MG tablet Take 1 tablet by mouth every 8 hours as needed for Nausea (Patient not taking: Reported on 7/26/2021) 20 tablet 0    sodium chloride (ALTAMIST SPRAY) 0.65 % nasal spray 1 spray by Nasal route as needed for Congestion (Patient not taking: Reported on 7/26/2021) 1 Bottle 3    sertraline (ZOLOFT) 50 MG tablet Take 1 tablet by mouth daily 35 tablet 2    Ferrous Gluconate (IRON) 240 (27 Fe) MG TABS Take 1 tablet by mouth daily (Patient not taking: Reported on 7/26/2021) 30 tablet 3    ferrous sulfate (FE TABS) 325 (65 Fe) MG EC tablet Take 1 tablet by mouth daily (with breakfast) (Patient not taking: Reported on 7/26/2021) 90 tablet 3    ibuprofen (ADVIL;MOTRIN) 600 MG tablet Take 1 tablet by mouth every 6 hours as needed for Pain (Patient not taking: Reported on 7/26/2021) 120 tablet 0    docusate sodium (COLACE) 100 MG capsule Take 1 capsule by mouth 2 times daily as needed for Constipation (Patient not taking: Reported on 7/26/2021) 60 capsule 1    docusate sodium (COLACE, DULCOLAX) 100 MG CAPS Take 100 mg by mouth daily (Patient not taking: Reported on 7/26/2021) 30 capsule 0    Prenatal Vit-Fe Fumarate-FA (PRENATAL VITAMIN) 27-0.8 MG TABS Take 1 tablet by mouth daily (Patient not taking: Reported on 7/19/2021) 30 tablet 12    oxymetazoline (12 HOUR NASAL SPRAY) 0.05 % nasal spray 2 sprays by Nasal route 2 times daily (Patient not taking: Reported on 7/26/2021) 1 Bottle 3    acetaminophen (TYLENOL) 500 MG tablet Take 1 tablet by mouth every 6 hours as needed for Pain (Patient not taking: Reported on 7/26/2021) 40 tablet 0    Cholecalciferol (VITAMIN D3) 1000 units TABS Take 1 tablet by mouth daily 90 tablet 1     No current facility-administered medications for this encounter.        Social History:   Social History     Socioeconomic History    Marital status: Single     Spouse name: Not on file    Number of children: Not on file    Years of education: Not on file    Highest education level: Not on file   Occupational History    Not on file   Tobacco Use    Smoking status: Current Some Day Smoker     Packs/day: 1.00     Types: Cigars    Smokeless tobacco: Never Used    Tobacco comment: Black and Mild   Vaping Use    Vaping Use: Never used   Substance and Sexual Activity    Alcohol use: No    Drug use: Never    Sexual activity: Yes     Partners: Male   Other Topics Concern    Not on file   Social History Narrative    Not on file     Social Determinants of Health     Financial Resource Strain:     Difficulty of Paying Living Expenses:    Food Insecurity:     Worried About Running Out of Food in the Last Year:     Ran Out of Food in the Last Year:    Transportation Needs:     Lack of Transportation (Medical):  Lack of Transportation (Non-Medical):    Physical Activity:     Days of Exercise per Week:     Minutes of Exercise per Session:    Stress:     Feeling of Stress :    Social Connections:     Frequency of Communication with Friends and Family:     Frequency of Social Gatherings with Friends and Family:     Attends [employer] Services:     Active Member of Clubs or Organizations:     Attends Club or Organization Meetings:     Marital Status:    Intimate Partner Violence:     Fear of Current or Ex-Partner:     Emotionally Abused:     Physically Abused:     Sexually Abused:        TOBACCO:   reports that she has been smoking cigars. She has been smoking about 1.00 pack per day. She has never used smokeless tobacco.  ETOH:   reports no history of alcohol use.     Family History:   Family History   Problem Relation Age of Onset    Depression Father     Thyroid Disease Father     Diabetes Sister            Pt interventions:  Provided education, Discussed self-care (sleep, nutrition, rewarding activities, social support, exercise), Motivational Interviewing to increase patient confidence and compliance with adhering to behavioral change plan, Motivational Interviewing to determine importance and readiness for change, Discussed potential barriers to change, Established rapport and Supportive techniques        PLAN:   Behavioral activation  Review goals   CBT       Patient scheduled to return on:       Were changes or additions made to the treatment plan today? YES []   NO []  Noted changes:                Pursuant to the emergency declaration under the [facility], [facility] waiver authority and the [employer] Act, this Virtual Visit was conducted, with patient's consent, to reduce the patient's risk of exposure to COVID-19 and provide continuity of care for an established patient. Services were provided through a video synchronous discussion virtually to substitute for in-person clinic visit.

## 2021-10-18 ENCOUNTER — HOSPITAL ENCOUNTER (OUTPATIENT)
Dept: PSYCHIATRY | Age: 36
Setting detail: THERAPIES SERIES
Discharge: HOME | End: 2021-10-18

## 2021-10-19 ENCOUNTER — HOSPITAL ENCOUNTER (OUTPATIENT)
Age: 36
Setting detail: SPECIMEN
Discharge: HOME | End: 2021-10-19
Payer: MEDICARE

## 2021-10-19 LAB
ABSOLUTE EOS #: 0.12 K/UL (ref 0–0.44)
ABSOLUTE IMMATURE GRANULOCYTE: <0.03 K/UL (ref 0–0.3)
ABSOLUTE LYMPH #: 4.15 K/UL (ref 1.1–3.7)
ABSOLUTE MONO #: 0.57 K/UL (ref 0.1–1.2)
ALBUMIN SERPL-MCNC: 3.9 G/DL (ref 3.5–5.2)
ALBUMIN/GLOBULIN RATIO: 1.3 (ref 1–2.5)
ALP BLD-CCNC: 71 U/L (ref 35–104)
ALT SERPL-CCNC: 6 U/L (ref 5–33)
ANION GAP SERPL CALCULATED.3IONS-SCNC: 16 MMOL/L (ref 9–17)
AST SERPL-CCNC: 16 U/L
BASOPHILS # BLD: 1 % (ref 0–2)
BASOPHILS ABSOLUTE: 0.06 K/UL (ref 0–0.2)
BILIRUB SERPL-MCNC: 0.3 MG/DL (ref 0.3–1.2)
BUN BLDV-MCNC: 3 MG/DL (ref 6–20)
BUN/CREAT BLD: ABNORMAL (ref 9–20)
C-REACTIVE PROTEIN: 8.9 MG/L (ref 0–5)
CALCIUM SERPL-MCNC: 8.9 MG/DL (ref 8.6–10.4)
CHLORIDE BLD-SCNC: 108 MMOL/L (ref 98–107)
CHOLESTEROL/HDL RATIO: 4
CHOLESTEROL: 124 MG/DL
CO2: 19 MMOL/L (ref 20–31)
CORTISOL COLLECTION INFO: NORMAL
CORTISOL: 13 UG/DL (ref 2.7–18.4)
CREAT SERPL-MCNC: 0.65 MG/DL (ref 0.5–0.9)
DIFFERENTIAL TYPE: ABNORMAL
EOSINOPHILS RELATIVE PERCENT: 2 % (ref 1–4)
FOLATE: 10.2 NG/ML
GFR AFRICAN AMERICAN: >60 ML/MIN
GFR NON-AFRICAN AMERICAN: >60 ML/MIN
GFR SERPL CREATININE-BSD FRML MDRD: ABNORMAL ML/MIN/{1.73_M2}
GFR SERPL CREATININE-BSD FRML MDRD: ABNORMAL ML/MIN/{1.73_M2}
GLUCOSE BLD-MCNC: 80 MG/DL (ref 70–99)
HCT VFR BLD CALC: 42.2 % (ref 36.3–47.1)
HDLC SERPL-MCNC: 31 MG/DL
HEMOGLOBIN: 12.7 G/DL (ref 11.9–15.1)
HOMOCYSTEINE: 8.2 UMOL/L
IMMATURE GRANULOCYTES: 0 %
IRON SATURATION: 16 % (ref 20–55)
IRON: 44 UG/DL (ref 37–145)
LDL CHOLESTEROL: 83 MG/DL (ref 0–130)
LYMPHOCYTES # BLD: 55 % (ref 24–43)
MCH RBC QN AUTO: 26.2 PG (ref 25.2–33.5)
MCHC RBC AUTO-ENTMCNC: 30.1 G/DL (ref 28.4–34.8)
MCV RBC AUTO: 87.2 FL (ref 82.6–102.9)
MONOCYTES # BLD: 8 % (ref 3–12)
NRBC AUTOMATED: 0 PER 100 WBC
PDW BLD-RTO: 17.1 % (ref 11.8–14.4)
PLATELET # BLD: 325 K/UL (ref 138–453)
PLATELET ESTIMATE: ABNORMAL
PMV BLD AUTO: 10.3 FL (ref 8.1–13.5)
POTASSIUM SERPL-SCNC: 3.9 MMOL/L (ref 3.7–5.3)
RBC # BLD: 4.84 M/UL (ref 3.95–5.11)
RBC # BLD: ABNORMAL 10*6/UL
SEDIMENTATION RATE, ERYTHROCYTE: 20 MM (ref 0–20)
SEG NEUTROPHILS: 34 % (ref 36–65)
SEGMENTED NEUTROPHILS ABSOLUTE COUNT: 2.56 K/UL (ref 1.5–8.1)
SODIUM BLD-SCNC: 143 MMOL/L (ref 135–144)
T3 FREE: 2.82 PG/ML (ref 2.02–4.43)
T3 TOTAL: 85 NG/DL (ref 60–181)
THYROXINE, FREE: 1.47 NG/DL (ref 0.93–1.7)
TOTAL IRON BINDING CAPACITY: 271 UG/DL (ref 250–450)
TOTAL PROTEIN: 6.8 G/DL (ref 6.4–8.3)
TRIGL SERPL-MCNC: 51 MG/DL
TSH SERPL DL<=0.05 MIU/L-ACNC: 0.7 MIU/L (ref 0.3–5)
UNSATURATED IRON BINDING CAPACITY: 227 UG/DL (ref 112–347)
VITAMIN B-12: 922 PG/ML (ref 232–1245)
VITAMIN D 25-HYDROXY: 33.9 NG/ML (ref 30–100)
VLDLC SERPL CALC-MCNC: ABNORMAL MG/DL (ref 1–30)
WBC # BLD: 7.5 K/UL (ref 3.5–11.3)
WBC # BLD: ABNORMAL 10*3/UL

## 2021-10-20 ENCOUNTER — HOSPITAL ENCOUNTER (OUTPATIENT)
Dept: PSYCHIATRY | Age: 36
Setting detail: THERAPIES SERIES
Discharge: HOME | End: 2021-10-20
Payer: MEDICARE

## 2021-10-20 LAB
ANTI DNA DOUBLE STRANDED: 8.8 IU/ML
ANTI-NUCLEAR ANTIBODY (ANA): NEGATIVE
DHEAS (DHEA SULFATE): 153 UG/DL (ref 45–270)
ENA ANTIBODIES SCREEN: 0.4 U/ML
ESTIMATED AVERAGE GLUCOSE: 97 MG/DL
HBA1C MFR BLD: 5 % (ref 4–6)

## 2021-10-20 PROCEDURE — 90837 PSYTX W PT 60 MINUTES: CPT | Performed by: SOCIAL WORKER

## 2021-10-21 NOTE — PSYCHOTHERAPY
TELEHEALTH EVALUATION -- Audio/Visual (During AUPSN-01 public health emergency)     [address] [facility] Therapy Note  RADHA Leyva   10/20/21  2:00 PM  Adalid Kolb  1985  [id number]    Patient location is at their home address. Location of clinician is at [facility] located at [address] [place] [address]. Time spent with Patient: 60 minutes      Pt was provided informed consent for the [address] [facility]. Discussed with patient model of service to include the limits of confidentiality (i.e. abuse reporting, suicide intervention, etc.) and short-term intervention focused approach. Pt indicated understanding. S:  Therapist and pt met for session via telehealth. Pt and therapist discussed pt's involvement in therapy and therapist praised pt for attending this session and logging on for session a few days earlier, apologizing that therapist had to cancel. Pt and therapist processed pt's anhedonia and her frustrations that she has not been many places lately due to heart palpitations when leaving the house. Pt reports that leaving the house now makes her nervous because she is worried about feeling sick and fidgety. Pt reports she feels \"doom and gloom\" and \"weighed down\" when out of the house. Pt reports that her mind is often racing. Therapist and pt explored her thoughts which pt identified as being about the loss of her children, the reality of her life, and her health. Pt often shuts down because she can't get things done. Pt reports more bad days than good days and that a \"good day\" is when she does not feel sick. Pt also reports that on her good days she feels low because she knows it is \"the calm before the storm. \"  Pt reports her days are dictated by how she feels physically. Therapist and pt processed this and therapist validated pt's feelings of frustration and offered emotional support.       Pt and therapist discussed her health, as pt feels \"there is no room for her mental health\" because her physical health is so complicated currently. Pt and therapist discussed getting adequate nutrition as pt is often tired. Pt reports she eats 1-2 meals per day but feels like she is slowly getting her appetite back. Pt reports she feels like her brain and body aren't in sync and she is \"forgetful, in a fog, and often irritated. \"  Pt reports she feels like she is losing time. Therapist used motivational interviewing to assess where pt is at in terms of readiness for change and her reasons for getting up each day. Pt reports that it is a higher being that blesses her to wake up and she is not sure what her purpose is. Therapist assisted pt in focusing on that hope of their being a reason. Pt denied any suicidal ideation. Pt and therapist problem solved to help pt achieve one task this week. Pt reports that she wants to get back on track to getting her gallbladder removed. Pt and therapist discussed steps to do that and assisted pt in finding a list of providers in her area that are in network with her insurance. Pt will call doctor a call this week. Therapist encouraged pt to use time when she is feeling good to do this. Therapist also encouraged pt to do things that she enjoys even if the enjoyment doesn't last long.         O:  MSE:     Appearance    alert, cooperative, mild distress  Appetite abnormal: digestive issues   Sleep disturbance Yes  Fatigue Yes  Loss of pleasure Yes  Impulsive behavior No  Speech    spontaneous, normal rate and normal volume  Mood    Depressed  Affect    depressed affect  Thought Content    hopelessness and helplessness  Thought Process    linear, goal directed and coherent  Associations    logical connections  Insight    Fair  Judgment    Intact  Orientation    oriented to person, place, time, and general circumstances  Memory    recent and remote memory intact  Attention/Concentration    intact  Morbid ideation No  Suicide Assessment    no suicidal ideation    A:  Pt presented to session and was engaged in discussion. Pt was not defensive and open to techniques and conversation topics given by therapist.  Pt appeared willing to change, but is struggling a lot with her physical health which is a barrier to change. Pt did identify concrete steps she can take to begin addressing some of her health concerns and did get blood tests done that she had been putting off. Pt has been in attendance to two sessions in a row. Pt is showing progress in reducing her own guilt over her children's deaths, but is still grieving. Pt is unable to successfully address her mental health concerns as her physical health is taking priority. Therapist and pt are working on coping with physical health concerns and completing tasks.           Visit Diagnosis:   PTSD,   Major Depressive Disorder, recurrent, severe,      History from Medical Record:        Diagnosis Date    Anxiety     Asthma     GERD (gastroesophageal reflux disease)     Hypothyroidism 5/3/2021    Major depression 4/30/2013    Morbid obesity 9/15/2017    Tension type headache 4/30/2013     Medications:   Current Outpatient Medications   Medication Sig Dispense Refill    pantoprazole (PROTONIX) 20 MG tablet Take 2 tablets by mouth daily for 7 days 14 tablet 0    dicyclomine (BENTYL) 10 MG capsule Take 2 capsules by mouth every 6 hours as needed (cramps) 20 capsule 0    ondansetron (ZOFRAN) 4 MG tablet Take 1 tablet by mouth every 8 hours as needed for Nausea (Patient not taking: Reported on 7/26/2021) 20 tablet 0    sodium chloride (ALTAMIST SPRAY) 0.65 % nasal spray 1 spray by Nasal route as needed for Congestion (Patient not taking: Reported on 7/26/2021) 1 Bottle 3    sertraline (ZOLOFT) 50 MG tablet Take 1 tablet by mouth daily 35 tablet 2    Ferrous Gluconate (IRON) 240 (27 Fe) MG TABS Take 1 tablet by mouth daily (Patient not taking: Reported on 7/26/2021) 30 tablet 3    ferrous sulfate (FE TABS) 325 (65 Fe) MG EC tablet Take 1 tablet by mouth daily (with breakfast) (Patient not taking: Reported on 7/26/2021) 90 tablet 3    ibuprofen (ADVIL;MOTRIN) 600 MG tablet Take 1 tablet by mouth every 6 hours as needed for Pain (Patient not taking: Reported on 7/26/2021) 120 tablet 0    docusate sodium (COLACE) 100 MG capsule Take 1 capsule by mouth 2 times daily as needed for Constipation (Patient not taking: Reported on 7/26/2021) 60 capsule 1    docusate sodium (COLACE, DULCOLAX) 100 MG CAPS Take 100 mg by mouth daily (Patient not taking: Reported on 7/26/2021) 30 capsule 0    Prenatal Vit-Fe Fumarate-FA (PRENATAL VITAMIN) 27-0.8 MG TABS Take 1 tablet by mouth daily (Patient not taking: Reported on 7/19/2021) 30 tablet 12    oxymetazoline (12 HOUR NASAL SPRAY) 0.05 % nasal spray 2 sprays by Nasal route 2 times daily (Patient not taking: Reported on 7/26/2021) 1 Bottle 3    acetaminophen (TYLENOL) 500 MG tablet Take 1 tablet by mouth every 6 hours as needed for Pain (Patient not taking: Reported on 7/26/2021) 40 tablet 0    Cholecalciferol (VITAMIN D3) 1000 units TABS Take 1 tablet by mouth daily 90 tablet 1     No current facility-administered medications for this encounter.        Social History:   Social History     Socioeconomic History    Marital status: Single     Spouse name: Not on file    Number of children: Not on file    Years of education: Not on file    Highest education level: Not on file   Occupational History    Not on file   Tobacco Use    Smoking status: Current Some Day Smoker     Packs/day: 1.00     Types: Cigars    Smokeless tobacco: Never Used    Tobacco comment: Black and Mild   Vaping Use    Vaping Use: Never used   Substance and Sexual Activity    Alcohol use: No    Drug use: Never    Sexual activity: Yes     Partners: Male   Other Topics Concern    Not on file   Social History Narrative    Not on file     Social Determinants of Health     Financial Resource Strain:     Difficulty of Paying Living Expenses:    Food Insecurity:     Worried About Running Out of Food in the Last Year:     [address] in the Last Year:    Transportation Needs:     Lack of Transportation (Medical):  Lack of Transportation (Non-Medical):    Physical Activity:     Days of Exercise per Week:     Minutes of Exercise per Session:    Stress:     Feeling of Stress :    Social Connections:     Frequency of Communication with Friends and Family:     Frequency of Social Gatherings with Friends and Family:     Attends Hinduism Services:     Active Member of Clubs or Organizations:     Attends Club or Organization Meetings:     Marital Status:    Intimate Partner Violence:     Fear of Current or Ex-Partner:     Emotionally Abused:     Physically Abused:     Sexually Abused:        TOBACCO:   reports that she has been smoking cigars. She has been smoking about 1.00 pack per day. She has never used smokeless tobacco.  ETOH:   reports no history of alcohol use. Family History:   Family History   Problem Relation Age of Onset    Depression Father     Thyroid Disease Father     Diabetes Sister            Pt interventions:  Provided education, Established rapport, Conducted functional assessment and Supportive techniques, problem solving, supportive techniques         PLAN:   Review tasks of conducting gastrointestinal doctors   Behavioral activation       Patient scheduled to return on:       Were changes or additions made to the treatment plan today?   YES []   NO [x]  Noted changes:                Pursuant to the emergency declaration under the [facility] waiver authority and the [employer] Act, this Virtual Visit was conducted, with patient's consent, to reduce the patient's risk of exposure to COVID-19 and provide continuity of care for an established patient. Services were provided through a video synchronous discussion virtually to substitute for in-person clinic visit.

## 2021-10-23 LAB — PREGNENOLONE: 90 NG/DL (ref 15–132)

## 2021-10-24 LAB — T3 REVERSE: 26 NG/DL (ref 9–27)

## 2021-11-01 ENCOUNTER — HOSPITAL ENCOUNTER (OUTPATIENT)
Dept: PSYCHIATRY | Age: 36
Setting detail: THERAPIES SERIES
Discharge: HOME | End: 2021-11-01
Payer: MEDICARE

## 2021-11-01 PROCEDURE — 90837 PSYTX W PT 60 MINUTES: CPT | Performed by: SOCIAL WORKER
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
## 2021-11-15 ENCOUNTER — HOSPITAL ENCOUNTER (OUTPATIENT)
Dept: PSYCHIATRY | Age: 36
Setting detail: THERAPIES SERIES
Discharge: HOME | End: 2021-11-15
Payer: MEDICARE

## 2021-11-15 PROCEDURE — 90837 PSYTX W PT 60 MINUTES: CPT | Performed by: SOCIAL WORKER

## 2021-11-15 NOTE — PSYCHOTHERAPY
TELEHEALTH EVALUATION -- Audio/Visual (During LBCOC-99 public health emergency)     [facility] Therapy Note  [place], SADIAABHILASH   11/15/21  1:00 PM  Gabriel Ratliff  1985  [id number]    Patient location is at their home address. Location of clinician is at [address] [place] [address] located at [address] [place] [address]. Time spent with Patient: 60 minutes      Pt was provided informed consent for the [facility]. Discussed with patient model of service to include the limits of confidentiality (i.e. abuse reporting, suicide intervention, etc.) and short-term intervention focused approach. Pt indicated understanding. S:  Pt presented to therapy session via telehealth. Pt and therapist processed pt's last two weeks. Therapist offered emotional support and used active listening to help pt process emotions and experiences. Therapist helped pt identify and validate her emotions and experiences. Pt reports that her physical health feels like it has been getting worse and that she had \"nothing good\" to report about the week. Pt reports that she had her virtual appt with a GI and is working on getting seen in person. Pt will be scheduling a few tests as well. Pt processed stress and feeling overwhelmed by her health problems and fears over possible surgeries and complications. Pt reports that she will do whatever it takes to feel better. Pt and therapist also discussed pt's fatigue and feelings of exhaustion. Pt reports that everything she does takes energy and that she finds it hard to complete daily tasks. Therapist and pt identified maladaptive thinking patterns and therapist led pt in reframing negative thoughts to reduce thinking badly about herself. Therapist and pt explored other thoughts and pt reports that she feels both angry at herself and God and has a lot of guilt and blame towards herself for not \"doing enough\" for her babies. Therapist and pt also discussed therapy progress. Pt reports she likes how things are going and denied therapist needed to be doing anything differently. Pt reports that she wants to come in person but struggles with energy. Therapist and pt problem solved and pt reports she will decide the day of each appt if she thinks she has the energy to come in person. Therapist and pt also discussed pt's therapy needs and pt is going to think about whether she needs to adjust her treatment plan given how tired she is all the time. Pt wants to figure out her medical needs and believes that will help with her mental vic needs. O:  MSE:     Appearance    alert, crying, mild distress  Appetite abnormal: GI issues   Sleep disturbance Yes  Fatigue Yes  Loss of pleasure Yes  Impulsive behavior No  Speech    normal rate, normal volume and well articulated  Mood    Guilty  Depressed  Shame  Affect    depressed affect  Thought Content    helplessness and excessive guilt  Thought Process    rapid and overabundance of ideas  Associations    logical connections  Insight    Good  Judgment    Intact  Orientation    oriented to person, place, time, and general circumstances  Memory    recent and remote memory intact  Attention/Concentration    intact  Morbid ideation No  Suicide Assessment    no suicidal ideation    A:    Pt presented to therapy as depressed and tired, but participated in discussions. Pt was engaged in discussions and vented, expressing her emotions and experiences appropriately. Pt was not resistant to interventions and when asked for feedback pt reports that therapy is going well. Pt expressed appreciation for being able to stay in therapy despite missing some appointments. Pt appears exhausted due to physical health and will be considering what she wants to/is able to work on in therapy.   Therapist and pt may put off trauma work until her health is managed while developing coping skills and building resources.           Visit Diagnosis:   PTSD  Major Depressive Disorder, recurrent, severe,      History from Medical Record:        Diagnosis Date    Anxiety     Asthma     GERD (gastroesophageal reflux disease)     Hypothyroidism 5/3/2021    Major depression 4/30/2013    Morbid obesity 9/15/2017    Tension type headache 4/30/2013     Medications:   Current Outpatient Medications   Medication Sig Dispense Refill    pantoprazole (PROTONIX) 20 MG tablet Take 2 tablets by mouth daily for 7 days 14 tablet 0    dicyclomine (BENTYL) 10 MG capsule Take 2 capsules by mouth every 6 hours as needed (cramps) 20 capsule 0    ondansetron (ZOFRAN) 4 MG tablet Take 1 tablet by mouth every 8 hours as needed for Nausea (Patient not taking: Reported on 7/26/2021) 20 tablet 0    sodium chloride (ALTAMIST SPRAY) 0.65 % nasal spray 1 spray by Nasal route as needed for Congestion (Patient not taking: Reported on 7/26/2021) 1 Bottle 3    sertraline (ZOLOFT) 50 MG tablet Take 1 tablet by mouth daily 35 tablet 2    Ferrous Gluconate (IRON) 240 (27 Fe) MG TABS Take 1 tablet by mouth daily (Patient not taking: Reported on 7/26/2021) 30 tablet 3    ferrous sulfate (FE TABS) 325 (65 Fe) MG EC tablet Take 1 tablet by mouth daily (with breakfast) (Patient not taking: Reported on 7/26/2021) 90 tablet 3    ibuprofen (ADVIL;MOTRIN) 600 MG tablet Take 1 tablet by mouth every 6 hours as needed for Pain (Patient not taking: Reported on 7/26/2021) 120 tablet 0    docusate sodium (COLACE) 100 MG capsule Take 1 capsule by mouth 2 times daily as needed for Constipation (Patient not taking: Reported on 7/26/2021) 60 capsule 1    docusate sodium (COLACE, DULCOLAX) 100 MG CAPS Take 100 mg by mouth daily (Patient not taking: Reported on 7/26/2021) 30 capsule 0    Prenatal Vit-Fe Fumarate-FA (PRENATAL VITAMIN) 27-0.8 MG TABS Take 1 tablet by mouth daily (Patient not taking: Reported on 7/19/2021) 30 tablet 12    oxymetazoline (12 HOUR NASAL SPRAY) 0.05 % nasal spray 2 sprays by Nasal route 2 times daily (Patient not taking: Reported on 7/26/2021) 1 Bottle 3    acetaminophen (TYLENOL) 500 MG tablet Take 1 tablet by mouth every 6 hours as needed for Pain (Patient not taking: Reported on 7/26/2021) 40 tablet 0    Cholecalciferol (VITAMIN D3) 1000 units TABS Take 1 tablet by mouth daily 90 tablet 1     No current facility-administered medications for this encounter. Social History:   Social History     Socioeconomic History    Marital status: Single     Spouse name: Not on file    Number of children: Not on file    Years of education: Not on file    Highest education level: Not on file   Occupational History    Not on file   Tobacco Use    Smoking status: Current Some Day Smoker     Packs/day: 1.00     Types: Cigars    Smokeless tobacco: Never Used    Tobacco comment: Black and Mild   Vaping Use    Vaping Use: Never used   Substance and Sexual Activity    Alcohol use: No    Drug use: Never    Sexual activity: Yes     Partners: Male   Other Topics Concern    Not on file   Social History Narrative    Not on file     Social Determinants of Health     Financial Resource Strain:     Difficulty of Paying Living Expenses: Not on file   Food Insecurity:     Worried About Running Out of Food in the Last Year: Not on file    Kay of Food in the Last Year: Not on file   Transportation Needs:     Lack of Transportation (Medical): Not on file    Lack of Transportation (Non-Medical):  Not on file   Physical Activity:     Days of Exercise per Week: Not on file    Minutes of Exercise per Session: Not on file   Stress:     Feeling of Stress : Not on file   Social Connections:     Frequency of Communication with Friends and Family: Not on file    Frequency of Social Gatherings with Friends and Family: Not on file    Attends [employer] Services: Not on file    Active Member of Clubs or Organizations: Not on file    Attends Club or Organization Meetings: Not on file    Marital Status: Not on file   Intimate Partner Violence:     Fear of Current or Ex-Partner: Not on file    Emotionally Abused: Not on file    Physically Abused: Not on file    Sexually Abused: Not on file   Housing Stability:     Unable to Pay for Housing in the Last Year: Not on file    Number of [place] in the Last Year: Not on file    Unstable Housing in the Last Year: Not on file       TOBACCO:   reports that she has been smoking cigars. She has been smoking about 1.00 pack per day. She has never used smokeless tobacco.  ETOH:   reports no history of alcohol use. Family History:   Family History   Problem Relation Age of Onset    Depression Father     Thyroid Disease Father     Diabetes Sister            Pt interventions:  Discussed potential treatments for  depression and Trauma, Provided education, Discussed self-care (sleep, nutrition, rewarding activities, social support, exercise), Provided education on PTSD symptoms and treatment options for evidence-based treatment (Cognitive Processing Therapy and Prolonged Exposure), Established rapport, Supportive techniques and Identified maladaptive thoughts        PLAN:   Review treatment plan  In person if pt can come in  Coping skills  Art       Patient scheduled to return on:   Monday 11/22/21 at 1 PM     Were changes or additions made to the treatment plan today? YES []   NO [x]  Noted changes:                Pursuant to the emergency declaration under the [facility], [facility] waiver authority and the [employer] Act, this Virtual Visit was conducted, with patient's consent, to reduce the patient's risk of exposure to COVID-19 and provide continuity of care for an established patient. Services were provided through a video synchronous discussion virtually to substitute for in-person clinic visit.

## 2021-11-22 ENCOUNTER — HOSPITAL ENCOUNTER (OUTPATIENT)
Dept: PSYCHIATRY | Age: 36
Setting detail: THERAPIES SERIES
Discharge: HOME | End: 2021-11-22
Payer: MEDICARE

## 2021-11-22 PROCEDURE — 90837 PSYTX W PT 60 MINUTES: CPT | Performed by: SOCIAL WORKER

## 2021-11-23 NOTE — PSYCHOTHERAPY
TELEHEALTH EVALUATION -- Audio/Visual (During AWYEC-46 public health emergency)     [facility] Therapy Note  Ramy Mccollum RADHA   11/22/21  1:00 PM  Devin Moon  1985  [id number]    Patient location is at their home address. Location of clinician is at [facility] located at [address] [place] [address]. Time spent with Patient: 60 minutes      Pt was provided informed consent for the [facility]. Discussed with patient model of service to include the limits of confidentiality (i.e. abuse reporting, suicide intervention, etc.) and short-term intervention focused approach. Pt indicated understanding. S:    Pt presented to therapy session as tired and slightly incoherent but pt reported she wanted to continue the session. Pt woke up as session progressed and became more engaged. Pt reported that she is still had not been sleeping well and that her sleep patterns are \"messed up. \"  Therapist and pt processed how pt's inconsistent sleep schedules make it difficult for her to schedule appointments with doctors. Therapist and pt discussed pt completing a sleep study to rule out anything physical or other sleep disorders. Pt reported she'd had some tests done that point to possible narcolepsy. Pt and therapist processed pt's emotions and frustrations and problem solved ways to utilize pt's energy to complete tasks necessary for bettering her health. Pt and therapist discussed her treatment plan and pt reported she thinks she has the energy to attend a session every week and work on her trauma and grief. Pt and therapist discussed prioritizing attending doctor's appointments as her physical health is currently preventing her from addressing her mental health. Pt reports she feels there is a wall up between herself and her mental health because she always feels so sick.   Pt wants to continue coming to therapy while she addresses her physical needs.  Therapist empowered pt to reach out to her doctors and schedule appointments to begin to get her health under control. Pt reports that she \"has to do the footwork\" and needs \"to do something. \"  Pt to reach out to her doctors before next week. Pt will begin to have 3 PM appointments on Thursdays beginning December 16th.      O:  MSE:     Appearance    alert, cooperative  Appetite abnormal: GI issues   Sleep disturbance Yes  Fatigue Yes  Loss of pleasure Yes  Impulsive behavior No  Speech    slow and whispered  Mood    Depressed  Affect    depressed affect  Thought Content    hopelessness  Thought Process    slow  Associations    logical connections  Insight    Good  Judgment    Intact  Orientation    oriented to person, place, time, and general circumstances  Memory    recent and remote memory intact  Attention/Concentration    intact  Morbid ideation No  Suicide Assessment    no suicidal ideation    A:    Pt presented to therapy as tired and slightly confused. Pt became more alert as the session progressed and denied the need to reschedule. Pt was able to identify that she needs to be staying on top of calling her doctors and get in to see them so she can begin to address her physical health. Pt and therapist have been processing the same health issues nearly every week in therapy and pt has identified that she needs to [facility] a change. \"  Pt appeared more empowered towards end of session after being emotionally validated and encouraged to put forth more effort. Pt's sleep is likely caused by a mixture of physical and mental health struggles. Pt possibly has narcolepsy.         Visit Diagnosis:   PTSD,   Major Depressive Disorder, recurrent, severe,      History from Medical Record:        Diagnosis Date    Anxiety     Asthma     GERD (gastroesophageal reflux disease)     Hypothyroidism 5/3/2021    Major depression 4/30/2013    Morbid obesity 9/15/2017    Tension type headache 4/30/2013 Medications:   Current Outpatient Medications   Medication Sig Dispense Refill    pantoprazole (PROTONIX) 20 MG tablet Take 2 tablets by mouth daily for 7 days 14 tablet 0    dicyclomine (BENTYL) 10 MG capsule Take 2 capsules by mouth every 6 hours as needed (cramps) 20 capsule 0    ondansetron (ZOFRAN) 4 MG tablet Take 1 tablet by mouth every 8 hours as needed for Nausea (Patient not taking: Reported on 7/26/2021) 20 tablet 0    sodium chloride (ALTAMIST SPRAY) 0.65 % nasal spray 1 spray by Nasal route as needed for Congestion (Patient not taking: Reported on 7/26/2021) 1 Bottle 3    sertraline (ZOLOFT) 50 MG tablet Take 1 tablet by mouth daily 35 tablet 2    Ferrous Gluconate (IRON) 240 (27 Fe) MG TABS Take 1 tablet by mouth daily (Patient not taking: Reported on 7/26/2021) 30 tablet 3    ferrous sulfate (FE TABS) 325 (65 Fe) MG EC tablet Take 1 tablet by mouth daily (with breakfast) (Patient not taking: Reported on 7/26/2021) 90 tablet 3    ibuprofen (ADVIL;MOTRIN) 600 MG tablet Take 1 tablet by mouth every 6 hours as needed for Pain (Patient not taking: Reported on 7/26/2021) 120 tablet 0    docusate sodium (COLACE) 100 MG capsule Take 1 capsule by mouth 2 times daily as needed for Constipation (Patient not taking: Reported on 7/26/2021) 60 capsule 1    docusate sodium (COLACE, DULCOLAX) 100 MG CAPS Take 100 mg by mouth daily (Patient not taking: Reported on 7/26/2021) 30 capsule 0    Prenatal Vit-Fe Fumarate-FA (PRENATAL VITAMIN) 27-0.8 MG TABS Take 1 tablet by mouth daily (Patient not taking: Reported on 7/19/2021) 30 tablet 12    oxymetazoline (12 HOUR NASAL SPRAY) 0.05 % nasal spray 2 sprays by Nasal route 2 times daily (Patient not taking: Reported on 7/26/2021) 1 Bottle 3    acetaminophen (TYLENOL) 500 MG tablet Take 1 tablet by mouth every 6 hours as needed for Pain (Patient not taking: Reported on 7/26/2021) 40 tablet 0    Cholecalciferol (VITAMIN D3) 1000 units TABS Take 1 tablet by mouth daily 90 tablet 1     No current facility-administered medications for this encounter. Social History:   Social History     Socioeconomic History    Marital status: Single     Spouse name: Not on file    Number of children: Not on file    Years of education: Not on file    Highest education level: Not on file   Occupational History    Not on file   Tobacco Use    Smoking status: Current Some Day Smoker     Packs/day: 1.00     Types: Cigars    Smokeless tobacco: Never Used    Tobacco comment: Black and Mild   Vaping Use    Vaping Use: Never used   Substance and Sexual Activity    Alcohol use: No    Drug use: Never    Sexual activity: Yes     Partners: Male   Other Topics Concern    Not on file   Social History Narrative    Not on file     Social Determinants of Health     Financial Resource Strain:     Difficulty of Paying Living Expenses: Not on file   Food Insecurity:     Worried About Running Out of Food in the Last Year: Not on file    Kay of Food in the Last Year: Not on file   Transportation Needs:     Lack of Transportation (Medical): Not on file    Lack of Transportation (Non-Medical):  Not on file   Physical Activity:     Days of Exercise per Week: Not on file    Minutes of Exercise per Session: Not on file   Stress:     Feeling of Stress : Not on file   Social Connections:     Frequency of Communication with Friends and Family: Not on file    Frequency of Social Gatherings with Friends and Family: Not on file    Attends Jehovah's witness Services: Not on file    Active Member of Clubs or Organizations: Not on file    Attends Club or Organization Meetings: Not on file    Marital Status: Not on file   Intimate Partner Violence:     Fear of Current or Ex-Partner: Not on file    Emotionally Abused: Not on file    Physically Abused: Not on file    Sexually Abused: Not on file   Housing Stability:     Unable to Pay for Housing in the Last Year: Not on file    Number of

## 2021-11-29 ENCOUNTER — HOSPITAL ENCOUNTER (OUTPATIENT)
Age: 36
Setting detail: SPECIMEN
Discharge: HOME | End: 2021-11-29

## 2021-11-29 ENCOUNTER — HOSPITAL ENCOUNTER (OUTPATIENT)
Dept: PSYCHIATRY | Age: 36
Setting detail: THERAPIES SERIES
Discharge: HOME | End: 2021-11-29
Payer: MEDICARE

## 2021-11-29 LAB
-: ABNORMAL
ABSOLUTE EOS #: 0.17 K/UL (ref 0–0.44)
ABSOLUTE IMMATURE GRANULOCYTE: <0.03 K/UL (ref 0–0.3)
ABSOLUTE LYMPH #: 4.64 K/UL (ref 1.1–3.7)
ABSOLUTE MONO #: 0.59 K/UL (ref 0.1–1.2)
AMORPHOUS: ABNORMAL
BACTERIA: ABNORMAL
BASOPHILS # BLD: 1 % (ref 0–2)
BASOPHILS ABSOLUTE: 0.06 K/UL (ref 0–0.2)
BILIRUBIN URINE: NEGATIVE
C-REACTIVE PROTEIN: 19.1 MG/L (ref 0–5)
CASTS UA: ABNORMAL /LPF (ref 0–2)
COLOR: YELLOW
COMMENT UA: ABNORMAL
CRYSTALS, UA: ABNORMAL /HPF
DIFFERENTIAL TYPE: ABNORMAL
EOSINOPHILS RELATIVE PERCENT: 2 % (ref 1–4)
EPITHELIAL CELLS UA: ABNORMAL /HPF (ref 0–5)
FOLATE: 10.2 NG/ML
GLUCOSE URINE: NEGATIVE
HCT VFR BLD CALC: 38.7 % (ref 36.3–47.1)
HEMOGLOBIN: 12.3 G/DL (ref 11.9–15.1)
IMMATURE GRANULOCYTES: 0 %
IRON SATURATION: 15 % (ref 20–55)
IRON: 37 UG/DL (ref 37–145)
KETONES, URINE: NEGATIVE
LEUKOCYTE ESTERASE, URINE: ABNORMAL
LYMPHOCYTES # BLD: 47 % (ref 24–43)
MCH RBC QN AUTO: 27.1 PG (ref 25.2–33.5)
MCHC RBC AUTO-ENTMCNC: 31.8 G/DL (ref 28.4–34.8)
MCV RBC AUTO: 85.2 FL (ref 82.6–102.9)
MONOCYTES # BLD: 6 % (ref 3–12)
MUCUS: ABNORMAL
NITRITE, URINE: NEGATIVE
NRBC AUTOMATED: 0 PER 100 WBC
OTHER OBSERVATIONS UA: ABNORMAL
PDW BLD-RTO: 15.4 % (ref 11.8–14.4)
PH UA: 6 (ref 5–8)
PLATELET # BLD: 270 K/UL (ref 138–453)
PLATELET ESTIMATE: ABNORMAL
PMV BLD AUTO: 10.1 FL (ref 8.1–13.5)
PROTEIN UA: NEGATIVE
RBC # BLD: 4.54 M/UL (ref 3.95–5.11)
RBC # BLD: ABNORMAL 10*6/UL
RBC UA: ABNORMAL /HPF (ref 0–2)
RENAL EPITHELIAL, UA: ABNORMAL /HPF
SEDIMENTATION RATE, ERYTHROCYTE: 30 MM (ref 0–20)
SEG NEUTROPHILS: 44 % (ref 36–65)
SEGMENTED NEUTROPHILS ABSOLUTE COUNT: 4.27 K/UL (ref 1.5–8.1)
SPECIFIC GRAVITY UA: 1.02 (ref 1–1.03)
T3 FREE: 2.56 PG/ML (ref 2.02–4.43)
THYROXINE, FREE: 1.2 NG/DL (ref 0.93–1.7)
TOTAL IRON BINDING CAPACITY: 252 UG/DL (ref 250–450)
TRICHOMONAS: ABNORMAL
TSH SERPL DL<=0.05 MIU/L-ACNC: 1.33 MIU/L (ref 0.3–5)
TURBIDITY: CLEAR
UNSATURATED IRON BINDING CAPACITY: 215 UG/DL (ref 112–347)
URINE HGB: NEGATIVE
UROBILINOGEN, URINE: NORMAL
VITAMIN B-12: 278 PG/ML (ref 232–1245)
VITAMIN D 25-HYDROXY: 29.3 NG/ML (ref 30–100)
WBC # BLD: 9.8 K/UL (ref 3.5–11.3)
WBC # BLD: ABNORMAL 10*3/UL
WBC UA: ABNORMAL /HPF (ref 0–5)
YEAST: ABNORMAL

## 2021-11-29 PROCEDURE — 90837 PSYTX W PT 60 MINUTES: CPT | Performed by: SOCIAL WORKER

## 2021-11-29 NOTE — PSYCHOTHERAPY
TELEHEALTH EVALUATION -- Audio/Visual (During QRQQJ-19 public health emergency)     [facility] Therapy Note  RADHA Stone   11/29/2021  1:00 PM  Pipe Sanchez  1985  [id number]    Patient location is at their home address. Location of clinician is at [facility] located at [address] [place] [address]. Time spent with Patient: 60 minutes      Pt was provided informed consent for the [facility]. Discussed with patient model of service to include the limits of confidentiality (i.e. abuse reporting, suicide intervention, etc.) and short-term intervention focused approach. Pt indicated understanding. S:    Pt and therapist engaged in a check in and pt reports that she feels an \"inch better. \"  Therapist empowered pt to feel good about feeling even a little bit better. Pt and therapist explored what was better and pt reports she was able to go outside for a little bit and that she was able to concentrate on a movie. Pt and therapist processed her last week and pt reports that she was feeling somewhat energized today but that her sleep schedule was still off. Therapist and pt explored what happens when pt tries to sleep. Pt reports she gets racing thoughts and that is when a lot of her memories of her trauma come out. Therapist offered psychoeducation as to why anxious thoughts and trauma symptoms occur at night. Therapist and pt began to process pt's experiences in losing her two babies and explored her thoughts and feelings. Pt expressed some guilt and self-blame at herself for not knowing she was pregnant with her first.  Therapist used CBT techniques to help pt identify and combat maladaptive thinking patterns. Pt and therapist processed her thoughts and pt was able to identify that she is still upset with some of her doctor's for not thinking to look at pregnancy as an explanation for her symptoms.   Pt and therapist processed some of pt's thoughts about her second pregnancy as well, in which she is angry at the hospital for not doing enough. Therapist and pt processed her emotions and thoughts at having \"what ifs\" and \"unsanswered questions. \"  Pt also identified that she felt \"cheated and robbed\" because she'd always wanted a family and feels it was taken from her. Therapist and pt discussed working on thoughts and emotions to help her get on the other side of the grief. Pt identified that she would not change it so she was never pregnant at all, indicating that she is moving through the grief process. Pt and therapist also discussed meditation or relaxation techniques to utilize at night to help with sleep. Pt did not want to practice in session, and therapist sent pt a progressive muscle relaxation to try at home. O:  MSE:     Appearance    alert, cooperative  Appetite abnormal: GI issues   Sleep disturbance Yes  Fatigue Yes  Loss of pleasure Yes  Impulsive behavior No  Speech    normal rate, normal volume and well articulated  Mood    Depressed  Affect    depressed affect  Thought Content    intact  Thought Process    linear, goal directed and coherent  Associations    logical connections  Insight    Good  Judgment    Intact  Orientation    oriented to person, place, time, and general circumstances  Memory    recent and remote memory intact  Attention/Concentration    intact  Morbid ideation No  Suicide Assessment    no suicidal ideation    A:  Pt presented to therapy as tired, but appeared more energetic than usual.  Pt was able to identify some positives from her week and displayed some hope in working through her grief. Pt identified that she would not change her circumstances so that she never got pregnant, which shows some level of acceptance and that she is working through her grief.   Pt is displaying a dedication to therapy by attending her appts even when she is tired and has begun to explore there thoughts surrounding her losses and process them. Pt is experiencing PTSD symptoms mostly at night and will try progressive muscle relaxation before bed.           Visit Diagnosis:   PTSD  Major Depressive Disorder Severe       History from Medical Record:        Diagnosis Date    Anxiety     Asthma     GERD (gastroesophageal reflux disease)     Hypothyroidism 5/3/2021    Major depression 4/30/2013    Morbid obesity 9/15/2017    Tension type headache 4/30/2013     Medications:   Current Outpatient Medications   Medication Sig Dispense Refill    pantoprazole (PROTONIX) 20 MG tablet Take 2 tablets by mouth daily for 7 days 14 tablet 0    dicyclomine (BENTYL) 10 MG capsule Take 2 capsules by mouth every 6 hours as needed (cramps) 20 capsule 0    ondansetron (ZOFRAN) 4 MG tablet Take 1 tablet by mouth every 8 hours as needed for Nausea (Patient not taking: Reported on 7/26/2021) 20 tablet 0    sodium chloride (ALTAMIST SPRAY) 0.65 % nasal spray 1 spray by Nasal route as needed for Congestion (Patient not taking: Reported on 7/26/2021) 1 Bottle 3    sertraline (ZOLOFT) 50 MG tablet Take 1 tablet by mouth daily 35 tablet 2    Ferrous Gluconate (IRON) 240 (27 Fe) MG TABS Take 1 tablet by mouth daily (Patient not taking: Reported on 7/26/2021) 30 tablet 3    ferrous sulfate (FE TABS) 325 (65 Fe) MG EC tablet Take 1 tablet by mouth daily (with breakfast) (Patient not taking: Reported on 7/26/2021) 90 tablet 3    ibuprofen (ADVIL;MOTRIN) 600 MG tablet Take 1 tablet by mouth every 6 hours as needed for Pain (Patient not taking: Reported on 7/26/2021) 120 tablet 0    docusate sodium (COLACE) 100 MG capsule Take 1 capsule by mouth 2 times daily as needed for Constipation (Patient not taking: Reported on 7/26/2021) 60 capsule 1    docusate sodium (COLACE, DULCOLAX) 100 MG CAPS Take 100 mg by mouth daily (Patient not taking: Reported on 7/26/2021) 30 capsule 0    Prenatal Vit-Fe Fumarate-FA (PRENATAL VITAMIN) 27-0.8 MG TABS Take 1 tablet by mouth daily (Patient not taking: Reported on 7/19/2021) 30 tablet 12    oxymetazoline (12 HOUR NASAL SPRAY) 0.05 % nasal spray 2 sprays by Nasal route 2 times daily (Patient not taking: Reported on 7/26/2021) 1 Bottle 3    acetaminophen (TYLENOL) 500 MG tablet Take 1 tablet by mouth every 6 hours as needed for Pain (Patient not taking: Reported on 7/26/2021) 40 tablet 0    Cholecalciferol (VITAMIN D3) 1000 units TABS Take 1 tablet by mouth daily 90 tablet 1     No current facility-administered medications for this encounter. Social History:   Social History     Socioeconomic History    Marital status: Single     Spouse name: Not on file    Number of children: Not on file    Years of education: Not on file    Highest education level: Not on file   Occupational History    Not on file   Tobacco Use    Smoking status: Current Some Day Smoker     Packs/day: 1.00     Types: Cigars    Smokeless tobacco: Never Used    Tobacco comment: Black and Mild   Vaping Use    Vaping Use: Never used   Substance and Sexual Activity    Alcohol use: No    Drug use: Never    Sexual activity: Yes     Partners: Male   Other Topics Concern    Not on file   Social History Narrative    Not on file     Social Determinants of Health     Financial Resource Strain:     Difficulty of Paying Living Expenses: Not on file   Food Insecurity:     Worried About Running Out of Food in the Last Year: Not on file    Kay of Food in the Last Year: Not on file   Transportation Needs:     Lack of Transportation (Medical): Not on file    Lack of Transportation (Non-Medical):  Not on file   Physical Activity:     Days of Exercise per Week: Not on file    Minutes of Exercise per Session: Not on file   Stress:     Feeling of Stress : Not on file   Social Connections:     Frequency of Communication with Friends and Family: Not on file    Frequency of Social Gatherings with Friends and Family: Not on file    Attends Mormonism Services: Not on file    Active Member of Clubs or Organizations: Not on file    Attends Club or Organization Meetings: Not on file    Marital Status: Not on file   Intimate Partner Violence:     Fear of Current or Ex-Partner: Not on file    Emotionally Abused: Not on file    Physically Abused: Not on file    Sexually Abused: Not on file   Housing Stability:     Unable to Pay for Housing in the Last Year: Not on file    Number of [place] in the Last Year: Not on file    Unstable Housing in the Last Year: Not on file       TOBACCO:   reports that she has been smoking cigars. She has been smoking about 1.00 pack per day. She has never used smokeless tobacco.  ETOH:   reports no history of alcohol use. Family History:   Family History   Problem Relation Age of Onset    Depression Father     Thyroid Disease Father     Diabetes Sister            Pt interventions:  Trained in relaxation strategies, Provided education, Discussed and problem-solved barriers in adhering to behavioral change plan, Established rapport, CBT to target maladaptive thoughts  and Identified maladaptive thoughts        PLAN:   Continue processing trauma   Review relaxation       Patient scheduled to return on:   Monday 12/6/21 at 1 PM     Were changes or additions made to the treatment plan today? YES []   NO [x]  Noted changes:                Pursuant to the emergency declaration under the [facility] waiver authority and the [employer] and [employer] Act, this Virtual Visit was conducted, with patient's consent, to reduce the patient's risk of exposure to COVID-19 and provide continuity of care for an established patient. Services were provided through a video synchronous discussion virtually to substitute for in-person clinic visit.

## 2021-11-30 LAB
CULTURE: NORMAL
Lab: NORMAL
SPECIMEN DESCRIPTION: NORMAL

## 2021-12-01 LAB
ANTI DNA DOUBLE STRANDED: 8.9 IU/ML
ANTI-NUCLEAR ANTIBODY (ANA): NEGATIVE
ENA ANTIBODIES SCREEN: 0.3 U/ML
THYROGLOBULIN AB: 16 IU/ML (ref 0–40)
THYROID PEROXIDASE (TPO) AB: 7.4 IU/ML (ref 0–25)

## 2021-12-02 LAB
COPPER: 144.4 UG/DL (ref 80–155)
SARS-COV-2, IGG: NEGATIVE
ZINC: 76.4 UG/DL (ref 60–120)

## 2021-12-08 LAB
MISCELLANEOUS LAB TEST RESULT: NORMAL
TEST NAME: NORMAL

## 2021-12-09 ENCOUNTER — HOSPITAL ENCOUNTER (OUTPATIENT)
Dept: PSYCHIATRY | Age: 36
Setting detail: THERAPIES SERIES
Discharge: HOME | End: 2021-12-09
Payer: MEDICARE

## 2021-12-09 PROCEDURE — 90837 PSYTX W PT 60 MINUTES: CPT | Performed by: SOCIAL WORKER

## 2021-12-09 NOTE — PSYCHOTHERAPY
TELEHEALTH EVALUATION -- Audio/Visual (During KVYJJ-58 public health emergency)     [facility]  RADHA Soto   12/9/2021  10:00 AM  Elsie Thompson  1985  [id number]    Patient location is at their home address. Location of clinician is at [facility] located at [address] [place] [address]. Time spent with Patient: 60 minutes      Pt was provided informed consent for the [facility]. Discussed with patient model of service to include the limits of confidentiality (i.e. abuse reporting, suicide intervention, etc.) and short-term intervention focused approach. Pt indicated understanding. S:  Pt presented to therapy as tired but engaged in session. Pt reports that she has an endoscopy scheduled at the end of the month and therapist offered praise and encouragement to pt for taking steps to better her health. Pt and therapist discussed and processed pt's other health concerns including sleep apnea, possible narcolepsy, and neurological issues. Therapist and pt discussed importance of getting a PCP and pt reports she knows she needs to. Pt and therapist processed past negative experiences with doctors and anxiety of going to the hospital.  Therapist and pt also processed her concerns with her grandma being in the hospital.  Therapist and pt discussed stress and pt's current self-care practices. Therapist used motivational interviewing techniques to assess pt's readiness to change and motivations for getting better. Therapist empowered pt to be her own health advocate and discussed ways to ensure she is heard by her health care providers. Pt and therapist discussed pt's motivation which she struggled to identify, but then reports she is motivated by figuring out what is happening. Pt also reports she wants to be able to notice changes in her body, which she was not able to when she was pregnant with her son.   Pt and therapist discuss channeling energy of not knowing things in the past to the present so she is able to take steps to better her health. Therapist used CBT techniques to help pt assess her thoughts of giving up and recognizing signs of giving up. Pt reports she tends to isolate herself before giving up on her health. Pt to work on identifying what her triggers are. Pt will call her new PCP to schedule appt before next week. O:  MSE:     Appearance    alert, cooperative  Appetite abnormal: GI issues   Sleep disturbance Yes  Fatigue Yes  Loss of pleasure Yes  Impulsive behavior No  Speech    slow and monotone  Mood    Depressed  Worthless  Irritability  [place]  Affect    depressed affect  Thought Content    hopelessness, helplessness and intrusive thoughts  Thought Process    slow  Associations    logical connections  Insight    Fair  Judgment    Intact  Orientation    oriented to person, place, time, and general circumstances  Memory    recent and remote memory intact  Attention/Concentration    intact  Morbid ideation No  Suicide Assessment    no suicidal ideation    A:  Pt presented to therapy as tired, depressed and slightly irritable. Pt has made some progress in managing her health by scheduling GI testing. Pt is still hesitant to get a PCP and a sleep study, indicating that there is a lot going on. Pt did express understanding that in order to get answers she needs to take steps and that she can work on multiple facets of her health at once. Pt will frequently talk about health but rarely takes steps to improve it, however has agreed to begin taking more steps this session. Pt will call her new PCP and schedule an appt and discuss getting a sleep study to identify interventions that need to be done to address sleep. Pt also identified that she tends to give up and expressed a desire to prevent giving up. Pt will explore further what her warning signs and triggers are.         Visit Diagnosis: PTSD  Major Depressive Disorder, recurrent, severe       History from Medical Record:        Diagnosis Date    Anxiety     Asthma     GERD (gastroesophageal reflux disease)     Hypothyroidism 5/3/2021    Major depression 4/30/2013    Morbid obesity 9/15/2017    Tension type headache 4/30/2013     Medications:   Current Outpatient Medications   Medication Sig Dispense Refill    pantoprazole (PROTONIX) 20 MG tablet Take 2 tablets by mouth daily for 7 days 14 tablet 0    dicyclomine (BENTYL) 10 MG capsule Take 2 capsules by mouth every 6 hours as needed (cramps) 20 capsule 0    ondansetron (ZOFRAN) 4 MG tablet Take 1 tablet by mouth every 8 hours as needed for Nausea (Patient not taking: Reported on 7/26/2021) 20 tablet 0    sodium chloride (ALTAMIST SPRAY) 0.65 % nasal spray 1 spray by Nasal route as needed for Congestion (Patient not taking: Reported on 7/26/2021) 1 Bottle 3    sertraline (ZOLOFT) 50 MG tablet Take 1 tablet by mouth daily 35 tablet 2    Ferrous Gluconate (IRON) 240 (27 Fe) MG TABS Take 1 tablet by mouth daily (Patient not taking: Reported on 7/26/2021) 30 tablet 3    ferrous sulfate (FE TABS) 325 (65 Fe) MG EC tablet Take 1 tablet by mouth daily (with breakfast) (Patient not taking: Reported on 7/26/2021) 90 tablet 3    ibuprofen (ADVIL;MOTRIN) 600 MG tablet Take 1 tablet by mouth every 6 hours as needed for Pain (Patient not taking: Reported on 7/26/2021) 120 tablet 0    docusate sodium (COLACE) 100 MG capsule Take 1 capsule by mouth 2 times daily as needed for Constipation (Patient not taking: Reported on 7/26/2021) 60 capsule 1    docusate sodium (COLACE, DULCOLAX) 100 MG CAPS Take 100 mg by mouth daily (Patient not taking: Reported on 7/26/2021) 30 capsule 0    Prenatal Vit-Fe Fumarate-FA (PRENATAL VITAMIN) 27-0.8 MG TABS Take 1 tablet by mouth daily (Patient not taking: Reported on 7/19/2021) 30 tablet 12    oxymetazoline (12 HOUR NASAL SPRAY) 0.05 % nasal spray 2 sprays by Nasal route 2 times daily (Patient not taking: Reported on 7/26/2021) 1 Bottle 3    acetaminophen (TYLENOL) 500 MG tablet Take 1 tablet by mouth every 6 hours as needed for Pain (Patient not taking: Reported on 7/26/2021) 40 tablet 0    Cholecalciferol (VITAMIN D3) 1000 units TABS Take 1 tablet by mouth daily 90 tablet 1     No current facility-administered medications for this encounter. Social History:   Social History     Socioeconomic History    Marital status: Single     Spouse name: Not on file    Number of children: Not on file    Years of education: Not on file    Highest education level: Not on file   Occupational History    Not on file   Tobacco Use    Smoking status: Current Some Day Smoker     Packs/day: 1.00     Types: Cigars    Smokeless tobacco: Never Used    Tobacco comment: Black and Mild   Vaping Use    Vaping Use: Never used   Substance and Sexual Activity    Alcohol use: No    Drug use: Never    Sexual activity: Yes     Partners: Male   Other Topics Concern    Not on file   Social History Narrative    Not on file     Social Determinants of Health     Financial Resource Strain:     Difficulty of Paying Living Expenses: Not on file   Food Insecurity:     Worried About Running Out of Food in the Last Year: Not on file    Kay of Food in the Last Year: Not on file   Transportation Needs:     Lack of Transportation (Medical): Not on file    Lack of Transportation (Non-Medical):  Not on file   Physical Activity:     Days of Exercise per Week: Not on file    Minutes of Exercise per Session: Not on file   Stress:     Feeling of Stress : Not on file   Social Connections:     Frequency of Communication with Friends and Family: Not on file    Frequency of Social Gatherings with Friends and Family: Not on file    Attends Amish Services: Not on file    Active Member of Clubs or Organizations: Not on file    Attends Club or Organization Meetings: Not on file    Marital Status: Not on file   Intimate Partner Violence:     Fear of Current or Ex-Partner: Not on file    Emotionally Abused: Not on file    Physically Abused: Not on file    Sexually Abused: Not on file   Housing Stability:     Unable to Pay for Housing in the Last Year: Not on file    Number of [place] in the Last Year: Not on file    Unstable Housing in the Last Year: Not on file       TOBACCO:   reports that she has been smoking cigars. She has been smoking about 1.00 pack per day. She has never used smokeless tobacco.  ETOH:   reports no history of alcohol use. Family History:   Family History   Problem Relation Age of Onset    Depression Father     Thyroid Disease Father     Diabetes Sister            Pt interventions:  Practiced assertive communication, Trained in relaxation strategies, Provided education, Discussed self-care (sleep, nutrition, rewarding activities, social support, exercise), Supportive techniques, CBT to target negative thoughts  and Identified maladaptive thoughts        PLAN:   CBT  Recognize signs of giving up and prevent it       Patient scheduled to return on:   Thursday 12/16/21 at 3 PM     Were changes or additions made to the treatment plan today? YES []   NO [x]  Noted changes:                Pursuant to the emergency declaration under the [facility], [facility] waiver authority and the [employer] Act, this Virtual Visit was conducted, with patient's consent, to reduce the patient's risk of exposure to COVID-19 and provide continuity of care for an established patient. Services were provided through a video synchronous discussion virtually to substitute for in-person clinic visit.

## 2021-12-16 ENCOUNTER — HOSPITAL ENCOUNTER (OUTPATIENT)
Dept: PSYCHIATRY | Age: 36
Discharge: HOME | End: 2021-12-16
Payer: MEDICARE

## 2021-12-16 PROCEDURE — 90834 PSYTX W PT 45 MINUTES: CPT | Performed by: SOCIAL WORKER

## 2021-12-17 NOTE — PSYCHOTHERAPY
TELEHEALTH EVALUATION -- Audio/Visual (During DNVNQ-62 public health emergency)     [facility] [place] Therapy Note  RADHA Mathur   12/16/21  3:30 PM  Manjula Reid  1985  [id number]    Patient location is at their home address. Location of clinician is at [facility] located at [address] [place] [address]. Time spent with Patient: 45 minutes      Pt was provided informed consent for the [facility] [place]. Discussed with patient model of service to include the limits of confidentiality (i.e. abuse reporting, suicide intervention, etc.) and short-term intervention focused approach. Pt indicated understanding. S:  Therapist met with pt for session, pt was 15 minutes late. Earlier in the day when confirming appt, therapist accidentally sent a text to pt that was meant for another pt. The text included congratulations on a baby, which therapist realized was not meant for pt and may trigger her. Therapist offered apology and invited pt to speak about how she felt and processed it with her. Therapist validated pt's feelings of frustration, anger, and sadness. Pt identified she almost did not come to session but reminded herself to give therapist \"vicenta\" as it was a mistake. Therapist praised pt for attending session despite being upset and being willing to work through discomfort. Pt and therapist began to discuss her current functioning and possible panic attacks. Pt reports that she does not have any racing thoughts before these \"panic attacks\" happen and that they occur both in the home and when she is \"out and about. \"  Therapist taught pt breathing techniques and other grounding exercises to help with attacks, but encouraged pt to discuss them with her doctor and psych as they may be physical.  Pt and therapist processed pt's emotions and thoughts lately and explored what has been \"running through her mind. \"  Pt is struggling with not having control over things and feeling cheated. Pt also reports that her mind has been mainly focused on her physical health. Therapist encouraged pt to make note of what comes up the most in her mind, and they will discuss that next week. Therapist and pt also discussed pt dx and therapist clarified her dx. Pt also reports that social security sent therapist records request.  Therapist has not gotten any requests and informed pt if one comes through it is sent down to medical records. O:  MSE:     Appearance    alert, cooperative  Appetite abnormal: GI issues   Sleep disturbance Yes  Fatigue Yes  Loss of pleasure Yes  Impulsive behavior No  Speech    normal volume and slow  Mood    Depressed  Emptiness  [place]  Affect    depressed affect  Thought Content    hopelessness, helplessness and intrusive thoughts  Thought Process    slow  Associations    logical connections  Insight    Fair  Judgment    Intact  Orientation    oriented to person, place, time, and general circumstances  Memory    recent and remote memory intact  Attention/Concentration    intact  Morbid ideation No  Suicide Assessment    no suicidal ideation    A:    Pt presented to therapy late and tired but was engaged. Pt showed progress by encountering trigger of therapist's accidental text, working through those emotions, and still attending session. Pt admitted to considering skipping therapy but chose to attend instead. Pt struggles to see her own progress and identify strengths, but has expressed a desire to feel better. Pt is also taking more ownership of her physical health and has scheduled some of her procedures. Pt struggles to identify what her racing thoughts are and will make note of them to see which one comes up the most and will process it next week.           Visit Diagnosis:   Major Depressive Disorder, severe       History from Medical Record:        Diagnosis Date    Anxiety     Asthma     GERD (gastroesophageal reflux for Pain (Patient not taking: Reported on 7/26/2021) 40 tablet 0    Cholecalciferol (VITAMIN D3) 1000 units TABS Take 1 tablet by mouth daily 90 tablet 1     No current facility-administered medications for this encounter. Social History:   Social History     Socioeconomic History    Marital status: Single     Spouse name: Not on file    Number of children: Not on file    Years of education: Not on file    Highest education level: Not on file   Occupational History    Not on file   Tobacco Use    Smoking status: Current Some Day Smoker     Packs/day: 1.00     Types: Cigars    Smokeless tobacco: Never Used    Tobacco comment: Black and Mild   Vaping Use    Vaping Use: Never used   Substance and Sexual Activity    Alcohol use: No    Drug use: Never    Sexual activity: Yes     Partners: Male   Other Topics Concern    Not on file   Social History Narrative    Not on file     Social Determinants of Health     Financial Resource Strain:     Difficulty of Paying Living Expenses: Not on file   Food Insecurity:     Worried About Running Out of Food in the Last Year: Not on file    Kay of Food in the Last Year: Not on file   Transportation Needs:     Lack of Transportation (Medical): Not on file    Lack of Transportation (Non-Medical):  Not on file   Physical Activity:     Days of Exercise per Week: Not on file    Minutes of Exercise per Session: Not on file   Stress:     Feeling of Stress : Not on file   Social Connections:     Frequency of Communication with Friends and Family: Not on file    Frequency of Social Gatherings with Friends and Family: Not on file    Attends Synagogue Services: Not on file    Active Member of Clubs or Organizations: Not on file    Attends Club or Organization Meetings: Not on file    Marital Status: Not on file   Intimate Partner Violence:     Fear of Current or Ex-Partner: Not on file    Emotionally Abused: Not on file    Physically Abused: Not on file  Sexually Abused: Not on file   Housing Stability:     Unable to Pay for Housing in the Last Year: Not on file    Number of Places Lived in the Last Year: Not on file    Unstable Housing in the Last Year: Not on file       TOBACCO:   reports that she has been smoking cigars. She has been smoking about 1.00 pack per day. She has never used smokeless tobacco.  ETOH:   reports no history of alcohol use. Family History:   Family History   Problem Relation Age of Onset    Depression Father     Thyroid Disease Father     Diabetes Sister            Pt interventions:  Trained in relaxation strategies, Provided education, Provided education on PTSD symptoms and treatment options for evidence-based treatment (Cognitive Processing Therapy and Prolonged Exposure), Established rapport, Supportive techniques and Identified maladaptive thoughts        PLAN:   Discuss Diagnosis  CBT techniques to address recurrent thoughts       Patient scheduled to return on: Wed 12/22/21 at 2 PM     Were changes or additions made to the treatment plan today? YES []   NO [x]  Noted changes:                Pursuant to the emergency declaration under the [facility] [place]note 2[facility], [facility] waiver authority and the [employer] Act, this Virtual Visit was conducted, with patient's consent, to reduce the patient's risk of exposure to COVID-19 and provide continuity of care for an established patient. Services were provided through a video synchronous discussion virtually to substitute for in-person clinic visit.

## 2022-01-07 NOTE — TELEPHONE ENCOUNTER
Geoffrey Henao called stating that she is concerned due to the amount of cramping that she is having. She would like to be seen sooner than later. She is scheduled for ultrasound on 3/23/2021. no

## 2022-01-11 ENCOUNTER — HOSPITAL ENCOUNTER (OUTPATIENT)
Dept: PSYCHIATRY | Age: 37
Setting detail: THERAPIES SERIES
Discharge: HOME | End: 2022-01-11
Payer: MEDICARE

## 2022-01-11 PROCEDURE — 90837 PSYTX W PT 60 MINUTES: CPT | Performed by: SOCIAL WORKER

## 2022-01-13 NOTE — PSYCHOTHERAPY
TELEHEALTH EVALUATION -- Audio/Visual (During NHURG-70 public health emergency)     [facility] Therapy Note  RADHA Belle   1/13/2022  8:25 AM  Teresita Early  1985  [id number]    Patient location is at their home address. Location of clinician is at [address] [place] [address] located at [address] [place] [address]. Time spent with Patient: 60 minutes      Pt was provided informed consent for the [facility]. Discussed with patient model of service to include the limits of confidentiality (i.e. abuse reporting, suicide intervention, etc.) and short-term intervention focused approach. Pt indicated understanding. S:    Pt and therapist engaged in a check in and pt reports that she is having a repeat procedure tomorrow to remove gallstones and is nervous. Pt and therapist addressed and processed her fears. Therapist acknowledged pt's different outlook this week vs last week as pt is more hopeful of things going well despite feeling anxious. Pt was also able to express some understanding for the failed attempt during the previous procedure. Pt and therapist used CBT techniques to reframe thoughts and explore pt's difficulties with trust.  Pt reports she feels like she is \"in the pit\" right now and has had to \"suck up her grief\" to work on herself. Pt and therapist explored further this idea of her having to suck up emotions and pt reports that it is a belief she has had most of her life. Pt and therapist explored pt's feelings and she identified that she is feeling guilty for feeling angry. Pt struggles with feeling angry at God and maintaining her raiza. Pt and therapist processed pt's internal conflict. Pt and therapist also discussed the \"wall\" pt reports she puts her emotions behind. Pt reports she has abandonment issues and still feels like she has been abandoned by some members of her family.   Pt and therapist discussed how her mental health seems to be going in circles or stagnant and pt feeling like she cna't get better. Pt and therapist discussed addressing thoughts that keep coming up. Therapist assessed for Panic Disorder and will continue to assess for that Dx. Pt was given thought log to track thoughts to process. O:  MSE:     Appearance    alert, cooperative  Appetite abnormal: GI issues   Sleep disturbance Yes  Fatigue Yes  Loss of pleasure Yes  Impulsive behavior No  Speech    normal rate, normal volume and well articulated  Mood    Angry  Depressed  Affect    depressed affect  Thought Content    excessive guilt and intrusive thoughts  Thought Process    linear, goal directed and coherent  Associations    logical connections  Insight    Good  Judgment    Intact  Orientation    oriented to person, place, time, and general circumstances  Memory    recent and remote memory intact  Attention/Concentration    intact  Morbid ideation No  Suicide Assessment    no suicidal ideation    A:  Pt presented to therapy on time and engaged in session. She presented as tired, depressed, and with high levels of guilt for feeling angry. Pt and therapist had a successful session in identifying other thoughts and feelings behind what pt is usually willing to talk about. Pt and therapist briefly touched on pt's childhood and the abandonment she felt. Pt was able to identify that those thoughts carried through with her to adulthood and make her want to put her emotions \"behind a wall. \"  Pt is also expressing symptoms of a possible panic disorder as she reports panic attacks that are unexpected. Pt does not go many places due to worryring about these. Pt needs further assessment before being given panic disorder diagnosis.           Visit Diagnosis:   PTSD  Major Depressive Disorder  R/O  Panic Disorder       History from Medical Record:        Diagnosis Date    Anxiety     Asthma     GERD (gastroesophageal reflux disease)     Hypothyroidism 5/3/2021    Major depression 4/30/2013    Morbid obesity 9/15/2017    Tension type headache 4/30/2013     Medications:   Current Outpatient Medications   Medication Sig Dispense Refill    pantoprazole (PROTONIX) 20 MG tablet Take 2 tablets by mouth daily for 7 days 14 tablet 0    dicyclomine (BENTYL) 10 MG capsule Take 2 capsules by mouth every 6 hours as needed (cramps) 20 capsule 0    ondansetron (ZOFRAN) 4 MG tablet Take 1 tablet by mouth every 8 hours as needed for Nausea (Patient not taking: Reported on 7/26/2021) 20 tablet 0    sodium chloride (ALTAMIST SPRAY) 0.65 % nasal spray 1 spray by Nasal route as needed for Congestion (Patient not taking: Reported on 7/26/2021) 1 Bottle 3    sertraline (ZOLOFT) 50 MG tablet Take 1 tablet by mouth daily 35 tablet 2    Ferrous Gluconate (IRON) 240 (27 Fe) MG TABS Take 1 tablet by mouth daily (Patient not taking: Reported on 7/26/2021) 30 tablet 3    ferrous sulfate (FE TABS) 325 (65 Fe) MG EC tablet Take 1 tablet by mouth daily (with breakfast) (Patient not taking: Reported on 7/26/2021) 90 tablet 3    ibuprofen (ADVIL;MOTRIN) 600 MG tablet Take 1 tablet by mouth every 6 hours as needed for Pain (Patient not taking: Reported on 7/26/2021) 120 tablet 0    docusate sodium (COLACE) 100 MG capsule Take 1 capsule by mouth 2 times daily as needed for Constipation (Patient not taking: Reported on 7/26/2021) 60 capsule 1    docusate sodium (COLACE, DULCOLAX) 100 MG CAPS Take 100 mg by mouth daily (Patient not taking: Reported on 7/26/2021) 30 capsule 0    Prenatal Vit-Fe Fumarate-FA (PRENATAL VITAMIN) 27-0.8 MG TABS Take 1 tablet by mouth daily (Patient not taking: Reported on 7/19/2021) 30 tablet 12    oxymetazoline (12 HOUR NASAL SPRAY) 0.05 % nasal spray 2 sprays by Nasal route 2 times daily (Patient not taking: Reported on 7/26/2021) 1 Bottle 3    acetaminophen (TYLENOL) 500 MG tablet Take 1 tablet by mouth every 6 hours as needed for Pain (Patient not taking: Reported on 7/26/2021) 40 tablet 0    Cholecalciferol (VITAMIN D3) 1000 units TABS Take 1 tablet by mouth daily 90 tablet 1     No current facility-administered medications for this encounter. Social History:   Social History     Socioeconomic History    Marital status: Single     Spouse name: Not on file    Number of children: Not on file    Years of education: Not on file    Highest education level: Not on file   Occupational History    Not on file   Tobacco Use    Smoking status: Current Some Day Smoker     Packs/day: 1.00     Types: Cigars    Smokeless tobacco: Never Used    Tobacco comment: Black and Mild   Vaping Use    Vaping Use: Never used   Substance and Sexual Activity    Alcohol use: No    Drug use: Never    Sexual activity: Yes     Partners: Male   Other Topics Concern    Not on file   Social History Narrative    Not on file     Social Determinants of Health     Financial Resource Strain:     Difficulty of Paying Living Expenses: Not on file   Food Insecurity:     Worried About Running Out of Food in the Last Year: Not on file    Kay of Food in the Last Year: Not on file   Transportation Needs:     Lack of Transportation (Medical): Not on file    Lack of Transportation (Non-Medical):  Not on file   Physical Activity:     Days of Exercise per Week: Not on file    Minutes of Exercise per Session: Not on file   Stress:     Feeling of Stress : Not on file   Social Connections:     Frequency of Communication with Friends and Family: Not on file    Frequency of Social Gatherings with Friends and Family: Not on file    Attends Sabianist Services: Not on file    Active Member of Clubs or Organizations: Not on file    Attends Club or Organization Meetings: Not on file    Marital Status: Not on file   Intimate Partner Violence:     Fear of Current or Ex-Partner: Not on file    Emotionally Abused: Not on file    Physically Abused: Not on file    Sexually Abused: Not on file   Housing Stability:     Unable to Pay for Housing in the Last Year: Not on file    Number of Places Lived in the Last Year: Not on file    Unstable Housing in the Last Year: Not on file       TOBACCO:   reports that she has been smoking cigars. She has been smoking about 1.00 pack per day. She has never used smokeless tobacco.  ETOH:   reports no history of alcohol use. Family History:   Family History   Problem Relation Age of Onset    Depression Father     Thyroid Disease Father     Diabetes Sister            Pt interventions:  Provided education, Discussed potential barriers to change, Established rapport, Conducted functional assessment, Sanders-setting to identify pt's primary goals for NIKHIL STRAUSS [facility] visit / overall health, Supportive techniques, CBT to target negative thoughts , Cognitive strategies to target anger and guilt including thought log and Identified maladaptive thoughts        PLAN:   Review Thought log   R/O Panic Disorder       Patient scheduled to return on:   Thursday 1/20/22 @ 3:30 PM     Were changes or additions made to the treatment plan today? YES []   NO [x]  Noted changes:                Pursuant to the emergency declaration under the [facility] waiver authority and the [employer] and [employer] General Act, this Virtual Visit was conducted, with patient's consent, to reduce the patient's risk of exposure to COVID-19 and provide continuity of care for an established patient. Services were provided through a video synchronous discussion virtually to substitute for in-person clinic visit.

## 2022-01-20 ENCOUNTER — HOSPITAL ENCOUNTER (OUTPATIENT)
Age: 37
Setting detail: SPECIMEN
Discharge: HOME | End: 2022-01-20

## 2022-01-20 LAB
-: ABNORMAL
ABSOLUTE EOS #: 0.1 K/UL (ref 0–0.44)
ABSOLUTE IMMATURE GRANULOCYTE: 0 K/UL (ref 0–0.3)
ABSOLUTE LYMPH #: 5.58 K/UL (ref 1.1–3.7)
ABSOLUTE MONO #: 0.59 K/UL (ref 0.1–1.2)
ALBUMIN SERPL-MCNC: 4.1 G/DL (ref 3.5–5.2)
ALBUMIN/GLOBULIN RATIO: 1.2 (ref 1–2.5)
ALP BLD-CCNC: 82 U/L (ref 35–104)
ALT SERPL-CCNC: 7 U/L (ref 5–33)
AMORPHOUS: ABNORMAL
AMYLASE: 36 U/L (ref 28–100)
ANION GAP SERPL CALCULATED.3IONS-SCNC: 17 MMOL/L (ref 9–17)
AST SERPL-CCNC: 15 U/L
BACTERIA: ABNORMAL
BASOPHILS # BLD: 1 % (ref 0–2)
BASOPHILS ABSOLUTE: 0.1 K/UL (ref 0–0.2)
BILIRUB SERPL-MCNC: 0.5 MG/DL (ref 0.3–1.2)
BILIRUBIN URINE: NEGATIVE
BUN BLDV-MCNC: 4 MG/DL (ref 6–20)
BUN/CREAT BLD: ABNORMAL (ref 9–20)
C-REACTIVE PROTEIN: 13.8 MG/L (ref 0–5)
CALCIUM SERPL-MCNC: 9.5 MG/DL (ref 8.6–10.4)
CASTS UA: ABNORMAL /LPF (ref 0–2)
CASTS UA: ABNORMAL /LPF (ref 0–2)
CHLORIDE BLD-SCNC: 101 MMOL/L (ref 98–107)
CO2: 22 MMOL/L (ref 20–31)
COLOR: ABNORMAL
COMMENT UA: ABNORMAL
CREAT SERPL-MCNC: 0.71 MG/DL (ref 0.5–0.9)
CRYSTALS, UA: ABNORMAL /HPF
DIFFERENTIAL TYPE: ABNORMAL
EOSINOPHILS RELATIVE PERCENT: 1 % (ref 1–4)
EPITHELIAL CELLS UA: ABNORMAL /HPF (ref 0–5)
GFR AFRICAN AMERICAN: >60 ML/MIN
GFR NON-AFRICAN AMERICAN: >60 ML/MIN
GFR SERPL CREATININE-BSD FRML MDRD: ABNORMAL ML/MIN/{1.73_M2}
GFR SERPL CREATININE-BSD FRML MDRD: ABNORMAL ML/MIN/{1.73_M2}
GLUCOSE BLD-MCNC: 99 MG/DL (ref 70–99)
GLUCOSE URINE: NEGATIVE
HCT VFR BLD CALC: 42.1 % (ref 36.3–47.1)
HEMOGLOBIN: 13.6 G/DL (ref 11.9–15.1)
IMMATURE GRANULOCYTES: 0 %
IRON SATURATION: 22 % (ref 20–55)
IRON: 65 UG/DL (ref 37–145)
KETONES, URINE: ABNORMAL
LEUKOCYTE ESTERASE, URINE: ABNORMAL
LIPASE: 12 U/L (ref 13–60)
LYMPHOCYTES # BLD: 57 % (ref 24–43)
MAGNESIUM: 1.8 MG/DL (ref 1.6–2.6)
MCH RBC QN AUTO: 28 PG (ref 25.2–33.5)
MCHC RBC AUTO-ENTMCNC: 32.3 G/DL (ref 28.4–34.8)
MCV RBC AUTO: 86.6 FL (ref 82.6–102.9)
MONOCYTES # BLD: 6 % (ref 3–12)
MORPHOLOGY: NORMAL
MUCUS: ABNORMAL
NITRITE, URINE: NEGATIVE
NRBC AUTOMATED: 0 PER 100 WBC
OTHER OBSERVATIONS UA: ABNORMAL
PDW BLD-RTO: 14.1 % (ref 11.8–14.4)
PH UA: 6.5 (ref 5–8)
PLATELET # BLD: 351 K/UL (ref 138–453)
PLATELET ESTIMATE: ABNORMAL
PMV BLD AUTO: 10.4 FL (ref 8.1–13.5)
POTASSIUM SERPL-SCNC: 3.2 MMOL/L (ref 3.7–5.3)
PROTEIN UA: ABNORMAL
RBC # BLD: 4.86 M/UL (ref 3.95–5.11)
RBC # BLD: ABNORMAL 10*6/UL
RBC UA: ABNORMAL /HPF (ref 0–2)
RENAL EPITHELIAL, UA: ABNORMAL /HPF
SEDIMENTATION RATE, ERYTHROCYTE: 19 MM (ref 0–20)
SEG NEUTROPHILS: 35 % (ref 36–65)
SEGMENTED NEUTROPHILS ABSOLUTE COUNT: 3.43 K/UL (ref 1.5–8.1)
SODIUM BLD-SCNC: 140 MMOL/L (ref 135–144)
SPECIFIC GRAVITY UA: 1.02 (ref 1–1.03)
T3 FREE: 3.43 PG/ML (ref 2.02–4.43)
THYROXINE, FREE: 1.84 NG/DL (ref 0.93–1.7)
TOTAL IRON BINDING CAPACITY: 289 UG/DL (ref 250–450)
TOTAL PROTEIN: 7.5 G/DL (ref 6.4–8.3)
TRICHOMONAS: ABNORMAL
TSH SERPL DL<=0.05 MIU/L-ACNC: 0.25 MIU/L (ref 0.3–5)
TURBIDITY: ABNORMAL
UNSATURATED IRON BINDING CAPACITY: 224 UG/DL (ref 112–347)
URINE HGB: NEGATIVE
UROBILINOGEN, URINE: NORMAL
VITAMIN D 25-HYDROXY: 40 NG/ML (ref 30–100)
WBC # BLD: 9.8 K/UL (ref 3.5–11.3)
WBC # BLD: ABNORMAL 10*3/UL
WBC UA: ABNORMAL /HPF (ref 0–5)
YEAST: ABNORMAL

## 2022-01-21 LAB
ANTI DNA DOUBLE STRANDED: 9.4 IU/ML
ANTI-NUCLEAR ANTIBODY (ANA): NEGATIVE
ENA ANTIBODIES SCREEN: 0.5 U/ML
FOLATE: 7.8 NG/ML
THYROGLOBULIN AB: 13 IU/ML (ref 0–40)
THYROID PEROXIDASE (TPO) AB: 7.4 IU/ML (ref 0–25)
VITAMIN B-12: 368 PG/ML (ref 232–1245)

## 2022-01-22 LAB
COPPER: 139.9 UG/DL (ref 80–155)
ZINC: 89.8 UG/DL (ref 60–120)

## 2022-01-24 LAB — T3 REVERSE: 38 NG/DL (ref 9–27)

## 2022-01-27 LAB — IBD PANEL: NORMAL

## 2022-02-07 ENCOUNTER — HOSPITAL ENCOUNTER (OUTPATIENT)
Dept: CT IMAGING | Age: 37
Discharge: HOME | End: 2022-02-09
Payer: MEDICARE

## 2022-02-07 DIAGNOSIS — R10.12 LEFT UPPER QUADRANT PAIN: ICD-10-CM

## 2022-02-07 PROCEDURE — 74176 CT ABD & PELVIS W/O CONTRAST: CPT

## 2022-02-10 ENCOUNTER — HOSPITAL ENCOUNTER (OUTPATIENT)
Dept: PSYCHIATRY | Age: 37
Setting detail: THERAPIES SERIES
Discharge: HOME | End: 2022-02-10
Payer: MEDICARE

## 2022-02-10 PROCEDURE — 90837 PSYTX W PT 60 MINUTES: CPT | Performed by: SOCIAL WORKER

## 2022-02-11 NOTE — PSYCHOTHERAPY
TELEHEALTH EVALUATION -- Audio/Visual (During MTBXE-11 public health emergency)     [facility]  RADHA Lamb   2/10/22  2 PM  Robert Levels  1985  [id number]    Patient location is at their home address. Location of clinician is at [facility] located at [address] [place] [address]. Time spent with Patient: 60 minutes      Pt was provided informed consent for the [address] [facility]. Discussed with patient model of service to include the limits of confidentiality (i.e. abuse reporting, suicide intervention, etc.) and short-term intervention focused approach. Pt indicated understanding. S:  Therapist and pt discussed boundaries and structure for the [facility] and its pantry. Earlier in the week pt came up to the [facility] requesting hygiene products but therapist was out of office. Other [facility] staff assisted pt in getting hygiene supplies. Therapist explained that in the future pt will need to arrange it with to  supplies and cannot show up unannounced. Pt expressed annoyed understanding and due to a miscommunication pt did believe that she could come up \"whenever.'  Pt also expressed she was planning on asking therapist to talk when she came up. Therapist explained that pt could not just \"drop in\" and would need to address things at her scheduled appt as even if therapist was not in a scheduled session therapist would likely be attending to other things. Therapist also challenged pt to start attending sessions in person as she was able to go to other appointments in the hospital and come up for the products. Pt denied being able to attend sessions in person and wants more time. Pt reports she is worried about having her \"panic episodes. \"  Therapist offered psychoeducation on avoidance and panic disorder. Pt and therapist discussed pt's current health concerns including her thyroid levels being off.   Pt reports some are low and some are high. Therapist also encouraged pt to get an EKG and speak with her PCP to rule out anything physical to explain her \"episodes\" before therapist would begin treating for Panic. Therapist offered psychoeducation on Panic Disorder and its treatment. Therapist and pt engaged in CBT techniques to begin looking at fear and maladaptive thoughts. Therapist sent pt a thought log complete for next session. Therapist also sent pt information on support groups for grief. Therapist may connect with pt's new PCP once she is established and with her endocrinologist to discuss pt's \"episodes\" and whether they are Panic Disorder related or caused by something physiological.      O:  MSE:     Appearance    tired, alert  Appetite abnormal: stomach issues   Sleep disturbance Yes  Fatigue Yes  Loss of pleasure Yes  Impulsive behavior No  Speech    slow and whispered  Mood    Depressed  Affect    flat affect  Thought Content    helplessness  Thought Process    slow  Associations    logical connections  Insight    Fair  Judgment    Intact  Orientation    oriented to person, place, time, and general circumstances  Memory    recent and remote memory intact  Attention/Concentration    intact  Morbid ideation No  Suicide Assessment    no suicidal ideation    A:  Pt presented to session as flat, tired, and slightly depressed. When pt was confronted with the fact that she came to the [facility] without making arrangements in search of products, pt grew defensive and annoyed that she was unable to come up whenever she wanted. Pt did send therapist a text saying she wanted to come up for products, but therapist was out of the office and did not confirm. Pt was eventually accepting of boundaries but is still unwilling to attend sessions in person. Pt was also hesitant to believe that she may have Panic Disorder and is still believing that her \"episodes\" are caused by something physical despite there not yet being a known medical cause.   Pt to be seen by a new PCP and endocrinologist.  Therapist assigned pt homework to track thoughts to assess pt's engagement in therapy. Pt is exhibiting symptoms of Panic Disorder including panic episodes with racing heart rate, difficulty breathing, sweaty hands, flutter in her chest, dizziness and an intense fear of panicking again.   Therapist will continue to assess and will not assign this diagnosis to pt until she rules out a physical cause by seeing her PCP for an EKG and her endocrinologist.          Visit Diagnosis:   PTSD  Major Depressive Disorder Recurrent, Severe       History from Medical Record:        Diagnosis Date    Anxiety     Asthma     GERD (gastroesophageal reflux disease)     Hypothyroidism 5/3/2021    Major depression 4/30/2013    Morbid obesity 9/15/2017    Tension type headache 4/30/2013     Medications:   Current Outpatient Medications   Medication Sig Dispense Refill    pantoprazole (PROTONIX) 20 MG tablet Take 2 tablets by mouth daily for 7 days 14 tablet 0    dicyclomine (BENTYL) 10 MG capsule Take 2 capsules by mouth every 6 hours as needed (cramps) 20 capsule 0    ondansetron (ZOFRAN) 4 MG tablet Take 1 tablet by mouth every 8 hours as needed for Nausea (Patient not taking: Reported on 7/26/2021) 20 tablet 0    sodium chloride (ALTAMIST SPRAY) 0.65 % nasal spray 1 spray by Nasal route as needed for Congestion (Patient not taking: Reported on 7/26/2021) 1 Bottle 3    sertraline (ZOLOFT) 50 MG tablet Take 1 tablet by mouth daily 35 tablet 2    Ferrous Gluconate (IRON) 240 (27 Fe) MG TABS Take 1 tablet by mouth daily (Patient not taking: Reported on 7/26/2021) 30 tablet 3    ferrous sulfate (FE TABS) 325 (65 Fe) MG EC tablet Take 1 tablet by mouth daily (with breakfast) (Patient not taking: Reported on 7/26/2021) 90 tablet 3    ibuprofen (ADVIL;MOTRIN) 600 MG tablet Take 1 tablet by mouth every 6 hours as needed for Pain (Patient not taking: Reported on 7/26/2021) 120 tablet 0    docusate sodium (COLACE) 100 MG capsule Take 1 capsule by mouth 2 times daily as needed for Constipation (Patient not taking: Reported on 7/26/2021) 60 capsule 1    docusate sodium (COLACE, DULCOLAX) 100 MG CAPS Take 100 mg by mouth daily (Patient not taking: Reported on 7/26/2021) 30 capsule 0    Prenatal Vit-Fe Fumarate-FA (PRENATAL VITAMIN) 27-0.8 MG TABS Take 1 tablet by mouth daily (Patient not taking: Reported on 7/19/2021) 30 tablet 12    oxymetazoline (12 HOUR NASAL SPRAY) 0.05 % nasal spray 2 sprays by Nasal route 2 times daily (Patient not taking: Reported on 7/26/2021) 1 Bottle 3    acetaminophen (TYLENOL) 500 MG tablet Take 1 tablet by mouth every 6 hours as needed for Pain (Patient not taking: Reported on 7/26/2021) 40 tablet 0    Cholecalciferol (VITAMIN D3) 1000 units TABS Take 1 tablet by mouth daily 90 tablet 1     No current facility-administered medications for this encounter. Social History:   Social History     Socioeconomic History    Marital status: Single     Spouse name: Not on file    Number of children: Not on file    Years of education: Not on file    Highest education level: Not on file   Occupational History    Not on file   Tobacco Use    Smoking status: Current Some Day Smoker     Packs/day: 1.00     Types: Cigars    Smokeless tobacco: Never Used    Tobacco comment: Black and Mild   Vaping Use    Vaping Use: Never used   Substance and Sexual Activity    Alcohol use: No    Drug use: Never    Sexual activity: Yes     Partners: Male   Other Topics Concern    Not on file   Social History Narrative    Not on file     Social Determinants of Health     Financial Resource Strain:     Difficulty of Paying Living Expenses: Not on file   Food Insecurity:     Worried About Running Out of Food in the Last Year: Not on file    Kay of Food in the Last Year: Not on file   Transportation Needs:     Lack of Transportation (Medical):  Not on file    Lack of Transportation (Non-Medical): Not on file   Physical Activity:     Days of Exercise per Week: Not on file    Minutes of Exercise per Session: Not on file   Stress:     Feeling of Stress : Not on file   Social Connections:     Frequency of Communication with Friends and Family: Not on file    Frequency of Social Gatherings with Friends and Family: Not on file    Attends [employer] Services: Not on file    Active Member of [address] [place] [address] or Organizations: Not on file    Attends Club or Organization Meetings: Not on file    Marital Status: Not on file   Intimate Partner Violence:     Fear of Current or Ex-Partner: Not on file    Emotionally Abused: Not on file    Physically Abused: Not on file    Sexually Abused: Not on file   Housing Stability:     Unable to Pay for Housing in the Last Year: Not on file    Number of [place] in the Last Year: Not on file    Unstable Housing in the Last Year: Not on file       TOBACCO:   reports that she has been smoking cigars. She has been smoking about 1.00 pack per day. She has never used smokeless tobacco.  ETOH:   reports no history of alcohol use. Family History:   Family History   Problem Relation Age of Onset    Depression Father     Thyroid Disease Father     Diabetes Sister            Pt interventions:  Provided education, Motivational Interviewing to determine importance and readiness for change, Discussed potential barriers to change, Established rapport, Supportive techniques and Identified maladaptive thoughts        PLAN:   Review thought log  Discuss health appointments       Patient scheduled to return on:   Monday 2/21/2022 at 2 PM via Doxy     Were changes or additions made to the treatment plan today?   YES []   NO [x]  Noted changes:                Pursuant to the emergency declaration under the [address] [place] [address] and the [employer] General Act, this Virtual Visit was conducted, with patient's consent, to reduce the patient's risk of exposure to COVID-19 and provide continuity of care for an established patient. Services were provided through a video synchronous discussion virtually to substitute for in-person clinic visit.

## 2022-02-21 ENCOUNTER — HOSPITAL ENCOUNTER (OUTPATIENT)
Dept: PSYCHIATRY | Age: 37
Setting detail: THERAPIES SERIES
Discharge: HOME | End: 2022-02-21
Payer: MEDICARE

## 2022-02-21 PROCEDURE — 90837 PSYTX W PT 60 MINUTES: CPT | Performed by: SOCIAL WORKER

## 2022-02-21 NOTE — PSYCHOTHERAPY
TELEHEALTH EVALUATION -- Audio/Visual (During HPWHR-11 public health emergency)     [address] [facility] Therapy Note  Aruna RADHA Hutchins   2/21/2022  1:00 PM  Tammy Blanc  1985  [id number]    Patient location is at their home address. Location of clinician is at [facility] located at [address] [place] [address]. Time spent with Patient: 60 minutes      Pt was provided informed consent for the [facility] [facility]. Discussed with patient model of service to include the limits of confidentiality (i.e. abuse reporting, suicide intervention, etc.) and short-term intervention focused approach. Pt indicated understanding. S:    Pt and therapist engaged in a check in and pt reports that she is \"feeling down\" and does not feel like herself. Pt struggled to identify what was different today, but identified that she still felt sad, angry, powerless and weak. Pt reports she feels like she doesn't have anything to release emotions. Therapist and pt discussed ways to release emotions and establishing social support. Therapist recommended support groups when pt identified she didn't have supportive family. Therapist used cognitive techniques to help pt identify and express thoughts and emotions. Pt and therapist and pt discussed her physical health and pt reports she has not yet reached out to a PCP to schedule an appt. Therapist offered pt emotional support and used active listening to help pt process her frustrations in regards to her physical health. Pt identifies that her Yadiel Cocking goal\" right now is getting physically better. Therapist presented pt with the option of taking a break from therapy if she is unable to fully participate due to physical health, and feels it is too overwhelming for her. Pt reported she would let therapist know if therapy became too much.   Therapist and pt discussed empowerment and therapist encouraged pt to focus on what she \"can\" do Depressive Disorder recurrent severe       History from Medical Record:        Diagnosis Date    Anxiety     Asthma     GERD (gastroesophageal reflux disease)     Hypothyroidism 5/3/2021    Major depression 4/30/2013    Morbid obesity 9/15/2017    Tension type headache 4/30/2013     Medications:   Current Outpatient Medications   Medication Sig Dispense Refill    pantoprazole (PROTONIX) 20 MG tablet Take 2 tablets by mouth daily for 7 days 14 tablet 0    dicyclomine (BENTYL) 10 MG capsule Take 2 capsules by mouth every 6 hours as needed (cramps) 20 capsule 0    ondansetron (ZOFRAN) 4 MG tablet Take 1 tablet by mouth every 8 hours as needed for Nausea (Patient not taking: Reported on 7/26/2021) 20 tablet 0    sodium chloride (ALTAMIST SPRAY) 0.65 % nasal spray 1 spray by Nasal route as needed for Congestion (Patient not taking: Reported on 7/26/2021) 1 Bottle 3    sertraline (ZOLOFT) 50 MG tablet Take 1 tablet by mouth daily 35 tablet 2    Ferrous Gluconate (IRON) 240 (27 Fe) MG TABS Take 1 tablet by mouth daily (Patient not taking: Reported on 7/26/2021) 30 tablet 3    ferrous sulfate (FE TABS) 325 (65 Fe) MG EC tablet Take 1 tablet by mouth daily (with breakfast) (Patient not taking: Reported on 7/26/2021) 90 tablet 3    ibuprofen (ADVIL;MOTRIN) 600 MG tablet Take 1 tablet by mouth every 6 hours as needed for Pain (Patient not taking: Reported on 7/26/2021) 120 tablet 0    docusate sodium (COLACE) 100 MG capsule Take 1 capsule by mouth 2 times daily as needed for Constipation (Patient not taking: Reported on 7/26/2021) 60 capsule 1    docusate sodium (COLACE, DULCOLAX) 100 MG CAPS Take 100 mg by mouth daily (Patient not taking: Reported on 7/26/2021) 30 capsule 0    Prenatal Vit-Fe Fumarate-FA (PRENATAL VITAMIN) 27-0.8 MG TABS Take 1 tablet by mouth daily (Patient not taking: Reported on 7/19/2021) 30 tablet 12    oxymetazoline (12 HOUR NASAL SPRAY) 0.05 % nasal spray 2 sprays by Nasal route 2 times daily (Patient not taking: Reported on 7/26/2021) 1 Bottle 3    acetaminophen (TYLENOL) 500 MG tablet Take 1 tablet by mouth every 6 hours as needed for Pain (Patient not taking: Reported on 7/26/2021) 40 tablet 0    Cholecalciferol (VITAMIN D3) 1000 units TABS Take 1 tablet by mouth daily 90 tablet 1     No current facility-administered medications for this encounter. Social History:   Social History     Socioeconomic History    Marital status: Single     Spouse name: Not on file    Number of children: Not on file    Years of education: Not on file    Highest education level: Not on file   Occupational History    Not on file   Tobacco Use    Smoking status: Current Some Day Smoker     Packs/day: 1.00     Types: Cigars    Smokeless tobacco: Never Used    Tobacco comment: Black and Mild   Vaping Use    Vaping Use: Never used   Substance and Sexual Activity    Alcohol use: No    Drug use: Never    Sexual activity: Yes     Partners: Male   Other Topics Concern    Not on file   Social History Narrative    Not on file     Social Determinants of Health     Financial Resource Strain:     Difficulty of Paying Living Expenses: Not on file   Food Insecurity:     Worried About Running Out of Food in the Last Year: Not on file    Kay of Food in the Last Year: Not on file   Transportation Needs:     Lack of Transportation (Medical): Not on file    Lack of Transportation (Non-Medical):  Not on file   Physical Activity:     Days of Exercise per Week: Not on file    Minutes of Exercise per Session: Not on file   Stress:     Feeling of Stress : Not on file   Social Connections:     Frequency of Communication with Friends and Family: Not on file    Frequency of Social Gatherings with Friends and Family: Not on file    Attends [employer]ze Services: Not on file    Active Member of Clubs or Organizations: Not on file    Attends Club or Organization Meetings: Not on file    Marital Status: Not on file   Intimate Partner Violence:     Fear of Current or Ex-Partner: Not on file    Emotionally Abused: Not on file    Physically Abused: Not on file    Sexually Abused: Not on file   Housing Stability:     Unable to Pay for Housing in the Last Year: Not on file    Number of [place] in the Last Year: Not on file    Unstable Housing in the Last Year: Not on file       TOBACCO:   reports that she has been smoking cigars. She has been smoking about 1.00 pack per day. She has never used smokeless tobacco.  ETOH:   reports no history of alcohol use. Family History:   Family History   Problem Relation Age of Onset    Depression Father     Thyroid Disease Father     Diabetes Sister            Pt interventions:  Trained in relaxation strategies, Discussed self-care (sleep, nutrition, rewarding activities, social support, exercise), Provided education on PTSD symptoms and treatment options for evidence-based treatment (Cognitive Processing Therapy and Prolonged Exposure), Discussed and problem-solved barriers in adhering to behavioral change plan, Motivational Interviewing to increase patient confidence and compliance with adhering to behavioral change plan, Motivational Interviewing to determine importance and readiness for change, Discussed potential barriers to change, Established rapport, Supportive techniques, Cognitive strategies to target negative thoughts  including self-blame and Identified maladaptive thoughts        PLAN:   Review thought log  Discuss trauma narratives       Patient scheduled to return on: Wednesday 3/9/2022 at 3 PM  Were changes or additions made to the treatment plan today?   YES []   NO []  Noted changes:                Pursuant to the emergency declaration under the [facility] waiver authority and the [employer] [facility] [employer] General Act, this Virtual Visit was conducted, with patient's consent, to reduce the patient's risk of exposure to COVID-19 and provide continuity of care for an established patient. Services were provided through a video synchronous discussion virtually to substitute for in-person clinic visit.

## 2022-03-14 ENCOUNTER — HOSPITAL ENCOUNTER (OUTPATIENT)
Dept: PSYCHIATRY | Age: 37
Setting detail: THERAPIES SERIES
Discharge: HOME | End: 2022-03-14
Payer: MEDICARE

## 2022-03-14 PROCEDURE — 90837 PSYTX W PT 60 MINUTES: CPT | Performed by: SOCIAL WORKER

## 2022-03-15 NOTE — PSYCHOTHERAPY
TELEHEALTH EVALUATION -- Audio/Visual (During ZFIEB-55 public health emergency)     [facility]  Adilene StephanieRADHA   3/14/2022  11:00 AM  Elenita Whaley  1985  [id number]    Patient location is at their home address. Location of clinician is at [facility] located at [address] [place] [address]. Time spent with Patient: 60 minutes      Pt was provided informed consent for the [address] [facility]. Discussed with patient model of service to include the limits of confidentiality (i.e. abuse reporting, suicide intervention, etc.) and short-term intervention focused approach. Pt indicated understanding. S:  Pt and therapist discussed pt's emotions and recent experiences. Pt reports that she has been feeling \"up and down. \"  Therapist and pt explored and processed what the \"downs\" feel lik and pt reports that she has experienced a lot of triggers. Pt reported the \"ups\" have felt less \"blah\" and she has been able to laugh. Pt and therapist review pt's progress in addressing her physical health as pt has identified that \"physical health\" is her only goal currently. Therapist offered psychoeducation on the effect of thyroid issues on anxiety and depression. Pt identified that her endocrinologist wants her to come in person, which is difficult for her. Therapist challenged pt to problem solve, instead of continuing to identify reasons why it wasn't working. Pt can call her insurance company to check if there are other cab companies to take her to her appointment, or will schedule her pickup time even earlier than she has been. Pt also reports she still does not have a PCP but will work on getting a sleep study from her psych. Pt denied interest in getting an EKG. Therapist and pt also processed and discussed pt's thoughts on taking a \"therapy break\" for 2-3 months so pt can devote her time and energy to attending doctor's appointments.   Pt and therapist discussed benefits of taking a break and addressed any concerns or barriers to this being successful. Therapist recommended pt consider utilizing a  to assist with basic needs and managing health. Therapist encouraged pt to monitor symptoms and to utilize crisis care or call 911 if she develops any active thoughts of causing harm to herself or others. Therapist also informed pt she could reach out sooner than 3 months if she felt the break needed to end or her physical needs have been stabilized. Therapist and pt identified a plan for pt to return in June. O:  MSE:     Appearance    alert, cooperative, no distress  Appetite normal  Sleep disturbance Yes  Fatigue Yes  Loss of pleasure Yes  Impulsive behavior No  Speech    normal rate, normal volume and well articulated  Mood    Normal  Affect    normal affect  Thought Content    intact  Thought Process    linear, goal directed and coherent  Associations    logical connections  Insight    Good  Judgment    Intact  Orientation    oriented to person, place, time, and general circumstances  Memory    recent and remote memory intact  Attention/Concentration    intact  Morbid ideation No  Suicide Assessment    no suicidal ideation    A:  Pt presented to therapy as tired but engaged. Pt reports having some ups and downs in the last two weeks but has managed. Pt identified that her physical health continue to be overwhelming and therapist notes pt has not yet taken many steps to address some things. Pt will often identify reasons as to why she has not made appointments or has missed them, often placing blame on other people or systems. Pt did not receive challenge to problem solve well and identified that she does not often have the energy to put into much. Therapist notes that pt has not been able to focus on her mental health concerns due to her physical needs.   Pt has missed many appointments and in sessions conversation often revolves around physical needs rather than mental health needs (grief and depression.)  Therapist has brought up pt's case to team meetings and gotten supervision on pt's inability to fully engage in session due to medical needs. Pt has not been progressing in her goals due to missed appointments and lack of ability to fully engage in therapy. Therapist and pt made decision to take a therapy break for 3 months to give pt the time to fully engage in medical appointments. Pt denied any suicidal or homicidal ideation and is aware that she can call and schedule next appointment earlier if needed.       Visit Diagnosis:   PTSD  Major Depressive Disorder, recurrent, severe,      History from Medical Record:        Diagnosis Date    Anxiety     Asthma     GERD (gastroesophageal reflux disease)     Hypothyroidism 5/3/2021    Major depression 4/30/2013    Morbid obesity 9/15/2017    Tension type headache 4/30/2013     Medications:   Current Outpatient Medications   Medication Sig Dispense Refill    pantoprazole (PROTONIX) 20 MG tablet Take 2 tablets by mouth daily for 7 days 14 tablet 0    dicyclomine (BENTYL) 10 MG capsule Take 2 capsules by mouth every 6 hours as needed (cramps) 20 capsule 0    ondansetron (ZOFRAN) 4 MG tablet Take 1 tablet by mouth every 8 hours as needed for Nausea (Patient not taking: Reported on 7/26/2021) 20 tablet 0    sodium chloride (ALTAMIST SPRAY) 0.65 % nasal spray 1 spray by Nasal route as needed for Congestion (Patient not taking: Reported on 7/26/2021) 1 Bottle 3    sertraline (ZOLOFT) 50 MG tablet Take 1 tablet by mouth daily 35 tablet 2    Ferrous Gluconate (IRON) 240 (27 Fe) MG TABS Take 1 tablet by mouth daily (Patient not taking: Reported on 7/26/2021) 30 tablet 3    ferrous sulfate (FE TABS) 325 (65 Fe) MG EC tablet Take 1 tablet by mouth daily (with breakfast) (Patient not taking: Reported on 7/26/2021) 90 tablet 3    ibuprofen (ADVIL;MOTRIN) 600 MG tablet Take 1 tablet by mouth every 6 hours as needed for Pain (Patient not taking: Reported on 7/26/2021) 120 tablet 0    docusate sodium (COLACE) 100 MG capsule Take 1 capsule by mouth 2 times daily as needed for Constipation (Patient not taking: Reported on 7/26/2021) 60 capsule 1    docusate sodium (COLACE, DULCOLAX) 100 MG CAPS Take 100 mg by mouth daily (Patient not taking: Reported on 7/26/2021) 30 capsule 0    Prenatal Vit-Fe Fumarate-FA (PRENATAL VITAMIN) 27-0.8 MG TABS Take 1 tablet by mouth daily (Patient not taking: Reported on 7/19/2021) 30 tablet 12    oxymetazoline (12 HOUR NASAL SPRAY) 0.05 % nasal spray 2 sprays by Nasal route 2 times daily (Patient not taking: Reported on 7/26/2021) 1 Bottle 3    acetaminophen (TYLENOL) 500 MG tablet Take 1 tablet by mouth every 6 hours as needed for Pain (Patient not taking: Reported on 7/26/2021) 40 tablet 0    Cholecalciferol (VITAMIN D3) 1000 units TABS Take 1 tablet by mouth daily 90 tablet 1     No current facility-administered medications for this encounter.        Social History:   Social History     Socioeconomic History    Marital status: Single     Spouse name: Not on file    Number of children: Not on file    Years of education: Not on file    Highest education level: Not on file   Occupational History    Not on file   Tobacco Use    Smoking status: Current Some Day Smoker     Packs/day: 1.00     Types: Cigars    Smokeless tobacco: Never Used    Tobacco comment: Black and Mild   Vaping Use    Vaping Use: Never used   Substance and Sexual Activity    Alcohol use: No    Drug use: Never    Sexual activity: Yes     Partners: Male   Other Topics Concern    Not on file   Social History Narrative    Not on file     Social Determinants of Health     Financial Resource Strain:     Difficulty of Paying Living Expenses: Not on file   Food Insecurity:     Worried About Running Out of Food in the Last Year: Not on file    Kay of Food in the Last Year: Not on file Transportation Needs:     Lack of Transportation (Medical): Not on file    Lack of Transportation (Non-Medical): Not on file   Physical Activity:     Days of Exercise per Week: Not on file    Minutes of Exercise per Session: Not on file   Stress:     Feeling of Stress : Not on file   Social Connections:     Frequency of Communication with Friends and Family: Not on file    Frequency of Social Gatherings with Friends and Family: Not on file    Attends Oriental orthodox Services: Not on file    Active Member of [address] [place] [address] [employer] or Organizations: Not on file    Attends Club or Organization Meetings: Not on file    Marital Status: Not on file   Intimate Partner Violence:     Fear of Current or Ex-Partner: Not on file    Emotionally Abused: Not on file    Physically Abused: Not on file    Sexually Abused: Not on file   Housing Stability:     Unable to Pay for Housing in the Last Year: Not on file    Number of [place] in the Last Year: Not on file    Unstable Housing in the Last Year: Not on file       TOBACCO:   reports that she has been smoking cigars. She has been smoking about 1.00 pack per day. She has never used smokeless tobacco.  ETOH:   reports no history of alcohol use.     Family History:   Family History   Problem Relation Age of Onset    Depression Father     Thyroid Disease Father     Diabetes Sister            Pt interventions:  Discussed potential treatments for  depression, Provided education, Provided education on PTSD symptoms and treatment options for evidence-based treatment (Cognitive Processing Therapy and Prolonged Exposure), Discussed and problem-solved barriers in adhering to behavioral change plan, Motivational Interviewing to increase patient confidence and compliance with adhering to behavioral change plan, Discussed potential barriers to change, Established rapport, Conducted functional assessment, Supportive techniques and Problem-solving re: making appointments         PLAN: Therapy break  Return in 3 months so pt can address medical needs       Patient scheduled to return on:   Thursday 6/2/2022 at 11:00 AM Virtually     Were changes or additions made to the treatment plan today? YES [x]   NO []  Noted changes: Taking a 3 month break               Pursuant to the emergency declaration under the [address] [place] [address] [facility] authority and the [employer] and [employer] Act, this Virtual Visit was conducted, with patient's consent, to reduce the patient's risk of exposure to COVID-19 and provide continuity of care for an established patient. Services were provided through a video synchronous discussion virtually to substitute for in-person clinic visit.

## 2022-05-10 ENCOUNTER — HOSPITAL ENCOUNTER (OUTPATIENT)
Dept: PSYCHIATRY | Age: 37
Setting detail: THERAPIES SERIES
Discharge: HOME | End: 2022-05-10
Payer: MEDICARE

## 2022-05-10 PROCEDURE — 90837 PSYTX W PT 60 MINUTES: CPT | Performed by: SOCIAL WORKER

## 2022-05-11 NOTE — PSYCHOTHERAPY
TELEHEALTH EVALUATION -- Audio/Visual (During AUKUI-46 public health emergency)     [facility] Therapy Note  RADHA Davalos   5/10/2022  5:00 PM  Magan Console  1985  [id number]    Patient location is at their home address. Location of clinician is at [facility] located at [address] [place] [address]. Time spent with Patient: 60 minutes      Pt was provided informed consent [facility] the [facility]. Discussed with patient model of service to include the limits of confidentiality (i.e. abuse reporting, suicide intervention, etc.) and short-term intervention focused approach. Pt indicated understanding. S:  Pt and therapist engaged in check in as pt had been taking a \"break\" from therapy to focus on physical health concerns. Pt had contacted pt to return one month early as she was struggling. Therapist and pt processed pt's current situation and offered emotional support and used active listening to gain insight into pt's current needs. Therapist and pt processed pt's lack of progress on her medical care and frustration over lack of answers and poor care by doctors. Pt reports consistent fatigue, low mood, and feeling like she \"can't get out. \"   Therapist and pt discussed areas of need including addressing grief, past childhood trauma, panic, and negative cognitions. Therapist offered psychoeducation on EMDR and its use to treat trauma, anxiety, and addressing negative thoughts. Therapist explained the processes of EMDR and sent pt EMDR flier and video to review. Therapist and pt discussed possible targets and negative cognitions to address including worthlessness, feeling like others were more of a priority, and loneliness. Therapist and pt also discussed the connection between the body and mind, and the effect of trauma on the body. Therapist and pt discussed re-engaging in weekly therapy and will discuss recurring appts at next session. O:  MSE:     Appearance    alert, cooperative, mild distress  Appetite abnormal: GI Issues  Sleep disturbance Yes  Fatigue Yes  Loss of pleasure Yes  Impulsive behavior No  Speech    normal rate, normal volume and well articulated  Mood    Depressed  Affect    depressed affect  Thought Content    hopelessness, helplessness, worthlessness and all or nothing thinking  Thought Process    linear, goal directed and coherent  Associations    logical connections  Insight    Good  Judgment    Intact  Orientation    oriented to person, place, time, and general circumstances  Memory    recent and remote memory intact  Attention/Concentration    intact  Morbid ideation No  Suicide Assessment    no suicidal ideation    A:  Pt presented to session as low and stressed. Pt's original return appt was in June but reached out to get in sooner. Pt displayed good self-knowledge by being able to recognize her need to return to therapy sooner. Pt has not made much progress in her physical health as she is still waiting on scheduling for an endocrinologist appt. Pt has been following up with them but has not been able to schedule yet. Pt also has not been able to address her gall bladder concerns. Therapist suggests that if pt is able to resolve some of her trauma, some of her physical health symptoms may be reduced. Pt would benefit from EMDR therapy, which therapist was recently trained in, to address hx of trauma, grief, and difficult emotions pt experiences. Pt identified that she feels depressed but that she has no thoughts of self-harm or suicide.           Visit Diagnosis:   Post Traumatic Stress Disorder: has distressing and unwanted memories, nightmares, she feels like it is happening in front of her, gets extremely upset when something reminds her, experiences physical reactions when something reminds her, she avoids thoughts, memories and external reminders, has strong negative beliefs about herself and the world, blames herself or others, experiences strong negative feelings, has a loss of interest in activities she used to enjoy, distances herself from others, difficulty experiencing positive emotions, reports irritability, is easily startled, has difficulty concentrating, and problems falling and staying asleep.     Major Depressive Disorder, recurrent, severe: low mood nearly every day, loss of interest or motivation, difficulty with sleep,  fatigue, feelings of guilt,      History from Medical Record:        Diagnosis Date    Anxiety     Asthma     GERD (gastroesophageal reflux disease)     Hypothyroidism 5/3/2021    Major depression 4/30/2013    Morbid obesity 9/15/2017    Tension type headache 4/30/2013     Medications:   Current Outpatient Medications   Medication Sig Dispense Refill    pantoprazole (PROTONIX) 20 MG tablet Take 2 tablets by mouth daily for 7 days 14 tablet 0    dicyclomine (BENTYL) 10 MG capsule Take 2 capsules by mouth every 6 hours as needed (cramps) 20 capsule 0    ondansetron (ZOFRAN) 4 MG tablet Take 1 tablet by mouth every 8 hours as needed for Nausea (Patient not taking: Reported on 7/26/2021) 20 tablet 0    sodium chloride (ALTAMIST SPRAY) 0.65 % nasal spray 1 spray by Nasal route as needed for Congestion (Patient not taking: Reported on 7/26/2021) 1 Bottle 3    sertraline (ZOLOFT) 50 MG tablet Take 1 tablet by mouth daily 35 tablet 2    Ferrous Gluconate (IRON) 240 (27 Fe) MG TABS Take 1 tablet by mouth daily (Patient not taking: Reported on 7/26/2021) 30 tablet 3    ferrous sulfate (FE TABS) 325 (65 Fe) MG EC tablet Take 1 tablet by mouth daily (with breakfast) (Patient not taking: Reported on 7/26/2021) 90 tablet 3    ibuprofen (ADVIL;MOTRIN) 600 MG tablet Take 1 tablet by mouth every 6 hours as needed for Pain (Patient not taking: Reported on 7/26/2021) 120 tablet 0    docusate sodium (COLACE) 100 MG capsule Take 1 capsule by mouth 2 times daily as needed for Constipation (Patient not taking: Reported on 7/26/2021) 60 capsule 1    docusate sodium (COLACE, DULCOLAX) 100 MG CAPS Take 100 mg by mouth daily (Patient not taking: Reported on 7/26/2021) 30 capsule 0    Prenatal Vit-Fe Fumarate-FA (PRENATAL VITAMIN) 27-0.8 MG TABS Take 1 tablet by mouth daily (Patient not taking: Reported on 7/19/2021) 30 tablet 12    oxymetazoline (12 HOUR NASAL SPRAY) 0.05 % nasal spray 2 sprays by Nasal route 2 times daily (Patient not taking: Reported on 7/26/2021) 1 Bottle 3    acetaminophen (TYLENOL) 500 MG tablet Take 1 tablet by mouth every 6 hours as needed for Pain (Patient not taking: Reported on 7/26/2021) 40 tablet 0    Cholecalciferol (VITAMIN D3) 1000 units TABS Take 1 tablet by mouth daily 90 tablet 1     No current facility-administered medications for this encounter. Social History:   Social History     Socioeconomic History    Marital status: Single     Spouse name: Not on file    Number of children: Not on file    Years of education: Not on file    Highest education level: Not on file   Occupational History    Not on file   Tobacco Use    Smoking status: Current Some Day Smoker     Packs/day: 1.00     Types: Cigars    Smokeless tobacco: Never Used    Tobacco comment: Black and Mild   Vaping Use    Vaping Use: Never used   Substance and Sexual Activity    Alcohol use: No    Drug use: Never    Sexual activity: Yes     Partners: Male   Other Topics Concern    Not on file   Social History Narrative    Not on file     Social Determinants of Health     Financial Resource Strain:     Difficulty of Paying Living Expenses: Not on file   Food Insecurity:     Worried About Running Out of Food in the Last Year: Not on file    Kay of Food in the Last Year: Not on file   Transportation Needs:     Lack of Transportation (Medical): Not on file    Lack of Transportation (Non-Medical):  Not on file   Physical Activity:     Days of Exercise per Week: Not on file    Minutes of Exercise per Session: Not on file   Stress:     Feeling of Stress : Not on file   Social Connections:     Frequency of Communication with Friends and Family: Not on file    Frequency of Social Gatherings with Friends and Family: Not on file    Attends Buddhism Services: Not on file    Active Member of Clubs or Organizations: Not on file    Attends Club or Organization Meetings: Not on file    Marital Status: Not on file   Intimate Partner Violence:     Fear of Current or Ex-Partner: Not on file    Emotionally Abused: Not on file    Physically Abused: Not on file    Sexually Abused: Not on file   Housing Stability:     Unable to Pay for Housing in the Last Year: Not on file    Number of [place] in the Last Year: Not on file    Unstable Housing in the Last Year: Not on file       TOBACCO:   reports that she has been smoking cigars. She has been smoking about 1.00 pack per day. She has never used smokeless tobacco.  ETOH:   reports no history of alcohol use. Family History:   Family History   Problem Relation Age of Onset    Depression Father     Thyroid Disease Father     Diabetes Sister            Pt interventions:  Provided handout on  EMDR, Provided education, Discussed self-care (sleep, nutrition, rewarding activities, social support, exercise), Provided education on PTSD symptoms and treatment options for evidence-based treatment (Cognitive Processing Therapy and Prolonged Exposure), Motivational Interviewing to determine importance and readiness for change, Discussed potential barriers to change, Established rapport, Conducted functional assessment, Supportive techniques and Identified maladaptive thoughts        PLAN:   Target Planning  Resourcing       Patient scheduled to return on:   Friday 5/20/2022 at 10 AM via doxy    Were changes or additions made to the treatment plan today?   YES []   NO [x]  Noted changes:                Pursuant to the emergency declaration under the [facility] [address] waiver authority and the [employer] and [employer] Act, this Virtual Visit was conducted, with patient's consent, to reduce the patient's risk of exposure to COVID-19 and provide continuity of care for an established patient. Services were provided through a video synchronous discussion virtually to substitute for in-person clinic visit.

## 2022-05-20 ENCOUNTER — HOSPITAL ENCOUNTER (OUTPATIENT)
Age: 37
Setting detail: SPECIMEN
Discharge: HOME | End: 2022-05-20

## 2022-05-20 LAB
-: ABNORMAL
ABSOLUTE EOS #: 0.13 K/UL (ref 0–0.44)
ABSOLUTE IMMATURE GRANULOCYTE: <0.03 K/UL (ref 0–0.3)
ABSOLUTE LYMPH #: 3.56 K/UL (ref 1.1–3.7)
ABSOLUTE MONO #: 0.62 K/UL (ref 0.1–1.2)
ALBUMIN SERPL-MCNC: 4.4 G/DL (ref 3.5–5.2)
ALBUMIN/GLOBULIN RATIO: 1.3 (ref 1–2.5)
ALP BLD-CCNC: 69 U/L (ref 35–104)
ALT SERPL-CCNC: <5 U/L (ref 5–33)
ANION GAP SERPL CALCULATED.3IONS-SCNC: 13 MMOL/L (ref 9–17)
AST SERPL-CCNC: 14 U/L
BACTERIA: ABNORMAL
BASOPHILS # BLD: 1 % (ref 0–2)
BASOPHILS ABSOLUTE: 0.06 K/UL (ref 0–0.2)
BILIRUB SERPL-MCNC: 0.53 MG/DL (ref 0.3–1.2)
BILIRUBIN URINE: NEGATIVE
BUN BLDV-MCNC: 6 MG/DL (ref 6–20)
CALCIUM SERPL-MCNC: 9.1 MG/DL (ref 8.6–10.4)
CHLORIDE BLD-SCNC: 101 MMOL/L (ref 98–107)
CO2: 25 MMOL/L (ref 20–31)
COLOR: YELLOW
CREAT SERPL-MCNC: 0.64 MG/DL (ref 0.5–0.9)
EOSINOPHILS RELATIVE PERCENT: 2 % (ref 1–4)
EPITHELIAL CELLS UA: ABNORMAL /HPF (ref 0–5)
ESTIMATED AVERAGE GLUCOSE: 85 MG/DL
FOLATE: 7.9 NG/ML
GFR AFRICAN AMERICAN: >60 ML/MIN
GFR NON-AFRICAN AMERICAN: >60 ML/MIN
GFR SERPL CREATININE-BSD FRML MDRD: ABNORMAL ML/MIN/{1.73_M2}
GLUCOSE BLD-MCNC: 93 MG/DL (ref 70–99)
GLUCOSE URINE: NEGATIVE
HBA1C MFR BLD: 4.6 % (ref 4–6)
HCT VFR BLD CALC: 42.5 % (ref 36.3–47.1)
HEMOGLOBIN: 13.1 G/DL (ref 11.9–15.1)
HIGH SENSITIVE C-REACTIVE PROTEIN: 10.5 MG/L
IMMATURE GRANULOCYTES: 0 %
INSULIN REFERENCE RANGE:: NORMAL
INSULIN: 11.6 MU/L
KETONES, URINE: NEGATIVE
LEUKOCYTE ESTERASE, URINE: ABNORMAL
LYMPHOCYTES # BLD: 41 % (ref 24–43)
MCH RBC QN AUTO: 27.6 PG (ref 25.2–33.5)
MCHC RBC AUTO-ENTMCNC: 30.8 G/DL (ref 28.4–34.8)
MCV RBC AUTO: 89.5 FL (ref 82.6–102.9)
MONOCYTES # BLD: 7 % (ref 3–12)
NITRITE, URINE: NEGATIVE
NRBC AUTOMATED: 0 PER 100 WBC
PDW BLD-RTO: 14.1 % (ref 11.8–14.4)
PH UA: 6 (ref 5–8)
PLATELET # BLD: 281 K/UL (ref 138–453)
PMV BLD AUTO: 10.3 FL (ref 8.1–13.5)
POTASSIUM SERPL-SCNC: 3.4 MMOL/L (ref 3.7–5.3)
PROTEIN UA: ABNORMAL
RBC # BLD: 4.75 M/UL (ref 3.95–5.11)
RBC UA: ABNORMAL /HPF (ref 0–4)
SEDIMENTATION RATE, ERYTHROCYTE: 36 MM/HR (ref 0–20)
SEG NEUTROPHILS: 49 % (ref 36–65)
SEGMENTED NEUTROPHILS ABSOLUTE COUNT: 4.24 K/UL (ref 1.5–8.1)
SODIUM BLD-SCNC: 139 MMOL/L (ref 135–144)
SPECIFIC GRAVITY UA: 1.01 (ref 1–1.03)
T3 FREE: 2.72 PG/ML (ref 2.02–4.43)
T3 TOTAL: 73 NG/DL (ref 60–181)
THYROXINE, FREE: 1.34 NG/DL (ref 0.93–1.7)
TOTAL PROTEIN: 7.9 G/DL (ref 6.4–8.3)
TRICHOMONAS: ABNORMAL
TSH SERPL DL<=0.05 MIU/L-ACNC: 0.85 UIU/ML (ref 0.3–5)
TURBIDITY: ABNORMAL
URINE HGB: ABNORMAL
UROBILINOGEN, URINE: NORMAL
VITAMIN B-12: 362 PG/ML (ref 232–1245)
VITAMIN D 25-HYDROXY: 32.1 NG/ML
WBC # BLD: 8.6 K/UL (ref 3.5–11.3)
WBC UA: ABNORMAL /HPF (ref 0–5)

## 2022-05-21 LAB
IRON SATURATION: 20 % (ref 20–55)
IRON: 72 UG/DL (ref 37–145)
TOTAL IRON BINDING CAPACITY: 367 UG/DL (ref 250–450)
UNSATURATED IRON BINDING CAPACITY: 295 UG/DL (ref 112–347)

## 2022-05-22 ENCOUNTER — APPOINTMENT (OUTPATIENT)
Dept: CT IMAGING | Age: 37
End: 2022-05-22
Payer: MEDICARE

## 2022-05-22 ENCOUNTER — HOSPITAL ENCOUNTER (EMERGENCY)
Age: 37
Discharge: HOME | End: 2022-05-22
Attending: EMERGENCY MEDICINE
Payer: MEDICARE

## 2022-05-22 ENCOUNTER — APPOINTMENT (OUTPATIENT)
Dept: GENERAL RADIOLOGY | Age: 37
End: 2022-05-22
Payer: MEDICARE

## 2022-05-22 VITALS
HEIGHT: 65 IN | WEIGHT: 159 LBS | HEART RATE: 63 BPM | OXYGEN SATURATION: 99 % | BODY MASS INDEX: 26.49 KG/M2 | RESPIRATION RATE: 18 BRPM | SYSTOLIC BLOOD PRESSURE: 103 MMHG | DIASTOLIC BLOOD PRESSURE: 50 MMHG | TEMPERATURE: 98.7 F

## 2022-05-22 DIAGNOSIS — R07.89 ATYPICAL CHEST PAIN: Primary | ICD-10-CM

## 2022-05-22 DIAGNOSIS — F41.1 ANXIETY STATE: ICD-10-CM

## 2022-05-22 LAB
ABSOLUTE EOS #: 0.2 K/UL (ref 0–0.44)
ABSOLUTE IMMATURE GRANULOCYTE: <0.03 K/UL (ref 0–0.3)
ABSOLUTE LYMPH #: 4.21 K/UL (ref 1.1–3.7)
ABSOLUTE MONO #: 0.61 K/UL (ref 0.1–1.2)
ALBUMIN SERPL-MCNC: 4.3 G/DL (ref 3.5–5.2)
ALBUMIN/GLOBULIN RATIO: 1.3 (ref 1–2.5)
ALP BLD-CCNC: 65 U/L (ref 35–104)
ALT SERPL-CCNC: <5 U/L (ref 5–33)
ANION GAP SERPL CALCULATED.3IONS-SCNC: 14 MMOL/L (ref 9–17)
AST SERPL-CCNC: 13 U/L
BASOPHILS # BLD: 1 % (ref 0–2)
BASOPHILS ABSOLUTE: 0.07 K/UL (ref 0–0.2)
BILIRUB SERPL-MCNC: 0.3 MG/DL (ref 0.3–1.2)
BILIRUBIN DIRECT: 0.11 MG/DL
BILIRUBIN, INDIRECT: 0.19 MG/DL (ref 0–1)
BUN BLDV-MCNC: 7 MG/DL (ref 6–20)
CALCIUM SERPL-MCNC: 9.2 MG/DL (ref 8.6–10.4)
CHLORIDE BLD-SCNC: 102 MMOL/L (ref 98–107)
CO2: 19 MMOL/L (ref 20–31)
CREAT SERPL-MCNC: 0.55 MG/DL (ref 0.5–0.9)
D-DIMER QUANTITATIVE: 0.25 MG/L FEU
EOSINOPHILS RELATIVE PERCENT: 2 % (ref 1–4)
GFR AFRICAN AMERICAN: >60 ML/MIN
GFR NON-AFRICAN AMERICAN: >60 ML/MIN
GFR SERPL CREATININE-BSD FRML MDRD: ABNORMAL ML/MIN/{1.73_M2}
GLUCOSE BLD-MCNC: 107 MG/DL (ref 70–99)
HCG QUALITATIVE: NEGATIVE
HCT VFR BLD CALC: 39.5 % (ref 36.3–47.1)
HEMOGLOBIN: 13 G/DL (ref 11.9–15.1)
IMMATURE GRANULOCYTES: 0 %
LIPASE: 11 U/L (ref 13–60)
LYMPHOCYTES # BLD: 49 % (ref 24–43)
MAGNESIUM: 1.8 MG/DL (ref 1.6–2.6)
MCH RBC QN AUTO: 28.3 PG (ref 25.2–33.5)
MCHC RBC AUTO-ENTMCNC: 32.9 G/DL (ref 28.4–34.8)
MCV RBC AUTO: 86.1 FL (ref 82.6–102.9)
MONOCYTES # BLD: 7 % (ref 3–12)
NRBC AUTOMATED: 0 PER 100 WBC
PDW BLD-RTO: 14 % (ref 11.8–14.4)
PLATELET # BLD: 262 K/UL (ref 138–453)
PMV BLD AUTO: 9.9 FL (ref 8.1–13.5)
POTASSIUM SERPL-SCNC: 3.5 MMOL/L (ref 3.7–5.3)
RBC # BLD: 4.59 M/UL (ref 3.95–5.11)
SEG NEUTROPHILS: 41 % (ref 36–65)
SEGMENTED NEUTROPHILS ABSOLUTE COUNT: 3.48 K/UL (ref 1.5–8.1)
SODIUM BLD-SCNC: 135 MMOL/L (ref 135–144)
TOTAL PROTEIN: 7.6 G/DL (ref 6.4–8.3)
TROPONIN, HIGH SENSITIVITY: <6 NG/L (ref 0–14)
TSH SERPL DL<=0.05 MIU/L-ACNC: 1.58 UIU/ML (ref 0.3–5)
WBC # BLD: 8.6 K/UL (ref 3.5–11.3)

## 2022-05-22 PROCEDURE — 80048 BASIC METABOLIC PNL TOTAL CA: CPT

## 2022-05-22 PROCEDURE — 71250 CT THORAX DX C-: CPT

## 2022-05-22 PROCEDURE — 2580000003 HC RX 258: Performed by: STUDENT IN AN ORGANIZED HEALTH CARE EDUCATION/TRAINING PROGRAM

## 2022-05-22 PROCEDURE — 84484 ASSAY OF TROPONIN QUANT: CPT

## 2022-05-22 PROCEDURE — 93005 ELECTROCARDIOGRAM TRACING: CPT | Performed by: STUDENT IN AN ORGANIZED HEALTH CARE EDUCATION/TRAINING PROGRAM

## 2022-05-22 PROCEDURE — 71045 X-RAY EXAM CHEST 1 VIEW: CPT

## 2022-05-22 PROCEDURE — 83735 ASSAY OF MAGNESIUM: CPT

## 2022-05-22 PROCEDURE — 99285 EMERGENCY DEPT VISIT HI MDM: CPT

## 2022-05-22 PROCEDURE — 84703 CHORIONIC GONADOTROPIN ASSAY: CPT

## 2022-05-22 PROCEDURE — 80076 HEPATIC FUNCTION PANEL: CPT

## 2022-05-22 PROCEDURE — 85379 FIBRIN DEGRADATION QUANT: CPT

## 2022-05-22 PROCEDURE — 84443 ASSAY THYROID STIM HORMONE: CPT

## 2022-05-22 PROCEDURE — 83690 ASSAY OF LIPASE: CPT

## 2022-05-22 PROCEDURE — 85025 COMPLETE CBC W/AUTO DIFF WBC: CPT

## 2022-05-22 RX ORDER — ACETAMINOPHEN 500 MG
1000 TABLET ORAL ONCE
Status: DISCONTINUED | OUTPATIENT
Start: 2022-05-22 | End: 2022-05-22 | Stop reason: HOSPADM

## 2022-05-22 RX ORDER — LORAZEPAM 2 MG/ML
1 INJECTION INTRAMUSCULAR ONCE
Status: DISCONTINUED | OUTPATIENT
Start: 2022-05-22 | End: 2022-05-22 | Stop reason: HOSPADM

## 2022-05-22 RX ORDER — 0.9 % SODIUM CHLORIDE 0.9 %
1000 INTRAVENOUS SOLUTION INTRAVENOUS ONCE
Status: COMPLETED | OUTPATIENT
Start: 2022-05-22 | End: 2022-05-22

## 2022-05-22 RX ADMIN — SODIUM CHLORIDE 1000 ML: 9 INJECTION, SOLUTION INTRAVENOUS at 16:18

## 2022-05-22 ASSESSMENT — ENCOUNTER SYMPTOMS
EYES NEGATIVE: 1
ABDOMINAL PAIN: 1
NAUSEA: 1
BACK PAIN: 0
COUGH: 1
DIARRHEA: 0
RECTAL PAIN: 0
SINUS PAIN: 0
APNEA: 0
SHORTNESS OF BREATH: 1
CHEST TIGHTNESS: 1
SINUS PRESSURE: 0
VOICE CHANGE: 0
VOMITING: 0

## 2022-05-22 NOTE — ED PROVIDER NOTES
[address] [facility]  ED  eMERGENCY dEPARTMENT eNCOUnter   Attending Attestation     Pt Name: Danny Daigle  MRN: [id number]  [place] 1985  Date of evaluation: 5/22/22       Danny Daigle is a 43 y.o. female who presents with Abdominal Pain and Chest Pain      History: Presents with abdominal pain, chest pain, feeling palpitations. Patient was history of GERD and H pylori    Exam: HRRR, lungs CTABL, abdomen soft and non tender. Pt awake, alert, pt is anxious. Plan for acs workup, abdominal workup, xray, and consider discharge if improved. Mass effect of trachea on xray, will get ct chest abd and pelvis. Pt refuses contrast.     I performed a history and physical examination of the patient and discussed management with the resident. I reviewed the residents note and agree with the documented findings and plan of care. Any areas of disagreement are noted on the chart. I was personally present for the key portions of any procedures. I have documented in the chart those procedures where I was not present during the key portions. I have personally reviewed all images and agree with the resident's interpretation. I have reviewed the emergency nurses triage note. I agree with the chief complaint, past medical history, past surgical history, allergies, medications, social and family history as documented unless otherwise noted below. Documentation of the HPI, Physical Exam and Medical Decision Making performed by medical students or scribes is based on my personal performance of the HPI, PE and MDM. For Phys Assistant/ Nurse Practitioner cases/documentation I have had a face to face evaluation of this patient and have completed at least one if not all key elements of the E/M (history, physical exam, and MDM). Additional findings are as noted.     For APC cases I have personally evaluated and examined the patient in conjunction with the APC and agree with the treatment plan and disposition of the patient as recorded by the APC.     Emir Cruz MD  Attending Emergency  Physician       Noe Jones MD  05/22/22 2014

## 2022-05-22 NOTE — ED PROVIDER NOTES
[facility] ED  Emergency Department Encounter  Emergency Medicine Resident     Pt Name: Tammie Jason    [place] 1985  Date of evaluation: 22  PCP:  No primary care provider on file. CHIEF COMPLAINT       Chief Complaint   Patient presents with    Abdominal Pain    Chest Pain       HISTORY OF PRESENT ILLNESS  (Location/Symptom, Timing/Onset, Context/Setting, Quality, Duration, ModifyingFactors, Severity.)      Tammie Jason is a 43 y.o. female who presents to the emergency department for evaluation of both abdominal and chest pain. Patient states that she has been otherwise feeling well before this morning around 7:00 when she began having a burning sensation in her stomach which then spread up into her chest.  Patient then describes that her abdominal pain became a gripping sensation feeling as though someone was grabbing her stomach and squeezing it multiple times. Patient states that her chest pain began that she became nauseous, states the pain is substernal and is a 9 out of 10. Patient states that she became incredibly worried when to contact her to call EMS. On arrival patient is tachycardic, states that she does feel like her heart is beating out of her chest.  States that she is also feeling mildly short of breath. Patient denies any recent sick contacts or changes in her normal daily routine. Patient does state that she is been worked up for some kind of thyroid problem and does not know whether it is hyper or hypo and does not know what medication she supposed to be taking. PAST MEDICAL / SURGICAL / SOCIAL /FAMILY HISTORY      has no past medical history on file. No other pertinent PMH on review with patient/guardian. has no past surgical history on file. No other pertinent PSH on review with patient/guardian.   Social History     Socioeconomic History    Marital status: Single     Spouse name: Not on file    Number of children: Not on file    Years of education: Not on file    Highest education level: Not on file   Occupational History    Not on file   Tobacco Use    Smoking status: Not on file    Smokeless tobacco: Not on file   Substance and Sexual Activity    Alcohol use: Not on file    Drug use: Not on file    Sexual activity: Not on file   Other Topics Concern    Not on file   Social History Narrative    Not on file     Social Determinants of Health     Financial Resource Strain:     Difficulty of Paying Living Expenses: Not on file   Food Insecurity:     Worried About Running Out of Food in the Last Year: Not on file    Kay of Food in the Last Year: Not on file   Transportation Needs:     Lack of Transportation (Medical): Not on file    Lack of Transportation (Non-Medical): Not on file   Physical Activity:     Days of Exercise per Week: Not on file    Minutes of Exercise per Session: Not on file   Stress:     Feeling of Stress : Not on file   Social Connections:     Frequency of Communication with Friends and Family: Not on file    Frequency of Social Gatherings with Friends and Family: Not on file    Attends Mandaeism Services: Not on file    Active Member of [address] [place] [address] or Organizations: Not on file    Attends Club or Organization Meetings: Not on file    Marital Status: Not on file   Intimate Partner Violence:     Fear of Current or Ex-Partner: Not on file    Emotionally Abused: Not on file    Physically Abused: Not on file    Sexually Abused: Not on file   Housing Stability:     Unable to Pay for Housing in the Last Year: Not on file    Number of [place] in the Last Year: Not on file    Unstable Housing in the Last Year: Not on file       I counseled the patient against using tobacco products. No family history on file. No other pertinent FamHx on review with patient/guardian. Allergies:  Patient has no allergy information on record.     Home Medications:  Prior to Admission medications    Not on File       REVIEW OF SYSTEMS    (2-9 systems for level 4, 10 ormore for level 5)      Review of Systems   Constitutional: Positive for diaphoresis. Negative for activity change, appetite change, fatigue and fever. HENT: Negative for congestion, sinus pressure, sinus pain, sneezing and voice change. Eyes: Negative. Respiratory: Positive for cough, chest tightness and shortness of breath. Negative for apnea. Cardiovascular: Positive for chest pain. Gastrointestinal: Positive for abdominal pain and nausea. Negative for diarrhea, rectal pain and vomiting. Genitourinary: Negative for difficulty urinating and frequency. Musculoskeletal: Negative for arthralgias, back pain and myalgias. Skin: Negative. Neurological: Negative for dizziness and headaches. Psychiatric/Behavioral: Negative for agitation. The patient is not hyperactive. PHYSICAL EXAM   (up to 7 for level 4, 8 or more for level 5)      INITIAL VITALS:   BP (!) 150/99   Pulse 58   Temp 98.7 °F (37.1 °C)   Resp 18   Ht 5' 5\" (1.651 m)   Wt 159 lb (72.1 kg)   SpO2 100%   BMI 26.46 kg/m²     Physical Exam  Constitutional:       Appearance: She is well-developed. HENT:      Head: Normocephalic and atraumatic. Mouth/Throat:      Mouth: Mucous membranes are moist.   Eyes:      Extraocular Movements: Extraocular movements intact. Pupils: Pupils are equal, round, and reactive to light. Cardiovascular:      Rate and Rhythm: Regular rhythm. Tachycardia present. Heart sounds: Normal heart sounds. No murmur heard. No friction rub. Pulmonary:      Effort: Pulmonary effort is normal.      Breath sounds: Normal breath sounds. Abdominal:      General: Abdomen is flat. Bowel sounds are increased. There is no distension. There are no signs of injury. Palpations: Abdomen is soft. Tenderness: There is generalized abdominal tenderness. There is no right CVA tenderness or left CVA tenderness.       Hernia: No hernia is present. Musculoskeletal:         General: No swelling or tenderness. Skin:     General: Skin is warm and dry. Capillary Refill: Capillary refill takes less than 2 seconds. Neurological:      Mental Status: She is alert.    Psychiatric:         Mood and Affect: Mood normal.         Behavior: Behavior normal.         DIFFERENTIAL  DIAGNOSIS     DDX: ACS, chest pain, thyroid storm, GERD, atypical chest pain, PE, panic    PLAN (LABS / IMAGING / EKG):  Orders Placed This Encounter   Procedures    XR CHEST PORTABLE    CT CHEST ABDOMEN PELVIS WO CONTRAST    CBC with Auto Differential    Basic Metabolic Panel w/ Reflex to MG    Hepatic Function Panel    Lipase    Troponin    D-Dimer, Quantitative    HCG Qualitative, Serum    TSH with Reflex    Magnesium    Urinalysis with Microscopic    EKG 12 Lead       MEDICATIONS ORDERED:  Orders Placed This Encounter   Medications    0.9 % sodium chloride bolus    LORazepam (ATIVAN) injection 1 mg    acetaminophen (TYLENOL) tablet 1,000 mg    iopamidol (ISOVUE-370) 76 % injection 75 mL           DIAGNOSTIC RESULTS / EMERGENCY DEPARTMENT COURSE / MDM     LABS:  Results for orders placed or performed during the hospital encounter of 05/22/22   CBC with Auto Differential   Result Value Ref Range    WBC 8.6 3.5 - 11.3 k/uL    RBC 4.59 3.95 - 5.11 m/uL    Hemoglobin 13.0 11.9 - 15.1 g/dL    Hematocrit 39.5 36.3 - 47.1 %    MCV 86.1 82.6 - 102.9 fL    MCH 28.3 25.2 - 33.5 pg    MCHC 32.9 28.4 - 34.8 g/dL    RDW 14.0 11.8 - 14.4 %    Platelets 214 888 - 636 k/uL    MPV 9.9 8.1 - 13.5 fL    NRBC Automated 0.0 0.0 per 100 WBC    Seg Neutrophils 41 36 - 65 %    Lymphocytes 49 (H) 24 - 43 %    Monocytes 7 3 - 12 %    Eosinophils % 2 1 - 4 %    Basophils 1 0 - 2 %    Immature Granulocytes 0 0 %    Segs Absolute 3.48 1.50 - 8.10 k/uL    Absolute Lymph # 4.21 (H) 1.10 - 3.70 k/uL    Absolute Mono # 0.61 0.10 - 1.20 k/uL    Absolute Eos # 0.20 0.00 - 0.44 k/uL Basophils Absolute 0.07 0.00 - 0.20 k/uL    Absolute Immature Granulocyte <0.03 0.00 - 0.30 k/uL   Basic Metabolic Panel w/ Reflex to MG   Result Value Ref Range    Glucose 107 (H) 70 - 99 mg/dL    BUN 7 6 - 20 mg/dL    CREATININE 0.55 0.50 - 0.90 mg/dL    Calcium 9.2 8.6 - 10.4 mg/dL    Sodium 135 135 - 144 mmol/L    Potassium 3.5 (L) 3.7 - 5.3 mmol/L    Chloride 102 98 - 107 mmol/L    CO2 19 (L) 20 - 31 mmol/L    Anion Gap 14 9 - 17 mmol/L    GFR Non-African American >60 >60 mL/min    GFR African American >60 >60 mL/min    GFR Comment         Hepatic Function Panel   Result Value Ref Range    Albumin 4.3 3.5 - 5.2 g/dL    Alkaline Phosphatase 65 35 - 104 U/L    ALT <5 (L) 5 - 33 U/L    AST 13 <32 U/L    Total Bilirubin 0.30 0.3 - 1.2 mg/dL    Bilirubin, Direct 0.11 <0.31 mg/dL    Bilirubin, Indirect 0.19 0.00 - 1.00 mg/dL    Total Protein 7.6 6.4 - 8.3 g/dL    Albumin/Globulin Ratio 1.3 1.0 - 2.5   Lipase   Result Value Ref Range    Lipase 11 (L) 13 - 60 U/L   Troponin   Result Value Ref Range    Troponin, High Sensitivity <6 0 - 14 ng/L   D-Dimer, Quantitative   Result Value Ref Range    D-Dimer, Quant 0.25 mg/L FEU   HCG Qualitative, Serum   Result Value Ref Range    hCG Qual NEGATIVE NEGATIVE   TSH with Reflex   Result Value Ref Range    TSH 1.58 0.30 - 5.00 uIU/mL   Magnesium   Result Value Ref Range    Magnesium 1.8 1.6 - 2.6 mg/dL         IMPRESSION/MDM/ED COURSE:  43 y.o. female presented with tachycardia, chest and abdominal pain and feeling of malaise and uneasiness for several hours before presenting the emerge department. Patient appears incredibly anxious in the room, pressured speech and feels inclined to see her every detail. Patient could be experiencing a partial panic versus thyroid storm type situation. We will get a TSH and give a one-time dose of Ativan for evaluation of possible panic versus thyroid storm. Chest work-up for ACS, PE and pneumonia.   We will also get a qualitative serum hCG Brittani ANTHONY  PGY 2  Resident Physician Emergency Medicine  05/22/22 9:08 PM        (Please note that portions of this note were completed with a voice recognition program.Efforts were made to edit the dictations but occasionally words are mis-transcribed.)        Albina Leyden, MD  Resident  05/22/22 [id number]

## 2022-05-22 NOTE — ED TRIAGE NOTES
Pt. Presents to the ED with abdominal pan and nausea and vomiting. Pt. States that the pain got so bad that it gradually traveled to her chest. Pt. Reports feeling fatigued and tired when she woke up.

## 2022-05-23 LAB
EKG ATRIAL RATE: 107 BPM
EKG P AXIS: 45 DEGREES
EKG P-R INTERVAL: 136 MS
EKG Q-T INTERVAL: 340 MS
EKG QRS DURATION: 68 MS
EKG QTC CALCULATION (BAZETT): 453 MS
EKG R AXIS: -2 DEGREES
EKG T AXIS: 57 DEGREES
EKG VENTRICULAR RATE: 107 BPM
MAGNESIUM RBC: 4.2 MG/DL (ref 3.6–7.5)
MISCELLANEOUS LAB TEST RESULT: NORMAL
TEST NAME: NORMAL
THYROGLOBULIN AB: 18 IU/ML (ref 0–40)
THYROID PEROXIDASE (TPO) AB: 4.3 IU/ML (ref 0–25)

## 2022-05-23 PROCEDURE — 93010 ELECTROCARDIOGRAM REPORT: CPT | Performed by: INTERNAL MEDICINE

## 2022-05-24 LAB — VITAMIN B6: 31.1 NMOL/L (ref 20–125)

## 2022-05-25 LAB — T3 REVERSE: 23.7 NG/DL (ref 9–27)

## 2022-05-31 ENCOUNTER — HOSPITAL ENCOUNTER (OUTPATIENT)
Dept: PSYCHIATRY | Age: 37
Setting detail: THERAPIES SERIES
Discharge: HOME | End: 2022-05-31
Payer: MEDICARE

## 2022-05-31 PROCEDURE — 90837 PSYTX W PT 60 MINUTES: CPT | Performed by: SOCIAL WORKER

## 2022-05-31 NOTE — PSYCHOTHERAPY
TELEHEALTH EVALUATION -- Audio/Visual (During WMGNY-80 public health emergency)     [address] Therapy Note  RADHA Rich   5/31/2022  9:00 AM  Sandra Walters  1985  [id number]    Patient location is at their home address. Location of clinician is at [facility] located at [address] [place] [address]. Time spent with Patient: 60 minutes      Pt was provided informed consent for the [address]. Discussed with patient model of service to include the limits of confidentiality (i.e. abuse reporting, suicide intervention, etc.) and short-term intervention focused approach. Pt indicated understanding. S:  Pt and therapist engaged in check in. Therapist offered emotional support and used active listening to help pt process her last week, thoughts, and feelings. Pt reported that she has been feeling \"blah\" and explored what that meant to her. Therapist and pt discussed, processed, and problem solved pt's recent health concerns and needs. Pt to reach out to surgeon for gall bladder this week. Therapist and pt continued EMDR protocol and therapist continued to assess for appropriateness. Therapist offered psychoeducation on EMDR and trauma and the brain. Therapist and pt began to build pt's resources including her safe space. Therapist led pt in beginning to build her safe space and exploring her \"beach. \"  Pt to continue to build and think about space over the next week before session, including a possible name.       O:  MSE:     Appearance    alert, cooperative  Appetite abnormal: GI issues   Sleep disturbance Yes  Fatigue Yes  Loss of pleasure Yes  Impulsive behavior No  Speech    normal rate, normal volume and well articulated  Mood    Depressed  Affect    depressed affect  Thought Content    intrusive thoughts, cognitive distortions and all or nothing thinking  Thought Process    linear, goal directed and coherent  Associations    logical connections  Insight    Good  Judgment    Intact  Orientation    oriented to person, place, time, and general circumstances  Memory    recent and remote memory intact  Attention/Concentration    intact  Morbid ideation No  Suicide Assessment    no suicidal ideation    A:  Pt presented to session as tired and in a low mood. Pt is still struggling with her physical symptoms and exhibits frustration at the lack of answers. Pt was able to express her emotions appropriately and effectively and identified that she will call her GI surgeon this week. Pt also displayed a higher degree of hope than in the past and expressed an interest in EMDR. Pt consented to EMDR and displays appropriateness to process her past trauma and traumatic losses of her children.           Visit Diagnosis:   Post Traumatic Stress Disorder: has distressing and unwanted memories, nightmares, she feels like it is happening in front of her, gets extremely upset when something reminds her, experiences physical reactions when something reminds her, she avoids thoughts, memories and external reminders, has strong negative beliefs about herself and the world, blames herself or others, experiences strong negative feelings, has a loss of interest in activities she used to enjoy, distances herself from others, difficulty experiencing positive emotions, reports irritability, is easily startled, has difficulty concentrating, and problems falling and staying asleep.    Major Depressive Disorder, recurrent, severe: low mood nearly every day, loss of interest or motivation, difficulty with sleep,  fatigue, feelings of guilt,           History from Medical Record:        Diagnosis Date    Anxiety     Asthma     GERD (gastroesophageal reflux disease)     Hypothyroidism 5/3/2021    Major depression 4/30/2013    Morbid obesity 9/15/2017    Tension type headache 4/30/2013     Medications:   Current Outpatient Medications   Medication Sig Dispense Refill    pantoprazole (PROTONIX) 20 MG tablet Take 2 tablets by mouth daily for 7 days 14 tablet 0    dicyclomine (BENTYL) 10 MG capsule Take 2 capsules by mouth every 6 hours as needed (cramps) 20 capsule 0    ondansetron (ZOFRAN) 4 MG tablet Take 1 tablet by mouth every 8 hours as needed for Nausea (Patient not taking: Reported on 7/26/2021) 20 tablet 0    sodium chloride (ALTAMIST SPRAY) 0.65 % nasal spray 1 spray by Nasal route as needed for Congestion (Patient not taking: Reported on 7/26/2021) 1 Bottle 3    sertraline (ZOLOFT) 50 MG tablet Take 1 tablet by mouth daily 35 tablet 2    Ferrous Gluconate (IRON) 240 (27 Fe) MG TABS Take 1 tablet by mouth daily (Patient not taking: Reported on 7/26/2021) 30 tablet 3    ferrous sulfate (FE TABS) 325 (65 Fe) MG EC tablet Take 1 tablet by mouth daily (with breakfast) (Patient not taking: Reported on 7/26/2021) 90 tablet 3    ibuprofen (ADVIL;MOTRIN) 600 MG tablet Take 1 tablet by mouth every 6 hours as needed for Pain (Patient not taking: Reported on 7/26/2021) 120 tablet 0    docusate sodium (COLACE) 100 MG capsule Take 1 capsule by mouth 2 times daily as needed for Constipation (Patient not taking: Reported on 7/26/2021) 60 capsule 1    docusate sodium (COLACE, DULCOLAX) 100 MG CAPS Take 100 mg by mouth daily (Patient not taking: Reported on 7/26/2021) 30 capsule 0    Prenatal Vit-Fe Fumarate-FA (PRENATAL VITAMIN) 27-0.8 MG TABS Take 1 tablet by mouth daily (Patient not taking: Reported on 7/19/2021) 30 tablet 12    acetaminophen (TYLENOL) 500 MG tablet Take 1 tablet by mouth every 6 hours as needed for Pain (Patient not taking: Reported on 7/26/2021) 40 tablet 0    Cholecalciferol (VITAMIN D3) 1000 units TABS Take 1 tablet by mouth daily 90 tablet 1     No current facility-administered medications for this encounter.        Social History:   Social History     Socioeconomic History    Marital status: Single     Spouse name: Not on file    Number of children: Not on file    Years of education: Not on file    Highest education level: Not on file   Occupational History    Not on file   Tobacco Use    Smoking status: Current Some Day Smoker     Packs/day: 1.00     Types: Cigars    Smokeless tobacco: Never Used    Tobacco comment: Black and Mild   Vaping Use    Vaping Use: Never used   Substance and Sexual Activity    Alcohol use: No    Drug use: Never    Sexual activity: Yes     Partners: Male   Other Topics Concern    Not on file   Social History Narrative    Not on file     Social Determinants of Health     Financial Resource Strain:     Difficulty of Paying Living Expenses: Not on file   Food Insecurity:     Worried About Running Out of Food in the Last Year: Not on file    Kay of Food in the Last Year: Not on file   Transportation Needs:     Lack of Transportation (Medical): Not on file    Lack of Transportation (Non-Medical): Not on file   Physical Activity:     Days of Exercise per Week: Not on file    Minutes of Exercise per Session: Not on file   Stress:     Feeling of Stress : Not on file   Social Connections:     Frequency of Communication with Friends and Family: Not on file    Frequency of Social Gatherings with Friends and Family: Not on file    Attends Sabianist Services: Not on file    Active Member of [address] [place] [address] [employer] or Organizations: Not on file    Attends Club or Organization Meetings: Not on file    Marital Status: Not on file   Intimate Partner Violence:     Fear of Current or Ex-Partner: Not on file    Emotionally Abused: Not on file    Physically Abused: Not on file    Sexually Abused: Not on file   Housing Stability:     Unable to Pay for Housing in the Last Year: Not on file    Number of [place] in the Last Year: Not on file    Unstable Housing in the Last Year: Not on file       TOBACCO:   reports that she has been smoking cigars. She has been smoking about 1.00 pack per day.  She has never used smokeless tobacco.  ETOH: reports no history of alcohol use. Family History:   Family History   Problem Relation Age of Onset    Depression Father     Thyroid Disease Father     Diabetes Sister            Pt interventions:  Trained in relaxation strategies, Provided education, Discussed self-care (sleep, nutrition, rewarding activities, social support, exercise), Provided education on PTSD symptoms and treatment options for evidence-based treatment (Cognitive Processing Therapy and Prolonged Exposure), Motivational Interviewing to determine importance and readiness for change, Established rapport, Conducted functional assessment, Supportive techniques, Identified maladaptive thoughts and Emphasized importance of regular practice of relaxation strategies to target / promote extension of tolerance window        PLAN:   Continue to build EMDR resources   Process physical health     Patient scheduled to return on:       Were changes or additions made to the treatment plan today? YES []   NO [x]  Noted changes:                Pursuant to the emergency declaration under the [facility], [facility] waiver authority and the [employer] and [employer] Act, this Virtual Visit was conducted, with patient's consent, to reduce the patient's risk of exposure to COVID-19 and provide continuity of care for an established patient. Services were provided through a video synchronous discussion virtually to substitute for in-person clinic visit.

## 2022-06-10 ENCOUNTER — HOSPITAL ENCOUNTER (OUTPATIENT)
Dept: PSYCHIATRY | Age: 37
Setting detail: THERAPIES SERIES
Discharge: HOME | End: 2022-06-10
Payer: MEDICARE

## 2022-06-10 LAB
CREATININE URINE /24 HR: NORMAL MG/D (ref 700–1600)
CREATININE URINE /VOLUME: 125 MG/DL
IODINE, UR UG/DAY: NORMAL (ref 93–1125)
IODINE, UR UG/L: 48.8 UG/L (ref 26–705)
IODINE, URINE: 39 UG/G CRT (ref 35–540)
URINE VOLUME: NORMAL

## 2022-06-10 PROCEDURE — 90837 PSYTX W PT 60 MINUTES: CPT | Performed by: SOCIAL WORKER

## 2022-06-10 NOTE — PSYCHOTHERAPY
TELEHEALTH EVALUATION -- Audio/Visual (During EAXSZ-68 public health emergency)     [facility] [facility] Therapy Note  RADHA Saenz   6/10/2022  11:00 AM  Nicolasa Kitchen  1985  [id number]    Patient location is at their home address. Location of clinician is at [address] [place] [address] located at [address] [place] [address]. Time spent with Patient: 60 minutes      Pt was provided informed consent for the [facility] [facility]. Discussed with patient model of service to include the limits of confidentiality (i.e. abuse reporting, suicide intervention, etc.) and short-term intervention focused approach. Pt indicated understanding. S:  Pt and therapist engaged in cognitive technique while exploring pt's feelings pf \"blah. \"  Therapist and pt also explored pt's current medical needs and problem solved how to address them and keep appointments organized. Therapist and pt reviewed pt's feelings of anxiousness and possible thoughts behind them. Therapist offered psychoeducation on panic disorder, anxiety, and grief. Therapist and pt reviewed pt's use of the peaceful place and pt named it [employer]. Therapist recommended pt consider using [employer] when she rides in a car and assigned daily practice of [employer] as homework.      O:  MSE:     Appearance    tired, cooperative, no distress  Appetite abnormal: GI Issues  Sleep disturbance Yes  Fatigue Yes  Loss of pleasure Yes  Impulsive behavior No  Speech    normal volume and pressured  Mood    anxious  Depressed  Affect    depressed affect  Thought Content    hopelessness, helplessness and cognitive distortions  Thought Process    tangential  Associations    tangential connections  Insight    Good  Judgment    Intact  Orientation    oriented to person, place, time, and general circumstances  Memory    recent and remote memory intact  Attention/Concentration    intact  Morbid ideation No  Suicide Assessment    no for 7 days 14 tablet 0    dicyclomine (BENTYL) 10 MG capsule Take 2 capsules by mouth every 6 hours as needed (cramps) 20 capsule 0    ondansetron (ZOFRAN) 4 MG tablet Take 1 tablet by mouth every 8 hours as needed for Nausea (Patient not taking: Reported on 7/26/2021) 20 tablet 0    sodium chloride (ALTAMIST SPRAY) 0.65 % nasal spray 1 spray by Nasal route as needed for Congestion (Patient not taking: Reported on 7/26/2021) 1 Bottle 3    sertraline (ZOLOFT) 50 MG tablet Take 1 tablet by mouth daily 35 tablet 2    Ferrous Gluconate (IRON) 240 (27 Fe) MG TABS Take 1 tablet by mouth daily (Patient not taking: Reported on 7/26/2021) 30 tablet 3    ferrous sulfate (FE TABS) 325 (65 Fe) MG EC tablet Take 1 tablet by mouth daily (with breakfast) (Patient not taking: Reported on 7/26/2021) 90 tablet 3    ibuprofen (ADVIL;MOTRIN) 600 MG tablet Take 1 tablet by mouth every 6 hours as needed for Pain (Patient not taking: Reported on 7/26/2021) 120 tablet 0    docusate sodium (COLACE) 100 MG capsule Take 1 capsule by mouth 2 times daily as needed for Constipation (Patient not taking: Reported on 7/26/2021) 60 capsule 1    docusate sodium (COLACE, DULCOLAX) 100 MG CAPS Take 100 mg by mouth daily (Patient not taking: Reported on 7/26/2021) 30 capsule 0    Prenatal Vit-Fe Fumarate-FA (PRENATAL VITAMIN) 27-0.8 MG TABS Take 1 tablet by mouth daily (Patient not taking: Reported on 7/19/2021) 30 tablet 12    acetaminophen (TYLENOL) 500 MG tablet Take 1 tablet by mouth every 6 hours as needed for Pain (Patient not taking: Reported on 7/26/2021) 40 tablet 0    Cholecalciferol (VITAMIN D3) 1000 units TABS Take 1 tablet by mouth daily 90 tablet 1     No current facility-administered medications for this encounter.        Social History:   Social History     Socioeconomic History    Marital status: Single     Spouse name: Not on file    Number of children: Not on file    Years of education: Not on file    Highest education level: Not on file   Occupational History    Not on file   Tobacco Use    Smoking status: Current Some Day Smoker     Packs/day: 1.00     Types: Cigars    Smokeless tobacco: Never Used    Tobacco comment: Black and Mild   Vaping Use    Vaping Use: Never used   Substance and Sexual Activity    Alcohol use: No    Drug use: Never    Sexual activity: Yes     Partners: Male   Other Topics Concern    Not on file   Social History Narrative    Not on file     Social Determinants of Health     Financial Resource Strain:     Difficulty of Paying Living Expenses: Not on file   Food Insecurity:     Worried About Running Out of Food in the Last Year: Not on file    Kay of Food in the Last Year: Not on file   Transportation Needs:     Lack of Transportation (Medical): Not on file    Lack of Transportation (Non-Medical): Not on file   Physical Activity:     Days of Exercise per Week: Not on file    Minutes of Exercise per Session: Not on file   Stress:     Feeling of Stress : Not on file   Social Connections:     Frequency of Communication with Friends and Family: Not on file    Frequency of Social Gatherings with Friends and Family: Not on file    Attends [employer] Services: Not on file    Active Member of [address] [place] [address] [employer] or Organizations: Not on file    Attends Club or Organization Meetings: Not on file    Marital Status: Not on file   Intimate Partner Violence:     Fear of Current or Ex-Partner: Not on file    Emotionally Abused: Not on file    Physically Abused: Not on file    Sexually Abused: Not on file   Housing Stability:     Unable to Pay for Housing in the Last Year: Not on file    Number of [place] in the Last Year: Not on file    Unstable Housing in the Last Year: Not on file       TOBACCO:   reports that she has been smoking cigars. She has been smoking about 1.00 pack per day. She has never used smokeless tobacco.  ETOH:   reports no history of alcohol use.     Family History:   Family History   Problem Relation Age of Onset    Depression Father     Thyroid Disease Father     Diabetes Sister            Pt interventions:  Trained in relaxation strategies, Provided education, Discussed self-care (sleep, nutrition, rewarding activities, social support, exercise), Provided education on PTSD symptoms and treatment options for evidence-based treatment (Cognitive Processing Therapy and Prolonged Exposure), Motivational Interviewing to determine importance and readiness for change, Discussed potential barriers to change, Established rapport, Conducted functional assessment, Supportive techniques, Emphasized self-care as important for managing overall health, Identified maladaptive thoughts and Emphasized importance of regular practice of relaxation strategies to target / promote regulation        PLAN:   Container  Discuss pt's desire to use essential oils      Patient scheduled to return on:   Tuesday 6/14/2022 at 9:00 AM    Were changes or additions made to the treatment plan today? YES []   NO [x]  Noted changes:                Pursuant to the emergency declaration under the [facility] waiver authority and the [employer] Act, this Virtual Visit was conducted, with patient's consent, to reduce the patient's risk of exposure to COVID-19 and provide continuity of care for an established patient. Services were provided through a video synchronous discussion virtually to substitute for in-person clinic visit.

## 2022-07-01 ENCOUNTER — HOSPITAL ENCOUNTER (OUTPATIENT)
Dept: PSYCHIATRY | Age: 37
Setting detail: THERAPIES SERIES
Discharge: HOME | End: 2022-07-01
Payer: MEDICARE

## 2022-07-01 PROCEDURE — 90832 PSYTX W PT 30 MINUTES: CPT | Performed by: SOCIAL WORKER

## 2022-07-05 NOTE — PSYCHOTHERAPY
[facility] Therapy Note in [facility] [address], [address]   7/1/2022  11:00 AM  Katelynn Wyatt  1985  [id number]    Time spent with Patient: 30 minutes      Pt was provided informed consent for the [facility]. Discussed with patient model of service to include the limits of confidentiality (i.e. abuse reporting, suicide intervention, etc.) and short-term intervention focused approach. Pt indicated understanding. Pt had to shorten session due to family commitment     S:  Pt and therapist engaged in check in and processed pt's recent anxiety. Pt identifies that she is not able to identify any anxious thoughts but has a jittery stomach and feels the anxiety in her body. Therapist led pt in grounding techniques to help ease pt's current anxiety and teach her how to use grounding skills later on. Therapist and pt also reviewed using her \"mari's beach. \"  Therapist offered psychoeducation on the effect of sleep on mental health and reminded pt of recommendation to get a sleep study to assess for possible sleep disorders. Therapist encouraged pt to practice her resources/coping skills daily and educated pt on importance of daily practice. Therapist and pt did not complete full session as pt had a last minute family committment.        O:  MSE:     Appearance    cooperative, crying, mild distress  Appetite abnormal: GI issues   Sleep disturbance Yes  Fatigue Yes  Loss of pleasure Yes  Impulsive behavior No  Speech    normal rate, normal volume and well articulated  Mood    Depressed  Demoralization  Affect    depressed affect  Thought Content    helplessness, cognitive distortions and all or nothing thinking  Thought Process    linear, goal directed and coherent  Associations    logical connections  Insight    Fair  Judgment    Intact  Orientation    oriented to person, place, time, and general circumstances  Memory    recent and remote memory intact  Attention/Concentration intact  Morbid ideation No  Suicide Assessment    no suicidal ideation    A:  Pt presented to session as anxious and tearful. Pt expressed frustration at her frequent anxiety. Pt was able to regulate and reduce distressing emotions after practicing grounding techniques in session. Pt continues to struggle with sleep and fatigue but has not yet sought out a sleep study. Pt has reported in the past that she will talk to her doctor about his but has not yet done so. Pt identified that she would reach out to her doctor via [employer]. Pt is struggling with her physical health and has not had much energy to focus on her mental health. Pt has not been practicing her coping skills daily and would benefit from daily practice. Therapist unable to continue EMDR resourcing with pt as pt had to shorten session.       Visit Diagnosis:   Post Traumatic Stress Disorder: has distressing and unwanted memories, nightmares, she feels like it is happening in front of her, gets extremely upset when something reminds her, experiences physical reactions when something reminds her, she avoids thoughts, memories and external reminders, has strong negative beliefs about herself and the world, blames herself or others, experiences strong negative feelings, has a loss of interest in activities she used to enjoy, distances herself from others, difficulty experiencing positive emotions, reports irritability, is easily startled, has difficulty concentrating, and problems falling and staying asleep.    Major Depressive Disorder, recurrent, severe: low mood nearly every day, loss of interest or motivation, difficulty with sleep,  fatigue, feelings of guilt,      History from Medical Record:        Diagnosis Date    Anxiety     Asthma     GERD (gastroesophageal reflux disease)     Hypothyroidism 5/3/2021    Major depression 4/30/2013    Morbid obesity 9/15/2017    Tension type headache 4/30/2013     Medications:   Current Outpatient Medications   Medication Sig Dispense Refill    pantoprazole (PROTONIX) 20 MG tablet Take 2 tablets by mouth daily for 7 days 14 tablet 0    dicyclomine (BENTYL) 10 MG capsule Take 2 capsules by mouth every 6 hours as needed (cramps) 20 capsule 0    ondansetron (ZOFRAN) 4 MG tablet Take 1 tablet by mouth every 8 hours as needed for Nausea (Patient not taking: Reported on 7/26/2021) 20 tablet 0    sodium chloride (ALTAMIST SPRAY) 0.65 % nasal spray 1 spray by Nasal route as needed for Congestion (Patient not taking: Reported on 7/26/2021) 1 Bottle 3    sertraline (ZOLOFT) 50 MG tablet Take 1 tablet by mouth daily 35 tablet 2    Ferrous Gluconate (IRON) 240 (27 Fe) MG TABS Take 1 tablet by mouth daily (Patient not taking: Reported on 7/26/2021) 30 tablet 3    ferrous sulfate (FE TABS) 325 (65 Fe) MG EC tablet Take 1 tablet by mouth daily (with breakfast) (Patient not taking: Reported on 7/26/2021) 90 tablet 3    ibuprofen (ADVIL;MOTRIN) 600 MG tablet Take 1 tablet by mouth every 6 hours as needed for Pain (Patient not taking: Reported on 7/26/2021) 120 tablet 0    docusate sodium (COLACE) 100 MG capsule Take 1 capsule by mouth 2 times daily as needed for Constipation (Patient not taking: Reported on 7/26/2021) 60 capsule 1    docusate sodium (COLACE, DULCOLAX) 100 MG CAPS Take 100 mg by mouth daily (Patient not taking: Reported on 7/26/2021) 30 capsule 0    Prenatal Vit-Fe Fumarate-FA (PRENATAL VITAMIN) 27-0.8 MG TABS Take 1 tablet by mouth daily (Patient not taking: Reported on 7/19/2021) 30 tablet 12    acetaminophen (TYLENOL) 500 MG tablet Take 1 tablet by mouth every 6 hours as needed for Pain (Patient not taking: Reported on 7/26/2021) 40 tablet 0    Cholecalciferol (VITAMIN D3) 1000 units TABS Take 1 tablet by mouth daily 90 tablet 1     No current facility-administered medications for this encounter.        Social History:   Social History     Socioeconomic History    Marital status: Single per day. She has never used smokeless tobacco.  ETOH:   reports no history of alcohol use. Family History:   Family History   Problem Relation Age of Onset    Depression Father     Thyroid Disease Father     Diabetes Sister            Pt interventions:  Trained in relaxation strategies, Provided education, Discussed self-care (sleep, nutrition, rewarding activities, social support, exercise), Discussed use of imagery, distractions, relaxation, mood management, communication training, questioning unhelpful thinking, problem-solving, and behavioral activation to manage pain, Established rapport, Supportive techniques, Emphasized self-care as important for managing overall health, Identified maladaptive thoughts, Explained relaxed breathing technique in detail and practiced this with pt in visit and Emphasized importance of regular practice of relaxation strategies to target / promote regulation        PLAN:   Container   Target plan       Patient scheduled to return on:   TBD, therapist sent pt available times     Were changes or additions made to the treatment plan today?   YES []   NO []  Noted changes:

## 2022-07-09 ENCOUNTER — HOSPITAL ENCOUNTER (EMERGENCY)
Age: 37
Discharge: HOME | End: 2022-07-09
Attending: EMERGENCY MEDICINE
Payer: MEDICARE

## 2022-07-09 ENCOUNTER — APPOINTMENT (OUTPATIENT)
Dept: GENERAL RADIOLOGY | Age: 37
End: 2022-07-09
Payer: MEDICARE

## 2022-07-09 VITALS
BODY MASS INDEX: 26.66 KG/M2 | HEIGHT: 65 IN | OXYGEN SATURATION: 100 % | WEIGHT: 160 LBS | DIASTOLIC BLOOD PRESSURE: 83 MMHG | SYSTOLIC BLOOD PRESSURE: 108 MMHG | HEART RATE: 74 BPM | TEMPERATURE: 98.2 F | RESPIRATION RATE: 17 BRPM

## 2022-07-09 DIAGNOSIS — R00.2 PALPITATIONS: Primary | ICD-10-CM

## 2022-07-09 DIAGNOSIS — R55 NEAR SYNCOPE: ICD-10-CM

## 2022-07-09 LAB
ALBUMIN SERPL-MCNC: 4.4 G/DL (ref 3.5–5.2)
ALBUMIN/GLOBULIN RATIO: 1.3 (ref 1–2.5)
ALP BLD-CCNC: 73 U/L (ref 35–104)
ALT SERPL-CCNC: 5 U/L (ref 5–33)
ANION GAP SERPL CALCULATED.3IONS-SCNC: 16 MMOL/L (ref 9–17)
AST SERPL-CCNC: 15 U/L
BILIRUB SERPL-MCNC: 0.6 MG/DL (ref 0.3–1.2)
BILIRUBIN URINE: NEGATIVE
BUN BLDV-MCNC: 5 MG/DL (ref 6–20)
CALCIUM SERPL-MCNC: 9.3 MG/DL (ref 8.6–10.4)
CHLORIDE BLD-SCNC: 101 MMOL/L (ref 98–107)
CO2: 23 MMOL/L (ref 20–31)
COLOR: YELLOW
COMMENT UA: ABNORMAL
CREAT SERPL-MCNC: 0.63 MG/DL (ref 0.5–0.9)
D-DIMER QUANTITATIVE: 0.54 MG/L FEU
GFR AFRICAN AMERICAN: >60 ML/MIN
GFR NON-AFRICAN AMERICAN: >60 ML/MIN
GFR SERPL CREATININE-BSD FRML MDRD: ABNORMAL ML/MIN/{1.73_M2}
GLUCOSE BLD-MCNC: 103 MG/DL (ref 70–99)
GLUCOSE URINE: NEGATIVE
HCG QUALITATIVE: NEGATIVE
HCT VFR BLD CALC: 40.4 % (ref 36.3–47.1)
HEMOGLOBIN: 13.1 G/DL (ref 11.9–15.1)
KETONES, URINE: NEGATIVE
LEUKOCYTE ESTERASE, URINE: NEGATIVE
LIPASE: 9 U/L (ref 13–60)
MCH RBC QN AUTO: 28.1 PG (ref 25.2–33.5)
MCHC RBC AUTO-ENTMCNC: 32.4 G/DL (ref 28.4–34.8)
MCV RBC AUTO: 86.7 FL (ref 82.6–102.9)
NITRITE, URINE: NEGATIVE
NRBC AUTOMATED: 0 PER 100 WBC
PDW BLD-RTO: 13.3 % (ref 11.8–14.4)
PH UA: 6.5 (ref 5–8)
PLATELET # BLD: 276 K/UL (ref 138–453)
PMV BLD AUTO: 10.2 FL (ref 8.1–13.5)
POTASSIUM SERPL-SCNC: 3.8 MMOL/L (ref 3.7–5.3)
PROTEIN UA: NEGATIVE
RBC # BLD: 4.66 M/UL (ref 3.95–5.11)
SODIUM BLD-SCNC: 140 MMOL/L (ref 135–144)
SPECIFIC GRAVITY UA: 1 (ref 1–1.03)
TOTAL PROTEIN: 7.7 G/DL (ref 6.4–8.3)
TROPONIN, HIGH SENSITIVITY: <6 NG/L (ref 0–14)
TSH SERPL DL<=0.05 MIU/L-ACNC: 1.02 UIU/ML (ref 0.3–5)
TURBIDITY: CLEAR
URINE HGB: NEGATIVE
UROBILINOGEN, URINE: NORMAL
WBC # BLD: 4.9 K/UL (ref 3.5–11.3)

## 2022-07-09 PROCEDURE — 81003 URINALYSIS AUTO W/O SCOPE: CPT

## 2022-07-09 PROCEDURE — 85379 FIBRIN DEGRADATION QUANT: CPT

## 2022-07-09 PROCEDURE — 84703 CHORIONIC GONADOTROPIN ASSAY: CPT

## 2022-07-09 PROCEDURE — 93005 ELECTROCARDIOGRAM TRACING: CPT

## 2022-07-09 PROCEDURE — 85027 COMPLETE CBC AUTOMATED: CPT

## 2022-07-09 PROCEDURE — 71045 X-RAY EXAM CHEST 1 VIEW: CPT

## 2022-07-09 PROCEDURE — 84484 ASSAY OF TROPONIN QUANT: CPT

## 2022-07-09 PROCEDURE — 80053 COMPREHEN METABOLIC PANEL: CPT

## 2022-07-09 PROCEDURE — 84443 ASSAY THYROID STIM HORMONE: CPT

## 2022-07-09 PROCEDURE — 83690 ASSAY OF LIPASE: CPT

## 2022-07-09 PROCEDURE — 99285 EMERGENCY DEPT VISIT HI MDM: CPT

## 2022-07-09 RX ORDER — 0.9 % SODIUM CHLORIDE 0.9 %
1000 INTRAVENOUS SOLUTION INTRAVENOUS ONCE
Status: DISCONTINUED | OUTPATIENT
Start: 2022-07-09 | End: 2022-07-09

## 2022-07-09 RX ORDER — HYDROXYZINE PAMOATE 25 MG/1
25 CAPSULE ORAL 3 TIMES DAILY PRN
Qty: 30 CAPSULE | Refills: 0 | Status: SHIPPED | OUTPATIENT
Start: 2022-07-09

## 2022-07-09 ASSESSMENT — PAIN DESCRIPTION - ORIENTATION: ORIENTATION: MID

## 2022-07-09 ASSESSMENT — ENCOUNTER SYMPTOMS
VOMITING: 0
COUGH: 0
DIARRHEA: 0
SHORTNESS OF BREATH: 0
NAUSEA: 1
ABDOMINAL PAIN: 1
CHEST TIGHTNESS: 0
SORE THROAT: 1

## 2022-07-09 ASSESSMENT — PAIN DESCRIPTION - LOCATION: LOCATION: CHEST

## 2022-07-09 ASSESSMENT — PAIN SCALES - GENERAL: PAINLEVEL_OUTOF10: 5

## 2022-07-09 ASSESSMENT — PAIN - FUNCTIONAL ASSESSMENT: PAIN_FUNCTIONAL_ASSESSMENT: 0-10

## 2022-07-09 NOTE — DISCHARGE INSTRUCTIONS
You came in today because of palpitations and lightheadedness that occurred this morning. We took note of the history of the presenting issue and timeline leading to today's presentation. Physical exam showed no signs of acute pathology so labs and imaging were ordered to confirm. Results of those examinations did not show any signs of cardiac, respiratory, endocrine or gastrointestinal pathology. Our recommendation is to continue to monitor your symptoms, taking the prescribed hydroxyzine as needed when symptoms present. It is also strongly recommended that you reach out to your primary care provider or one shown in the list below to restart medications that have not been taken. Please return to the emergency department if your symptoms become worse, you have fainting, strong chest pain, or any changes to your symptoms that would otherwise suggest a worsening of your condition. [address]  ED Clinic List    Healthcare Providers Services Day of [address]  [phone] Pediatric Primary Care  Adult Primary Care  OB/GYN/Prenatal/Specialty Clinics Monday - Friday  8:00a - 4:30p   [address] [place] [address]  Adult Medicine, Pediatrics, OB/GYN Monday - Friday  8:30a - [facility] [facility]  [phone]  Wednesday 8:30a - 11:00a   [address] Adult Internal Medicine   [facility]  [phone]  [facility]  [phone]    Pediatric Clinic  [phone]   Monday, Tuesday, Thursday, Friday  8:00a - 4:30p  Wednesday 1:00p - 4:30p  Monday, Tuesday, Thursday 8:00a - 5:00p;  Friday 8:00a - 12:30p  Wednesday 1p - 4p  Monday, Tuesday, Thursday, Friday  8:30a - 4:15p  Wednesday 12:30p - [address] [facility]  [phone] Pediatric Primary Care  Adult Primary Care  OB/Prenatal Monday - Wednesday, Friday Monday - Friday 8a - 12p  Thursday 8a - 4:45p   [facility]  [address] [facility]  [phone]  [address]   [phone] Pregnancy  Pre & Post Adoption Counseling  Pregnancy Support  Prenatal / nutrition Care  Reward Incentive Program [place], [place], Fri 10:00a - 4:30p  Thur 10:00a - [address] [place] Location  Monday - Friday [address]   OB/GYN  [address] [facility],   [address]  [phone]  Adult Internal Medicine  [address] [facility]  591 [id number] [address], [address] [facility]  [phone]  Neuro / Headache  [address],   [facility]   [phone] Monday - Friday  8:30a - 5p   [facility]  [address]  [phone] [place], [place]  9:00a - 4:30p  Wed 1:00p - 4:30p   [facility] [address]  [phone] Family Practice Monday - Friday  8:30a - 5:00p     [facility]  [address] [facility] [address], [facility] [address]  [phone] Burn/Plastic, GI, Orthopedics, Surgical / Trauma, Urology, Vascular Monday - Friday 8:00 - 4:30p    Call for an appointment   [facility]  [address]  [phone] Adult Medicine, [facility], Dental  Patient must be certified homeless  Under age 25 not accepted Monday - Friday 8:00 - [address] Gypsy Chen   [phone]  OB   [phone] Monday, Tuesday, Wednesday, Friday 9a - 5p  Thursday 9a - 6p  Wednesday (OB only)     [facility]  [address]  [phone] OB/Prenatal Monday 11a -7p  Arvin Larger 9a - 5p  Friday 8a - 4p  1st Saturday 9a -1p   Podiatry Clinic  [address], [facility] [address]  [phone]  Monday - Friday 8:00 - 4:30 p   Pregnancy Center [facility]  [phone] Free nurse visits  [place] programs  Counseling  Pregnancy Class Call or walk in   [facility] Clinic  [facility]  [phone] Various Clinics [address]  [address] [facility], [address]  [phone] Family Practice Monday - Friday  8a - 4:30p   [facility]  [address] [facility], [address]  [phone] [address] [facility], [address]  [phone]    Monday - Friday  8a - 4:30p    Monday - Friday 8a - 4:30p  Thursday [address] [facility]  [phone]  Monday - Friday  9a - 5p   Diabetic Education Services  [phone]  Call for an appointment   [address]  [address]  [phone]  Monday - Friday  8:30a - 4p   Dental [facility] of [address] [facility]  [phone] Must have source of income and must bring (2) recent check stubs to appointment By appointment only   [facility] for the [facility]  [address] [facility]  [phone] Patient must be homeless, call for   eligibility guidelines.   Under age 25 NOT accepted Days and hours vary (Doors open at 8:30a - day of week varies)  Call for an appointment   [address] [facility] First Call for Help  [phone])  Call for an appointment   MARGIE   ([address] [facility])  [phone]   toll free [phone] For financial assistance

## 2022-07-09 NOTE — ED PROVIDER NOTES
[facility] ED  Emergency Department Encounter  Emergency Medicine Resident     Pt Name: Gabriel Ratliff  MRN: [id number]  [place] 1985  Date of evaluation: 7/9/22  PCP:  No primary care provider on file. CHIEF COMPLAINT       Chief Complaint   Patient presents with    Hypertension     BP is fluctuating at home, normally reads SBP in 90s, reading in 150s to low 100s.  Dizziness     upon waking up     Dysuria     \"it's getting stuck\" with frequency     Anxiety     from chest tightness    Cholelithiasis     reports gallstones \"stuck in bile duct\"        HISTORY OFPRESENT ILLNESS  (Location/Symptom, Timing/Onset, Context/Setting, Quality, Duration, Modifying Factors,Severity.)      Gabriel Ratliff is a 43 y.o. female who presents with hypertension and dizziness. She states that she woke up earlier today and while laying in bed noted that her heart rate was elevated. She then proceeded to take blood pressure measurement which came as 140/122 and a pulse of 45 bpm.  She rechecked this again and had a result of 120/103 with a pulse rate of 135 bpm.  She states that the pattern of her lightheadedness begins with her being alert followed by becoming tired rapidly and then becoming lightheaded. This has been occurring for the past week and the patient states that it has been distressing as it interrupts her ADLs. She reports mild pain in her stomach when this occurs. States that it is worse in the morning and during the day but settles at around 5 PM.  States that episodes occur 2-3 times per day. She states that she is taken no meds or home remedies for these episodes. Other symptoms that she reports include brain fog, nausea without vomiting or diarrhea, fatigue, increased urinary frequency, and stomach pain which she believes is due to her history of gallstones.     She also reports that she stopped use of all her medications about 2 months ago due \"worrying about symptoms worsening\". PAST MEDICAL / SURGICAL / SOCIAL / FAMILY HISTORY      has a past medical history of Anxiety, Asthma, GERD (gastroesophageal reflux disease), Hypothyroidism, Major depression, Morbid obesity, and Tension type headache.     has no past surgical history on file. However, she did state that she did have an ERCP for gallstones. Social:  reports that she has been smoking cigars. She has been smoking about 1.00 pack per day. She has never used smokeless tobacco. She reports that she does not drink alcohol and does not use drugs. She states that she has cut down to 1-2 cigarillos per day. Family Hx:   Family History   Problem Relation Age of Onset    Depression Father     Thyroid Disease Father     Diabetes Sister         Allergies:  Iodine, Penicillin g, Dye  [iodides], Benadryl [diphenhydramine], and Prednisone    Home Medications:  Prior to Admission medications    Medication Sig Start Date End Date Taking?  Authorizing Provider   hydrOXYzine pamoate (VISTARIL) 25 MG capsule Take 1 capsule by mouth 3 times daily as needed for Anxiety 7/9/22  Yes Emigdio Berumen MD   pantoprazole (PROTONIX) 20 MG tablet Take 2 tablets by mouth daily for 7 days 8/24/21 8/31/21  Keon Aguilar MD   dicyclomine (BENTYL) 10 MG capsule Take 2 capsules by mouth every 6 hours as needed (cramps) 8/24/21   Keon Aguilar MD   ondansetron (ZOFRAN) 4 MG tablet Take 1 tablet by mouth every 8 hours as needed for Nausea  Patient not taking: Reported on 7/26/2021 7/24/21   Marina Bedoya MD   sertraline (ZOLOFT) 50 MG tablet Take 1 tablet by mouth daily 7/19/21 11/1/21  Beth Hamilton DO   sodium chloride (ALTAMIST SPRAY) 0.65 % nasal spray 1 spray by Nasal route as needed for Congestion  Patient not taking: Reported on 7/26/2021 7/19/21 7/9/22  Beth Hamilton DO   Ferrous Gluconate (IRON) 240 (27 Fe) MG TABS Take 1 tablet by mouth daily  Patient not taking: Reported on 7/26/2021 9/67/31 5/41/39  Annette Sommer DO ferrous sulfate (FE TABS) 325 (65 Fe) MG EC tablet Take 1 tablet by mouth daily (with breakfast)  Patient not taking: Reported on 7/26/2021 7/6/21   Gladys Sanchez, DO   ibuprofen (ADVIL;MOTRIN) 600 MG tablet Take 1 tablet by mouth every 6 hours as needed for Pain  Patient not taking: Reported on 7/26/2021 7/1/21   Eric Olivas, DO   Prenatal Vit-Fe Fumarate-FA (PRENATAL VITAMIN) 27-0.8 MG TABS Take 1 tablet by mouth daily  Patient not taking: Reported on 7/19/2021 6/14/21   Radha Hamilton, DO   acetaminophen (TYLENOL) 500 MG tablet Take 1 tablet by mouth every 6 hours as needed for Pain  Patient not taking: Reported on 7/26/2021 1/20/21 7/9/22  Lethaniel Face Hiren, DO   Cholecalciferol (VITAMIN D3) 1000 units TABS Take 1 tablet by mouth daily 7/31/17   Olivier Davis MD       REVIEW OFSYSTEMS    (2-9 systems for level 4, 10 or more for level 5)      Review of Systems   Constitutional: Negative for appetite change, chills and fever. HENT: Positive for sore throat. Respiratory: Negative for cough, chest tightness and shortness of breath. Cardiovascular: Positive for palpitations. Negative for chest pain. Gastrointestinal: Positive for abdominal pain and nausea. Negative for diarrhea and vomiting. Endocrine: Positive for polyuria. Genitourinary: Positive for frequency. Musculoskeletal: Positive for gait problem (reports unstable when she walks). Neurological: Positive for dizziness, weakness, light-headedness, numbness and headaches. Psychiatric/Behavioral: The patient is nervous/anxious. PHYSICAL EXAM   (up to 7 for level 4, 8 or more forlevel 5)      INITIAL VITALS:   Vitals:    07/09/22 1347   BP: 108/83   Pulse: 74   Resp:    Temp:    SpO2: 100%        Physical Exam  Constitutional:       Appearance: She is obese. HENT:      Head: Normocephalic and atraumatic. Eyes:      Extraocular Movements: Extraocular movements intact.       Comments: Strabismus of the right eye   Cardiovascular:      Rate and Rhythm: Regular rhythm. Tachycardia present. Heart sounds: Normal heart sounds. No murmur heard. No gallop. Pulmonary:      Breath sounds: Normal breath sounds. Abdominal:      General: Abdomen is flat. Bowel sounds are normal. There is no distension. Palpations: Abdomen is soft. There is no mass. Tenderness: There is no abdominal tenderness. There is no guarding or rebound. Musculoskeletal:         General: Normal range of motion. Skin:     General: Skin is warm and dry. Neurological:      Mental Status: She is alert. Psychiatric:         Behavior: Behavior normal.         Thought Content: Thought content normal.         Judgment: Judgment normal.      Comments: Patient was tearful throughout exam due to worries about potential underlying condition           DIFFERENTIAL  DIAGNOSIS       DDX:   1. Syncope    2. Hypothyroidism  3. UTI  4. Anxiety    Initial MDM/Plan: 43 y.o. female with past medical history of hypothyroidism presents with an episode of lightheadedness after waking up in the morning. She is tearful, and reports confusion, lightheadeness, abdominal pain, and increased frequency of urination. She is non-compliant with medications for all her conditions (including hypothyroidism and anxiety). Examination was benign for cardiac, abdominal, circulatory, and neurologic pathology. Quantitative tests were ordered to review for potential cardiac, abdominal, thyroid, and urinary pathology.     DIAGNOSTIC RESULTS / EMERGENCYDEPARTMENT COURSE / MDM     LABS:  Labs Reviewed   COMPREHENSIVE METABOLIC PANEL - Abnormal; Notable for the following components:       Result Value    Glucose 103 (*)     BUN 5 (*)     All other components within normal limits   LIPASE - Abnormal; Notable for the following components:    Lipase 9 (*)     All other components within normal limits   URINALYSIS WITH REFLEX TO CULTURE - Abnormal; Notable for the following components:    Specific Gravity, UA 1.002 (*)     All other components within normal limits   CBC   TROPONIN   TSH WITH REFLEX   D-DIMER, QUANTITATIVE   HCG, SERUM, QUALITATIVE           RADIOLOGY:  XR CHEST (SINGLE VIEW FRONTAL)    Result Date: 7/9/2022  EXAMINATION: ONE X-RAY VIEW OF THE CHEST 7/9/2022 2:36 pm COMPARISON: 10/01/2021 HISTORY: ORDERING SYSTEM PROVIDED HISTORY:  Complaint of chest pain and palpitations, evaluate for cardiac structural abnormalities. TECHNOLOGIST PROVIDED HISTORY: Concerns for COVID Complaint of chest pain and palpitations, evaluate for cardiac structural abnormalities. FINDINGS: There is rightward deviation of the trachea, secondary to an enlarged left thyroid lobe when compared to a CT from 07/10/2021. The heart is normal in size for technique. No pleural effusion or pneumothorax is seen. No focal lung consolidation is identified. No acute cardiopulmonary abnormality identified. Rightward deviation of the trachea, secondary to an enlarged left thyroid lobe, as seen on a chest CT from 07/10/2021. EKG  EKG Interpretation    Interpreted by emergency department physician    Rhythm: sinus tachycardia  Rate: 100-110  Axis: normal  Ectopy: none  Conduction: normal  ST Segments: normal  T Waves: non specific  Q Waves: none   Biphasic P wave in V1    Clinical Impression: sinus tachycardia non-specific EKG    Dameon Oseguera MD      All EKG's are interpreted by the Emergency Department Physicianwho either signs or Co-signs this chart in the absence of a cardiologist.    EMERGENCY DEPARTMENT COURSE:          PROCEDURES:  None    CONSULTS:  None      FINAL IMPRESSION      1. Palpitations    2.  Near syncope          DISPOSITION / PLAN     DISPOSITION        PATIENT REFERRED TO:  Primary Care - See Clinic List Below    Schedule an appointment as soon as possible for a visit       [facility]  [address] [place] [address]  [phone]    If symptoms worsen      DISCHARGE MEDICATIONS:  New Prescriptions    HYDROXYZINE PAMOATE (VISTARIL) 25 MG CAPSULE    Take 1 capsule by mouth 3 times daily as needed for Anxiety       Donna Pinedo MD  Emergency Medicine Resident    (Please note that portions of this note were completed with a voice recognition program.Efforts were made to edit the dictations but occasionally words are mis-transcribed.)        Donna Pinedo MD  Resident  07/09/22 4610

## 2022-07-09 NOTE — ED PROVIDER NOTES
[facility] [facility]     Emergency Department     Faculty Attestation        I performed a history and physical examination of the patient and discussed management with the resident. I reviewed the residents note and agree with the documented findings and plan of care. Any areas of disagreement are noted on the chart. I was personally present for the key portions of any procedures. I have documented in the chart those procedures where I was not present during the key portions. I have reviewed the emergency nurses triage note. I agree with the chief complaint, past medical history, past surgical history, allergies, medications, social and family history as documented unless otherwise noted below. For mid-level providers such as nurse practitioners as well as physicians assistants:    I have personally seen and evaluated the patient. I find the patient's history and physical exam are consistent with NP/PA documentation. I agree with the care provided, treatment rendered, disposition, & follow-up plan. Additional findings are as noted. Vital Signs: BP (!) 113/90   Pulse (!) 109   Temp 98.2 °F (36.8 °C) (Oral)   Resp 17   Ht 5' 5\" (1.651 m)   Wt 160 lb (72.6 kg)   LMP 07/01/2022   SpO2 99%   BMI 26.63 kg/m²   PCP:  No primary care provider on file. Pertinent Comments:     Patient presents to the emergency department with multiple complaints. Her main complaint is feeling dizzy and lightheaded and feel like her heart races intermittently. This is been present ever since she stopped taking her thyroid medication and her Zoloft about 2 weeks ago. She feels like her heart is skipping beats and racing gets chest pain with it and feels very anxious. Symptoms seem to come and go randomly but the symptoms happened this morning shortly after she woke up which prompted her to come to the emergency department.   She also complains of some epigastric and right upper quadrant abdominal pain and states she has \"gallstones\". She has no nausea or vomiting or fevers and cough, but is intermittently short of breath. She also planes of dysuria as well but denies any vaginal bleeding or vaginal discharge or pelvic pain or flank pain. On exam she is afebrile nontoxic slightly tachycardic but normotensive. Appears anxious. Regular rate and rhythm. Lungs clear. Abdomen soft minimally tender in the epigastrium and right upper quadrant but there is no rebound or guarding. No CVA tenderness. She appears well-hydrated.     Will check EKG, CBC, CMP, pregnancy, urinalysis, lipase, troponin, D-dimer, thyroid panel, chest x-ray, reassessment      EKG Interpretation    Interpreted by me    Rhythm: sinus   Rate: Tachycardic  Axis: normal  Ectopy: none  Conduction: normal  ST Segments: no acute change  T Waves: no acute change  Q Waves: none    Clinical Impression: Nonspecific EKG no acute changes from previous EKG done on September 2021          Ruchi Hong MD    Attending Emergency Medicine Physician              Charles Randolph MD  07/09/22 [id number]

## 2022-07-09 NOTE — ED NOTES
Pt ambulated to restroom with a steady gait on her own     Ashley Abdullahis, [facility]  07/09/22 0379

## 2022-07-11 LAB
EKG ATRIAL RATE: 101 BPM
EKG P AXIS: 40 DEGREES
EKG P-R INTERVAL: 138 MS
EKG Q-T INTERVAL: 314 MS
EKG QRS DURATION: 70 MS
EKG QTC CALCULATION (BAZETT): 407 MS
EKG R AXIS: -14 DEGREES
EKG T AXIS: 77 DEGREES
EKG VENTRICULAR RATE: 101 BPM

## 2022-07-11 PROCEDURE — 93010 ELECTROCARDIOGRAM REPORT: CPT | Performed by: INTERNAL MEDICINE

## 2022-08-06 ENCOUNTER — HOSPITAL ENCOUNTER (EMERGENCY)
Age: 37
Discharge: HOME | End: 2022-08-06
Attending: EMERGENCY MEDICINE
Payer: MEDICARE

## 2022-08-06 ENCOUNTER — APPOINTMENT (OUTPATIENT)
Dept: GENERAL RADIOLOGY | Age: 37
End: 2022-08-06
Payer: MEDICARE

## 2022-08-06 VITALS
RESPIRATION RATE: 16 BRPM | DIASTOLIC BLOOD PRESSURE: 77 MMHG | OXYGEN SATURATION: 100 % | HEART RATE: 75 BPM | SYSTOLIC BLOOD PRESSURE: 107 MMHG | TEMPERATURE: 98.6 F

## 2022-08-06 DIAGNOSIS — U07.1 COVID-19: Primary | ICD-10-CM

## 2022-08-06 DIAGNOSIS — R07.9 CHEST PAIN, UNSPECIFIED TYPE: ICD-10-CM

## 2022-08-06 LAB
ABSOLUTE EOS #: 0 K/UL (ref 0–0.4)
ABSOLUTE IMMATURE GRANULOCYTE: 0 K/UL (ref 0–0.3)
ABSOLUTE LYMPH #: 1.32 K/UL (ref 1–4.8)
ABSOLUTE MONO #: 0.17 K/UL (ref 0.1–0.8)
ALBUMIN SERPL-MCNC: 4.2 G/DL (ref 3.5–5.2)
ALBUMIN/GLOBULIN RATIO: 1.4 (ref 1–2.5)
ALP BLD-CCNC: 53 U/L (ref 35–104)
ALT SERPL-CCNC: 10 U/L (ref 5–33)
ANION GAP SERPL CALCULATED.3IONS-SCNC: 13 MMOL/L (ref 9–17)
AST SERPL-CCNC: 27 U/L
BASOPHILS # BLD: 1 % (ref 0–2)
BASOPHILS ABSOLUTE: 0.03 K/UL (ref 0–0.2)
BILIRUB SERPL-MCNC: 0.3 MG/DL (ref 0.3–1.2)
BUN BLDV-MCNC: 6 MG/DL (ref 6–20)
CALCIUM SERPL-MCNC: 8.8 MG/DL (ref 8.6–10.4)
CHLORIDE BLD-SCNC: 101 MMOL/L (ref 98–107)
CO2: 23 MMOL/L (ref 20–31)
CREAT SERPL-MCNC: 0.56 MG/DL (ref 0.5–0.9)
D-DIMER QUANTITATIVE: 0.35 MG/L FEU
EOSINOPHILS RELATIVE PERCENT: 0 % (ref 1–4)
GFR AFRICAN AMERICAN: >60 ML/MIN
GFR NON-AFRICAN AMERICAN: >60 ML/MIN
GFR SERPL CREATININE-BSD FRML MDRD: ABNORMAL ML/MIN/{1.73_M2}
GLUCOSE BLD-MCNC: 106 MG/DL (ref 70–99)
HCG QUALITATIVE: NEGATIVE
HCT VFR BLD CALC: 34.3 % (ref 36.3–47.1)
HEMOGLOBIN: 12 G/DL (ref 11.9–15.1)
IMMATURE GRANULOCYTES: 0 %
LIPASE: 9 U/L (ref 13–60)
LYMPHOCYTES # BLD: 40 % (ref 24–44)
MCH RBC QN AUTO: 29.1 PG (ref 25.2–33.5)
MCHC RBC AUTO-ENTMCNC: 35 G/DL (ref 28.4–34.8)
MCV RBC AUTO: 83.3 FL (ref 82.6–102.9)
MONOCYTES # BLD: 5 % (ref 1–7)
MORPHOLOGY: NORMAL
NRBC AUTOMATED: 0 PER 100 WBC
PDW BLD-RTO: 13.7 % (ref 11.8–14.4)
PLATELET # BLD: ABNORMAL K/UL (ref 138–453)
PLATELET, FLUORESCENCE: 148 K/UL (ref 138–453)
PLATELET, IMMATURE FRACTION: 3.2 % (ref 1.1–10.3)
POTASSIUM SERPL-SCNC: 4 MMOL/L (ref 3.7–5.3)
RBC # BLD: 4.12 M/UL (ref 3.95–5.11)
REASON FOR REJECTION: NORMAL
SEG NEUTROPHILS: 54 % (ref 36–66)
SEGMENTED NEUTROPHILS ABSOLUTE COUNT: 1.78 K/UL (ref 1.8–7.7)
SODIUM BLD-SCNC: 137 MMOL/L (ref 135–144)
TOTAL PROTEIN: 7.3 G/DL (ref 6.4–8.3)
TROPONIN, HIGH SENSITIVITY: <6 NG/L (ref 0–14)
WBC # BLD: 3.3 K/UL (ref 3.5–11.3)
ZZ NTE CLEAN UP: ORDERED TEST: NORMAL
ZZ NTE WITH NAME CLEAN UP: SPECIMEN SOURCE: NORMAL

## 2022-08-06 PROCEDURE — 80053 COMPREHEN METABOLIC PANEL: CPT

## 2022-08-06 PROCEDURE — 96374 THER/PROPH/DIAG INJ IV PUSH: CPT

## 2022-08-06 PROCEDURE — 85379 FIBRIN DEGRADATION QUANT: CPT

## 2022-08-06 PROCEDURE — 99285 EMERGENCY DEPT VISIT HI MDM: CPT

## 2022-08-06 PROCEDURE — 85055 RETICULATED PLATELET ASSAY: CPT

## 2022-08-06 PROCEDURE — 85025 COMPLETE CBC W/AUTO DIFF WBC: CPT

## 2022-08-06 PROCEDURE — 71045 X-RAY EXAM CHEST 1 VIEW: CPT

## 2022-08-06 PROCEDURE — 84703 CHORIONIC GONADOTROPIN ASSAY: CPT

## 2022-08-06 PROCEDURE — 6360000002 HC RX W HCPCS: Performed by: STUDENT IN AN ORGANIZED HEALTH CARE EDUCATION/TRAINING PROGRAM

## 2022-08-06 PROCEDURE — 84484 ASSAY OF TROPONIN QUANT: CPT

## 2022-08-06 PROCEDURE — 83690 ASSAY OF LIPASE: CPT

## 2022-08-06 PROCEDURE — 6370000000 HC RX 637 (ALT 250 FOR IP): Performed by: STUDENT IN AN ORGANIZED HEALTH CARE EDUCATION/TRAINING PROGRAM

## 2022-08-06 PROCEDURE — 93005 ELECTROCARDIOGRAM TRACING: CPT | Performed by: STUDENT IN AN ORGANIZED HEALTH CARE EDUCATION/TRAINING PROGRAM

## 2022-08-06 RX ORDER — MIDAZOLAM HYDROCHLORIDE 2 MG/2ML
1 INJECTION, SOLUTION INTRAMUSCULAR; INTRAVENOUS ONCE
Status: DISCONTINUED | OUTPATIENT
Start: 2022-08-06 | End: 2022-08-06 | Stop reason: HOSPADM

## 2022-08-06 RX ORDER — ASPIRIN 81 MG/1
324 TABLET, CHEWABLE ORAL ONCE
Status: COMPLETED | OUTPATIENT
Start: 2022-08-06 | End: 2022-08-06

## 2022-08-06 RX ORDER — HYDROXYZINE HYDROCHLORIDE 50 MG/ML
25 INJECTION, SOLUTION INTRAMUSCULAR ONCE
Status: DISCONTINUED | OUTPATIENT
Start: 2022-08-06 | End: 2022-08-06

## 2022-08-06 RX ORDER — ACETAMINOPHEN 500 MG
500 TABLET ORAL EVERY 6 HOURS PRN
Qty: 40 TABLET | Refills: 0 | Status: SHIPPED | OUTPATIENT
Start: 2022-08-06 | End: 2022-08-16

## 2022-08-06 RX ORDER — IBUPROFEN 600 MG/1
600 TABLET ORAL 4 TIMES DAILY PRN
Qty: 40 TABLET | Refills: 0 | Status: SHIPPED | OUTPATIENT
Start: 2022-08-06 | End: 2022-08-16

## 2022-08-06 RX ORDER — ONDANSETRON 2 MG/ML
4 INJECTION INTRAMUSCULAR; INTRAVENOUS ONCE
Status: COMPLETED | OUTPATIENT
Start: 2022-08-06 | End: 2022-08-06

## 2022-08-06 RX ADMIN — ONDANSETRON 4 MG: 2 INJECTION INTRAMUSCULAR; INTRAVENOUS at 00:57

## 2022-08-06 RX ADMIN — ASPIRIN 324 MG: 81 TABLET, CHEWABLE ORAL at 00:56

## 2022-08-06 ASSESSMENT — HEART SCORE: ECG: 0

## 2022-08-06 NOTE — DISCHARGE INSTRUCTIONS
You were diagnosed with Covid-19. Please isolate for 7 days and wear a mask. Do not spend time around immunocompromised individuals. Take your medication as indicated. For pain use ibuprofen (Motrin / Advil) or acetaminophen (Tylenol), unless prescribed medications that have acetaminophen in it. You can take over the counter acetaminophen tablets (1 - 2 tablets of the 500-mg strength every 6 hours) or ibuprofen tablets (2 tablets every 4 hours). PLEASE RETURN TO THE EMERGENCY DEPARTMENT IMMEDIATELY for worsening symptoms of increasing pain, shortness of breath, feeling of your heart fluttering or racing, swelling to your feet, unable to lay flat, or if you develop any concerning symptoms such as: high fever not relieved by acetaminophen (Tylenol) and/or ibuprofen (Motrin / Advil), chills, persistent nausea and/or vomiting, loss of consciousness, numbness, weakness or tingling in the arms or legs or change in color of the extremities, changes in mental status, persistent headache, blurry vision, loss of bladder / bowel control, unable to follow up with your physician, or other any other care or concern.

## 2022-08-06 NOTE — ED PROVIDER NOTES
[facility] ED  Emergency Department Encounter  EmergencyMedicine Resident     Pt Tere Dawkins  MRN: [id number]  Shericegfhalie 1985  Date of evaluation: 8/6/22  PCP:  No primary care provider on file. This patient was evaluated in the Emergency Department for symptoms described in the history of present illness. The patient was evaluated in the context of the global COVID-19 pandemic, which necessitated consideration that the patient might be at risk for infection with the SARS-CoV-2 virus that causes COVID-19. Institutional protocols and algorithms that pertain to the evaluation of patients at risk for COVID-19 are in a state of rapid change based on information released by regulatory bodies including the CDC and federal and state organizations. These policies and algorithms were followed during the patient's care in the ED. CHIEF COMPLAINT       Chief Complaint   Patient presents with    Chest Pain    Shortness of Breath       HISTORY OF PRESENT ILLNESS  (Location/Symptom, Timing/Onset, Context/Setting, Quality, Duration, Modifying Factors, Severity.)      Mar Doyle is a 43 y.o. female who presents with chest pain and shortness of breath. She states these symptoms have been present for several days, and have been accompanied by myalgias. She had a positive Covid test at home yesterday, and states the symptoms have worsened since the positive test. She endorses significant anxiety about the positive test. She describes the chest pain as a burning, fiery sensation that is midsternal and non-radiating and that worsens with movement or breathing. She denies having any cardiac history, but does have a history of GERD and asthma and smoking. She denies headache, changes in vision, abdominal pain, nausea, vomiting, diarrhea, fevers, chills, numbness, tingling, weakness.     PAST MEDICAL / SURGICAL / SOCIAL / FAMILY HISTORY      has a past medical history of Anxiety, Asthma, GERD (gastroesophageal reflux disease), Hypothyroidism, Major depression, Morbid obesity, and Tension type headache.       has no past surgical history on file. Social History     Socioeconomic History    Marital status: Single     Spouse name: Not on file    Number of children: Not on file    Years of education: Not on file    Highest education level: Not on file   Occupational History    Not on file   Tobacco Use    Smoking status: Some Days     Packs/day: 1.00     Types: Cigars, Cigarettes    Smokeless tobacco: Never    Tobacco comments:     Black and Mild   Vaping Use    Vaping Use: Never used   Substance and Sexual Activity    Alcohol use: No    Drug use: Never    Sexual activity: Yes     Partners: Male   Other Topics Concern    Not on file   Social History Narrative    Not on file     Social Determinants of Health     Financial Resource Strain: Not on file   Food Insecurity: Not on file   Transportation Needs: Not on file   Physical Activity: Not on file   Stress: Not on file   Social Connections: Not on file   Intimate Partner Violence: Not on file   Housing Stability: Not on file       Family History   Problem Relation Age of Onset    Depression Father     Thyroid Disease Father     Diabetes Sister        Allergies:  Iodine, Penicillin g, Dye  [iodides], Benadryl [diphenhydramine], and Prednisone    Home Medications:  Prior to Admission medications    Medication Sig Start Date End Date Taking?  Authorizing Provider   acetaminophen (TYLENOL) 500 MG tablet Take 1 tablet by mouth every 6 hours as needed for Pain 8/6/22 8/16/22 Yes Taylor Espinoza MD   ibuprofen (ADVIL;MOTRIN) 600 MG tablet Take 1 tablet by mouth 4 times daily as needed for Pain 8/6/22 8/16/22 Yes Taylor Espinoza MD   hydrOXYzine pamoate (VISTARIL) 25 MG capsule Take 1 capsule by mouth 3 times daily as needed for Anxiety 7/9/22   Samson Callow, MD   pantoprazole (PROTONIX) 20 MG tablet Take 2 tablets by mouth daily for 7 days 8/24/21 8/31/21  Nela Rouse MD   dicyclomine (BENTYL) 10 MG capsule Take 2 capsules by mouth every 6 hours as needed (cramps) 8/24/21   Nela Rouse MD   ondansetron (ZOFRAN) 4 MG tablet Take 1 tablet by mouth every 8 hours as needed for Nausea  Patient not taking: Reported on 7/26/2021 7/24/21   Pryor Kawasaki, MD   sertraline (ZOLOFT) 50 MG tablet Take 1 tablet by mouth daily 7/19/21 11/1/21  Tawny Hamilton DO   sodium chloride (ALTAMIST SPRAY) 0.65 % nasal spray 1 spray by Nasal route as needed for Congestion  Patient not taking: Reported on 7/26/2021 7/19/21 7/9/22  Tawny Hamilton DO   Ferrous Gluconate (IRON) 240 (27 Fe) MG TABS Take 1 tablet by mouth daily  Patient not taking: Reported on 7/26/2021 6/80/55 7/08/76  Kayleen Haile,    ferrous sulfate (FE TABS) 325 (65 Fe) MG EC tablet Take 1 tablet by mouth daily (with breakfast)  Patient not taking: Reported on 7/26/2021 7/6/21   Thao Fatima, DO   Prenatal Vit-Fe Fumarate-FA (PRENATAL VITAMIN) 27-0.8 MG TABS Take 1 tablet by mouth daily  Patient not taking: Reported on 7/19/2021 6/14/21   Tawny Hamilton DO   Cholecalciferol (VITAMIN D3) 1000 units TABS Take 1 tablet by mouth daily 7/31/17   Laura Pearson MD       REVIEW OF SYSTEMS    (2-9 systems for level 4, 10 or more for level 5)      Review of Systems   Constitutional:  Negative for chills, fatigue and fever. HENT:  Negative for congestion, rhinorrhea and sore throat. Eyes:  Negative for visual disturbance. Respiratory:  Positive for shortness of breath. Negative for cough. Cardiovascular:  Positive for chest pain. Negative for palpitations and leg swelling. Gastrointestinal:  Negative for abdominal pain, diarrhea, nausea and vomiting. Genitourinary:  Negative for dysuria and hematuria. Musculoskeletal:  Negative for arthralgias and myalgias. Skin:  Negative for pallor and rash.    Neurological:  Negative for dizziness, tremors, syncope, weakness, light-headedness, numbness and headaches. All other systems reviewed and are negative. PHYSICAL EXAM   (up to 7 for level 4, 8 or more for level 5)      INITIAL VITALS:   /77   Pulse 75   Temp 98.6 °F (37 °C) (Oral)   Resp 16   LMP 07/28/2022 (Approximate)   SpO2 100%     Physical Exam  Vitals reviewed. Constitutional:       General: She is not in acute distress. Appearance: She is not toxic-appearing. HENT:      Head: Normocephalic and atraumatic. Mouth/Throat:      Mouth: Mucous membranes are moist.      Pharynx: Oropharynx is clear. Eyes:      Extraocular Movements: Extraocular movements intact. Pupils: Pupils are equal, round, and reactive to light. Cardiovascular:      Rate and Rhythm: Normal rate and regular rhythm. Pulses: Normal pulses. Heart sounds: Normal heart sounds. Pulmonary:      Effort: Pulmonary effort is normal.      Breath sounds: Normal breath sounds. No decreased breath sounds, wheezing, rhonchi or rales. Abdominal:      Palpations: Abdomen is soft. Tenderness: There is no abdominal tenderness. There is no guarding or rebound. Musculoskeletal:         General: Normal range of motion. Cervical back: Normal range of motion and neck supple. Right lower leg: No edema. Left lower leg: No edema. Skin:     Capillary Refill: Capillary refill takes less than 2 seconds. Coloration: Skin is not pale. Findings: No rash. Neurological:      General: No focal deficit present. Mental Status: She is alert and oriented to person, place, and time. Motor: No weakness.        DIFFERENTIAL  DIAGNOSIS     PLAN (LABS / IMAGING / EKG):  Orders Placed This Encounter   Procedures    COVID-19, Rapid    XR CHEST PORTABLE    Troponin    CBC with Auto Differential    Comprehensive Metabolic Panel    HCG Qualitative, Serum    Lipase    Immature Platelet Fraction    SPECIMEN REJECTION    D-Dimer, Quantitative    PREVIOUS SPECIMEN EKG 12 Lead       MEDICATIONS ORDERED:  Orders Placed This Encounter   Medications    ondansetron (ZOFRAN) injection 4 mg    aspirin chewable tablet 324 mg    DISCONTD: hydrOXYzine (VISTARIL) injection 25 mg    DISCONTD: midazolam PF (VERSED) injection 1 mg    acetaminophen (TYLENOL) 500 MG tablet     Sig: Take 1 tablet by mouth every 6 hours as needed for Pain     Dispense:  40 tablet     Refill:  0    ibuprofen (ADVIL;MOTRIN) 600 MG tablet     Sig: Take 1 tablet by mouth 4 times daily as needed for Pain     Dispense:  40 tablet     Refill:  0       DDX: COPD exacerbation, asthma, pneumothorax, anaphylaxis, anxiety, PE , pericardial effusion, CHF, ACS/MI, atelectasis, lower airway obstruction, aspiration      DIAGNOSTIC RESULTS / EMERGENCY DEPARTMENT COURSE / MDM   LAB RESULTS:  Results for orders placed or performed during the hospital encounter of 08/06/22   Troponin   Result Value Ref Range    Troponin, High Sensitivity <6 0 - 14 ng/L   CBC with Auto Differential   Result Value Ref Range    WBC 3.3 (L) 3.5 - 11.3 k/uL    RBC 4.12 3.95 - 5.11 m/uL    Hemoglobin 12.0 11.9 - 15.1 g/dL    Hematocrit 34.3 (L) 36.3 - 47.1 %    MCV 83.3 82.6 - 102.9 fL    MCH 29.1 25.2 - 33.5 pg    MCHC 35.0 (H) 28.4 - 34.8 g/dL    RDW 13.7 11.8 - 14.4 %    Platelets See Reflexed IPF Result 138 - 453 k/uL    NRBC Automated 0.0 0.0 per 100 WBC    Immature Granulocytes 0 0 %    Seg Neutrophils 54 36 - 66 %    Lymphocytes 40 24 - 44 %    Monocytes 5 1 - 7 %    Eosinophils % 0 (L) 1 - 4 %    Basophils 1 0 - 2 %    Absolute Immature Granulocyte 0.00 0.00 - 0.30 k/uL    Segs Absolute 1.78 (L) 1.8 - 7.7 k/uL    Absolute Lymph # 1.32 1.0 - 4.8 k/uL    Absolute Mono # 0.17 0.1 - 0.8 k/uL    Absolute Eos # 0.00 0.0 - 0.4 k/uL    Basophils Absolute 0.03 0.0 - 0.2 k/uL    Morphology Normal    Comprehensive Metabolic Panel   Result Value Ref Range    Glucose 106 (H) 70 - 99 mg/dL    BUN 6 6 - 20 mg/dL    Creatinine 0.56 0.50 - 0.90 mg/dL    Calcium 8.8 8.6 - 10.4 mg/dL    Sodium 137 135 - 144 mmol/L    Potassium 4.0 3.7 - 5.3 mmol/L    Chloride 101 98 - 107 mmol/L    CO2 23 20 - 31 mmol/L    Anion Gap 13 9 - 17 mmol/L    Alkaline Phosphatase 53 35 - 104 U/L    ALT 10 5 - 33 U/L    AST 27 <32 U/L    Total Bilirubin 0.30 0.3 - 1.2 mg/dL    Total Protein 7.3 6.4 - 8.3 g/dL    Albumin 4.2 3.5 - 5.2 g/dL    Albumin/Globulin Ratio 1.4 1.0 - 2.5    GFR Non-African American >60 >60 mL/min    GFR African American >60 >60 mL/min    GFR Comment         HCG Qualitative, Serum   Result Value Ref Range    hCG Qual NEGATIVE NEGATIVE   Lipase   Result Value Ref Range    Lipase 9 (L) 13 - 60 U/L   Immature Platelet Fraction   Result Value Ref Range    Platelet, Immature Fraction 3.2 1.1 - 10.3 %    Platelet, Fluorescence 148 138 - 453 k/uL   SPECIMEN REJECTION   Result Value Ref Range    Specimen Source . BLOOD     Ordered Test DIME     Reason for Rejection Specimen Clotted    D-Dimer, Quantitative   Result Value Ref Range    D-Dimer, Quant 0.35 mg/L FEU   EKG 12 Lead   Result Value Ref Range    Ventricular Rate 85 BPM    Atrial Rate 85 BPM    P-R Interval 146 ms    QRS Duration 68 ms    Q-T Interval 374 ms    QTc Calculation (Bazett) 445 ms    P Axis 39 degrees    R Axis -21 degrees    T Axis 9 degrees       IMPRESSION: 42 yo F presents with burning sensation in her chest and shortness of breath for the past few days. Positive covid test at home yesterday. Chest burning worsens with movement and breathing. SpO2 100% on RA. Lungs CTAB. Vitals stable. CXR unremarkable, D dimer negative. Troponin <6. Will discharge with return precautions and follow up with PCP. Patient voiced understanding and agreement with plan.     RADIOLOGY:  XR CHEST PORTABLE    Result Date: 8/6/2022  EXAMINATION: ONE XRAY VIEW OF THE CHEST 8/6/2022 12:47 am COMPARISON: 07/09/2022 HISTORY: ORDERING SYSTEM PROVIDED HISTORY: SOB, Covid+ TECHNOLOGIST PROVIDED HISTORY: SOB, Covid+ Reason for Exam: Upright port, SOB, covid FINDINGS: The cardiomediastinal silhouette is normal in size and contour. 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 The lungs are clear. No pleural effusion or pneumothorax is present. No acute cardiopulmonary process        EKG  EKG: normal EKG, normal sinus rhythm. All EKG's are interpreted by the Emergency Department Physician who either signs or Co-signs this chart in the absence of a cardiologist.    EMERGENCY DEPARTMENT COURSE:  ED Course as of 08/11/22 2058   Sat Aug 06, 2022   0102 CXR unremarkable [JG]   0150 D-Dimer, Quant: 0.35 [JG]   0214 Troponin, High Sensitivity: <6 [JG]      ED Course User Index  [JG] Nesha Hopkins MD     CONSULTS:  None    FINAL IMPRESSION      1. COVID-19    2.  Chest pain, unspecified type          DISPOSITION / PLAN     DISPOSITION Decision To Discharge 08/06/2022 03:00:03 AM      PATIENT REFERRED TO:  [address] [place] [address] [address] [place] [address]  [phone]  Schedule an appointment as soon as possible for a visit       [facility] ED  [address] [address]  [phone]  Go to   As needed, If symptoms worsen    DISCHARGE MEDICATIONS:  Discharge Medication List as of 8/6/2022  3:03 AM          Nesha Hopkins MD  Emergency Medicine Resident    (Please note that portions of thisnote were completed with a voice recognition program.  Efforts were made to edit the dictations but occasionally words are mis-transcribed.)       Nesha Hopkins MD  Resident  08/11/22 [id number]

## 2022-08-07 LAB
EKG ATRIAL RATE: 85 BPM
EKG P AXIS: 39 DEGREES
EKG P-R INTERVAL: 146 MS
EKG Q-T INTERVAL: 374 MS
EKG QRS DURATION: 68 MS
EKG QTC CALCULATION (BAZETT): 445 MS
EKG R AXIS: -21 DEGREES
EKG T AXIS: 9 DEGREES
EKG VENTRICULAR RATE: 85 BPM

## 2022-08-07 PROCEDURE — 93010 ELECTROCARDIOGRAM REPORT: CPT | Performed by: INTERNAL MEDICINE

## 2022-08-08 ENCOUNTER — CARE COORDINATION (OUTPATIENT)
Dept: CARE COORDINATION | Age: 37
End: 2022-08-08

## 2022-08-08 NOTE — CARE COORDINATION
Attempted to reach patient for Covid ED follow up. Message left on her voicemail with call back number. Will try to reach her tomorrow if no return call.

## 2022-08-09 NOTE — CARE COORDINATION
Second attempt to reach patient for Covid ED follow up. Left a message on her voicemail with call back number. No response back. Will close episode.

## 2022-08-10 ENCOUNTER — CLINICAL DOCUMENTATION (OUTPATIENT)
Dept: PSYCHIATRY | Age: 37
End: 2022-08-10

## 2022-08-10 NOTE — PROGRESS NOTES
Therapist received message from pt stating she is not able to attend a session until the end of the month. Therapist provided pt with open times the last week of August.  Therapist informed pt that she will need to choose an appt by the end of the day or she will be discharged. Therapist also informed pt if she was not able to schedule yet she would be discharged and could call back when she is able to be seen. Due to 17 no shows/late cancellations in the last 12 months of service pt will need to schedule by today and attend the next session to remain a recurring client.

## 2022-08-11 ENCOUNTER — FOLLOWUP TELEPHONE ENCOUNTER (OUTPATIENT)
Dept: PSYCHIATRY | Age: 37
End: 2022-08-11

## 2022-08-11 ASSESSMENT — ENCOUNTER SYMPTOMS
SHORTNESS OF BREATH: 1
RHINORRHEA: 0
NAUSEA: 0
VOMITING: 0
COUGH: 0
DIARRHEA: 0
SORE THROAT: 0
ABDOMINAL PAIN: 0

## 2022-08-11 NOTE — PROGRESS NOTES
Pt reached out and scheduled appt on Thursday 9/1/22 at 11 AM.  Due to multiple no shows/late cancellations over the last year, pt must attend or cancel with 24 hours notice or she will be discharged.

## 2022-08-14 ENCOUNTER — HOSPITAL ENCOUNTER (EMERGENCY)
Age: 37
Discharge: HOME | End: 2022-08-15
Attending: EMERGENCY MEDICINE
Payer: MEDICARE

## 2022-08-14 DIAGNOSIS — F41.9 ANXIETY: Primary | ICD-10-CM

## 2022-08-14 PROCEDURE — 99285 EMERGENCY DEPT VISIT HI MDM: CPT

## 2022-08-15 ENCOUNTER — APPOINTMENT (OUTPATIENT)
Dept: GENERAL RADIOLOGY | Age: 37
End: 2022-08-15
Payer: MEDICARE

## 2022-08-15 VITALS
TEMPERATURE: 98.1 F | OXYGEN SATURATION: 100 % | HEART RATE: 86 BPM | DIASTOLIC BLOOD PRESSURE: 88 MMHG | SYSTOLIC BLOOD PRESSURE: 117 MMHG | WEIGHT: 160 LBS | HEIGHT: 65 IN | BODY MASS INDEX: 26.66 KG/M2 | RESPIRATION RATE: 10 BRPM

## 2022-08-15 LAB
ABSOLUTE EOS #: <0.03 K/UL (ref 0–0.44)
ABSOLUTE IMMATURE GRANULOCYTE: <0.03 K/UL (ref 0–0.3)
ABSOLUTE LYMPH #: 2.61 K/UL (ref 1.1–3.7)
ABSOLUTE MONO #: 0.55 K/UL (ref 0.1–1.2)
ALBUMIN SERPL-MCNC: 4.4 G/DL (ref 3.5–5.2)
ALBUMIN/GLOBULIN RATIO: 1.3 (ref 1–2.5)
ALP BLD-CCNC: 55 U/L (ref 35–104)
ALT SERPL-CCNC: 11 U/L (ref 5–33)
ANION GAP SERPL CALCULATED.3IONS-SCNC: 13 MMOL/L (ref 9–17)
AST SERPL-CCNC: 17 U/L
BASOPHILS # BLD: 1 % (ref 0–2)
BASOPHILS ABSOLUTE: 0.03 K/UL (ref 0–0.2)
BILIRUB SERPL-MCNC: 0.69 MG/DL (ref 0.3–1.2)
BILIRUBIN DIRECT: 0.15 MG/DL
BILIRUBIN URINE: NEGATIVE
BILIRUBIN, INDIRECT: 0.54 MG/DL (ref 0–1)
BUN BLDV-MCNC: 8 MG/DL (ref 6–20)
CALCIUM SERPL-MCNC: 9.5 MG/DL (ref 8.6–10.4)
CASTS UA: ABNORMAL /LPF (ref 0–8)
CHLORIDE BLD-SCNC: 100 MMOL/L (ref 98–107)
CO2: 22 MMOL/L (ref 20–31)
COLOR: YELLOW
CREAT SERPL-MCNC: 0.65 MG/DL (ref 0.5–0.9)
D-DIMER QUANTITATIVE: 0.27 MG/L FEU
EOSINOPHILS RELATIVE PERCENT: 0 % (ref 1–4)
EPITHELIAL CELLS UA: ABNORMAL /HPF (ref 0–5)
GFR AFRICAN AMERICAN: >60 ML/MIN
GFR NON-AFRICAN AMERICAN: >60 ML/MIN
GFR SERPL CREATININE-BSD FRML MDRD: ABNORMAL ML/MIN/{1.73_M2}
GLUCOSE BLD-MCNC: 100 MG/DL (ref 70–99)
GLUCOSE URINE: NEGATIVE
HCG(URINE) PREGNANCY TEST: NEGATIVE
HCT VFR BLD CALC: 39.9 % (ref 36.3–47.1)
HEMOGLOBIN: 12.9 G/DL (ref 11.9–15.1)
IMMATURE GRANULOCYTES: 0 %
KETONES, URINE: ABNORMAL
LEUKOCYTE ESTERASE, URINE: ABNORMAL
LIPASE: 9 U/L (ref 13–60)
LYMPHOCYTES # BLD: 42 % (ref 24–43)
MCH RBC QN AUTO: 27.3 PG (ref 25.2–33.5)
MCHC RBC AUTO-ENTMCNC: 32.3 G/DL (ref 28.4–34.8)
MCV RBC AUTO: 84.5 FL (ref 82.6–102.9)
MONOCYTES # BLD: 9 % (ref 3–12)
NITRITE, URINE: NEGATIVE
NRBC AUTOMATED: 0 PER 100 WBC
PDW BLD-RTO: 13.9 % (ref 11.8–14.4)
PH UA: 5.5 (ref 5–8)
PLATELET # BLD: 254 K/UL (ref 138–453)
PMV BLD AUTO: 10.3 FL (ref 8.1–13.5)
POTASSIUM SERPL-SCNC: 3.3 MMOL/L (ref 3.7–5.3)
PRO-BNP: 28 PG/ML
PROTEIN UA: NEGATIVE
RBC # BLD: 4.72 M/UL (ref 3.95–5.11)
RBC UA: ABNORMAL /HPF (ref 0–4)
SEG NEUTROPHILS: 49 % (ref 36–65)
SEGMENTED NEUTROPHILS ABSOLUTE COUNT: 3.07 K/UL (ref 1.5–8.1)
SODIUM BLD-SCNC: 135 MMOL/L (ref 135–144)
SPECIFIC GRAVITY UA: 1.02 (ref 1–1.03)
TOTAL PROTEIN: 7.7 G/DL (ref 6.4–8.3)
TROPONIN, HIGH SENSITIVITY: <6 NG/L (ref 0–14)
TROPONIN, HIGH SENSITIVITY: <6 NG/L (ref 0–14)
TSH SERPL DL<=0.05 MIU/L-ACNC: 1.9 UIU/ML (ref 0.3–5)
TURBIDITY: ABNORMAL
URINE HGB: NEGATIVE
UROBILINOGEN, URINE: NORMAL
WBC # BLD: 6.3 K/UL (ref 3.5–11.3)
WBC UA: ABNORMAL /HPF (ref 0–5)

## 2022-08-15 PROCEDURE — 83690 ASSAY OF LIPASE: CPT

## 2022-08-15 PROCEDURE — 84484 ASSAY OF TROPONIN QUANT: CPT

## 2022-08-15 PROCEDURE — 80048 BASIC METABOLIC PNL TOTAL CA: CPT

## 2022-08-15 PROCEDURE — 83880 ASSAY OF NATRIURETIC PEPTIDE: CPT

## 2022-08-15 PROCEDURE — 71045 X-RAY EXAM CHEST 1 VIEW: CPT

## 2022-08-15 PROCEDURE — 80076 HEPATIC FUNCTION PANEL: CPT

## 2022-08-15 PROCEDURE — 81001 URINALYSIS AUTO W/SCOPE: CPT

## 2022-08-15 PROCEDURE — 85379 FIBRIN DEGRADATION QUANT: CPT

## 2022-08-15 PROCEDURE — 85025 COMPLETE CBC W/AUTO DIFF WBC: CPT

## 2022-08-15 PROCEDURE — 84443 ASSAY THYROID STIM HORMONE: CPT

## 2022-08-15 PROCEDURE — 81025 URINE PREGNANCY TEST: CPT

## 2022-08-15 PROCEDURE — 93005 ELECTROCARDIOGRAM TRACING: CPT | Performed by: STUDENT IN AN ORGANIZED HEALTH CARE EDUCATION/TRAINING PROGRAM

## 2022-08-15 PROCEDURE — 6370000000 HC RX 637 (ALT 250 FOR IP): Performed by: STUDENT IN AN ORGANIZED HEALTH CARE EDUCATION/TRAINING PROGRAM

## 2022-08-15 PROCEDURE — 2580000003 HC RX 258: Performed by: STUDENT IN AN ORGANIZED HEALTH CARE EDUCATION/TRAINING PROGRAM

## 2022-08-15 RX ORDER — 0.9 % SODIUM CHLORIDE 0.9 %
1000 INTRAVENOUS SOLUTION INTRAVENOUS ONCE
Status: COMPLETED | OUTPATIENT
Start: 2022-08-15 | End: 2022-08-15

## 2022-08-15 RX ORDER — DOXYCYCLINE HYCLATE 100 MG
100 TABLET ORAL 2 TIMES DAILY
Qty: 14 TABLET | Refills: 0 | Status: SHIPPED | OUTPATIENT
Start: 2022-08-15 | End: 2022-08-15

## 2022-08-15 RX ORDER — MECLIZINE HYDROCHLORIDE 25 MG/1
25 TABLET ORAL 3 TIMES DAILY PRN
Qty: 30 TABLET | Refills: 0 | Status: SHIPPED | OUTPATIENT
Start: 2022-08-15 | End: 2022-08-25

## 2022-08-15 RX ORDER — CEPHALEXIN 250 MG/5ML
500 POWDER, FOR SUSPENSION ORAL 2 TIMES DAILY
Qty: 140 ML | Refills: 0 | Status: SHIPPED | OUTPATIENT
Start: 2022-08-15 | End: 2022-08-22

## 2022-08-15 RX ORDER — LORAZEPAM 2 MG/ML
1 INJECTION INTRAMUSCULAR ONCE
Status: DISCONTINUED | OUTPATIENT
Start: 2022-08-15 | End: 2022-08-15 | Stop reason: HOSPADM

## 2022-08-15 RX ADMIN — POTASSIUM BICARBONATE 20 MEQ: 782 TABLET, EFFERVESCENT ORAL at 01:32

## 2022-08-15 RX ADMIN — SODIUM CHLORIDE 1000 ML: 9 INJECTION, SOLUTION INTRAVENOUS at 00:42

## 2022-08-15 ASSESSMENT — ENCOUNTER SYMPTOMS
ABDOMINAL PAIN: 0
COUGH: 0
BACK PAIN: 0
DIARRHEA: 0
VOMITING: 0
CONSTIPATION: 0
SHORTNESS OF BREATH: 1
NAUSEA: 0

## 2022-08-15 ASSESSMENT — PAIN - FUNCTIONAL ASSESSMENT: PAIN_FUNCTIONAL_ASSESSMENT: 0-10

## 2022-08-15 ASSESSMENT — PAIN SCALES - GENERAL: PAINLEVEL_OUTOF10: 4

## 2022-08-15 NOTE — ED PROVIDER NOTES
[facility] [address] [facility]  Emergency Department  Emergency Medicine Resident [address] of [place] Levy was assumed from Dr. Kezia Delong and is being seen for Anxiety  . The patient's initial evaluation and plan have been discussed with the prior provider who initially evaluated the patient. EMERGENCY DEPARTMENT COURSE / MEDICAL DECISION MAKING:       MEDICATIONS GIVEN:  Orders Placed This Encounter   Medications    DISCONTD: LORazepam (ATIVAN) injection 1 mg    0.9 % sodium chloride bolus    DISCONTD: potassium bicarb-citric acid (EFFER-K) effervescent tablet 20 mEq    potassium bicarb-citric acid (EFFER-K) effervescent tablet 20 mEq    meclizine (ANTIVERT) 25 MG tablet     Sig: Take 1 tablet by mouth 3 times daily as needed (anxiety)     Dispense:  30 tablet     Refill:  0    DISCONTD: doxycycline hyclate (VIBRA-TABS) 100 MG tablet     Sig: Take 1 tablet by mouth in the morning and 1 tablet before bedtime. Do all this for 7 days.      Dispense:  14 tablet     Refill:  0    cephALEXin (KEFLEX) 250 MG/5ML suspension     Sig: Take 10 mLs by mouth in the morning and at bedtime for 7 days     Dispense:  140 mL     Refill:  0       LABS / RADIOLOGY:     Labs Reviewed   CBC WITH AUTO DIFFERENTIAL - Abnormal; Notable for the following components:       Result Value    Eosinophils % 0 (*)     All other components within normal limits   BASIC METABOLIC PANEL - Abnormal; Notable for the following components:    Glucose 100 (*)     Potassium 3.3 (*)     All other components within normal limits   LIPASE - Abnormal; Notable for the following components:    Lipase 9 (*)     All other components within normal limits   URINALYSIS WITH MICROSCOPIC - Abnormal; Notable for the following components:    Turbidity UA Cloudy (*)     Ketones, Urine TRACE (*)     Leukocyte Esterase, Urine MODERATE (*)     All other components within normal limits   TROPONIN   TROPONIN   BRAIN NATRIURETIC PEPTIDE   D-DIMER, QUANTITATIVE   HEPATIC FUNCTION PANEL   TSH WITH REFLEX   PREGNANCY, URINE       XR CHEST PORTABLE    Result Date: 8/15/2022  EXAMINATION: ONE XRAY VIEW OF THE CHEST 8/15/2022 12:47 am COMPARISON: 08/06/2022 HISTORY: ORDERING SYSTEM PROVIDED HISTORY: chest pain TECHNOLOGIST PROVIDED HISTORY: chest pain Reason for Exam: Upright port, CP FINDINGS: Heart size and pulmonary vasculature are normal.  The lungs are clear and normally expanded. Surrounding osseous and soft tissue structures are unremarkable. Normal examination. XR CHEST PORTABLE    Result Date: 8/6/2022  EXAMINATION: ONE XRAY VIEW OF THE CHEST 8/6/2022 12:47 am COMPARISON: 07/09/2022 HISTORY: ORDERING SYSTEM PROVIDED HISTORY: SOB, Covid+ TECHNOLOGIST PROVIDED HISTORY: SOB, Covid+ Reason for Exam: Upright port, SOB, covid FINDINGS: The cardiomediastinal silhouette is normal in size and contour. 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 The lungs are clear. No pleural effusion or pneumothorax is present. No acute cardiopulmonary process       RECENT VITALS:     Temp: 98.1 °F (36.7 °C),  Heart Rate: 86, Resp: 10, BP: 117/88, SpO2: 100 %    This patient is a 43 y.o. Female with history of anxiety presents with acute onset chest pain and shortness of breath after patient found that her systolic blood pressure was in the 90s and her heart rate was in the 50s. Patient with recent diagnosis of COVID, at this time she took her oxygen saturation at home which was in the high 80s. Patient called her physician who instructed her to come to the emergency department. Upon initial presentation patient tearful and anxious. ACS work-up unremarkable. Negative D-dimer. Patient refused Ativan and 1 L normal saline bolus. Patient found to be hypokalemic at 3.3, 20 M EQ's of Effer-K administered. Awaiting second troponin.   Urinalysis consistent with UTI, patient allergic to cephalosporins, patient prescribed doxycycline, prescription printed and given to patient. Patient to be sent home with prescription for meclizine for anxiety. Patient can be discharged home pending second normal troponin. ED Course as of 08/15/22 0603   Mon Aug 15, 2022   0248 Troponin, High Sensitivity: <6 [JG]      ED Course User Index  [JG] Georgia Roche MD       OUTSTANDING TASKS / RECOMMENDATIONS:    F/u trop  Dispo     FINAL IMPRESSION:     1. Anxiety        DISPOSITION:         DISPOSITION:  [x]  Discharge   []  Transfer -    []  Admission -     []  Against Medical Advice   []  Eloped   FOLLOW-UP: [facility]  [address] [place] [address]  [phone]  Go to   If symptoms worsen     DISCHARGE MEDICATIONS: Discharge Medication List as of 8/15/2022  2:51 AM             Details   meclizine (ANTIVERT) 25 MG tablet Take 1 tablet by mouth 3 times daily as needed (anxiety), Disp-30 tablet, R-0Print      doxycycline hyclate (VIBRA-TABS) 100 MG tablet Take 1 tablet by mouth in the morning and 1 tablet before bedtime. Do all this for 7 days. , Disp-14 tablet, R-0Print                Details   acetaminophen (TYLENOL) 500 MG tablet Take 1 tablet by mouth every 6 hours as needed for Pain, Disp-40 tablet, R-0Normal      ibuprofen (ADVIL;MOTRIN) 600 MG tablet Take 1 tablet by mouth 4 times daily as needed for Pain, Disp-40 tablet, R-0Normal      hydrOXYzine pamoate (VISTARIL) 25 MG capsule Take 1 capsule by mouth 3 times daily as needed for Anxiety, Disp-30 capsule, R-0Print      pantoprazole (PROTONIX) 20 MG tablet Take 2 tablets by mouth daily for 7 days, Disp-14 tablet, R-0Print      dicyclomine (BENTYL) 10 MG capsule Take 2 capsules by mouth every 6 hours as needed (cramps), Disp-20 capsule, R-0Print      ondansetron (ZOFRAN) 4 MG tablet Take 1 tablet by mouth every 8 hours as needed for Nausea, Disp-20 tablet, R-0Print      sertraline (ZOLOFT) 50 MG tablet Take 1 tablet by mouth daily, Disp-35 tablet, R-2Normal      Ferrous Gluconate (IRON) 240 (27 Fe) MG TABS Take 1 tablet by mouth daily, Disp-30 tablet, R-3Normal      ferrous sulfate (FE TABS) 325 (65 Fe) MG EC tablet Take 1 tablet by mouth daily (with breakfast), Disp-90 tablet, R-3Normal      Prenatal Vit-Fe Fumarate-FA (PRENATAL VITAMIN) 27-0.8 MG TABS Take 1 tablet by mouth daily, Disp-30 tablet, R-12May substitute with any formulary covered by the patient's insuranceNormal      Cholecalciferol (VITAMIN D3) 1000 units TABS Take 1 tablet by mouth daily, Disp-90 tablet, R-1Normal                    Alice Corbin MD  Emergency Medicine Resident  [facility]       Alice Corbin MD  Resident  08/15/22 2759

## 2022-08-15 NOTE — ED PROVIDER NOTES
[facility]  ED  Emergency Department Encounter  EmergencyMedicine Resident     Pt Jad Daley  MRN: [id number]  Ivanna 1985  Date of evaluation: 8/15/22  PCP:  No primary care provider on file. This patient was evaluated in the Emergency Department for symptoms described in the history of present illness. The patient was evaluated in the context of the global COVID-19 pandemic, which necessitated consideration that the patient might be at risk for infection with the SARS-CoV-2 virus that causes COVID-19. Institutional protocols and algorithms that pertain to the evaluation of patients at risk for COVID-19 are in a state of rapid change based on information released by regulatory bodies including the CDC and federal and state organizations. These policies and algorithms were followed during the patient's care in the ED. CHIEF COMPLAINT       Chief Complaint   Patient presents with    Anxiety       HISTORY OF PRESENT ILLNESS  (Location/Symptom, Timing/Onset, Context/Setting, Quality, Duration, Modifying Factors, Severity.)      Dalton Benavides is a 43 y.o. female with history of anxiety who presents with chest pain and shortness of breath. Patient states that prior to arrival she awoke from sleep and felt dizzy so she took her blood pressure. Patient states that she saw that her systolic blood pressure was in the 90s. She then developed shortness of breath and chest pain. She then checked her pulse which was in the 50s. Patient had a recent diagnosis of COVID-19 to which she had a pulse oximeter at home. Patient called her physician who advised her to check her oxygenation. Patient's oxygenation at home was reportedly in the high 80s. Patient began to have more chest pain, increased shortness of breath, anxiety. Patient called EMS and brought to the emergency department.   Upon initial presentation patient tearful, anxious, expressing substernal chest pain with shortness of breath. Patient endorsing dysuria. Denies fever/chills, cough, nausea/vomiting, abdominal pain, change in bowel or bladder function, numbness or tingling. PAST MEDICAL / SURGICAL / SOCIAL / FAMILY HISTORY      has a past medical history of Anxiety, Asthma, GERD (gastroesophageal reflux disease), Hypothyroidism, Major depression, Morbid obesity, and Tension type headache.     has no past surgical history on file. Social History     Socioeconomic History    Marital status: Single     Spouse name: Not on file    Number of children: Not on file    Years of education: Not on file    Highest education level: Not on file   Occupational History    Not on file   Tobacco Use    Smoking status: Some Days     Packs/day: 1.00     Types: Cigars, Cigarettes    Smokeless tobacco: Never    Tobacco comments:     Black and Mild   Vaping Use    Vaping Use: Never used   Substance and Sexual Activity    Alcohol use: No    Drug use: Never    Sexual activity: Yes     Partners: Male   Other Topics Concern    Not on file   Social History Narrative    Not on file     Social Determinants of Health     Financial Resource Strain: Not on file   Food Insecurity: Not on file   Transportation Needs: Not on file   Physical Activity: Not on file   Stress: Not on file   Social Connections: Not on file   Intimate Partner Violence: Not on file   Housing Stability: Not on file       Family History   Problem Relation Age of Onset    Depression Father     Thyroid Disease Father     Diabetes Sister        Allergies:    Iodine, Penicillin g, Dye  [iodides], Benadryl [diphenhydramine], and Prednisone    Home Medications:  Prior to Admission medications    Medication Sig Start Date End Date Taking?  Authorizing Provider   meclizine (ANTIVERT) 25 MG tablet Take 1 tablet by mouth 3 times daily as needed (anxiety) 8/15/22 8/25/22 Yes Markel Ranellone, DO   doxycycline hyclate (VIBRA-TABS) 100 MG tablet Take 1 tablet by mouth in the morning and 1 tablet before bedtime. Do all this for 7 days. 8/15/22 8/22/22 Yes Markel Wilson,    acetaminophen (TYLENOL) 500 MG tablet Take 1 tablet by mouth every 6 hours as needed for Pain 8/6/22 8/16/22  Anya Epperson MD   ibuprofen (ADVIL;MOTRIN) 600 MG tablet Take 1 tablet by mouth 4 times daily as needed for Pain 8/6/22 8/16/22  Anya Epperson MD   hydrOXYzine pamoate (VISTARIL) 25 MG capsule Take 1 capsule by mouth 3 times daily as needed for Anxiety 7/9/22   Emigdio Berumen MD   pantoprazole (PROTONIX) 20 MG tablet Take 2 tablets by mouth daily for 7 days 8/24/21 8/31/21  Keon Aguilar MD   dicyclomine (BENTYL) 10 MG capsule Take 2 capsules by mouth every 6 hours as needed (cramps) 8/24/21   Keon Aguilar MD   ondansetron (ZOFRAN) 4 MG tablet Take 1 tablet by mouth every 8 hours as needed for Nausea  Patient not taking: Reported on 7/26/2021 7/24/21   Marina Bedoya MD   sertraline (ZOLOFT) 50 MG tablet Take 1 tablet by mouth daily 7/19/21 11/1/21  Beth Hamilton DO   sodium chloride (ALTAMIST SPRAY) 0.65 % nasal spray 1 spray by Nasal route as needed for Congestion  Patient not taking: Reported on 7/26/2021 7/19/21 7/9/22  Beth Hamilton DO   Ferrous Gluconate (IRON) 240 (27 Fe) MG TABS Take 1 tablet by mouth daily  Patient not taking: Reported on 7/26/2021 0/74/68 7/82/05  Annette Sommer, DO   ferrous sulfate (FE TABS) 325 (65 Fe) MG EC tablet Take 1 tablet by mouth daily (with breakfast)  Patient not taking: Reported on 7/26/2021 7/6/21   Becky Gibbons, DO   Prenatal Vit-Fe Fumarate-FA (PRENATAL VITAMIN) 27-0.8 MG TABS Take 1 tablet by mouth daily  Patient not taking: Reported on 7/19/2021 6/14/21   Beth Bath Cacciotti, DO   Cholecalciferol (VITAMIN D3) 1000 units TABS Take 1 tablet by mouth daily 7/31/17   Charlene Leon MD       REVIEW OF SYSTEMS    (2-9 systems for level 4, 10 or more for level 5)    Review of Systems   Constitutional:  Negative for chills and fever.    HENT: Negative for congestion. Eyes:  Negative for visual disturbance. Respiratory:  Positive for shortness of breath. Negative for cough. Cardiovascular:  Positive for chest pain. Gastrointestinal:  Negative for abdominal pain, constipation, diarrhea, nausea and vomiting. Genitourinary:  Positive for dysuria. Negative for difficulty urinating, hematuria, vaginal bleeding and vaginal discharge. Musculoskeletal:  Negative for back pain. Skin:  Negative for wound. Neurological:  Negative for weakness, numbness and headaches. PHYSICAL EXAM   (up to 7 for level 4, 8 or more for level 5)    INITIAL VITALS:   /88   Pulse 86   Temp 98.1 °F (36.7 °C) (Oral)   Resp 10   Ht 5' 5\" (1.651 m)   Wt 160 lb (72.6 kg)   LMP 07/28/2022 (Approximate)   SpO2 100%   BMI 26.63 kg/m²   I have reviewed the triage vital signs. Const: Tearful, anxious  Eyes: PERRL, no conjunctival injection  HENT: NCAT, Neck supple without meningismus   CV: RRR, Warm, well-perfused extremities  RESP: CTAB, Unlabored respiratory effort  GI: soft, non-tender, non-distended, no masses  MSK: No gross deformities appreciated  Skin: Warm, dry. No rashes  Neuro: Alert, CNs II-XII grossly intact. Sensation and motor function of extremities grossly intact. Psych: Tearful and anxious      DIFFERENTIAL  DIAGNOSIS   DDX:  Anxiety, PE, pneumothorax, pneumonia, ACS/MI, UTI, thyroid abnormality    Initial MDM:  58-year-old female with history of anxiety presents emergency department with chest pain and shortness of breath after she found herself to have a systolic blood pressure in the 90s as well as heart rate in the 50s. Patient is anxious and tearful in the emergency department. Endorsing substernal chest pain and shortness of breath. Will get CBC, BMP, BNP, troponin x2, D-dimer, LFTs, lipase, TSH, urinalysis, EKG, chest x-ray. Will give 1 mg of Ativan. Will reevaluate.     PLAN (LABS / IMAGING / EKG):  Orders Placed This Encounter Procedures    XR CHEST PORTABLE    CBC with Auto Differential    Basic Metabolic Panel    Troponin    Brain Natriuretic Peptide    D-Dimer, Quantitative    Lipase    Hepatic Function Panel    TSH with Reflex    Urinalysis with Microscopic    Pregnancy, Urine    EKG 12 Lead       MEDICATIONS ORDERED:  Orders Placed This Encounter   Medications    LORazepam (ATIVAN) injection 1 mg    0.9 % sodium chloride bolus    DISCONTD: potassium bicarb-citric acid (EFFER-K) effervescent tablet 20 mEq    potassium bicarb-citric acid (EFFER-K) effervescent tablet 20 mEq    meclizine (ANTIVERT) 25 MG tablet     Sig: Take 1 tablet by mouth 3 times daily as needed (anxiety)     Dispense:  30 tablet     Refill:  0    doxycycline hyclate (VIBRA-TABS) 100 MG tablet     Sig: Take 1 tablet by mouth in the morning and 1 tablet before bedtime. Do all this for 7 days.      Dispense:  14 tablet     Refill:  0         DIAGNOSTIC RESULTS / EMERGENCY DEPARTMENT COURSE / MDM   LAB RESULTS:  Results for orders placed or performed during the hospital encounter of 08/14/22   CBC with Auto Differential   Result Value Ref Range    WBC 6.3 3.5 - 11.3 k/uL    RBC 4.72 3.95 - 5.11 m/uL    Hemoglobin 12.9 11.9 - 15.1 g/dL    Hematocrit 39.9 36.3 - 47.1 %    MCV 84.5 82.6 - 102.9 fL    MCH 27.3 25.2 - 33.5 pg    MCHC 32.3 28.4 - 34.8 g/dL    RDW 13.9 11.8 - 14.4 %    Platelets 370 198 - 804 k/uL    MPV 10.3 8.1 - 13.5 fL    NRBC Automated 0.0 0.0 per 100 WBC    Seg Neutrophils 49 36 - 65 %    Lymphocytes 42 24 - 43 %    Monocytes 9 3 - 12 %    Eosinophils % 0 (L) 1 - 4 %    Basophils 1 0 - 2 %    Immature Granulocytes 0 0 %    Segs Absolute 3.07 1.50 - 8.10 k/uL    Absolute Lymph # 2.61 1.10 - 3.70 k/uL    Absolute Mono # 0.55 0.10 - 1.20 k/uL    Absolute Eos # <0.03 0.00 - 0.44 k/uL    Basophils Absolute 0.03 0.00 - 0.20 k/uL    Absolute Immature Granulocyte <0.03 0.00 - 0.30 k/uL   Basic Metabolic Panel   Result Value Ref Range    Glucose 100 (H) 70 - 99 mg/dL    BUN 8 6 - 20 mg/dL    Creatinine 0.65 0.50 - 0.90 mg/dL    Calcium 9.5 8.6 - 10.4 mg/dL    Sodium 135 135 - 144 mmol/L    Potassium 3.3 (L) 3.7 - 5.3 mmol/L    Chloride 100 98 - 107 mmol/L    CO2 22 20 - 31 mmol/L    Anion Gap 13 9 - 17 mmol/L    GFR Non-African American >60 >60 mL/min    GFR African American >60 >60 mL/min    GFR Comment         Troponin   Result Value Ref Range    Troponin, High Sensitivity <6 0 - 14 ng/L   Brain Natriuretic Peptide   Result Value Ref Range    Pro-BNP 28 <300 pg/mL   D-Dimer, Quantitative   Result Value Ref Range    D-Dimer, Quant 0.27 mg/L FEU   Lipase   Result Value Ref Range    Lipase 9 (L) 13 - 60 U/L   Hepatic Function Panel   Result Value Ref Range    Albumin 4.4 3.5 - 5.2 g/dL    Alkaline Phosphatase 55 35 - 104 U/L    ALT 11 5 - 33 U/L    AST 17 <32 U/L    Total Bilirubin 0.69 0.3 - 1.2 mg/dL    Bilirubin, Direct 0.15 <0.31 mg/dL    Bilirubin, Indirect 0.54 0.00 - 1.00 mg/dL    Total Protein 7.7 6.4 - 8.3 g/dL    Albumin/Globulin Ratio 1.3 1.0 - 2.5   TSH with Reflex   Result Value Ref Range    TSH 1.90 0.30 - 5.00 uIU/mL   Urinalysis with Microscopic   Result Value Ref Range    Color, UA Yellow Yellow    Turbidity UA Cloudy (A) Clear    Glucose, Ur NEGATIVE NEGATIVE    Bilirubin Urine NEGATIVE NEGATIVE    Ketones, Urine TRACE (A) NEGATIVE    Specific Gravity, UA 1.018 1.005 - 1.030    Urine Hgb NEGATIVE NEGATIVE    pH, UA 5.5 5.0 - 8.0    Protein, UA NEGATIVE NEGATIVE    Urobilinogen, Urine Normal Normal    Nitrite, Urine NEGATIVE NEGATIVE    Leukocyte Esterase, Urine MODERATE (A) NEGATIVE    WBC, UA 20 TO 50 0 - 5 /HPF    RBC, UA 2 TO 5 0 - 4 /HPF    Casts UA  0 - 8 /LPF     10 TO 20 HYALINE Reference range defined for non-centrifuged specimen. Epithelial Cells UA 2 TO 5 0 - 5 /HPF   Pregnancy, Urine   Result Value Ref Range    HCG(Urine) Pregnancy Test NEGATIVE NEGATIVE       Hypokalemic, will treat with 20 mEq of Effer-K. Urinalysis concerning for UTI. Patient allergic to penicillins, will prescribe doxycycline. Rest of lab work unremarkable. RADIOLOGY:  XR CHEST PORTABLE    Result Date: 8/15/2022  EXAMINATION: ONE XRAY VIEW OF THE CHEST 8/15/2022 12:47 am COMPARISON: 08/06/2022 HISTORY: ORDERING SYSTEM PROVIDED HISTORY: chest pain TECHNOLOGIST PROVIDED HISTORY: chest pain Reason for Exam: Upright port, CP FINDINGS: Heart size and pulmonary vasculature are normal.  The lungs are clear and normally expanded. Surrounding osseous and soft tissue structures are unremarkable. Normal examination. EKG  Rhythm: normal sinus   Rate: normal  Axis: left  Ectopy: none  Conduction: normal  ST Segments: no acute change  T Waves: no acute change  Q Waves: none    EKG  Impression: no acute changes and non-specific EKG     All EKG's are interpreted by the Emergency Department Physician who either signs or Co-signs this chart in the absence of a cardiologist.    MDM/EMERGENCY DEPARTMENT COURSE:  Patient refused Ativan and fluids. Did take 21 M EQ's of Effer-K for hypokalemia. Patient's symptoms improved markedly in the emergency department. Urinalysis concerning for UTI. Patient will receive prescription for doxycycline. Patient will also be sent home with prescription for meclizine for anxiety. Still awaiting second troponin. Transfer of patient care to Dr. Johanna Villarreal. At time of transfer patient hemodynamically stable, no complaints, markedly improved symptoms. Prescriptions for meclizine and doxycycline printed and signed prior to transfer of care. CRITICAL CARE:  Please see Attending Note    FINAL IMPRESSION      1.  Anxiety          DISPOSITION / PLAN     DISPOSITION      PATIENT REFERRED TO:  [facility]  [address] [place] [address]  [phone]  Go to   If symptoms worsen    DISCHARGE MEDICATIONS:  New Prescriptions    DOXYCYCLINE HYCLATE (VIBRA-TABS) 100 MG TABLET    Take 1 tablet by mouth in the morning and 1 tablet before bedtime. Do all this for 7 days.     MECLIZINE (ANTIVERT) 25 MG TABLET    Take 1 tablet by mouth 3 times daily as needed (anxiety)       Rima Prabhakar DO  Emergency Medicine Resident    (Please note that portions of this note were completed with a voice recognition program.  Efforts were made to edit the dictations but occasionally words are mis-transcribed.)       Rima Prabhakar DO  Resident  08/16/22 0450

## 2022-08-15 NOTE — DISCHARGE INSTRUCTIONS
Take your medication as indicated. For pain use ibuprofen (Motrin / Advil) or acetaminophen (Tylenol), unless prescribed medications that have acetaminophen in it. You can take over the counter acetaminophen tablets (1 - 2 tablets of the 500-mg strength every 6 hours) or ibuprofen tablets (2 tablets every 4 hours). PLEASE RETURN TO THE EMERGENCY DEPARTMENT IMMEDIATELY for worsening symptoms of increasing pain, shortness of breath, feeling of your heart fluttering or racing, swelling to your feet, unable to lay flat, or if you develop any concerning symptoms such as: high fever not relieved by acetaminophen (Tylenol) and/or ibuprofen (Motrin / Advil), chills, persistent nausea and/or vomiting, loss of consciousness, numbness, weakness or tingling in the arms or legs or change in color of the extremities, changes in mental status, persistent headache, blurry vision, loss of bladder / bowel control, unable to follow up with your physician, or other any other care or concern.

## 2022-08-15 NOTE — ED PROVIDER NOTES
[facility]     Emergency Department     Faculty Attestation    I performed a history and physical examination of the patient and discussed management with the resident. I reviewed the residents note and agree with the documented findings and plan of care. Any areas of disagreement are noted on the chart. I was personally present for the key portions of any procedures. I have documented in the chart those procedures where I was not present during the key portions. I have reviewed the emergency nurses triage note. I agree with the chief complaint, past medical history, past surgical history, allergies, medications, social and family history as documented unless otherwise noted below. For Physician Assistant/ Nurse Practitioner cases/documentation I have personally evaluated this patient and have completed at least one if not all key elements of the E/M (history, physical exam, and MDM). Additional findings are as noted. I have personally seen and evaluated the patient. I find the patient's history and physical exam are consistent with the NP/PA documentation. I agree with the care provided, treatment rendered, disposition and follow-up plan. This patient was evaluated in the Emergency Department for symptoms described in the history of present illness. The patient was evaluated in the context of the global COVID-19 pandemic, which necessitated consideration that the patient might be at risk for infection with the SARS-CoV-2 virus that causes COVID-19. Institutional protocols and algorithms that pertain to the evaluation of patients at risk for COVID-19 are in a state of rapid change based on information released by regulatory bodies including the CDC and federal and state organizations. These policies and algorithms were followed during the patient's care in the ED.     38-year-old female, recently had COVID presenting with lightheadedness, dizziness, and low pulse rate and hypoxia on home pulse oximeter. States that she had low blood pressure on her home cuff after a nap, when she was feeling dizzy. She had a heart rate in the 50s and pulse ox in the 80s on her home meter. She is concerned that this has to do with her gallbladder. Patient states that she has had problems with her gallbladder in the past, but it could not be removed earlier this year due to thyroid problems. She has not taken anything for her symptoms. Exam:  General: awake, alert, in no acute distress, with anxious affect, and ambulating without assistance  CV: regular rhythm and tachycardia  Lungs: Breathing comfortably on room air with no tachypnea, hypoxia, or increased work of breathing  Neuro: Awake, alert, moving all extremities. Able to ambulate without listing to one side or falling. EOMs intact without nystagmus. Right eye with strabismus. Pupils equal and reactive. Plan:  Work-up including CBC, electrolytes, thyroid studies, LFTs, TSH, D-dimer. Will obtain chest x-ray. Will hydrate. Will obtain ambulatory pulse ox prior to disposition decision. Patient is not febrile, tachypneic, nor hypoxic. Not currently meeting SIRS criteria. EKG with normal sinus rhythm, left axis deviation, ventricular rate of 98 bpm.  Normal intervals. No ST segment elevation or depression. Global flattening of T waves, unchanged when compared to EKG from 8/6/2022.           Lizzie Gee MD   Attending Emergency  Physician    (Please note that portions of this note were completed with a voice recognition program. Efforts were made to edit the dictations but occasionally words are mis-transcribed.)            Lizzie Gee MD  08/15/22 [id number]

## 2022-08-15 NOTE — ED NOTES
Pt presents to the ED with C/O having chest pain and dizziness. Pt stated that she woke up with blurred vision. Pt stated that she has anxiety today. Pt stated that she recently had COVID in the past couple of weeks. Pt stated that her PCP wanted her to check her oxygen saturation. Pt stated that it was under 90 percent. Pt stated that she suffers from anxiety and is out of medications. Pt has a HX of adrenal issues. Thyroid issues, gal bladder issues. Pt was placed on full cardiac monitor. Will continue to monitor and reassess.       Jaydon Manning RN  08/15/22 0015       Jaydon Manning RN  08/15/22 [id number]

## 2022-08-17 LAB
EKG ATRIAL RATE: 98 BPM
EKG P AXIS: 48 DEGREES
EKG P-R INTERVAL: 138 MS
EKG Q-T INTERVAL: 332 MS
EKG QRS DURATION: 70 MS
EKG QTC CALCULATION (BAZETT): 423 MS
EKG R AXIS: -14 DEGREES
EKG T AXIS: 60 DEGREES
EKG VENTRICULAR RATE: 98 BPM

## 2022-08-17 PROCEDURE — 93010 ELECTROCARDIOGRAM REPORT: CPT | Performed by: INTERNAL MEDICINE

## 2022-09-01 ENCOUNTER — HOSPITAL ENCOUNTER (OUTPATIENT)
Dept: PSYCHIATRY | Age: 37
Setting detail: THERAPIES SERIES
Discharge: HOME | End: 2022-09-01
Payer: MEDICARE

## 2022-09-01 PROCEDURE — 90834 PSYTX W PT 45 MINUTES: CPT | Performed by: SOCIAL WORKER

## 2022-09-02 NOTE — PROGRESS NOTES
[facility] Therapy Note in [facility] [address], [address]   9/1/2022  11:00 AM  Mar Doyle  1985  [id number]    Time spent with Patient: 45 minutes      Pt was provided informed consent for the [address]. Discussed with patient model of service to include the limits of confidentiality (i.e. abuse reporting, suicide intervention, etc.) and short-term intervention focused approach. Pt indicated understanding. Pt requested shortened session due to grocery delivery. S:  Pt presented to session and engaged in check in with therapist as pt has not attended session in two months. Therapist used active listening and offered emotional support. Pt and therapist discussed pt's medical needs and engaged in problem solving in order to address needs and tasks to be completed. Therapist used motivational interviewing to help empower pt and encourage her to be her own advocate. Therapist and pt discussed therapy goals and pt would like to work on grief. Pt also identified that she will not be able to commit to weekly sessions so pt will attend therapy as needed. O:  MSE:     Appearance    tired, alert, cooperative  Appetite abnormal: GI issues   Sleep disturbance Yes  Fatigue Yes  Loss of pleasure Yes  Impulsive behavior No  Speech    normal volume, well articulated, and slow  Mood    Depressed  Demoralization  Affect    depressed affect  Thought Content    hopelessness and cognitive distortions  Thought Process    slow  Associations    logical connections  Insight    Good  Judgment    Intact  Orientation    oriented to person, place, time, and general circumstances  Memory    recent and remote memory intact  Attention/Concentration    intact  Morbid ideation No  Suicide Assessment    no suicidal ideation    A:  Pt presented to session as tired and in low/depressed mood. Pt's affect was depressed but denied any SI or HI.   Pt presented to therapy for the first time in two months. Pt is continuously working on trying to get into see a surgeon but self-identifies that she self-sabotages or will procrastinate, which prolongs results. Pt displayed good insight into her needs and motivation and reported she will engage in as needed sessions rather than being a recurrent pt. For this reason and due to pt's medical instability and need to engage in virtual, therapist and pt will not be continuing EMDR at this time. Therapist and pt will engage in grief work and behavioral activation. Visit Diagnosis:   Post Traumatic Stress Disorder: has distressing and unwanted memories, nightmares, she feels like it is happening in front of her, gets extremely upset when something reminds her, experiences physical reactions when something reminds her, she avoids thoughts, memories and external reminders, has strong negative beliefs about herself and the world, blames herself or others, experiences strong negative feelings, has a loss of interest in activities she used to enjoy, distances herself from others, difficulty experiencing positive emotions, reports irritability, is easily startled, has difficulty concentrating, and problems falling and staying asleep.     Major Depressive Disorder, recurrent, severe: low mood nearly every day, loss of interest or motivation, difficulty with sleep,  fatigue, feelings of guilt,      History from Medical Record:        Diagnosis Date    Anxiety     Asthma     GERD (gastroesophageal reflux disease)     Hypothyroidism 5/3/2021    Major depression 4/30/2013    Morbid obesity 9/15/2017    Tension type headache 4/30/2013     Medications:   Current Outpatient Medications   Medication Sig Dispense Refill    acetaminophen (TYLENOL) 500 MG tablet Take 1 tablet by mouth every 6 hours as needed for Pain 40 tablet 0    ibuprofen (ADVIL;MOTRIN) 600 MG tablet Take 1 tablet by mouth 4 times daily as needed for Pain 40 tablet 0    hydrOXYzine pamoate (VISTARIL) 25 MG capsule Take 1 capsule by mouth 3 times daily as needed for Anxiety 30 capsule 0    pantoprazole (PROTONIX) 20 MG tablet Take 2 tablets by mouth daily for 7 days 14 tablet 0    dicyclomine (BENTYL) 10 MG capsule Take 2 capsules by mouth every 6 hours as needed (cramps) 20 capsule 0    ondansetron (ZOFRAN) 4 MG tablet Take 1 tablet by mouth every 8 hours as needed for Nausea (Patient not taking: Reported on 7/26/2021) 20 tablet 0    sertraline (ZOLOFT) 50 MG tablet Take 1 tablet by mouth daily 35 tablet 2    Ferrous Gluconate (IRON) 240 (27 Fe) MG TABS Take 1 tablet by mouth daily (Patient not taking: Reported on 7/26/2021) 30 tablet 3    ferrous sulfate (FE TABS) 325 (65 Fe) MG EC tablet Take 1 tablet by mouth daily (with breakfast) (Patient not taking: Reported on 7/26/2021) 90 tablet 3    Prenatal Vit-Fe Fumarate-FA (PRENATAL VITAMIN) 27-0.8 MG TABS Take 1 tablet by mouth daily (Patient not taking: Reported on 7/19/2021) 30 tablet 12    Cholecalciferol (VITAMIN D3) 1000 units TABS Take 1 tablet by mouth daily 90 tablet 1     No current facility-administered medications for this encounter.        Social History:   Social History     Socioeconomic History    Marital status: Single     Spouse name: Not on file    Number of children: Not on file    Years of education: Not on file    Highest education level: Not on file   Occupational History    Not on file   Tobacco Use    Smoking status: Some Days     Packs/day: 1.00     Types: Cigars, Cigarettes    Smokeless tobacco: Never    Tobacco comments:     Black and Mild   Vaping Use    Vaping Use: Never used   Substance and Sexual Activity    Alcohol use: No    Drug use: Never    Sexual activity: Yes     Partners: Male   Other Topics Concern    Not on file   Social History Narrative    Not on file     Social Determinants of Health     Financial Resource Strain: Not on file   Food Insecurity: Not on file   Transportation Needs: Not on file   Physical Activity: Not on file Stress: Not on file   Social Connections: Not on file   Intimate Partner Violence: Not on file   Housing Stability: Not on file       TOBACCO:   reports that she has been smoking cigars. She has been smoking an average of 1 pack per day. She has never used smokeless tobacco.  ETOH:   reports no history of alcohol use. Family History:   Family History   Problem Relation Age of Onset    Depression Father     Thyroid Disease Father     Diabetes Sister            Pt interventions:  Trained in strategies for increasing balanced thinking, Provided education, Discussed self-care (sleep, nutrition, rewarding activities, social support, exercise), Motivational Interviewing to increase patient confidence and compliance with adhering to behavioral change plan, Motivational Interviewing to determine importance and readiness for change, Discussed potential barriers to change, Established rapport, Supportive techniques, Problem-solving re: doctor's appts, and Reviewed Sleep Hygiene tips including: slowly switching to normal sleep schedule         PLAN:   Explore grief narratives   Grief techniques  Needs of mourning     Patient scheduled to return on:   TBD. Therapist sent pt available times  Pt to engage as needed     Were changes or additions made to the treatment plan today?   YES []   NO [x]  Noted changes:

## 2022-09-26 ENCOUNTER — CLINICAL DOCUMENTATION (OUTPATIENT)
Dept: PSYCHIATRY | Age: 37
End: 2022-09-26

## 2022-09-26 NOTE — PROGRESS NOTES
[address] [facility]) Discharge Summary  RADHA Goyal  9/26/2022  8:44 AM        [place]  1985  [id number]    Date of last contact with patient: Letter sent on 9/15/2022    Date of discharge from services at [facility]: 9/26/22      Discharge Status    [] Successful- Treatment plans/goals where reached and patient is discharged with a planned exit. [] Satisfactory- Patient left with satisfactory progress and plans/goals were partially met but without planned exit. [] Unsatisfactory- Patient is discharged with poor progress in achievement of treatment plans/goals. [] Transferred- Patient is transferred to another program or another level of service. [x] Terminated- Patient has been terminated from services due to: Pt had 21 no shows since beginning therapy in July 2021. Pt was given until 9/23/2022 to confirm interest in therapy after latest no show and did not contact therapist about continuing. Clinician Summary of the Treatment Episode and Reason for Discharge: Narrative Summary of the Treatment Episode includes presenting problem, treatment provided and outcome. Pt presented to session for virtual assessment on 7/22/2021. Throughout the course of therapy pt attended one in person appointment and the rest were virtual.  Therapist and pt developed treatment plan to address the diagnoses of Post Traumatic Stress Disorder and Major Depressive Disorder due to childhood trauma and two traumatic later term pregnancy losses. Pt and therapist used cognitive skills,  behavioral activation, mindfulness and were going to start EMDR before pt's attendance declined. Pt's mental health was also complicated by multiple medical issues that made it difficult for pt to maintain a consistent therapy schedule and sleep patterns. In the course of treatment (July 2021-August 2022) pt had 21 no show appointments and late cancellations.   Pt struggled to make progress due to medical concerns and inconsistent therapy. Pt struggled to address mental health when physical health was the main concern. After not showing to the latest appt pt was given until Friday 9/23/22 to confirm interest in therapy and to discuss schedule. Pt did not contact therapist and has thus been discharged. Clinician Recommendations and Referrals Provided (if applicable): Therapist recommends that pt continue to see psychiatrist to manage medications. Therapist also recommends pt seek out mental health services to address grief and trauma when she is able to adequately participate in therapy. Pt can reach back out to the [facility] for services and her case will be reviewed, or therapist recommends patti Bañuelos to offer pt a community mental health based approach. If pt develops any thoughts of hurting herself or others she should contact [facility] or 1 or go to the nearest emergency room.

## 2022-12-07 ENCOUNTER — OFFICE VISIT (OUTPATIENT)
Dept: SURGERY | Age: 37
End: 2022-12-07
Payer: MEDICARE

## 2022-12-07 VITALS
TEMPERATURE: 98.1 F | WEIGHT: 160 LBS | HEART RATE: 115 BPM | BODY MASS INDEX: 26.63 KG/M2 | SYSTOLIC BLOOD PRESSURE: 123 MMHG | DIASTOLIC BLOOD PRESSURE: 84 MMHG

## 2022-12-07 DIAGNOSIS — R10.11 RIGHT UPPER QUADRANT ABDOMINAL PAIN: Primary | ICD-10-CM

## 2022-12-07 PROCEDURE — 4004F PT TOBACCO SCREEN RCVD TLK: CPT | Performed by: STUDENT IN AN ORGANIZED HEALTH CARE EDUCATION/TRAINING PROGRAM

## 2022-12-07 PROCEDURE — G8419 CALC BMI OUT NRM PARAM NOF/U: HCPCS | Performed by: STUDENT IN AN ORGANIZED HEALTH CARE EDUCATION/TRAINING PROGRAM

## 2022-12-07 PROCEDURE — G8484 FLU IMMUNIZE NO ADMIN: HCPCS | Performed by: STUDENT IN AN ORGANIZED HEALTH CARE EDUCATION/TRAINING PROGRAM

## 2022-12-07 PROCEDURE — G8427 DOCREV CUR MEDS BY ELIG CLIN: HCPCS | Performed by: STUDENT IN AN ORGANIZED HEALTH CARE EDUCATION/TRAINING PROGRAM

## 2022-12-07 PROCEDURE — 99204 OFFICE O/P NEW MOD 45 MIN: CPT | Performed by: STUDENT IN AN ORGANIZED HEALTH CARE EDUCATION/TRAINING PROGRAM

## 2022-12-07 NOTE — PROGRESS NOTES
Visit Information    Have you changed or started any medications since your last visit including any over-the-counter medicines, vitamins, or herbal medicines? no   Have you stopped taking any of your medications? Is so, why? -  no  Are you having any side effects from any of your medications? - no    Have you seen any other physician or provider since your last visit?  no   Have you had any other diagnostic tests since your last visit?  no   Have you been seen in the emergency room and/or had an admission in a hospital since we last saw you?  no   Have you had your routine dental cleaning in the past 6 months?  no     Do you have an active MyChart account? If no, what is the barrier?   No:     Patient Care Team:  Vik Alberts MD as Obstetrician (Perinatology)  Loretta Iverson MD (Family Medicine)    Medical History Review  Past Medical, Family, and Social History reviewed and does not contribute to the patient presenting condition    Health Maintenance   Topic Date Due    COVID-19 Vaccine (1) Never done    Varicella vaccine (1 of 2 - 2-dose childhood series) Never done    Pneumococcal 0-64 years Vaccine (1 - PCV) Never done    DTaP/Tdap/Td vaccine (1 - Tdap) Never done    Depression Monitoring  05/05/2022    Flu vaccine (1) Never done    Cervical cancer screen  12/29/2025    Hepatitis C screen  Completed    HIV screen  Completed    Hepatitis A vaccine  Aged Out    Hib vaccine  Aged Out    Meningococcal (ACWY) vaccine  Aged Out

## 2022-12-07 NOTE — PROGRESS NOTES
History and Physical  [facility]    Patient's Name/Date of Birth: Jessie Thomas / 1985 (37 y.o.)    Date: December 7, 2022     HPI: Pt is a 43 y.o. female who presents to [facility] for evaluation of RUQ pain. Patient reports she has had RUQ pain for over a year. She reports the pain also goes to the epigastric region and radiates to the back. She does have history of H. pylori he for which she was treated while she was pregnant, but reports she was subsequently tested again after treatment and tested positive again. Has not underwent any further treatment. She has never had an endoscopy. She does continue to have severe reflux. She reports she has abdominal pain after eating anything and it does not matter whether its fatty or greasy. Recent LFTs done 4 months ago were WNL. Reviewed to prior CT scans done within the last year, from 5/22 and 2/7. The CT from 2/7 mentions multiple noncalcified gallstones. The gallbladder on CT does look contracted. Patient reports a history of multiple miscarriages, at least 3 within the last 2 and half years. She has not been worked up for any disorder to explain this. Patient is a smoker, utilizes 1/2 pack/day. She has not had any abdominal surgeries. She is not on any blood thinners. Past Medical History:   Diagnosis Date    Anxiety     Asthma     GERD (gastroesophageal reflux disease)     Hypothyroidism 5/3/2021    Major depression 4/30/2013    Morbid obesity 9/15/2017    Tension type headache 4/30/2013       No past surgical history on file.     Current Outpatient Medications   Medication Sig Dispense Refill    acetaminophen (TYLENOL) 500 MG tablet Take 1 tablet by mouth every 6 hours as needed for Pain 40 tablet 0    ibuprofen (ADVIL;MOTRIN) 600 MG tablet Take 1 tablet by mouth 4 times daily as needed for Pain 40 tablet 0    hydrOXYzine pamoate (VISTARIL) 25 MG capsule Take 1 capsule by mouth 3 times daily as needed for Anxiety 30 capsule 0    pantoprazole (PROTONIX) 20 MG tablet Take 2 tablets by mouth daily for 7 days 14 tablet 0    dicyclomine (BENTYL) 10 MG capsule Take 2 capsules by mouth every 6 hours as needed (cramps) 20 capsule 0    ondansetron (ZOFRAN) 4 MG tablet Take 1 tablet by mouth every 8 hours as needed for Nausea (Patient not taking: Reported on 7/26/2021) 20 tablet 0    sertraline (ZOLOFT) 50 MG tablet Take 1 tablet by mouth daily 35 tablet 2    Ferrous Gluconate (IRON) 240 (27 Fe) MG TABS Take 1 tablet by mouth daily (Patient not taking: Reported on 7/26/2021) 30 tablet 3    ferrous sulfate (FE TABS) 325 (65 Fe) MG EC tablet Take 1 tablet by mouth daily (with breakfast) (Patient not taking: Reported on 7/26/2021) 90 tablet 3    Prenatal Vit-Fe Fumarate-FA (PRENATAL VITAMIN) 27-0.8 MG TABS Take 1 tablet by mouth daily (Patient not taking: Reported on 7/19/2021) 30 tablet 12    Cholecalciferol (VITAMIN D3) 1000 units TABS Take 1 tablet by mouth daily 90 tablet 1     No current facility-administered medications for this visit.        Allergies   Allergen Reactions    Iodine Shortness Of Breath    Penicillin G Hives and Shortness Of Breath    Dye  [Iodides]     Benadryl [Diphenhydramine] Palpitations    Prednisone Nausea And Vomiting       Family History   Problem Relation Age of Onset    Depression Father     Thyroid Disease Father     Diabetes Sister        Social History     Socioeconomic History    Marital status: Single     Spouse name: Not on file    Number of children: Not on file    Years of education: Not on file    Highest education level: Not on file   Occupational History    Not on file   Tobacco Use    Smoking status: Some Days     Packs/day: 1.00     Types: Cigars, Cigarettes    Smokeless tobacco: Never    Tobacco comments:     Black and Mild   Vaping Use    Vaping Use: Never used   Substance and Sexual Activity    Alcohol use: No    Drug use: Never    Sexual activity: Yes     Partners: Male   Other Topics Concern    Not on file   Social History Narrative    Not on file     Social Determinants of Health     Financial Resource Strain: Not on file   Food Insecurity: Not on file   Transportation Needs: Not on file   Physical Activity: Not on file   Stress: Not on file   Social Connections: Not on file   Intimate Partner Violence: Not on file   Housing Stability: Not on file       ROS:   GEN: Denies recent weight loss, fatigue, fevers, chills. HEENT: No rhinorrhea, dysphagia, odynphagia. CV: No history of MI, recent chest pain. RESP: Denies shortness of breath, COPD, asthma. GI: Admits to abdominal pain  : Denies increased frequency or dysuria. HEM[de-identified] Denies history of anemia or DVTs. ENDO: Denies history of thyroid problems or diabetes. NEURO: Denies history of CVA, TIA. Physical Exam:  There were no vitals filed for this visit. General:A & O x3  HEENT:  NCAT, PERRL, EMOI, oral mucus membrane pink and moist, no mass palpated on neck exam  BREAST:Deferred  Heart: RRR  Lungs: Unlabored breathing on RA  Abdomen: Soft, nondistended, moderate TTP RUQ with positive Mitchell sign  RECTAL: deferred  Extremity: Normal, without deformities, edema, or skin discoloration  SKIN: Skin color, texture, turgor normal. No rashes or lesions. Neuro: CN II-XII grossly intact. No motor or sensory deficits appreciated. MK:normal throughout upper and lower extremities    Assessment     36F with 1 year of RUQ pain    Plan   Discussed the patient, history, physical, labs, imaging with Dr. Irene Chen picture may be consistent with a dysfunctional gallbladder. Her CT abdomen/pelvis does indicate multiple calcified gallstones, but the gallbladder appears abnormal.  Will order MRCP without contrast to further evaluate the patient's gallbladder. Patient has a contrast allergy. After the MRCP is done, patient may follow-up in clinic to discuss the results.   Patient will likely need in EGD prior to any cholecystectomy given her history of H. pylori and concern for ongoing gastritis or ulcerative disease. Follow-up in clinic in 2-3 weeks after MRCP is resulted. Manas Kunz DO  12/7/2022    Attending Physician Statement  I have discussed the case with Dr Rubina Benitez, including pertinent history and exam findings with the resident. I have seen and examined the patient and the key elements of the encounter have been performed by me. I agree with the assessment, plan and orders as documented by the resident.       Electronically signed by Xiao Le IV, DO  on 12/14/2022 at 10:43 AM

## 2022-12-07 NOTE — PATIENT INSTRUCTIONS
Thank you letting us take care of you today. We hope that all your questions were addressed. If a question was overlooked or something else comes to mind after you return home, please call our office at [phone]. Follow up in [facility] surgery clinic after MRCP is done. If you need to cancel or change an appointment, surgery or procedure, please contact the office at [phone].

## 2023-01-27 ENCOUNTER — HOSPITAL ENCOUNTER (EMERGENCY)
Age: 38
Discharge: HOME | End: 2023-01-27
Attending: EMERGENCY MEDICINE
Payer: MEDICARE

## 2023-01-27 VITALS
SYSTOLIC BLOOD PRESSURE: 105 MMHG | DIASTOLIC BLOOD PRESSURE: 74 MMHG | OXYGEN SATURATION: 100 % | HEART RATE: 110 BPM | TEMPERATURE: 97 F | RESPIRATION RATE: 16 BRPM

## 2023-01-27 DIAGNOSIS — N76.0 BACTERIAL VAGINOSIS: Primary | ICD-10-CM

## 2023-01-27 DIAGNOSIS — B96.89 BACTERIAL VAGINOSIS: Primary | ICD-10-CM

## 2023-01-27 LAB
ABSOLUTE EOS #: 0.05 K/UL (ref 0–0.44)
ABSOLUTE IMMATURE GRANULOCYTE: <0.03 K/UL (ref 0–0.3)
ABSOLUTE LYMPH #: 1.94 K/UL (ref 1.1–3.7)
ABSOLUTE MONO #: 0.4 K/UL (ref 0.1–1.2)
ALBUMIN SERPL-MCNC: 4.1 G/DL (ref 3.5–5.2)
ALBUMIN/GLOBULIN RATIO: 1.2 (ref 1–2.5)
ALP BLD-CCNC: 73 U/L (ref 35–104)
ALT SERPL-CCNC: 6 U/L (ref 5–33)
ANION GAP SERPL CALCULATED.3IONS-SCNC: 10 MMOL/L (ref 9–17)
AST SERPL-CCNC: 15 U/L
BASOPHILS # BLD: 1 % (ref 0–2)
BASOPHILS ABSOLUTE: 0.04 K/UL (ref 0–0.2)
BILIRUB SERPL-MCNC: 0.3 MG/DL (ref 0.3–1.2)
BILIRUBIN URINE: NEGATIVE
BUN BLDV-MCNC: 7 MG/DL (ref 6–20)
CALCIUM SERPL-MCNC: 8.9 MG/DL (ref 8.6–10.4)
CANDIDA SPECIES, DNA PROBE: NEGATIVE
CHLORIDE BLD-SCNC: 101 MMOL/L (ref 98–107)
CO2: 25 MMOL/L (ref 20–31)
COLOR: YELLOW
CREAT SERPL-MCNC: 0.6 MG/DL (ref 0.5–0.9)
EOSINOPHILS RELATIVE PERCENT: 1 % (ref 1–4)
EPITHELIAL CELLS UA: NORMAL /HPF (ref 0–5)
GARDNERELLA VAGINALIS, DNA PROBE: POSITIVE
GFR SERPL CREATININE-BSD FRML MDRD: >60 ML/MIN/1.73M2
GLUCOSE BLD-MCNC: 96 MG/DL (ref 70–99)
GLUCOSE URINE: NEGATIVE
HCG QUANTITATIVE: <1 MIU/ML
HCG(URINE) PREGNANCY TEST: NEGATIVE
HCT VFR BLD CALC: 39.6 % (ref 36.3–47.1)
HEMOGLOBIN: 12.8 G/DL (ref 11.9–15.1)
IMMATURE GRANULOCYTES: 0 %
KETONES, URINE: NEGATIVE
LEUKOCYTE ESTERASE, URINE: NEGATIVE
LYMPHOCYTES # BLD: 38 % (ref 24–43)
MCH RBC QN AUTO: 28.4 PG (ref 25.2–33.5)
MCHC RBC AUTO-ENTMCNC: 32.3 G/DL (ref 28.4–34.8)
MCV RBC AUTO: 87.8 FL (ref 82.6–102.9)
MONOCYTES # BLD: 8 % (ref 3–12)
NITRITE, URINE: NEGATIVE
NRBC AUTOMATED: 0 PER 100 WBC
PDW BLD-RTO: 14.6 % (ref 11.8–14.4)
PH UA: 5.5 (ref 5–8)
PLATELET # BLD: 248 K/UL (ref 138–453)
PMV BLD AUTO: 9.6 FL (ref 8.1–13.5)
POTASSIUM SERPL-SCNC: 3.7 MMOL/L (ref 3.7–5.3)
PROTEIN UA: NEGATIVE
RBC # BLD: 4.51 M/UL (ref 3.95–5.11)
RBC # BLD: ABNORMAL 10*6/UL
RBC UA: NORMAL /HPF (ref 0–4)
SEG NEUTROPHILS: 52 % (ref 36–65)
SEGMENTED NEUTROPHILS ABSOLUTE COUNT: 2.69 K/UL (ref 1.5–8.1)
SODIUM BLD-SCNC: 136 MMOL/L (ref 135–144)
SOURCE: ABNORMAL
SPECIFIC GRAVITY UA: 1.01 (ref 1–1.03)
TOTAL PROTEIN: 7.4 G/DL (ref 6.4–8.3)
TRICHOMONAS VAGINALIS DNA: NEGATIVE
TURBIDITY: CLEAR
URINE HGB: NEGATIVE
UROBILINOGEN, URINE: NORMAL
WBC # BLD: 5.1 K/UL (ref 3.5–11.3)
WBC UA: NORMAL /HPF (ref 0–5)

## 2023-01-27 PROCEDURE — 87086 URINE CULTURE/COLONY COUNT: CPT

## 2023-01-27 PROCEDURE — 80053 COMPREHEN METABOLIC PANEL: CPT

## 2023-01-27 PROCEDURE — 81001 URINALYSIS AUTO W/SCOPE: CPT

## 2023-01-27 PROCEDURE — 87510 GARDNER VAG DNA DIR PROBE: CPT

## 2023-01-27 PROCEDURE — 87591 N.GONORRHOEAE DNA AMP PROB: CPT

## 2023-01-27 PROCEDURE — 87480 CANDIDA DNA DIR PROBE: CPT

## 2023-01-27 PROCEDURE — 85025 COMPLETE CBC W/AUTO DIFF WBC: CPT

## 2023-01-27 PROCEDURE — 99283 EMERGENCY DEPT VISIT LOW MDM: CPT

## 2023-01-27 PROCEDURE — 81025 URINE PREGNANCY TEST: CPT

## 2023-01-27 PROCEDURE — 87660 TRICHOMONAS VAGIN DIR PROBE: CPT

## 2023-01-27 PROCEDURE — 87491 CHLMYD TRACH DNA AMP PROBE: CPT

## 2023-01-27 PROCEDURE — 84702 CHORIONIC GONADOTROPIN TEST: CPT

## 2023-01-27 RX ORDER — METRONIDAZOLE 500 MG/1
500 TABLET ORAL ONCE
Status: DISCONTINUED | OUTPATIENT
Start: 2023-01-27 | End: 2023-01-27 | Stop reason: HOSPADM

## 2023-01-27 RX ORDER — METRONIDAZOLE 500 MG/1
500 TABLET ORAL 2 TIMES DAILY
Qty: 13 TABLET | Refills: 0 | Status: SHIPPED | OUTPATIENT
Start: 2023-01-27

## 2023-01-27 ASSESSMENT — ENCOUNTER SYMPTOMS
BACK PAIN: 0
DIARRHEA: 0
SHORTNESS OF BREATH: 0
VOMITING: 0
ABDOMINAL PAIN: 1

## 2023-01-27 NOTE — ED NOTES
The following labs were labeled with patient stickers & tubed to lab;    [x]Lavender   []On Ice  []Blue  [x]Green/ Yellow  []Green/ Black []On Ice  []Pink  []Red  [x]Yellow    []COVID-19 Swab []Rapid    [x]Urine Sample  []Pelvic Cultures    []Blood Cultures       Sameer Joseph LPN  07/46/29 [id number]

## 2023-01-27 NOTE — ED NOTES
Patient walked out to nurses station stating that she wants to be discharged.       Som Siddiqui RN  01/27/23 [id number]

## 2023-01-27 NOTE — ED NOTES
Assisted Dr. Brittany Carranza  with pelvic exam, cultures obtained, pt was extremely anxious and tearful during exam.The following labs were labeled with patient stickers & tubed to lab;    []Lavender   []On Ice  []Blue  []Green/ Yellow  []Green/ Black []On Ice  []Pink  []Red  []Yellow    []COVID-19 Swab []Rapid    []Urine Sample  [x]Pelvic Cultures    []Blood Cultures         Noel Kincaid, LPN  61/92/13 [id number]

## 2023-01-27 NOTE — ED NOTES
Pt declining vaginal exam until labwork is resulted, Dr. Angela Blair updated on status.       Dinah Heard LPN  57/48/96 [id number]

## 2023-01-27 NOTE — ED PROVIDER NOTES
[facility]     Emergency Department     Faculty Attestation    I performed a history and physical examination of the patient and discussed management with the resident. I reviewed the residents note and agree with the documented findings including all diagnostic interpretations and plan of care. Any areas of disagreement are noted on the chart. I was personally present for the key portions of any procedures. I have documented in the chart those procedures where I was not present during the key portions. I have reviewed the emergency nurses triage note. I agree with the chief complaint, past medical history, past surgical history, allergies, medications, social and family history as documented unless otherwise noted below. Documentation of the HPI, Physical Exam and Medical Decision Making performed by scribsocorro is based on my personal performance of the HPI, PE and MDM. For Physician Assistant/ Nurse Practitioner cases/documentation I have personally evaluated this patient and have completed at least one if not all key elements of the E/M (history, physical exam, and MDM). Additional findings are as noted. Primary Care Physician: No primary care provider on file. VITAL SIGNS:   temperature is 97 °F (36.1 °C). Her blood pressure is 105/74 and her pulse is 110 (abnormal). Her respiration is 16 and oxygen saturation is 100%. Medical Decision Making  Concern for pregnancy. Recent reported miscarriage and typical menstrual periods and had positive pregnancy test recently. Patient is concerned as she has been using condoms and attempting to track ovulation cycle. No fever no dysuria no vaginal discharge. Will obtain labs including hCG, if positive will obtain ultrasound to evaluate further. Amount and/or Complexity of Data Reviewed  External Data Reviewed: labs and notes. Details: Prior OB/GYN  visits  Labs: ordered.           Bill Novak MD, Helena Sheehan  Attending Emergency Physician        Krystal Adan MD  01/27/23 [id number]

## 2023-01-27 NOTE — DISCHARGE INSTRUCTIONS
You did test positive for bacterial vaginosis. Please take antibiotics as prescribed. Your pregnancy test was negative. Please call the OB/GYN office to schedule a follow appointment as soon as possible. Additionally please follow with your primary care provider. Please return the emergency room if you develop any worsening or concerning symptoms.

## 2023-01-27 NOTE — ED PROVIDER NOTES
[facility]  ED  Emergency Department Encounter  Emergency Medicine Resident     Pt Abdullahi Mcneal  MRN: [id number]  Shericegfhalie 1985  Date of evaluation: 1/27/23  PCP:  No primary care provider on file. Note Started: 10:57 AM EST      CHIEF COMPLAINT       Chief Complaint   Patient presents with    Vaginal Bleeding       HISTORY OF PRESENT ILLNESS  (Location/Symptom, Timing/Onset, Context/Setting, Quality, Duration, Modifying Factors, Severity.)      Gabriel Ratliff is a 42 y.o. female who presents with concerns for pregnancy. Patient states she believes she had a miscarriage approximately 3 months ago however never followed up with providers to know if this was completed or not. States she had mild vaginal bleeding around that time. States she has had normal menstrual cycles since. States she is trying to not get pregnant so she is using at home ovulation kits to determine when she is ovulating. Additionally states she is using condoms with her partner. States the other day she accidentally took a pregnancy test from the ovulation kit thinking it was ovulation stick and it did turn positive. States she took 3 more tests after this and they all been positive. Denies any current vaginal bleeding or discharge. Denies any abdominal pain. States she does have some vaginal pain. Patient is very concerned she may have some retained products from her previous miscarriage left in her uterus. Denies any recent fevers or chills. Additionally states she has had mild abdominal pain, located mainly in the right upper quadrant, states she has issues with her gallbladder, was supposed to have her gallbladder removed, has not yet done this.     PAST MEDICAL / SURGICAL / SOCIAL / FAMILY HISTORY      has a past medical history of Anxiety, Asthma, GERD (gastroesophageal reflux disease), History of blood transfusion, Hypothyroidism, Major depression, Morbid obesity, SANTIAGO (obstructive sleep apnea), and Tension type headache.       has a past surgical history that includes ERCP. Social History     Socioeconomic History    Marital status: Single     Spouse name: Not on file    Number of children: Not on file    Years of education: Not on file    Highest education level: Not on file   Occupational History    Not on file   Tobacco Use    Smoking status: Some Days     Types: Cigars    Smokeless tobacco: Never    Tobacco comments:     Black and Mild   Vaping Use    Vaping Use: Never used   Substance and Sexual Activity    Alcohol use: Not Currently    Drug use: Never    Sexual activity: Yes     Partners: Male   Other Topics Concern    Not on file   Social History Narrative    Not on file     Social Determinants of Health     Financial Resource Strain: Not on file   Food Insecurity: Not on file   Transportation Needs: Not on file   Physical Activity: Not on file   Stress: Not on file   Social Connections: Not on file   Intimate Partner Violence: Not on file   Housing Stability: Not on file       Family History   Problem Relation Age of Onset    Depression Father     Thyroid Disease Father     Diabetes Sister        Allergies:  Iodine, Penicillin g, Dye  [iodides], Benadryl [diphenhydramine], and Prednisone    Home Medications:  Prior to Admission medications    Medication Sig Start Date End Date Taking? Authorizing Provider   metroNIDAZOLE (FLAGYL) 500 MG tablet Take 1 tablet by mouth in the morning and 1 tablet in the evening. Take with Food. Do NOT drink alcohol. . 1/27/23  Yes Mauricio Li,    albuterol sulfate HFA (VENTOLIN HFA) 108 (90 Base) MCG/ACT inhaler Inhale 2 puffs into the lungs every 6 hours as needed for Wheezing    Historical Provider, MD   acetaminophen (TYLENOL) 500 MG tablet Take 1 tablet by mouth every 6 hours as needed for Pain 8/6/22 12/12/22  Rosalia Herron MD   ibuprofen (ADVIL;MOTRIN) 600 MG tablet Take 1 tablet by mouth 4 times daily as needed for Pain 8/6/22 12/12/22  Viktor Degroot MD   hydrOXYzine pamoate (VISTARIL) 25 MG capsule Take 1 capsule by mouth 3 times daily as needed for Anxiety 7/9/22   Felisa Saldana MD   ondansetron [facility]) 4 MG tablet Take 1 tablet by mouth every 8 hours as needed for Nausea 7/24/21   Kelle Berg MD   sertraline (ZOLOFT) 50 MG tablet Take 1 tablet by mouth daily 7/19/21 12/12/22  Yonatan Hamilton DO   sodium chloride (ALTAMIST SPRAY) 0.65 % nasal spray 1 spray by Nasal route as needed for Congestion  Patient not taking: Reported on 7/26/2021 7/19/21 7/9/22  Yonatan Priya Hamilton DO   ferrous sulfate (FE TABS) 325 (65 Fe) MG EC tablet Take 1 tablet by mouth daily (with breakfast) 7/6/21   Gudelia Jones DO   Cholecalciferol (VITAMIN D3) 1000 units TABS Take 1 tablet by mouth daily 7/31/17   Robert Martinez MD       REVIEW OF SYSTEMS       Review of Systems   Constitutional:  Negative for chills and fever. HENT:  Negative for congestion. Eyes:  Negative for visual disturbance. Respiratory:  Negative for shortness of breath. Cardiovascular:  Negative for chest pain. Gastrointestinal:  Positive for abdominal pain. Negative for diarrhea and vomiting. Genitourinary:  Positive for vaginal discharge and vaginal pain. Negative for dysuria and vaginal bleeding. Musculoskeletal:  Negative for back pain. Skin:  Negative for rash. Neurological:  Negative for headaches. Psychiatric/Behavioral:  Negative for confusion. Positive for anxiety     PHYSICAL EXAM      INITIAL VITALS:   /74   Pulse (!) 110   Temp 97 °F (36.1 °C)   Resp 16   LMP 01/16/2023 (Exact Date)   SpO2 100%     Physical Exam  Vitals reviewed. Exam conducted with a chaperone present Briseida Phillips LPN). Constitutional:       General: She is not in acute distress. Appearance: Normal appearance. She is not ill-appearing. Comments: Anxious appearing   HENT:      Head: Normocephalic and atraumatic.       Right Ear: External ear normal.      Left Ear: External ear normal.      Nose: Nose normal.      Mouth/Throat:      Mouth: Mucous membranes are moist.   Eyes:      General:         Right eye: No discharge. Left eye: No discharge. Cardiovascular:      Rate and Rhythm: Normal rate and regular rhythm. Pulses: Normal pulses. Pulmonary:      Effort: Pulmonary effort is normal. No respiratory distress. Breath sounds: Normal breath sounds. Abdominal:      General: There is no distension. Palpations: Abdomen is soft. Comments: Very mild right upper quadrant tenderness, negative Mitchell sign   Genitourinary:     Comments: Pelvic exam performed chaperone present. Cervical os closed, moderate amount of discharge noted in vaginal vault, appears to be coming from cervix, white, thin. No bleeding. No cervical motion tenderness. No adnexal signs bilaterally. Some pain at vaginal introitus  Musculoskeletal:      Cervical back: Normal range of motion. Comments: Moving all 4 extremities   Skin:     General: Skin is warm. Capillary Refill: Capillary refill takes less than 2 seconds. Neurological:      General: No focal deficit present. Mental Status: She is alert. Psychiatric:      Comments: Appears anxious         DDX/DIAGNOSTIC RESULTS / EMERGENCY DEPARTMENT COURSE / MDM     Medical Decision Making  DDx: Early pregnancy, retained products, UTI, STI, vaginitis, anxiety    75-year-old female presents with concern for positive pregnancy test at home. States she is attempting to not get pregnant however accidentally took a positive pregnancy as when she thought it was an ovulation kit. Has taken multiple positive test since then. Believes she had a miscarriage approximately 3 months ago however never followed up with OB/GYN after this. Having some vaginal pain, denies any vaginal bleeding or discharge. Denies any dysuria or increased urinary frequency.   Patient appears in no acute distress initial evaluation however does appear anxious, afebrile, stable vital signs. Abdomen soft, very mild tenderness to palpation in right upper quadrant. Negative Mitchell sign. We will plan to send urinalysis. Will obtain basic abdominal labs and obtain a beta hCG quant to further evaluate. Offered patient pelvic examination however she declined. Will reevaluate. Amount and/or Complexity of Data Reviewed  Labs: ordered. Decision-making details documented in ED Course. Risk  Prescription drug management. EKG  None    All EKG's are interpreted by the Emergency Department Physician who either signs or Co-signs this chart in the absence of a cardiologist.    EMERGENCY DEPARTMENT COURSE:      ED Course as of 01/27/23 1833   Fri Jan 27, 2023   1157 HCG(Urine) Pregnancy Test: NEGATIVE [AB]   1157 WBC: 5.1 [AB]   1157 Hemoglobin Quant: 12.8 [AB]   1157 Urinalysis unremarkable [AB]   1252 hCG Quant: <1 [AB]   1306 Patient reevaluated. Updated on negative hCG quant, less than 1. Patient still very hesitant regarding this result, still concerned there may be something in her vagina or uterus. Now amenable to pelvic exam.  Will perform. [AB]   1437 Gardnerella Vaginalis, DNA Probe(!): POSITIVE [AB]   1437 We will treat for BV as patient is symptomatic. We will plan on discharge at this time with close outpatient follow-up with OB/GYN as well as PCP. Given strict return precautions. Patient agreed with plan. [AB]      ED Course User Index  [AB] Penelope Washburn DO       PROCEDURES:  None    CONSULTS:  None    CRITICAL CARE:  There was significant risk of life threatening deterioration of patient's condition requiring my direct management. Critical care time 0 minutes, excluding any documented procedures. FINAL IMPRESSION      1.  Bacterial vaginosis          DISPOSITION / PLAN     DISPOSITION Decision To Discharge 01/27/2023 02:38:00 PM      PATIENT REFERRED TO:  [facility] ED  [address] [facility] [id number]  [phone]  Go to   If symptoms worsen    [address] [id number] [place] [address]  [phone]  Schedule an appointment as soon as possible for a visit in 3 days      [facility] [address] [phone]  Schedule an appointment as soon as possible for a visit in 3 days      DISCHARGE MEDICATIONS:  Discharge Medication List as of 1/27/2023  2:40 PM        START taking these medications    Details   metroNIDAZOLE (FLAGYL) 500 MG tablet Take 1 tablet by mouth in the morning and 1 tablet in the evening. Take with Food. Do NOT drink alcohol. ., Disp-13 tablet, R-0Print             Cherryfield Abts, DO  Emergency Medicine Resident    (Please note that portions of thisnote were completed with a voice recognition program.  Efforts were made to edit the dictations but occasionally words are mis-transcribed.)        Aldean Blizzard,   Resident  01/27/23 0449

## 2023-01-27 NOTE — ED NOTES
Pt presents to the ED c/o vaginal bleeding and pelvic pain x 1 week. Pt states she believes she is pregnant and is fearful because she has had 3 previous miscarriages. Pt A&O x 4, is anxious and tearful, RR even and unlabored, resting comfortably on stretcher with eyes open and call light in reach. Vital signs obtained, medical hx and allergies reviewed with pt.  Pt placed in a gown for exam. Initial assessment performed by physician, Vu Rosario will carry out initial orders/tasks and reassess pt.         Keturah Crews LPN  19/40/67 [id number]

## 2023-01-27 NOTE — ED NOTES
Pt A&O x 4, does not appear in acute distress, RR even and unlabored, resting comfortably on stretcher with eyes closed and call light in reach.         Shabbir Silver LPN  52/98/51 1219

## 2023-01-28 LAB
CULTURE: NORMAL
SPECIMEN DESCRIPTION: NORMAL
